# Patient Record
Sex: FEMALE | Race: WHITE | NOT HISPANIC OR LATINO | URBAN - METROPOLITAN AREA
[De-identification: names, ages, dates, MRNs, and addresses within clinical notes are randomized per-mention and may not be internally consistent; named-entity substitution may affect disease eponyms.]

---

## 2017-06-07 ENCOUNTER — OUTPATIENT (OUTPATIENT)
Dept: OUTPATIENT SERVICES | Facility: HOSPITAL | Age: 82
LOS: 1 days | Discharge: HOME | End: 2017-06-07

## 2017-06-07 DIAGNOSIS — S72.009A FRACTURE OF UNSPECIFIED PART OF NECK OF UNSPECIFIED FEMUR, INITIAL ENCOUNTER FOR CLOSED FRACTURE: ICD-10-CM

## 2017-06-07 DIAGNOSIS — L03.90 CELLULITIS, UNSPECIFIED: ICD-10-CM

## 2017-06-07 DIAGNOSIS — E03.9 HYPOTHYROIDISM, UNSPECIFIED: ICD-10-CM

## 2017-06-07 DIAGNOSIS — E27.0 OTHER ADRENOCORTICAL OVERACTIVITY: ICD-10-CM

## 2017-06-07 DIAGNOSIS — M48.00 SPINAL STENOSIS, SITE UNSPECIFIED: ICD-10-CM

## 2017-06-07 DIAGNOSIS — M19.90 UNSPECIFIED OSTEOARTHRITIS, UNSPECIFIED SITE: ICD-10-CM

## 2017-06-28 ENCOUNTER — OUTPATIENT (OUTPATIENT)
Dept: OUTPATIENT SERVICES | Facility: HOSPITAL | Age: 82
LOS: 1 days | Discharge: HOME | End: 2017-06-28

## 2017-06-28 DIAGNOSIS — E53.8 DEFICIENCY OF OTHER SPECIFIED B GROUP VITAMINS: ICD-10-CM

## 2017-06-28 DIAGNOSIS — M19.90 UNSPECIFIED OSTEOARTHRITIS, UNSPECIFIED SITE: ICD-10-CM

## 2017-06-28 DIAGNOSIS — E27.0 OTHER ADRENOCORTICAL OVERACTIVITY: ICD-10-CM

## 2017-06-28 DIAGNOSIS — E03.9 HYPOTHYROIDISM, UNSPECIFIED: ICD-10-CM

## 2017-06-28 DIAGNOSIS — D50.9 IRON DEFICIENCY ANEMIA, UNSPECIFIED: ICD-10-CM

## 2017-06-28 DIAGNOSIS — R79.89 OTHER SPECIFIED ABNORMAL FINDINGS OF BLOOD CHEMISTRY: ICD-10-CM

## 2017-06-28 DIAGNOSIS — N39.0 URINARY TRACT INFECTION, SITE NOT SPECIFIED: ICD-10-CM

## 2017-06-28 DIAGNOSIS — L03.90 CELLULITIS, UNSPECIFIED: ICD-10-CM

## 2017-06-28 DIAGNOSIS — S72.009A FRACTURE OF UNSPECIFIED PART OF NECK OF UNSPECIFIED FEMUR, INITIAL ENCOUNTER FOR CLOSED FRACTURE: ICD-10-CM

## 2017-06-28 DIAGNOSIS — M48.00 SPINAL STENOSIS, SITE UNSPECIFIED: ICD-10-CM

## 2017-06-28 DIAGNOSIS — E55.9 VITAMIN D DEFICIENCY, UNSPECIFIED: ICD-10-CM

## 2017-12-03 ENCOUNTER — EMERGENCY (EMERGENCY)
Facility: HOSPITAL | Age: 82
LOS: 0 days | Discharge: NURSING, REHAB OR LONG-TERM CARE | End: 2017-12-03
Admitting: INTERNAL MEDICINE

## 2017-12-03 DIAGNOSIS — E78.00 PURE HYPERCHOLESTEROLEMIA, UNSPECIFIED: ICD-10-CM

## 2017-12-03 DIAGNOSIS — E27.0 OTHER ADRENOCORTICAL OVERACTIVITY: ICD-10-CM

## 2017-12-03 DIAGNOSIS — T83.038A LEAKAGE OF OTHER URINARY CATHETER, INITIAL ENCOUNTER: ICD-10-CM

## 2017-12-03 DIAGNOSIS — S72.009A FRACTURE OF UNSPECIFIED PART OF NECK OF UNSPECIFIED FEMUR, INITIAL ENCOUNTER FOR CLOSED FRACTURE: ICD-10-CM

## 2017-12-03 DIAGNOSIS — Z79.891 LONG TERM (CURRENT) USE OF OPIATE ANALGESIC: ICD-10-CM

## 2017-12-03 DIAGNOSIS — M48.00 SPINAL STENOSIS, SITE UNSPECIFIED: ICD-10-CM

## 2017-12-03 DIAGNOSIS — L03.90 CELLULITIS, UNSPECIFIED: ICD-10-CM

## 2017-12-03 DIAGNOSIS — M19.90 UNSPECIFIED OSTEOARTHRITIS, UNSPECIFIED SITE: ICD-10-CM

## 2017-12-03 DIAGNOSIS — E03.9 HYPOTHYROIDISM, UNSPECIFIED: ICD-10-CM
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
## 2017-12-08 ENCOUNTER — EMERGENCY (EMERGENCY)
Facility: HOSPITAL | Age: 82
LOS: 0 days | Discharge: HOME | End: 2017-12-08

## 2017-12-08 DIAGNOSIS — E03.9 HYPOTHYROIDISM, UNSPECIFIED: ICD-10-CM
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
## 2017-12-08 DIAGNOSIS — E27.0 OTHER ADRENOCORTICAL OVERACTIVITY: ICD-10-CM

## 2017-12-08 DIAGNOSIS — Z79.899 OTHER LONG TERM (CURRENT) DRUG THERAPY: ICD-10-CM

## 2017-12-08 DIAGNOSIS — T83.511A INFECTION AND INFLAMMATORY REACTION DUE TO INDWELLING URETHRAL CATHETER, INITIAL ENCOUNTER: ICD-10-CM
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
## 2017-12-08 DIAGNOSIS — S72.009A FRACTURE OF UNSPECIFIED PART OF NECK OF UNSPECIFIED FEMUR, INITIAL ENCOUNTER FOR CLOSED FRACTURE: ICD-10-CM

## 2017-12-08 DIAGNOSIS — E78.00 PURE HYPERCHOLESTEROLEMIA, UNSPECIFIED: ICD-10-CM

## 2017-12-08 DIAGNOSIS — N39.0 URINARY TRACT INFECTION, SITE NOT SPECIFIED: ICD-10-CM

## 2017-12-08 DIAGNOSIS — M19.90 UNSPECIFIED OSTEOARTHRITIS, UNSPECIFIED SITE: ICD-10-CM

## 2017-12-08 DIAGNOSIS — L03.90 CELLULITIS, UNSPECIFIED: ICD-10-CM
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
## 2017-12-08 DIAGNOSIS — M48.00 SPINAL STENOSIS, SITE UNSPECIFIED: ICD-10-CM

## 2017-12-22 ENCOUNTER — OUTPATIENT (OUTPATIENT)
Dept: OUTPATIENT SERVICES | Facility: HOSPITAL | Age: 82
LOS: 1 days | Discharge: HOME | End: 2017-12-22

## 2017-12-22 DIAGNOSIS — E27.0 OTHER ADRENOCORTICAL OVERACTIVITY: ICD-10-CM

## 2017-12-22 DIAGNOSIS — N39.0 URINARY TRACT INFECTION, SITE NOT SPECIFIED: ICD-10-CM

## 2017-12-22 DIAGNOSIS — L03.90 CELLULITIS, UNSPECIFIED: ICD-10-CM

## 2017-12-22 DIAGNOSIS — S72.009A FRACTURE OF UNSPECIFIED PART OF NECK OF UNSPECIFIED FEMUR, INITIAL ENCOUNTER FOR CLOSED FRACTURE: ICD-10-CM

## 2017-12-22 DIAGNOSIS — R41.82 ALTERED MENTAL STATUS, UNSPECIFIED: ICD-10-CM

## 2017-12-22 DIAGNOSIS — M19.90 UNSPECIFIED OSTEOARTHRITIS, UNSPECIFIED SITE: ICD-10-CM

## 2017-12-22 DIAGNOSIS — M48.00 SPINAL STENOSIS, SITE UNSPECIFIED: ICD-10-CM

## 2017-12-22 DIAGNOSIS — E03.9 HYPOTHYROIDISM, UNSPECIFIED: ICD-10-CM

## 2018-03-13 ENCOUNTER — OUTPATIENT (OUTPATIENT)
Dept: OUTPATIENT SERVICES | Facility: HOSPITAL | Age: 83
LOS: 1 days | Discharge: HOME | End: 2018-03-13

## 2018-03-13 DIAGNOSIS — R79.89 OTHER SPECIFIED ABNORMAL FINDINGS OF BLOOD CHEMISTRY: ICD-10-CM

## 2018-03-13 DIAGNOSIS — D64.9 ANEMIA, UNSPECIFIED: ICD-10-CM

## 2018-03-20 ENCOUNTER — OUTPATIENT (OUTPATIENT)
Dept: OUTPATIENT SERVICES | Facility: HOSPITAL | Age: 83
LOS: 1 days | Discharge: HOME | End: 2018-03-20

## 2018-03-20 DIAGNOSIS — R79.89 OTHER SPECIFIED ABNORMAL FINDINGS OF BLOOD CHEMISTRY: ICD-10-CM

## 2018-03-20 DIAGNOSIS — D64.9 ANEMIA, UNSPECIFIED: ICD-10-CM

## 2018-03-28 ENCOUNTER — OUTPATIENT (OUTPATIENT)
Dept: OUTPATIENT SERVICES | Facility: HOSPITAL | Age: 83
LOS: 1 days | Discharge: HOME | End: 2018-03-28

## 2018-03-28 DIAGNOSIS — R79.89 OTHER SPECIFIED ABNORMAL FINDINGS OF BLOOD CHEMISTRY: ICD-10-CM

## 2018-05-03 ENCOUNTER — OUTPATIENT (OUTPATIENT)
Dept: OUTPATIENT SERVICES | Facility: HOSPITAL | Age: 83
LOS: 1 days | Discharge: HOME | End: 2018-05-03

## 2018-05-03 DIAGNOSIS — R79.89 OTHER SPECIFIED ABNORMAL FINDINGS OF BLOOD CHEMISTRY: ICD-10-CM

## 2018-06-08 ENCOUNTER — OUTPATIENT (OUTPATIENT)
Dept: OUTPATIENT SERVICES | Facility: HOSPITAL | Age: 83
LOS: 1 days | Discharge: HOME | End: 2018-06-08

## 2018-06-08 DIAGNOSIS — R79.89 OTHER SPECIFIED ABNORMAL FINDINGS OF BLOOD CHEMISTRY: ICD-10-CM

## 2018-07-30 ENCOUNTER — OUTPATIENT (OUTPATIENT)
Dept: OUTPATIENT SERVICES | Facility: HOSPITAL | Age: 83
LOS: 1 days | Discharge: HOME | End: 2018-07-30

## 2018-07-30 DIAGNOSIS — R60.9 EDEMA, UNSPECIFIED: ICD-10-CM

## 2018-07-30 DIAGNOSIS — D64.9 ANEMIA, UNSPECIFIED: ICD-10-CM

## 2018-08-09 ENCOUNTER — OUTPATIENT (OUTPATIENT)
Dept: OUTPATIENT SERVICES | Facility: HOSPITAL | Age: 83
LOS: 1 days | Discharge: HOME | End: 2018-08-09

## 2018-08-09 DIAGNOSIS — Z02.9 ENCOUNTER FOR ADMINISTRATIVE EXAMINATIONS, UNSPECIFIED: ICD-10-CM

## 2018-08-13 ENCOUNTER — OUTPATIENT (OUTPATIENT)
Dept: OUTPATIENT SERVICES | Facility: HOSPITAL | Age: 83
LOS: 1 days | Discharge: HOME | End: 2018-08-13

## 2018-08-13 DIAGNOSIS — D64.9 ANEMIA, UNSPECIFIED: ICD-10-CM

## 2018-08-13 DIAGNOSIS — R60.9 EDEMA, UNSPECIFIED: ICD-10-CM

## 2018-10-02 ENCOUNTER — OUTPATIENT (OUTPATIENT)
Dept: OUTPATIENT SERVICES | Facility: HOSPITAL | Age: 83
LOS: 1 days | Discharge: HOME | End: 2018-10-02

## 2018-10-02 DIAGNOSIS — R79.89 OTHER SPECIFIED ABNORMAL FINDINGS OF BLOOD CHEMISTRY: ICD-10-CM

## 2018-10-02 DIAGNOSIS — D64.9 ANEMIA, UNSPECIFIED: ICD-10-CM

## 2018-11-05 ENCOUNTER — OUTPATIENT (OUTPATIENT)
Dept: OUTPATIENT SERVICES | Facility: HOSPITAL | Age: 83
LOS: 1 days | Discharge: HOME | End: 2018-11-05

## 2018-11-05 DIAGNOSIS — R22.9 LOCALIZED SWELLING, MASS AND LUMP, UNSPECIFIED: ICD-10-CM

## 2018-11-05 DIAGNOSIS — R79.89 OTHER SPECIFIED ABNORMAL FINDINGS OF BLOOD CHEMISTRY: ICD-10-CM

## 2018-11-09 ENCOUNTER — INPATIENT (INPATIENT)
Facility: HOSPITAL | Age: 83
LOS: 11 days | Discharge: NURSING, REHAB OR LONG-TERM CARE | End: 2018-11-21
Attending: INTERNAL MEDICINE | Admitting: INTERNAL MEDICINE
Payer: MEDICARE

## 2018-11-09 VITALS
HEART RATE: 76 BPM | DIASTOLIC BLOOD PRESSURE: 78 MMHG | SYSTOLIC BLOOD PRESSURE: 184 MMHG | OXYGEN SATURATION: 100 % | TEMPERATURE: 99 F | RESPIRATION RATE: 18 BRPM

## 2018-11-09 LAB
ALBUMIN SERPL ELPH-MCNC: 4 G/DL — SIGNIFICANT CHANGE UP (ref 3.5–5.2)
ALP SERPL-CCNC: 100 U/L — SIGNIFICANT CHANGE UP (ref 30–115)
ALT FLD-CCNC: 21 U/L — SIGNIFICANT CHANGE UP (ref 0–41)
ANION GAP SERPL CALC-SCNC: 18 MMOL/L — HIGH (ref 7–14)
APTT BLD: 29.2 SEC — SIGNIFICANT CHANGE UP (ref 27–39.2)
AST SERPL-CCNC: 22 U/L — SIGNIFICANT CHANGE UP (ref 0–41)
BASE EXCESS BLDV CALC-SCNC: 0.2 MMOL/L — SIGNIFICANT CHANGE UP (ref -2–2)
BASOPHILS # BLD AUTO: 0.03 K/UL — SIGNIFICANT CHANGE UP (ref 0–0.2)
BASOPHILS NFR BLD AUTO: 0.2 % — SIGNIFICANT CHANGE UP (ref 0–1)
BILIRUB SERPL-MCNC: 0.5 MG/DL — SIGNIFICANT CHANGE UP (ref 0.2–1.2)
BLD GP AB SCN SERPL QL: SIGNIFICANT CHANGE UP
BUN SERPL-MCNC: 7 MG/DL — LOW (ref 10–20)
CA-I SERPL-SCNC: 1.08 MMOL/L — LOW (ref 1.12–1.3)
CALCIUM SERPL-MCNC: 8.7 MG/DL — SIGNIFICANT CHANGE UP (ref 8.5–10.1)
CHLORIDE SERPL-SCNC: 76 MMOL/L — LOW (ref 98–110)
CO2 SERPL-SCNC: 21 MMOL/L — SIGNIFICANT CHANGE UP (ref 17–32)
CREAT SERPL-MCNC: 0.5 MG/DL — LOW (ref 0.7–1.5)
EOSINOPHIL # BLD AUTO: 0.02 K/UL — SIGNIFICANT CHANGE UP (ref 0–0.7)
EOSINOPHIL NFR BLD AUTO: 0.2 % — SIGNIFICANT CHANGE UP (ref 0–8)
GAS PNL BLDV: 114 MMOL/L — LOW (ref 136–145)
GAS PNL BLDV: SIGNIFICANT CHANGE UP
GLUCOSE SERPL-MCNC: 148 MG/DL — HIGH (ref 70–99)
HCO3 BLDV-SCNC: 25 MMOL/L — SIGNIFICANT CHANGE UP (ref 22–29)
HCT VFR BLD CALC: 31.8 % — LOW (ref 37–47)
HCT VFR BLDA CALC: 37.8 % — SIGNIFICANT CHANGE UP (ref 34–44)
HGB BLD CALC-MCNC: 12.3 G/DL — LOW (ref 14–18)
HGB BLD-MCNC: 11.3 G/DL — LOW (ref 12–16)
IMM GRANULOCYTES NFR BLD AUTO: 0.5 % — HIGH (ref 0.1–0.3)
INR BLD: 1.05 RATIO — SIGNIFICANT CHANGE UP (ref 0.65–1.3)
LACTATE BLDV-MCNC: 3.1 MMOL/L — HIGH (ref 0.5–1.6)
LYMPHOCYTES # BLD AUTO: 1.73 K/UL — SIGNIFICANT CHANGE UP (ref 1.2–3.4)
LYMPHOCYTES # BLD AUTO: 13.3 % — LOW (ref 20.5–51.1)
MCHC RBC-ENTMCNC: 30 PG — SIGNIFICANT CHANGE UP (ref 27–31)
MCHC RBC-ENTMCNC: 35.5 G/DL — SIGNIFICANT CHANGE UP (ref 32–37)
MCV RBC AUTO: 84.4 FL — SIGNIFICANT CHANGE UP (ref 81–99)
MONOCYTES # BLD AUTO: 1.23 K/UL — HIGH (ref 0.1–0.6)
MONOCYTES NFR BLD AUTO: 9.5 % — HIGH (ref 1.7–9.3)
NEUTROPHILS # BLD AUTO: 9.91 K/UL — HIGH (ref 1.4–6.5)
NEUTROPHILS NFR BLD AUTO: 76.3 % — HIGH (ref 42.2–75.2)
NRBC # BLD: 0 /100 WBCS — SIGNIFICANT CHANGE UP (ref 0–0)
PCO2 BLDV: 41 MMHG — SIGNIFICANT CHANGE UP (ref 41–51)
PH BLDV: 7.4 — SIGNIFICANT CHANGE UP (ref 7.26–7.43)
PLATELET # BLD AUTO: 258 K/UL — SIGNIFICANT CHANGE UP (ref 130–400)
PO2 BLDV: 47 MMHG — HIGH (ref 20–40)
POTASSIUM BLDV-SCNC: 3.8 MMOL/L — SIGNIFICANT CHANGE UP (ref 3.3–5.6)
POTASSIUM SERPL-MCNC: 4.2 MMOL/L — SIGNIFICANT CHANGE UP (ref 3.5–5)
POTASSIUM SERPL-SCNC: 4.2 MMOL/L — SIGNIFICANT CHANGE UP (ref 3.5–5)
PROT SERPL-MCNC: 7 G/DL — SIGNIFICANT CHANGE UP (ref 6–8)
PROTHROM AB SERPL-ACNC: 12.1 SEC — SIGNIFICANT CHANGE UP (ref 9.95–12.87)
RBC # BLD: 3.77 M/UL — LOW (ref 4.2–5.4)
RBC # FLD: 13.2 % — SIGNIFICANT CHANGE UP (ref 11.5–14.5)
SAO2 % BLDV: 82 % — SIGNIFICANT CHANGE UP
SODIUM SERPL-SCNC: 115 MMOL/L — CRITICAL LOW (ref 135–146)
TYPE + AB SCN PNL BLD: SIGNIFICANT CHANGE UP
WBC # BLD: 12.98 K/UL — HIGH (ref 4.8–10.8)
WBC # FLD AUTO: 12.98 K/UL — HIGH (ref 4.8–10.8)

## 2018-11-09 RX ORDER — SODIUM CHLORIDE 9 MG/ML
1000 INJECTION INTRAMUSCULAR; INTRAVENOUS; SUBCUTANEOUS ONCE
Qty: 0 | Refills: 0 | Status: COMPLETED | OUTPATIENT
Start: 2018-11-09 | End: 2018-11-09

## 2018-11-09 RX ORDER — ONDANSETRON 8 MG/1
4 TABLET, FILM COATED ORAL ONCE
Qty: 0 | Refills: 0 | Status: COMPLETED | OUTPATIENT
Start: 2018-11-09 | End: 2018-11-09

## 2018-11-09 RX ADMIN — SODIUM CHLORIDE 1000 MILLILITER(S): 9 INJECTION INTRAMUSCULAR; INTRAVENOUS; SUBCUTANEOUS at 23:33

## 2018-11-09 RX ADMIN — ONDANSETRON 4 MILLIGRAM(S): 8 TABLET, FILM COATED ORAL at 21:01

## 2018-11-09 RX ADMIN — SODIUM CHLORIDE 2000 MILLILITER(S): 9 INJECTION INTRAMUSCULAR; INTRAVENOUS; SUBCUTANEOUS at 22:56

## 2018-11-09 NOTE — ED PROVIDER NOTE - OBJECTIVE STATEMENT
89 y/o female with PMH of HLD, hypothyroidism, osteoarthritis, depression s/p right hip replacement who presents to ED for right hip pain s/p fall. Pt states that yesterday she fell while walking with her walker and landed on her buttock. PT states she fell due to her neuropathy. After fall, pt was unable to stand or ambulate. Pts granddaughter called an xray tech to her home who did an xray which found a fracture prompting pt come to ED for evaluation. No head injury. Remembers entire incident. No weakness, chest pain, shortness of breath, abdominal pain.     Orthopedist: Dr. Dixon

## 2018-11-09 NOTE — ED ADULT NURSE NOTE - NSFALLRSKHRMRISKTYPE_ED_ALL_ED
bones(Osteoporosis,prev fx,steroid use,metastatic bone ca)/other/surgery(72hr postop, recent leg amputee, sig. abd/thor surg)

## 2018-11-09 NOTE — CONSULT NOTE ADULT - ASSESSMENT
plan  right periprosthetic distal femur fracture     pain control   dvt proph   non weight bearing knee immobilizer   fu labs   serial cbc   ice to knee   history of swollen right lower ext obtain duplex   compartment checks   nv checks   surgical options discussed with family   will discuss case with attending plan  right periprosthetic distal femur fracture     pain control   dvt proph   non weight bearing knee immobilizer   fu labs   serial cbc   ice to knee   history of swollen right lower ext obtain duplex   compartment checks   nv checks   surgical options discussed with family   will discuss case with attending   pt seen and examined  will likely needed ORIF  when stable  sodium still low

## 2018-11-09 NOTE — ED ADULT NURSE NOTE - OBJECTIVE STATEMENT
pt c/o right hip pain s/p fall. pt recently had right hip replacement, was ambulating with walker and had mechanical fall. has xray out pt and was told she has a fx. Denies head trauma, denies LOC.

## 2018-11-09 NOTE — ED PROVIDER NOTE - ATTENDING CONTRIBUTION TO CARE
89 yo F with h/o HL, hypothyroidism, OA, depression, s/p R hip pinning p/w right hip pain. Pt states that she was walking with her aide yesterday and had two falls. Pt states that the falls were 2/2 to her neuropathy. Pt states that she was able to get up after her fall the first time but after second fall, was unable to get up 2/2 to twisting her right leg. Pt denies any head injury or LOC. Pt is not currently on any anticoagulation. Pt states that she has been unable to ambulate since her second fall. Pt currently c/o pain and swelling to her right thigh and lower leg. a/p: elevated blood pressure, pt appears in mild distress, nontoxic appearing, ncat, perrla, norm cardiac exam, lungs cta b/l, no w/r/r, abd is soft and nt, +shortened and externally rotated right lower leg, +swelling to right knee/upper calf, +soft compartments, +able to faintly palpate right DP, +sensation intact to light touch throughout RLE, able to move toes on right foot, restricted ROM of right knee/hip, will check xr, labs and reassess

## 2018-11-09 NOTE — ED PROVIDER NOTE - NS ED ROS FT
Review of Systems:  •	CONSTITUTIONAL - No fever, No diaphoresis, No weight change  •	SKIN - No rash  •	HEMATOLOGIC - No abnormal bleeding or bruising  •	EYES - No eye pain, No blurred vision  •	ENT - No change in hearing, No sore throat, No neck pain, No rhinorrhea, No ear pain  •	RESPIRATORY - No shortness of breath, No cough  •	CARDIAC -No chest pain, No palpitations  •	GI - No abdominal pain, No nausea, No vomiting, No diarrhea, No constipation, No bright red blood per rectum or melena. No flank pain  •                 - No dysuria, frequency, hematuria.   •	ENDO - No polydypsia, No polyuria, No heat/cold intolerance  •	MUSCULOSKELETAL - No joint paint, No swelling, No back pain. + neuropathy. Ambulate with walker at baseline.  •	NEUROLOGIC - No numbness, No focal weakness, No headache, No dizziness  All other systems negative, unless specified in HPI

## 2018-11-09 NOTE — ED ADULT NURSE NOTE - NSIMPLEMENTINTERV_GEN_ALL_ED
Implemented All Fall with Harm Risk Interventions:  [place] to call system. Call bell, personal items and telephone within reach. Instruct patient to call for assistance. Room bathroom lighting operational. Non-slip footwear when patient is off stretcher. Physically safe environment: no spills, clutter or unnecessary equipment. Stretcher in lowest position, wheels locked, appropriate side rails in place. Provide visual cue, wrist band, yellow gown, etc. Monitor gait and stability. Monitor for mental status changes and reorient to person, place, and time. Review medications for side effects contributing to fall risk. Reinforce activity limits and safety measures with patient and family. Provide visual clues: red socks.

## 2018-11-09 NOTE — CONSULT NOTE ADULT - SUBJECTIVE AND OBJECTIVE BOX
Orthopaedic Surgery Consult Note    For Surgeon:    HPI:  89yFemale  Patient is a 89y old  Female who presents with a chief complaint of twisting injury yesterday and presents with right lower ext pain.    Pt s/p right hip orif 2016 and has long imn.  Pt ambulates with walker.  As per family right lower ext has been swollen       Allergies    No Known Allergies    Intolerances      PAST MEDICAL & SURGICAL HISTORY:  Depression  OA (osteoarthritis)  Hypothyroid  HLD (hyperlipidemia)    MEDICATIONS  (STANDING):    MEDICATIONS  (PRN):      Vital Signs Last 24 Hrs  T(C): 37.1 (09 Nov 2018 19:04), Max: 37.1 (09 Nov 2018 19:04)  T(F): 98.8 (09 Nov 2018 19:04), Max: 98.8 (09 Nov 2018 19:04)  HR: 76 (09 Nov 2018 19:04) (76 - 76)  BP: 184/78 (09 Nov 2018 19:04) (184/78 - 184/78)  BP(mean): --  RR: 18 (09 Nov 2018 19:04) (18 - 18)  SpO2: 100% (09 Nov 2018 19:04) (100% - 100%)                              11.3   12.98 )-----------( 258      ( 09 Nov 2018 20:50 )             31.8     11-09    115<LL>  |  76<L>  |  7<L>  ----------------------------<  148<H>  4.2   |  21  |  0.5<L>    Ca    8.7      09 Nov 2018 20:50    TPro  7.0  /  Alb  4.0  /  TBili  0.5  /  DBili  x   /  AST  22  /  ALT  21  /  AlkPhos  100  11-09    PT/INR - ( 09 Nov 2018 20:50 )   PT: 12.10 sec;   INR: 1.05 ratio         PTT - ( 09 Nov 2018 20:50 )  PTT:29.2 sec  Imaging: right femur: s/p long imn with distal femur fracture     PE: right lower ext: swelling present of entire right lower ext, thigh and calf compressible, not tense, skin intact, df/pf intact sensation intact, pulse not palatable due to swelling, was found on ultrasound by ed staff, cap refill<2 seconds, foot is warm   no pain at ankle moving toes, remainder of pe unremarkable

## 2018-11-09 NOTE — ED PROVIDER NOTE - PROGRESS NOTE DETAILS
Ortho consulted. US at bedside shows +R DP pulsations, though slightly diminished from L DP. Repeat vbg sent 2/2 to hyponatremia noted on labs. bedside sono: + b/l pulses in the le discussed case with ICU, Dr. Boaz Conrad, no need for ICU

## 2018-11-09 NOTE — ED PROVIDER NOTE - MEDICAL DECISION MAKING DETAILS
89 yo F with h/o HL, hypothyroidism, OA, depression, s/p R hip pinning p/w right hip pain. Pt states that she was walking with her aide yesterday and had two falls. Pt diagnosed with distal right femur fracture. Pt seen by ortho. Pt also noted with hyponatremia on labs, not symptomatic at this time. Pt case discussed with ICU, no need for ICU monitoring at this time. Pt stable for admission to medical floor for furthe rmonitoring and ortho intervention. +pulses intact on exam in ED but will need to be monitored

## 2018-11-09 NOTE — ED PROVIDER NOTE - PHYSICAL EXAMINATION
CONSTITUTIONAL: Well-developed; well-nourished; in no acute distress.   SKIN: Warm, dry  HEAD: Normocephalic; atraumatic.  EYES: no conjunctival injection. PERRL.   ENT: No nasal discharge; airway clear.  NECK: Supple; non tender.  CARD: S1, S2 normal; no murmurs, gallops, or rubs. Regular rate and rhythm.   RESP: No wheezes, rales or rhonchi.  ABD: soft ntnd  EXT: Normal ROM.  No clubbing, cyanosis or edema.   : Musa cath in place.   LYMPH: No acute cervical adenopathy.  NEURO: Alert, oriented, grossly unremarkable  PSYCH: Cooperative, appropriate.

## 2018-11-10 DIAGNOSIS — R09.89 OTHER SPECIFIED SYMPTOMS AND SIGNS INVOLVING THE CIRCULATORY AND RESPIRATORY SYSTEMS: ICD-10-CM

## 2018-11-10 DIAGNOSIS — Z96.7 PRESENCE OF OTHER BONE AND TENDON IMPLANTS: Chronic | ICD-10-CM

## 2018-11-10 LAB
ALBUMIN SERPL ELPH-MCNC: 4.1 G/DL — SIGNIFICANT CHANGE UP (ref 3.5–5.2)
ALP SERPL-CCNC: 100 U/L — SIGNIFICANT CHANGE UP (ref 30–115)
ALT FLD-CCNC: 19 U/L — SIGNIFICANT CHANGE UP (ref 0–41)
ANION GAP SERPL CALC-SCNC: 18 MMOL/L — HIGH (ref 7–14)
APPEARANCE UR: ABNORMAL
APTT BLD: 30.3 SEC — SIGNIFICANT CHANGE UP (ref 27–39.2)
AST SERPL-CCNC: 18 U/L — SIGNIFICANT CHANGE UP (ref 0–41)
BASE EXCESS BLDA CALC-SCNC: 1.5 MMOL/L — SIGNIFICANT CHANGE UP (ref -2–2)
BASOPHILS # BLD AUTO: 0.02 K/UL — SIGNIFICANT CHANGE UP (ref 0–0.2)
BASOPHILS NFR BLD AUTO: 0.2 % — SIGNIFICANT CHANGE UP (ref 0–1)
BILIRUB SERPL-MCNC: 0.5 MG/DL — SIGNIFICANT CHANGE UP (ref 0.2–1.2)
BILIRUB UR-MCNC: NEGATIVE — SIGNIFICANT CHANGE UP
BUN SERPL-MCNC: 7 MG/DL — LOW (ref 10–20)
CALCIUM SERPL-MCNC: 8.4 MG/DL — LOW (ref 8.5–10.1)
CHLORIDE SERPL-SCNC: 79 MMOL/L — LOW (ref 98–110)
CHOLEST SERPL-MCNC: 134 MG/DL — SIGNIFICANT CHANGE UP (ref 100–200)
CO2 SERPL-SCNC: 23 MMOL/L — SIGNIFICANT CHANGE UP (ref 17–32)
COLOR SPEC: YELLOW — SIGNIFICANT CHANGE UP
CREAT ?TM UR-MCNC: 26 MG/DL — SIGNIFICANT CHANGE UP
CREAT SERPL-MCNC: 0.5 MG/DL — LOW (ref 0.7–1.5)
DIFF PNL FLD: ABNORMAL
EOSINOPHIL # BLD AUTO: 0.03 K/UL — SIGNIFICANT CHANGE UP (ref 0–0.7)
EOSINOPHIL NFR BLD AUTO: 0.3 % — SIGNIFICANT CHANGE UP (ref 0–8)
ESTIMATED AVERAGE GLUCOSE: 120 MG/DL — HIGH (ref 68–114)
GLUCOSE BLDC GLUCOMTR-MCNC: 149 MG/DL — HIGH (ref 70–99)
GLUCOSE BLDC GLUCOMTR-MCNC: 156 MG/DL — HIGH (ref 70–99)
GLUCOSE SERPL-MCNC: 118 MG/DL — HIGH (ref 70–99)
GLUCOSE UR QL: NEGATIVE MG/DL — SIGNIFICANT CHANGE UP
HBA1C BLD-MCNC: 5.8 % — HIGH (ref 4–5.6)
HCO3 BLDA-SCNC: 26 MMOL/L — SIGNIFICANT CHANGE UP (ref 23–27)
HCT VFR BLD CALC: 30.2 % — LOW (ref 37–47)
HDLC SERPL-MCNC: 51 MG/DL — SIGNIFICANT CHANGE UP
HGB BLD-MCNC: 10.6 G/DL — LOW (ref 12–16)
IMM GRANULOCYTES NFR BLD AUTO: 0.2 % — SIGNIFICANT CHANGE UP (ref 0.1–0.3)
INR BLD: 1.04 RATIO — SIGNIFICANT CHANGE UP (ref 0.65–1.3)
KETONES UR-MCNC: NEGATIVE — SIGNIFICANT CHANGE UP
LACTATE SERPL-SCNC: 1.8 MMOL/L — SIGNIFICANT CHANGE UP (ref 0.5–2.2)
LEUKOCYTE ESTERASE UR-ACNC: ABNORMAL
LIPID PNL WITH DIRECT LDL SERPL: 64 MG/DL — SIGNIFICANT CHANGE UP (ref 4–129)
LYMPHOCYTES # BLD AUTO: 1.55 K/UL — SIGNIFICANT CHANGE UP (ref 1.2–3.4)
LYMPHOCYTES # BLD AUTO: 17.4 % — LOW (ref 20.5–51.1)
MAGNESIUM SERPL-MCNC: 1.8 MG/DL — SIGNIFICANT CHANGE UP (ref 1.8–2.4)
MCHC RBC-ENTMCNC: 29.7 PG — SIGNIFICANT CHANGE UP (ref 27–31)
MCHC RBC-ENTMCNC: 35.1 G/DL — SIGNIFICANT CHANGE UP (ref 32–37)
MCV RBC AUTO: 84.6 FL — SIGNIFICANT CHANGE UP (ref 81–99)
MONOCYTES # BLD AUTO: 1.12 K/UL — HIGH (ref 0.1–0.6)
MONOCYTES NFR BLD AUTO: 12.6 % — HIGH (ref 1.7–9.3)
NEUTROPHILS # BLD AUTO: 6.15 K/UL — SIGNIFICANT CHANGE UP (ref 1.4–6.5)
NEUTROPHILS NFR BLD AUTO: 69.3 % — SIGNIFICANT CHANGE UP (ref 42.2–75.2)
NITRITE UR-MCNC: POSITIVE
OSMOLALITY UR: 276 MOS/KG — SIGNIFICANT CHANGE UP (ref 50–1400)
PCO2 BLDA: 38 MMHG — SIGNIFICANT CHANGE UP (ref 38–42)
PH BLDA: 7.44 — HIGH (ref 7.38–7.42)
PH UR: 6 — SIGNIFICANT CHANGE UP (ref 5–8)
PHOSPHATE SERPL-MCNC: 3.6 MG/DL — SIGNIFICANT CHANGE UP (ref 2.1–4.9)
PLATELET # BLD AUTO: 266 K/UL — SIGNIFICANT CHANGE UP (ref 130–400)
PO2 BLDA: 62 MMHG — LOW (ref 78–95)
POTASSIUM SERPL-MCNC: 4 MMOL/L — SIGNIFICANT CHANGE UP (ref 3.5–5)
POTASSIUM SERPL-SCNC: 4 MMOL/L — SIGNIFICANT CHANGE UP (ref 3.5–5)
PROT SERPL-MCNC: 6.9 G/DL — SIGNIFICANT CHANGE UP (ref 6–8)
PROT UR-MCNC: NEGATIVE MG/DL — SIGNIFICANT CHANGE UP
PROTHROM AB SERPL-ACNC: 12 SEC — SIGNIFICANT CHANGE UP (ref 9.95–12.87)
RBC # BLD: 3.57 M/UL — LOW (ref 4.2–5.4)
RBC # FLD: 13.3 % — SIGNIFICANT CHANGE UP (ref 11.5–14.5)
SAO2 % BLDA: 92 % — LOW (ref 94–98)
SODIUM SERPL-SCNC: 120 MMOL/L — LOW (ref 135–146)
SODIUM UR-SCNC: 63 MMOL/L — SIGNIFICANT CHANGE UP
SP GR SPEC: 1.01 — SIGNIFICANT CHANGE UP (ref 1.01–1.03)
TOTAL CHOLESTEROL/HDL RATIO MEASUREMENT: 2.6 RATIO — LOW (ref 4–5.5)
TRIGL SERPL-MCNC: 160 MG/DL — HIGH (ref 10–149)
TSH SERPL-MCNC: 4.75 UIU/ML — HIGH (ref 0.27–4.2)
TYPE + AB SCN PNL BLD: SIGNIFICANT CHANGE UP
UROBILINOGEN FLD QL: 0.2 MG/DL — SIGNIFICANT CHANGE UP (ref 0.2–0.2)
UUN UR-MCNC: 163 MG/DL — SIGNIFICANT CHANGE UP
WBC # BLD: 8.89 K/UL — SIGNIFICANT CHANGE UP (ref 4.8–10.8)
WBC # FLD AUTO: 8.89 K/UL — SIGNIFICANT CHANGE UP (ref 4.8–10.8)

## 2018-11-10 PROCEDURE — 93970 EXTREMITY STUDY: CPT | Mod: 26

## 2018-11-10 RX ORDER — HEPARIN SODIUM 5000 [USP'U]/ML
5000 INJECTION INTRAVENOUS; SUBCUTANEOUS EVERY 8 HOURS
Qty: 0 | Refills: 0 | Status: DISCONTINUED | OUTPATIENT
Start: 2018-11-10 | End: 2018-11-14

## 2018-11-10 RX ORDER — SIMVASTATIN 20 MG/1
5 TABLET, FILM COATED ORAL AT BEDTIME
Qty: 0 | Refills: 0 | Status: DISCONTINUED | OUTPATIENT
Start: 2018-11-10 | End: 2018-11-14

## 2018-11-10 RX ORDER — ONDANSETRON 8 MG/1
4 TABLET, FILM COATED ORAL ONCE
Qty: 0 | Refills: 0 | Status: COMPLETED | OUTPATIENT
Start: 2018-11-10 | End: 2018-11-10

## 2018-11-10 RX ORDER — LEVOTHYROXINE SODIUM 125 MCG
25 TABLET ORAL DAILY
Qty: 0 | Refills: 0 | Status: DISCONTINUED | OUTPATIENT
Start: 2018-11-10 | End: 2018-11-14

## 2018-11-10 RX ORDER — CEFTRIAXONE 500 MG/1
1 INJECTION, POWDER, FOR SOLUTION INTRAMUSCULAR; INTRAVENOUS EVERY 24 HOURS
Qty: 0 | Refills: 0 | Status: DISCONTINUED | OUTPATIENT
Start: 2018-11-10 | End: 2018-11-14

## 2018-11-10 RX ORDER — SODIUM CHLORIDE 9 MG/ML
1000 INJECTION INTRAMUSCULAR; INTRAVENOUS; SUBCUTANEOUS
Qty: 0 | Refills: 0 | Status: DISCONTINUED | OUTPATIENT
Start: 2018-11-10 | End: 2018-11-10

## 2018-11-10 RX ORDER — CELECOXIB 200 MG/1
200 CAPSULE ORAL AT BEDTIME
Qty: 0 | Refills: 0 | Status: DISCONTINUED | OUTPATIENT
Start: 2018-11-10 | End: 2018-11-14

## 2018-11-10 RX ORDER — MIRTAZAPINE 45 MG/1
15 TABLET, ORALLY DISINTEGRATING ORAL AT BEDTIME
Qty: 0 | Refills: 0 | Status: DISCONTINUED | OUTPATIENT
Start: 2018-11-10 | End: 2018-11-11

## 2018-11-10 RX ORDER — DOCUSATE SODIUM 100 MG
100 CAPSULE ORAL THREE TIMES A DAY
Qty: 0 | Refills: 0 | Status: DISCONTINUED | OUTPATIENT
Start: 2018-11-10 | End: 2018-11-14

## 2018-11-10 RX ORDER — PANTOPRAZOLE SODIUM 20 MG/1
40 TABLET, DELAYED RELEASE ORAL
Qty: 0 | Refills: 0 | Status: DISCONTINUED | OUTPATIENT
Start: 2018-11-10 | End: 2018-11-14

## 2018-11-10 RX ORDER — MORPHINE SULFATE 50 MG/1
2 CAPSULE, EXTENDED RELEASE ORAL EVERY 4 HOURS
Qty: 0 | Refills: 0 | Status: DISCONTINUED | OUTPATIENT
Start: 2018-11-10 | End: 2018-11-14

## 2018-11-10 RX ORDER — SENNA PLUS 8.6 MG/1
2 TABLET ORAL AT BEDTIME
Qty: 0 | Refills: 0 | Status: DISCONTINUED | OUTPATIENT
Start: 2018-11-10 | End: 2018-11-14

## 2018-11-10 RX ORDER — DULOXETINE HYDROCHLORIDE 30 MG/1
20 CAPSULE, DELAYED RELEASE ORAL DAILY
Qty: 0 | Refills: 0 | Status: DISCONTINUED | OUTPATIENT
Start: 2018-11-10 | End: 2018-11-10

## 2018-11-10 RX ORDER — FUROSEMIDE 40 MG
40 TABLET ORAL ONCE
Qty: 0 | Refills: 0 | Status: COMPLETED | OUTPATIENT
Start: 2018-11-10 | End: 2018-11-10

## 2018-11-10 RX ORDER — ONDANSETRON 8 MG/1
4 TABLET, FILM COATED ORAL
Qty: 0 | Refills: 0 | Status: DISCONTINUED | OUTPATIENT
Start: 2018-11-10 | End: 2018-11-14

## 2018-11-10 RX ADMIN — HEPARIN SODIUM 5000 UNIT(S): 5000 INJECTION INTRAVENOUS; SUBCUTANEOUS at 23:29

## 2018-11-10 RX ADMIN — HEPARIN SODIUM 5000 UNIT(S): 5000 INJECTION INTRAVENOUS; SUBCUTANEOUS at 13:29

## 2018-11-10 RX ADMIN — Medication 1 TABLET(S): at 11:52

## 2018-11-10 RX ADMIN — Medication 150 MILLIGRAM(S): at 23:30

## 2018-11-10 RX ADMIN — Medication 100 MILLIGRAM(S): at 13:29

## 2018-11-10 RX ADMIN — DULOXETINE HYDROCHLORIDE 20 MILLIGRAM(S): 30 CAPSULE, DELAYED RELEASE ORAL at 11:52

## 2018-11-10 RX ADMIN — Medication 150 MILLIGRAM(S): at 05:29

## 2018-11-10 RX ADMIN — Medication 150 MILLIGRAM(S): at 13:29

## 2018-11-10 RX ADMIN — MIRTAZAPINE 15 MILLIGRAM(S): 45 TABLET, ORALLY DISINTEGRATING ORAL at 23:30

## 2018-11-10 RX ADMIN — Medication 100 MILLIGRAM(S): at 23:29

## 2018-11-10 RX ADMIN — SIMVASTATIN 5 MILLIGRAM(S): 20 TABLET, FILM COATED ORAL at 23:30

## 2018-11-10 RX ADMIN — Medication 100 MILLIGRAM(S): at 05:28

## 2018-11-10 RX ADMIN — SODIUM CHLORIDE 75 MILLILITER(S): 9 INJECTION INTRAMUSCULAR; INTRAVENOUS; SUBCUTANEOUS at 04:39

## 2018-11-10 RX ADMIN — HEPARIN SODIUM 5000 UNIT(S): 5000 INJECTION INTRAVENOUS; SUBCUTANEOUS at 05:29

## 2018-11-10 RX ADMIN — Medication 25 MICROGRAM(S): at 05:29

## 2018-11-10 RX ADMIN — PANTOPRAZOLE SODIUM 40 MILLIGRAM(S): 20 TABLET, DELAYED RELEASE ORAL at 08:11

## 2018-11-10 RX ADMIN — SODIUM CHLORIDE 75 MILLILITER(S): 9 INJECTION INTRAMUSCULAR; INTRAVENOUS; SUBCUTANEOUS at 07:04

## 2018-11-10 RX ADMIN — CELECOXIB 200 MILLIGRAM(S): 200 CAPSULE ORAL at 23:29

## 2018-11-10 RX ADMIN — Medication 40 MILLIGRAM(S): at 07:03

## 2018-11-10 NOTE — H&P ADULT - HISTORY OF PRESENT ILLNESS
89 year old Female with pertinent history of right hip orif in 2016 presents to the ED after a fall from a standing position while walking with the stroller. She said it resulted in a twisting injury yesterday and when she tried to get up she heard a snap and had a lot of pain in the right lower extremity. She says at baseline she lives home alone, an aide is their to help her with basic needs. A doctor visits her once every 2 months and A nurse visits her every 2 weeks. She currently has no other active complaints and feels fine. She denies any shaking movement while on the floor or loss of bowel or bladder control or tongue bite during the episode of fall.

## 2018-11-10 NOTE — H&P ADULT - NSHPSOCIALHISTORY_GEN_ALL_CORE
non smoker  used to drink wine socially, no longer alcohol abuse  no history of drug abuse non smoker  used to drink wine socially, no longer alcohol   no history of drug abuse

## 2018-11-10 NOTE — CONSULT NOTE ADULT - SUBJECTIVE AND OBJECTIVE BOX
Vascular Surgery Consultation Note    89 year old Female with PMHx of neuropathy, depression, OA, hypothyroid, HLD and pertinent history of right hip orif in 2016 presents to the ED after a fall from a standing position while walking with the stroller. She said it resulted in a twisting injury yesterday and when she tried to get up she heard a snap and had a lot of pain in the right lower extremity. She says at baseline she lives home alone, an aide is their to help her with basic needs. A doctor visits her once every 2 months and A nurse visits her every 2 weeks. She currently has no other active complaints and feels fine. She denies any shaking movement while on the floor or loss of bowel or bladder control or tongue bite during the episode of fall. (10 Nov 2018 00:58)    Ortho is planning for possible surgical intervention when pt's Na is stable. Pt has RLE swelling and ortho recommended venous duplex. Vascular surgery is being consulted for decrease pulses.    Surgery Information  OR time:      EBL:          IV Fluids:       Blood Products:   UOP:        PAST MEDICAL & SURGICAL HISTORY:  Neuropathy  Depression  OA (osteoarthritis)  Hypothyroid  HLD (hyperlipidemia)  S/P ORIF (open reduction internal fixation) fracture    Home Meds: Home Medications:  aspirin 81 mg oral tablet, chewable: 1 tab(s) orally once a day (10 Nov 2018 00:56)  celecoxib 200 mg oral capsule: 1 cap(s) orally once a day (at bedtime) (10 Nov 2018 00:54)  DULoxetine 20 mg oral delayed release capsule: 1 dose(s) orally once a day (at bedtime) (10 Nov 2018 00:51)  levothyroxine 25 mcg (0.025 mg) oral tablet: 1 tab(s) orally once a day (10 Nov 2018 00:53)  Lyrica 150 mg oral capsule: 1 cap(s) orally every 8 hours (10 Nov 2018 00:53)  mirtazapine 15 mg oral tablet: 1 tab(s) orally once a day (at bedtime) (10 Nov 2018 00:55)  omeprazole 20 mg oral delayed release capsule: 1 cap(s) orally once a day (10 Nov 2018 00:52)  simvastatin 5 mg oral tablet: 1 tab(s) orally once a day (at bedtime) (10 Nov 2018 00:52)  Vitamin D3 50,000 intl units oral capsule: 1 cap(s) orally once a week (10 Nov 2018 00:57)    Allergies: Allergies  No Known Allergies    Intolerances  None recorded.    Soc:   Advanced Directives: Presumed Full Code     ROS:    REVIEW OF SYSTEMS    [x] A ten-point review of systems was otherwise negative except as noted.  [ ] Due to altered mental status/intubation, subjective information were not able to be obtained from the patient. History was obtained, to the extent possible, from review of the chart and collateral sources of information.      CURRENT MEDICATIONS:   --------------------------------------------------------------------------------------  Neurologic Medications  celecoxib 200 milliGRAM(s) Oral at bedtime  mirtazapine 15 milliGRAM(s) Oral at bedtime  morphine  - Injectable 2 milliGRAM(s) IV Push every 4 hours PRN Severe Pain (7 - 10)  pregabalin 150 milliGRAM(s) Oral every 8 hours    Gastrointestinal Medications  docusate sodium 100 milliGRAM(s) Oral three times a day  multivitamin 1 Tablet(s) Oral daily  pantoprazole    Tablet 40 milliGRAM(s) Oral before breakfast  senna 2 Tablet(s) Oral at bedtime PRN Constipation    Hematologic/Oncologic Medications  heparin  Injectable 5000 Unit(s) SubCutaneous every 8 hours    Antimicrobial/Immunologic Medications  cefTRIAXone   IVPB 1 Gram(s) IV Intermittent every 24 hours    Endocrine/Metabolic Medications  levothyroxine 25 MICROGram(s) Oral daily  simvastatin 5 milliGRAM(s) Oral at bedtime    VITAL SIGNS, INS/OUTS (last 24 hours):  --------------------------------------------------------------------------------------  ICU Vital Signs Last 24 Hrs  T(C): 35.4 (10 Nov 2018 16:23), Max: 37 (10 Nov 2018 02:13)  T(F): 95.7 (10 Nov 2018 16:23), Max: 98.6 (10 Nov 2018 02:13)  HR: 74 (10 Nov 2018 16:23) (71 - 74)  BP: 133/59 (10 Nov 2018 16:23) (124/60 - 143/67)  RR: 19 (10 Nov 2018 16:23) (18 - 20)  SpO2: 96% (10 Nov 2018 16:23) (96% - 97%)    I&O's Summary    10 Nov 2018 07:01  -  10 Nov 2018 20:38  --------------------------------------------------------  IN: 0 mL / OUT: 1100 mL / NET: -1100 mL    --------------------------------------------------------------------------------------  PHYSICAL EXAM  General: NAD, AAOx3, calm and cooperative  HEENT: NCAT  Cardiac: RRR S1, S2  Respiratory: CTAB, normal respiratory effort  Abdomen: Soft, non-distended, non-tender, +bowel sounds  Musculoskeletal: Swelling to right knee/upper calf w/ restricted ROM of right knee and hip, brace present. Strength 5/5 BL UE, ROM intact, compartments soft  Neuro: grossly intact, No focal deficits  Vascular: Pulses 2+ throughout, extremities well perfused  Skin: Warm/dry, normal color, no jaundice    LABS  --------------------------------------------------------------------------------------  Labs:  CAPILLARY BLOOD GLUCOSE  POCT Blood Glucose.: 156 mg/dL (10 Nov 2018 14:11)               10.6   8.89  )-----------( 266      ( 10 Nov 2018 07:10 )             30.2       Auto Neutrophil %: 69.3 % (11-10-18 @ 07:10)  Auto Immature Granulocyte %: 0.2 % (11-10-18 @ 07:10)  Auto Neutrophil %: 76.3 % (18 @ 20:50)  Auto Immature Granulocyte %: 0.5 % (18 @ 20:50)    11-10    120<L>  |  79<L>  |  7<L>  ----------------------------<  118<H>  4.0   |  23  |  0.5<L>    Calcium, Total Serum: 8.4 mg/dL (11-10-18 @ 07:10)    LFTs:             6.9  | 0.5  | 18       ------------------[100     ( 10 Nov 2018 07:10 )  4.1  | x    | 19          Lactate, Blood: 1.8 mmol/L (11-10-18 @ 07:10)  Blood Gas Arterial, Lactate: 1.4 mmoL/L (11-10-18 @ 06:39)  Blood Gas Venous - Lactate: 3.1 mmoL/L (18 @ 22:37)    ABG - ( 10 Nov 2018 06:39 )  pH: 7.44  /  pCO2: 38    /  pO2: 62    / HCO3: 26    / Base Excess: 1.5   /  SaO2: 92        Coags:     12.00  ----< 1.04    ( 10 Nov 2018 07:10 )     30.3     Urinalysis Basic - ( 10 Nov 2018 06:08 )    Color: Yellow / Appearance: Cloudy / S.010 / pH: x  Gluc: x / Ketone: Negative  / Bili: Negative / Urobili: 0.2 mg/dL   Blood: x / Protein: Negative mg/dL / Nitrite: Positive   Leuk Esterase: Large / RBC: 3-5 /HPF / WBC >50 /HPF   Sq Epi: x / Non Sq Epi: x / Bacteria: Many /HPF      IMAGING RESULTS  < from: Xray Chest 1 View-PORTABLE IMMEDIATE (18 @ 20:40) >  Impression:    No consolidation effusion or pneumothorax.    < from: Xray Pelvis AP only (18 @ 20:41) >  Impression:  No acute displaced fracture or dislocation.    < from: Xray Femur 2 Views, Right (18 @ 20:41) >  Impression:  Comminuted distal diaphyseal femur fracture.    < from: CT Head No Cont (18 @ 23:30) >  Impression:   No evidence of intracranial hemorrhage, territorial infarct, or mass   effect.

## 2018-11-10 NOTE — H&P ADULT - FAMILY HISTORY
No pertinent family history in first degree relatives No pertinent family history in first degree relatives, no family history of heart disease

## 2018-11-10 NOTE — PROVIDER CONTACT NOTE (OTHER) - ACTION/TREATMENT ORDERED:
Md Brown made aware and came to assess patient and review medications. Will continue to monitor, no new orders at this time.

## 2018-11-10 NOTE — PROGRESS NOTE ADULT - ASSESSMENT
89 year old Female with pertinent history of right hip orif in 2016 presents to the ED after a fall from a standing position while walking with the stroller. She said it resulted in a twisting injury yesterday and when she tried to get up she heard a snap and had a lot of pain in the right lower extremity. She says at baseline she lives home alone, an aide is their to help her with basic needs. A doctor visits her once every 2 months and A nurse visits her every 2 weeks. She currently has no other active complaints and feels fine. She denies any shaking movement while on the floor or loss of bowel or bladder control or tongue bite during the episode of fall. (10 Nov 2018 00:58)    #) Fall  Right periprosthetic distal femur fracture as per surgery  2 mg every 4 hours PRN for pain control   DVT prophylaxis with Heparin  Non weight bearing knee immobilizer, apply Ice to knee   Will obtain right lower ext obtain duplex   Regular compartment checks and neurovascular checks   Orthopedic team following    #) Hyponateremia  Low Serum Osmolality around 240  Check Urine Osmolality and Urine Sodium  Will start on Normal Saline at 75 cc/hr  Follow up echo  Currently asymptomatic    Hypothyroidism  Continue levothyroxine    Dyslipidemia  Continue Simvastatin, f/u lipid profile    Depression  Continue home medications    DVT prophylaxis with Heparin and GI prophylaxis with Protonix  Comes from Home  Full Code

## 2018-11-10 NOTE — H&P ADULT - NSHPLABSRESULTS_GEN_ALL_CORE
SUBJECTIVE:    Patient is a 89y old Female who presents with a chief complaint of fall (10 Nov 2018 00:58)    Currently admitted to medicine with the primary diagnosis of Femur fracture     Today is hospital day 1d. This morning she is resting comfortably in bed and reports no new issues or overnight events.     PAST MEDICAL & SURGICAL HISTORY  Depression  OA (osteoarthritis)  Hypothyroid  HLD (hyperlipidemia)  S/P ORIF (open reduction internal fixation) fracture    SOCIAL HISTORY:  Negative for smoking/alcohol/drug use.     ALLERGIES:  No Known Allergies    MEDICATIONS:  STANDING MEDICATIONS  celecoxib 200 milliGRAM(s) Oral at bedtime  DULoxetine 20 milliGRAM(s) Oral daily  levothyroxine 25 MICROGram(s) Oral daily  mirtazapine 15 milliGRAM(s) Oral at bedtime  pantoprazole    Tablet 40 milliGRAM(s) Oral before breakfast  pregabalin 150 milliGRAM(s) Oral every 8 hours  simvastatin 5 milliGRAM(s) Oral at bedtime    PRN MEDICATIONS    VITALS:   T(F): 98.8  HR: 76  BP: 184/78  RR: 18  SpO2: 100%    LABS:                        11.3   12.98 )-----------( 258      ( 09 Nov 2018 20:50 )             31.8     11-09    115<LL>  |  76<L>  |  7<L>  ----------------------------<  148<H>  4.2   |  21  |  0.5<L>    Ca    8.7      09 Nov 2018 20:50    TPro  7.0  /  Alb  4.0  /  TBili  0.5  /  DBili  x   /  AST  22  /  ALT  21  /  AlkPhos  100  11-09    PT/INR - ( 09 Nov 2018 20:50 )   PT: 12.10 sec;   INR: 1.05 ratio         PTT - ( 09 Nov 2018 20:50 )  PTT:29.2 sec              RADIOLOGY:    CT Head No Cont (11.09.18 @ 23:30) >    No evidence of intracranial hemorrhage, territorial infarct, or mass   effect.    Xray Pelvis AP only (11.09.18 @ 20:41) >    No acute displaced fracture or dislocation.

## 2018-11-10 NOTE — PROGRESS NOTE ADULT - SUBJECTIVE AND OBJECTIVE BOX
SUBJECTIVE:    Patient is a 89y old Female who presents with a chief complaint of Fall (10 Nov 2018 20:37)    Currently admitted to medicine with the primary diagnosis of Femur fracture     Today is hospital day 1d. This morning she is resting comfortably in bed and reports no new issues or overnight events.     PAST MEDICAL & SURGICAL HISTORY  Neuropathy  Depression  OA (osteoarthritis)  Hypothyroid  HLD (hyperlipidemia)  S/P ORIF (open reduction internal fixation) fracture    SOCIAL HISTORY:  Negative for smoking/alcohol/drug use.     ALLERGIES:  No Known Allergies    MEDICATIONS:  STANDING MEDICATIONS  cefTRIAXone   IVPB 1 Gram(s) IV Intermittent every 24 hours  celecoxib 200 milliGRAM(s) Oral at bedtime  docusate sodium 100 milliGRAM(s) Oral three times a day  heparin  Injectable 5000 Unit(s) SubCutaneous every 8 hours  levothyroxine 25 MICROGram(s) Oral daily  mirtazapine 15 milliGRAM(s) Oral at bedtime  multivitamin 1 Tablet(s) Oral daily  pantoprazole    Tablet 40 milliGRAM(s) Oral before breakfast  pregabalin 150 milliGRAM(s) Oral every 8 hours  simvastatin 5 milliGRAM(s) Oral at bedtime    PRN MEDICATIONS  morphine  - Injectable 2 milliGRAM(s) IV Push every 4 hours PRN  senna 2 Tablet(s) Oral at bedtime PRN    VITALS:   T(F): 95.7  HR: 74  BP: 133/59  RR: 19  SpO2: 96%    LABS:                        10.6   8.89  )-----------( 266      ( 10 Nov 2018 07:10 )             30.2     11-10    120<L>  |  79<L>  |  7<L>  ----------------------------<  118<H>  4.0   |  23  |  0.5<L>    Ca    8.4<L>      10 Nov 2018 07:10  Phos  3.6     11-10  Mg     1.8     11-10    TPro  6.9  /  Alb  4.1  /  TBili  0.5  /  DBili  x   /  AST  18  /  ALT  19  /  AlkPhos  100  11-10    PT/INR - ( 10 Nov 2018 07:10 )   PT: 12.00 sec;   INR: 1.04 ratio         PTT - ( 10 Nov 2018 07:10 )  PTT:30.3 sec  Urinalysis Basic - ( 10 Nov 2018 06:08 )    Color: Yellow / Appearance: Cloudy / S.010 / pH: x  Gluc: x / Ketone: Negative  / Bili: Negative / Urobili: 0.2 mg/dL   Blood: x / Protein: Negative mg/dL / Nitrite: Positive   Leuk Esterase: Large / RBC: 3-5 /HPF / WBC >50 /HPF   Sq Epi: x / Non Sq Epi: x / Bacteria: Many /HPF      ABG - ( 10 Nov 2018 06:39 )  pH, Arterial: 7.44  pH, Blood: x     /  pCO2: 38    /  pO2: 62    / HCO3: 26    / Base Excess: 1.5   /  SaO2: 92                Lactate, Blood: 1.8 mmol/L (11-10-18 @ 07:10)          RADIOLOGY:    PHYSICAL EXAM:  GEN: No acute distress  LUNGS: Clear to auscultation bilaterally   HEART: S1/S2 present. RRR.   ABD: Soft, non-tender, non-distended. Bowel sounds present  EXT: NC/NC/NE/2+PP/JOE  NEURO: AAOX3

## 2018-11-10 NOTE — CONSULT NOTE ADULT - ASSESSMENT
ASSESSMENT:  89y Female with PMHx of neuropathy, depression, OA, hypothyroid, HLD and pertinent history of right hip ORIF in 2016 presents to the ED after a fall, with above physical exam, labs and imaging studies are consistent with distal femur fracture. Vascular surgery is being consulted for decreased pulses. Ortho is planning for possible surgical intervention when pt's Na is stable. Pt has RLE swelling and ortho recommended venous duplex. Vascular surgery is being consulted for decrease pulses.      PLAN:   -F/U Venous duplex of RLE  -Trend Na  -Pain Control as needed  -Encourage Incentive Spirometer (10x/hour when awake) use  -Continue GI and DVT prophylaxis  -Strict Input and output monitoring  -F/U ortho plan  -Continue care as per primary team    --------------------------------------------------------------------------------------    11-10-18 @ 20:38 ASSESSMENT:  89y Female with PMHx of neuropathy, depression, OA, hypothyroid, HLD and pertinent history of right hip ORIF in 2016 presents to the ED after a fall, with above physical exam, labs and imaging studies are consistent with distal femur fracture. Vascular surgery is being consulted for decreased pulses. Ortho is planning for possible surgical intervention when pt's Na is stable. Pt has RLE swelling and ortho recommended venous duplex. Vascular surgery is being consulted for decrease pulses.      PLAN:     -Pain Control as needed  -Encourage Incentive Spirometer (10x/hour when awake) use  -Continue GI and DVT prophylaxis  -F/U ortho plan  -Continue care as per primary team    --------------------------------------------------------------------------------------    11-10-18 @ 20:38

## 2018-11-10 NOTE — H&P ADULT - ATTENDING COMMENTS
Patient is seen and examined independently at bedside. I agree with the resident's note, history and plan as above. Corrections made where appropriate    All labs, radiologic studies, vitals, orders and medications list reviewed.    Early morning, patient was noted to be hypoxic, Cxray with pleural effusion. She received Lasix 40 with symptomatic improvement.    A: 1. Right femur Fracture       2. Hyponatremia, chronic x many months       3. Hypothyroidism        4. Vitamin D Deficiency        5. Depression         6. +U/A  --As per family at bedside, patient has had an outpatient workup for chronic hyponatremia, which she has had for many months and was unrevealing. Cymbalta can cause hyponatremia. Will hold for now  --f/u TSH  --check LE duplex and f/u vascular evaluation  --case discussed with Ortho, plan for surgery next week if family is agreeable  --c/w diet for now with increased salt, DVT ppx and pain control. NWB to right extremity but may go OOB to chair  --UA positive, will start rocephin, pending Ucx  --c/w Vit D supplementation  --check 2d echo to further risk stratify for OR Patient is seen and examined independently at bedside. I agree with the resident's note, history and plan as above. Corrections made where appropriate    All labs, radiologic studies, vitals, orders and medications list reviewed.    Early morning, patient was noted to be hypoxic, Cxray with pleural effusion. She received Lasix 40 with symptomatic improvement.    A: 1. Right femur Fracture       2. Hyponatremia, chronic x >1 year       3. Hypothyroidism        4. Vitamin D Deficiency        5. Depression         6. +U/A  --Prior records reviewed. Patient has had chronic hyponatremia x more that 1 year and is being followed on an outpatient basis.  Cymbalta can cause hyponatremia. Will hold for now  --f/u TSH  --check LE duplex and f/u vascular evaluation  --case discussed with Ortho, plan for surgery next week if family is agreeable  --c/w diet for now with increased salt, DVT ppx and pain control. NWB to right extremity but may go OOB to chair  --UA positive, will start rocephin, pending Ucx  --c/w Vit D supplementation  --check 2d echo to further risk stratify for OR Patient is seen and examined independently at bedside. I agree with the resident's note, history and plan as above. Corrections made where appropriate    All labs, radiologic studies, vitals, orders and medications list reviewed.    Early morning, patient was noted to be hypoxic, Cxray with pleural effusion. She received Lasix 40 with symptomatic improvement.    A: 1. Right femur Fracture       2. Hyponatremia, chronic x >1 year       3. Hypothyroidism        4. Vitamin D Deficiency        5. Depression         6. +U/A  --Prior records reviewed. Patient has had chronic hyponatremia x more that 1 year and is being followed on an outpatient basis.  Was 114 on 3/10/17. Cymbalta can cause hyponatremia. Will hold for now  --f/u TSH  --check LE duplex and f/u vascular evaluation  --case discussed with Ortho, plan for surgery next week if family is agreeable  --c/w diet for now with increased salt, DVT ppx and pain control. NWB to right extremity but may go OOB to chair  --UA positive, will start rocephin, pending Ucx  --c/w Vit D supplementation  --check 2d echo to further risk stratify for OR

## 2018-11-10 NOTE — PROVIDER CONTACT NOTE (OTHER) - ASSESSMENT
Patient noted with lethargy upon returning from venous duplex testing. vs: 138/59, pulse 70, pox 94% on RA, .

## 2018-11-10 NOTE — H&P ADULT - NSHPPHYSICALEXAM_GEN_ALL_CORE
CONSTITUTIONAL: Well-developed; well-nourished; in no acute distress.   HEAD: Normocephalic; atraumatic.  EYES: no conjunctival injection. PERRL.   ENT: No nasal discharge; airway clear..  CARD: S1, S2 normal RRR  RESP: breath sounds bilateral  ABD: soft, bloated, non tender  EXT: Shortened and slightly externally rotated right lower leg, swelling to right knee/upper calf, soft compartments, sensation intact to light touch throughout RLE, able to move toes on right foot, restricted ROM of right knee and hip, brace present  : Musa cath in place.   NEURO: Alert, oriented, grossly unremarkable  PSYCH: Cooperative, appropriate.

## 2018-11-10 NOTE — H&P ADULT - ASSESSMENT
89 year old female being evaluated s/p fall    Fall  Right periprosthetic distal femur fracture as per surgery  2 mg every 4 hours PRN for pain control   DVT prophylaxis with Heparin  Non weight bearing knee immobilizer, apply Ice to knee   Will obtain right lower ext obtain duplex   Regular compartment checks and neurovascular checks   Orthopedic team following    Hyponateremia  Low Serum Osmolality around 240  Check Urine Osmolality and Urine Sodium  Will start on Normal Saline at 75 cc/hr  Follow up echo  Currently asymptomatic    Dyslipidemia  Continue Simvastatin, f/u lipid profile    Depression  Continue home medications    DVT prophylaxis with Heparin and GI prophylaxis with Protonix  Comes from Home  Full Code 89 year old female being evaluated s/p fall    Fall  Right periprosthetic distal femur fracture as per surgery  2 mg every 4 hours PRN for pain control   DVT prophylaxis with Heparin  Non weight bearing knee immobilizer, apply Ice to knee   Will obtain right lower ext obtain duplex   Regular compartment checks and neurovascular checks   Orthopedic team following    Hyponateremia  Low Serum Osmolality around 240  Check Urine Osmolality and Urine Sodium  Will start on Normal Saline at 75 cc/hr  Follow up echo  Currently asymptomatic    Hypothyroidism  Continue levothyroxine    Dyslipidemia  Continue Simvastatin, f/u lipid profile    Depression  Continue home medications    DVT prophylaxis with Heparin and GI prophylaxis with Protonix  Comes from Home  Full Code

## 2018-11-10 NOTE — CHART NOTE - NSCHARTNOTEFT_GEN_A_CORE
Was informed by the RN patient requiring 2L NC and saturating at 91%. upon admission the pt had no oxygen requirements. I examined the pt at the bedside. Pt resting comfortably in bed in no acute distress and no subjective SOB. Bibasilar crackles were present. CXR from admission shows L sided plural effusion.     -F/U echo  -F/U ABG  -40 mg IV lasix  - F/U repeat CXR Was informed by the RN patient requiring 2L NC and saturating at 91%. upon admission the pt had no oxygen requirements. I examined the pt at the bedside. Pt resting comfortably in bed in no acute distress and no subjective SOB. Bibasilar crackles were present. CXR from admission shows L sided plural effusion.   - DC fluids   -F/U echo  -F/U ABG  -40 mg IV lasix  - F/U repeat CXR

## 2018-11-11 LAB
ANION GAP SERPL CALC-SCNC: 16 MMOL/L — HIGH (ref 7–14)
BASOPHILS # BLD AUTO: 0.07 K/UL — SIGNIFICANT CHANGE UP (ref 0–0.2)
BASOPHILS NFR BLD AUTO: 0.7 % — SIGNIFICANT CHANGE UP (ref 0–1)
BUN SERPL-MCNC: 12 MG/DL — SIGNIFICANT CHANGE UP (ref 10–20)
CALCIUM SERPL-MCNC: 8.7 MG/DL — SIGNIFICANT CHANGE UP (ref 8.5–10.1)
CHLORIDE SERPL-SCNC: 80 MMOL/L — LOW (ref 98–110)
CO2 SERPL-SCNC: 26 MMOL/L — SIGNIFICANT CHANGE UP (ref 17–32)
CREAT SERPL-MCNC: 0.8 MG/DL — SIGNIFICANT CHANGE UP (ref 0.7–1.5)
EOSINOPHIL # BLD AUTO: 0.08 K/UL — SIGNIFICANT CHANGE UP (ref 0–0.7)
EOSINOPHIL NFR BLD AUTO: 0.8 % — SIGNIFICANT CHANGE UP (ref 0–8)
GLUCOSE SERPL-MCNC: 128 MG/DL — HIGH (ref 70–99)
HCT VFR BLD CALC: 28.8 % — LOW (ref 37–47)
HGB BLD-MCNC: 9.8 G/DL — LOW (ref 12–16)
IMM GRANULOCYTES NFR BLD AUTO: 0.3 % — SIGNIFICANT CHANGE UP (ref 0.1–0.3)
LYMPHOCYTES # BLD AUTO: 2.89 K/UL — SIGNIFICANT CHANGE UP (ref 1.2–3.4)
LYMPHOCYTES # BLD AUTO: 29.6 % — SIGNIFICANT CHANGE UP (ref 20.5–51.1)
MAGNESIUM SERPL-MCNC: 1.9 MG/DL — SIGNIFICANT CHANGE UP (ref 1.8–2.4)
MCHC RBC-ENTMCNC: 29.3 PG — SIGNIFICANT CHANGE UP (ref 27–31)
MCHC RBC-ENTMCNC: 34 G/DL — SIGNIFICANT CHANGE UP (ref 32–37)
MCV RBC AUTO: 86 FL — SIGNIFICANT CHANGE UP (ref 81–99)
MONOCYTES # BLD AUTO: 1.81 K/UL — HIGH (ref 0.1–0.6)
MONOCYTES NFR BLD AUTO: 18.6 % — HIGH (ref 1.7–9.3)
NEUTROPHILS # BLD AUTO: 4.87 K/UL — SIGNIFICANT CHANGE UP (ref 1.4–6.5)
NEUTROPHILS NFR BLD AUTO: 50 % — SIGNIFICANT CHANGE UP (ref 42.2–75.2)
NRBC # BLD: 0 /100 WBCS — SIGNIFICANT CHANGE UP (ref 0–0)
PLATELET # BLD AUTO: 255 K/UL — SIGNIFICANT CHANGE UP (ref 130–400)
POTASSIUM SERPL-MCNC: 4 MMOL/L — SIGNIFICANT CHANGE UP (ref 3.5–5)
POTASSIUM SERPL-SCNC: 4 MMOL/L — SIGNIFICANT CHANGE UP (ref 3.5–5)
RBC # BLD: 3.35 M/UL — LOW (ref 4.2–5.4)
RBC # FLD: 13.4 % — SIGNIFICANT CHANGE UP (ref 11.5–14.5)
SODIUM SERPL-SCNC: 122 MMOL/L — LOW (ref 135–146)
WBC # BLD: 9.75 K/UL — SIGNIFICANT CHANGE UP (ref 4.8–10.8)
WBC # FLD AUTO: 9.75 K/UL — SIGNIFICANT CHANGE UP (ref 4.8–10.8)

## 2018-11-11 RX ADMIN — Medication 100 MILLIGRAM(S): at 13:47

## 2018-11-11 RX ADMIN — CEFTRIAXONE 100 GRAM(S): 500 INJECTION, POWDER, FOR SOLUTION INTRAMUSCULAR; INTRAVENOUS at 06:10

## 2018-11-11 RX ADMIN — CELECOXIB 200 MILLIGRAM(S): 200 CAPSULE ORAL at 00:00

## 2018-11-11 RX ADMIN — Medication 100 MILLIGRAM(S): at 21:35

## 2018-11-11 RX ADMIN — PANTOPRAZOLE SODIUM 40 MILLIGRAM(S): 20 TABLET, DELAYED RELEASE ORAL at 06:10

## 2018-11-11 RX ADMIN — HEPARIN SODIUM 5000 UNIT(S): 5000 INJECTION INTRAVENOUS; SUBCUTANEOUS at 21:35

## 2018-11-11 RX ADMIN — Medication 150 MILLIGRAM(S): at 21:35

## 2018-11-11 RX ADMIN — CELECOXIB 200 MILLIGRAM(S): 200 CAPSULE ORAL at 21:37

## 2018-11-11 RX ADMIN — Medication 150 MILLIGRAM(S): at 13:43

## 2018-11-11 RX ADMIN — Medication 1 TABLET(S): at 13:44

## 2018-11-11 RX ADMIN — HEPARIN SODIUM 5000 UNIT(S): 5000 INJECTION INTRAVENOUS; SUBCUTANEOUS at 06:11

## 2018-11-11 RX ADMIN — Medication 100 MILLIGRAM(S): at 06:10

## 2018-11-11 RX ADMIN — HEPARIN SODIUM 5000 UNIT(S): 5000 INJECTION INTRAVENOUS; SUBCUTANEOUS at 13:46

## 2018-11-11 RX ADMIN — SIMVASTATIN 5 MILLIGRAM(S): 20 TABLET, FILM COATED ORAL at 21:35

## 2018-11-11 NOTE — PROGRESS NOTE ADULT - ASSESSMENT
89 year old Female with pertinent history of right hip orif in 2016 presents to the ED after a fall from a standing position while walking with the stroller. She said it resulted in a twisting injury yesterday and when she tried to get up she heard a snap and had a lot of pain in the right lower extremity. She says at baseline she lives home alone, an aide is their to help her with basic needs. A doctor visits her once every 2 months and A nurse visits her every 2 weeks. She currently has no other active complaints and feels fine. She denies any shaking movement while on the floor or loss of bowel or bladder control or tongue bite during the episode of fall.  ·	Right femur Fracture  ·	Hyponatremia, chronic x >1 year  ·	Hypothyroidism  ·	Vitamin D Deficiency  ·	Depression  ·	 +U/A/UTI    --Patient has had chronic hyponatremia x more that 1 year and is being followed on an outpatient basis.  Was 114 on 3/10/17. Cymbalta can cause hyponatremia. Will hold for now  --awaiting today's labs  --f/u TSH  --LE duplex and echo done, pending read  --case discussed with Ortho, plan for surgery next week if family is agreeable  --f/u vascular evaluation  --c/w diet for now with increased salt, DVT ppx and pain control. NWB to right extremity but may go OOB to chair  --c/w Rocephin for UTI, f/u Ucx  --c/w Vit D supplementation  --will consider cardiology evaluation pending echo results 89 year old Female with pertinent history of right hip orif in 2016 presents to the ED after a fall from a standing position while walking with the stroller. She said it resulted in a twisting injury yesterday and when she tried to get up she heard a snap and had a lot of pain in the right lower extremity. She says at baseline she lives home alone, an aide is their to help her with basic needs. A doctor visits her once every 2 months and A nurse visits her every 2 weeks. She currently has no other active complaints and feels fine. She denies any shaking movement while on the floor or loss of bowel or bladder control or tongue bite during the episode of fall.  ·	Right femur Fracture  ·	Hyponatremia, chronic x >1 year  ·	Hypothyroidism  ·	Vitamin D Deficiency  ·	Depression  ·	 +U/A/UTI    --Patient has had chronic hyponatremia x more that 1 year and is being followed on an outpatient basis.  Was 114 on 3/10/17. Cymbalta can cause hyponatremia. Will hold for now  --awaiting today's labs  --f/u TSH  --LE duplex no DVTs b/l LE  --f/u 2d echo  --case discussed with Ortho, plan for surgery next week if family is agreeable  --f/u vascular evaluation  --c/w diet for now with increased salt, DVT ppx and pain control. NWB to right extremity but may go OOB to chair  --c/w Rocephin for UTI, f/u Ucx  --c/w Vit D supplementation  --will consider cardiology evaluation pending echo results

## 2018-11-11 NOTE — PROGRESS NOTE ADULT - SUBJECTIVE AND OBJECTIVE BOX
ASHVIN CUEVAS  89y Female    CHIEF COMPLAINT:    Patient is a 89y old  Female who presents with a chief complaint of fall (10 Nov 2018 21:25)      INTERVAL HPI/OVERNIGHT EVENTS:    Patient seen and examined. Per chart notes, patient was lethargic last night after coming back from vascular. Vital signs were stable and no intervention done. Currently at baseline.     ROS: All other systems are negative.    Vital Signs:    T(F): 97.3 (11-11-18 @ 07:35), Max: 97.3 (11-11-18 @ 07:35)  HR: 70 (11-11-18 @ 07:35) (70 - 74)  BP: 154/67 (11-11-18 @ 07:35) (133/59 - 154/67)  RR: 18 (11-11-18 @ 07:35) (18 - 19)  SpO2: 95% (11-11-18 @ 07:35) (94% - 96%)  I&O's Summary    10 Nov 2018 07:01  -  11 Nov 2018 07:00  --------------------------------------------------------  IN: 0 mL / OUT: 1100 mL / NET: -1100 mL    POCT Blood Glucose.: 149 mg/dL (10 Nov 2018 23:38)  POCT Blood Glucose.: 156 mg/dL (10 Nov 2018 14:11)      PHYSICAL EXAM:    GENERAL:  NAD, resting in bed  SKIN: No rashes or lesions  HEENT: Atraumatic. Normocephalic.   NECK: Supple, No JVD. No lymphadenopathy.  PULMONARY: faint bibasilar crackles. No wheezing. No rales  CVS: Normal S1, S2. Rate and Rhythm are regular. No murmurs.  ABDOMEN/GI: Soft, Nontender, Nondistended; BS present  MSK:  No edema B/L LE. Rkee/leg swollen with decreased ROM  NEUROLOGIC:  No motor or sensory deficit.  PSYCH: Alert & oriented x 2, normal affect    Consultant(s) Notes Reviewed:  [x ] YES  [ ] NO  Care Discussed with Consultants/Other Providers [ x] YES  [ ] NO    LABS:                        10.6   8.89  )-----------( 266      ( 10 Nov 2018 07:10 )             30.2     11-10    120<L>  |  79<L>  |  7<L>  ----------------------------<  118<H>  4.0   |  23  |  0.5<L>    Ca    8.4<L>      10 Nov 2018 07:10  Phos  3.6     11-10  Mg     1.8     11-10    TPro  6.9  /  Alb  4.1  /  TBili  0.5  /  DBili  x   /  AST  18  /  ALT  19  /  AlkPhos  100  11-10    PT/INR - ( 10 Nov 2018 07:10 )   PT: 12.00 sec;   INR: 1.04 ratio      PTT - ( 10 Nov 2018 07:10 )  PTT:30.3 sec    RADIOLOGY & ADDITIONAL TESTS:  < from: Xray Femur 2 Views, Right (11.09.18 @ 20:41) >  Comminuted distal diaphyseal femur fracture.    < end of copied text >      Imaging or report Personally Reviewed:  [x] YES  [ ] NO      Medications:  Standing  cefTRIAXone   IVPB 1 Gram(s) IV Intermittent every 24 hours  celecoxib 200 milliGRAM(s) Oral at bedtime  docusate sodium 100 milliGRAM(s) Oral three times a day  heparin  Injectable 5000 Unit(s) SubCutaneous every 8 hours  levothyroxine 25 MICROGram(s) Oral daily  mirtazapine 15 milliGRAM(s) Oral at bedtime  multivitamin 1 Tablet(s) Oral daily  pantoprazole    Tablet 40 milliGRAM(s) Oral before breakfast  pregabalin 150 milliGRAM(s) Oral every 8 hours  simvastatin 5 milliGRAM(s) Oral at bedtime    PRN Meds  morphine  - Injectable 2 milliGRAM(s) IV Push every 4 hours PRN  ondansetron    Tablet 4 milliGRAM(s) Oral two times a day PRN  senna 2 Tablet(s) Oral at bedtime PRN

## 2018-11-11 NOTE — PATIENT PROFILE ADULT - NSPROCHRONICPAINLOC_GEN_A_NUR
abdomen/residual limb, RUE/ankle/heel/Bilateral:/cervical spine/residual limb, LUE/leg/knee/lower/foot

## 2018-11-11 NOTE — PATIENT PROFILE ADULT - STATED REASON FOR ADMISSION
Pt. states she was using her walker and she felt she was going to fall so she slowly sat down on the floor. She felt and heard a crack in the back of the right leg.

## 2018-11-12 LAB
ANION GAP SERPL CALC-SCNC: 17 MMOL/L — HIGH (ref 7–14)
BASOPHILS # BLD AUTO: 0.09 K/UL — SIGNIFICANT CHANGE UP (ref 0–0.2)
BASOPHILS NFR BLD AUTO: 0.8 % — SIGNIFICANT CHANGE UP (ref 0–1)
BUN SERPL-MCNC: 12 MG/DL — SIGNIFICANT CHANGE UP (ref 10–20)
CALCIUM SERPL-MCNC: 9.2 MG/DL — SIGNIFICANT CHANGE UP (ref 8.5–10.1)
CHLORIDE SERPL-SCNC: 84 MMOL/L — LOW (ref 98–110)
CO2 SERPL-SCNC: 24 MMOL/L — SIGNIFICANT CHANGE UP (ref 17–32)
CREAT SERPL-MCNC: 0.6 MG/DL — LOW (ref 0.7–1.5)
EOSINOPHIL # BLD AUTO: 0.07 K/UL — SIGNIFICANT CHANGE UP (ref 0–0.7)
EOSINOPHIL NFR BLD AUTO: 0.6 % — SIGNIFICANT CHANGE UP (ref 0–8)
GLUCOSE SERPL-MCNC: 116 MG/DL — HIGH (ref 70–99)
HCT VFR BLD CALC: 29.5 % — LOW (ref 37–47)
HGB BLD-MCNC: 10.4 G/DL — LOW (ref 12–16)
IMM GRANULOCYTES NFR BLD AUTO: 0.3 % — SIGNIFICANT CHANGE UP (ref 0.1–0.3)
LYMPHOCYTES # BLD AUTO: 2.57 K/UL — SIGNIFICANT CHANGE UP (ref 1.2–3.4)
LYMPHOCYTES # BLD AUTO: 22.1 % — SIGNIFICANT CHANGE UP (ref 20.5–51.1)
MAGNESIUM SERPL-MCNC: 2 MG/DL — SIGNIFICANT CHANGE UP (ref 1.8–2.4)
MCHC RBC-ENTMCNC: 30 PG — SIGNIFICANT CHANGE UP (ref 27–31)
MCHC RBC-ENTMCNC: 35.3 G/DL — SIGNIFICANT CHANGE UP (ref 32–37)
MCV RBC AUTO: 85 FL — SIGNIFICANT CHANGE UP (ref 81–99)
MONOCYTES # BLD AUTO: 1.54 K/UL — HIGH (ref 0.1–0.6)
MONOCYTES NFR BLD AUTO: 13.2 % — HIGH (ref 1.7–9.3)
NEUTROPHILS # BLD AUTO: 7.34 K/UL — HIGH (ref 1.4–6.5)
NEUTROPHILS NFR BLD AUTO: 63 % — SIGNIFICANT CHANGE UP (ref 42.2–75.2)
NRBC # BLD: 0 /100 WBCS — SIGNIFICANT CHANGE UP (ref 0–0)
OSMOLALITY UR: 148 MOS/KG — SIGNIFICANT CHANGE UP (ref 50–1400)
PLATELET # BLD AUTO: 297 K/UL — SIGNIFICANT CHANGE UP (ref 130–400)
POTASSIUM SERPL-MCNC: 3.9 MMOL/L — SIGNIFICANT CHANGE UP (ref 3.5–5)
POTASSIUM SERPL-SCNC: 3.9 MMOL/L — SIGNIFICANT CHANGE UP (ref 3.5–5)
RBC # BLD: 3.47 M/UL — LOW (ref 4.2–5.4)
RBC # FLD: 13.3 % — SIGNIFICANT CHANGE UP (ref 11.5–14.5)
SODIUM SERPL-SCNC: 125 MMOL/L — LOW (ref 135–146)
SODIUM UR-SCNC: <20 MMOL/L — SIGNIFICANT CHANGE UP
WBC # BLD: 11.65 K/UL — HIGH (ref 4.8–10.8)
WBC # FLD AUTO: 11.65 K/UL — HIGH (ref 4.8–10.8)

## 2018-11-12 PROCEDURE — 93925 LOWER EXTREMITY STUDY: CPT | Mod: 26

## 2018-11-12 PROCEDURE — 99222 1ST HOSP IP/OBS MODERATE 55: CPT

## 2018-11-12 RX ADMIN — HEPARIN SODIUM 5000 UNIT(S): 5000 INJECTION INTRAVENOUS; SUBCUTANEOUS at 13:05

## 2018-11-12 RX ADMIN — PANTOPRAZOLE SODIUM 40 MILLIGRAM(S): 20 TABLET, DELAYED RELEASE ORAL at 05:50

## 2018-11-12 RX ADMIN — Medication 1 TABLET(S): at 11:29

## 2018-11-12 RX ADMIN — Medication 100 MILLIGRAM(S): at 13:05

## 2018-11-12 RX ADMIN — Medication 150 MILLIGRAM(S): at 05:54

## 2018-11-12 RX ADMIN — Medication 150 MILLIGRAM(S): at 13:05

## 2018-11-12 RX ADMIN — Medication 25 MICROGRAM(S): at 05:50

## 2018-11-12 RX ADMIN — CELECOXIB 200 MILLIGRAM(S): 200 CAPSULE ORAL at 21:37

## 2018-11-12 RX ADMIN — Medication 100 MILLIGRAM(S): at 21:37

## 2018-11-12 RX ADMIN — HEPARIN SODIUM 5000 UNIT(S): 5000 INJECTION INTRAVENOUS; SUBCUTANEOUS at 21:37

## 2018-11-12 RX ADMIN — CEFTRIAXONE 100 GRAM(S): 500 INJECTION, POWDER, FOR SOLUTION INTRAMUSCULAR; INTRAVENOUS at 05:49

## 2018-11-12 RX ADMIN — Medication 150 MILLIGRAM(S): at 21:37

## 2018-11-12 RX ADMIN — Medication 100 MILLIGRAM(S): at 05:50

## 2018-11-12 RX ADMIN — SIMVASTATIN 5 MILLIGRAM(S): 20 TABLET, FILM COATED ORAL at 21:37

## 2018-11-12 RX ADMIN — HEPARIN SODIUM 5000 UNIT(S): 5000 INJECTION INTRAVENOUS; SUBCUTANEOUS at 05:51

## 2018-11-12 NOTE — PROGRESS NOTE ADULT - SUBJECTIVE AND OBJECTIVE BOX
Hospital Day:  3d    Subjective:      Past Medical Hx:   Neuropathy  Depression  OA (osteoarthritis)  Hypothyroid  HLD (hyperlipidemia)    Past Sx:  S/P ORIF (open reduction internal fixation) fracture    Allergies:  No Known Allergies    Current Meds:   Standng Meds:  cefTRIAXone   IVPB 1 Gram(s) IV Intermittent every 24 hours  celecoxib 200 milliGRAM(s) Oral at bedtime  docusate sodium 100 milliGRAM(s) Oral three times a day  heparin  Injectable 5000 Unit(s) SubCutaneous every 8 hours  levothyroxine 25 MICROGram(s) Oral daily  multivitamin 1 Tablet(s) Oral daily  pantoprazole    Tablet 40 milliGRAM(s) Oral before breakfast  pregabalin 150 milliGRAM(s) Oral every 8 hours  simvastatin 5 milliGRAM(s) Oral at bedtime    PRN Meds:  morphine  - Injectable 2 milliGRAM(s) IV Push every 4 hours PRN Severe Pain (7 - 10)  ondansetron    Tablet 4 milliGRAM(s) Oral two times a day PRN Nausea and/or Vomiting  senna 2 Tablet(s) Oral at bedtime PRN Constipation    Vital Signs:   T(F): 97.4 (18 @ 08:00), Max: 98.3 (18 @ 11:30)  HR: 75 (18 @ 08:00) (66 - 76)  BP: 101/60 (18 @ 08:00) (101/60 - 140/64)  RR: 18 (18 @ 08:00) (18 - 18)  SpO2: --      18 @ 07:01  -  18 @ 07:00  --------------------------------------------------------  IN: 210 mL / OUT: 2700 mL / NET: -2490 mL        Physical Exam:   GENERAL: NAD, Confused; pleasant and conversive. Appearing younger than stated age   HEENT: NCAT, PERRLA, EOMI   CHEST/LUNG: CTA No wheezing/rhonchi/rales   HEART: Regular rate and rhythm; s1 s2 appreciated, No murmurs, rubs, or gallops  ABDOMEN: Soft, Nontender, Nondistended; Bowel sounds present  EXTREMITIES: No LE edema b/l, Muscle strength 3/5 in RLE, 5/5 in bilateral UE and LLE, Mild swelling in right lower extremity   NERVOUS SYSTEM:  Alert & Oriented X2 (person and place, not time), sensation equal and intact bilaterally       Labs:                         10.4   11.65 )-----------( 297      ( 2018 06:00 )             29.5     Neutophil% 63.0, Lymphocyte% 22.1, Monocyte% 13.2, Bands% 0.3 18 @ 06:00    2018 06:00    125    |  84     |  12     ----------------------------<  116    3.9     |  24     |  0.6      Ca    9.2        2018 06:00  Mg     2.0       2018 06:00      Hemoglobin A1C, Whole Blood: 5.8 % (11-10-18 @ 07:10)    Urinalysis Basic - ( 10 Nov 2018 06:08 )    Color: Yellow / Appearance: Cloudy / S.010 / pH: x  Gluc: x / Ketone: Negative  / Bili: Negative / Urobili: 0.2 mg/dL   Blood: x / Protein: Negative mg/dL / Nitrite: Positive   Leuk Esterase: Large / RBC: 3-5 /HPF / WBC >50 /HPF   Sq Epi: x / Non Sq Epi: x / Bacteria: Many /HPF    Radiology:       Assessment and Plan:   This is a 89 year old female with PMHx of right hip ORIF in 2016, Vit D deficiency, Hypothyroid, hyponatremia who presented to the ED after enduring a twisting injury to her right leg with an audible snap and severe pain. Patient found to have a a right periprosthetic distal femur fracture.     #Right Periprosthetic Distal Femur Fracture  - Seen by surgery: plan for surgery this week if family is agreeable   - Vascular surgery: duplex of RLE negative, Trend NA,   - OOBTC with non-weight bearing to RLE  - Pain regimen: 2mg Morphine q4 PRN     #Hyponatremia:   - Sodium increasing 115 -> 120 -> 122 -> 125   - Continue on increased sodium diet  - Continue on NS at 75cc   - Urine osmolality and urine sodium pending   - Echo normal with grade 1 diastolic dysfunction     #UTI  - Continue on Rocephin  - Urine culture sent; results pending     #Hypothyroidism  - Continue on Levothyroxine	  - TSH ordered and pending    #Depression  - Continue on home medication     Activity: OOBTC with nonweightbearing on RLE  Diet: Mechanical Soft  DVT ppx: Heparin SubQ  GI ppx: Not indicated   Code Status: full code   DISPO: Plan for surgery this week? Hospital Day:  3d    Subjective:  No overnight events. Seen and examined at bedside. Patient was confused and kept stating that she had people behind her and felt [place] was [place]. No fevers, chills, nausea, vomiting, diarrhea, chest pain, shortness of breath. Last reported bowel movement was between  and .     Past Medical Hx:   Neuropathy  Depression  OA (osteoarthritis)  Hypothyroid  HLD (hyperlipidemia)    Past Sx:  S/P ORIF (open reduction internal fixation) fracture    Allergies:  No Known Allergies    Current Meds:   Standng Meds:  cefTRIAXone   IVPB 1 Gram(s) IV Intermittent every 24 hours  celecoxib 200 milliGRAM(s) Oral at bedtime  docusate sodium 100 milliGRAM(s) Oral three times a day  heparin  Injectable 5000 Unit(s) SubCutaneous every 8 hours  levothyroxine 25 MICROGram(s) Oral daily  multivitamin 1 Tablet(s) Oral daily  pantoprazole    Tablet 40 milliGRAM(s) Oral before breakfast  pregabalin 150 milliGRAM(s) Oral every 8 hours  simvastatin 5 milliGRAM(s) Oral at bedtime    PRN Meds:  morphine  - Injectable 2 milliGRAM(s) IV Push every 4 hours PRN Severe Pain (7 - 10)  ondansetron    Tablet 4 milliGRAM(s) Oral two times a day PRN Nausea and/or Vomiting  senna 2 Tablet(s) Oral at bedtime PRN Constipation    Vital Signs:   T(F): 97.4 (18 @ 08:00), Max: 98.3 (18 @ 11:30)  HR: 75 (18 @ 08:00) (66 - 76)  BP: 101/60 (18 @ 08:00) (101/60 - 140/64)  RR: 18 (18 @ 08:00) (18 - 18)  SpO2: --      18 @ 07:01  -  18 @ 07:00  --------------------------------------------------------  IN: 210 mL / OUT: 2700 mL / NET: -2490 mL        Physical Exam:   GENERAL: NAD, Confused; pleasant and conversive. Appearing younger than stated age   HEENT: NCAT, PERRLA, EOMI   CHEST/LUNG: CTA No wheezing/rhonchi/rales   HEART: Regular rate and rhythm; s1 s2 appreciated, No murmurs, rubs, or gallops  ABDOMEN: Soft, Nontender, Nondistended; Bowel sounds present  EXTREMITIES: No LE edema b/l, Muscle strength 3/5 in RLE, 5/5 in bilateral UE and LLE, Mild swelling in right lower extremity   NERVOUS SYSTEM:  Alert & Oriented X2 (person and place, not time), sensation equal and intact bilaterally       Labs:                         10.4   11.65 )-----------( 297      ( 2018 06:00 )             29.5     Neutophil% 63.0, Lymphocyte% 22.1, Monocyte% 13.2, Bands% 0.3 18 @ 06:00    2018 06:00    125    |  84     |  12     ----------------------------<  116    3.9     |  24     |  0.6      Ca    9.2        2018 06:00  Mg     2.0       2018 06:00      Hemoglobin A1C, Whole Blood: 5.8 % (11-10-18 @ 07:10)    Urinalysis Basic - ( 10 Nov 2018 06:08 )    Color: Yellow / Appearance: Cloudy / S.010 / pH: x  Gluc: x / Ketone: Negative  / Bili: Negative / Urobili: 0.2 mg/dL   Blood: x / Protein: Negative mg/dL / Nitrite: Positive   Leuk Esterase: Large / RBC: 3-5 /HPF / WBC >50 /HPF   Sq Epi: x / Non Sq Epi: x / Bacteria: Many /HPF    Radiology:       Assessment and Plan:   This is a 89 year old female with PMHx of right hip ORIF in 2016, Vit D deficiency, Hypothyroid, hyponatremia who presented to the ED after enduring a twisting injury to her right leg with an audible snap and severe pain. Patient found to have a a right periprosthetic distal femur fracture.     #Right Periprosthetic Distal Femur Fracture  - Seen by surgery: plan for surgery this week if family is agreeable   - Vascular surgery: duplex of RLE negative, Trend NA,   - OOBTC with non-weight bearing to RLE  - Pain regimen: 2mg Morphine q4 PRN     #Hyponatremia:   - Sodium increasing 115 -> 120 -> 122 -> 125   - Continue on increased sodium diet  - Urine osmolality and urine sodium today   - Echo normal with grade 1 diastolic dysfunction     #UTI  - Continue on Rocephin  - Urine culture sent; results pending     #Hypothyroidism  - Continue on Levothyroxine	  - TSH ordered and pending    #Depression  - Continue on home medication     Activity: OOBTC with nonweightbearing on RLE  Diet: Mechanical Soft  DVT ppx: Heparin SubQ  GI ppx: Not indicated   Code Status: full code   DISPO: Plan for surgery this week? Spoke with family (daughters and granddaughters) at length regarding the patient's disorientation. Explained the leading causes of delirium with the family and our plan to start on a bowel regimen for constipation, continue treatment for a UTI and constant reorientation with day/night exposure. Family understandable. Spoke with attending regarding the need for medical attending clearance for orthopedic surgery. Hospital Day:  3d    Subjective:  No overnight events. Seen and examined at bedside. Patient was confused and kept stating that she had people behind her and felt that she was in the [place]. No fevers, chills, nausea, vomiting, diarrhea, chest pain, shortness of breath. Last reported bowel movement was between  and .     Past Medical Hx:   Neuropathy  Depression  OA (osteoarthritis)  Hypothyroid  HLD (hyperlipidemia)    Past Sx:  S/P ORIF (open reduction internal fixation) fracture    Allergies:  No Known Allergies    Current Meds:   Standng Meds:  cefTRIAXone   IVPB 1 Gram(s) IV Intermittent every 24 hours  celecoxib 200 milliGRAM(s) Oral at bedtime  docusate sodium 100 milliGRAM(s) Oral three times a day  heparin  Injectable 5000 Unit(s) SubCutaneous every 8 hours  levothyroxine 25 MICROGram(s) Oral daily  multivitamin 1 Tablet(s) Oral daily  pantoprazole    Tablet 40 milliGRAM(s) Oral before breakfast  pregabalin 150 milliGRAM(s) Oral every 8 hours  simvastatin 5 milliGRAM(s) Oral at bedtime    PRN Meds:  morphine  - Injectable 2 milliGRAM(s) IV Push every 4 hours PRN Severe Pain (7 - 10)  ondansetron    Tablet 4 milliGRAM(s) Oral two times a day PRN Nausea and/or Vomiting  senna 2 Tablet(s) Oral at bedtime PRN Constipation    Vital Signs:   T(F): 97.4 (18 @ 08:00), Max: 98.3 (18 @ 11:30)  HR: 75 (18 @ 08:00) (66 - 76)  BP: 101/60 (18 @ 08:00) (101/60 - 140/64)  RR: 18 (18 @ 08:00) (18 - 18)  SpO2: --      18 @ 07:01  -  18 @ 07:00  --------------------------------------------------------  IN: 210 mL / OUT: 2700 mL / NET: -2490 mL        Physical Exam:   GENERAL: NAD, Confused; pleasant and conversive. Appearing younger than stated age   HEENT: NCAT, PERRLA, EOMI   CHEST/LUNG: CTA No wheezing/rhonchi/rales   HEART: Regular rate and rhythm; s1 s2 appreciated, No murmurs, rubs, or gallops  ABDOMEN: Soft, Nontender, Nondistended; Bowel sounds present  EXTREMITIES: No LE edema b/l, Muscle strength 3/5 in RLE, 5/5 in bilateral UE and LLE, Mild swelling in right lower extremity   NERVOUS SYSTEM:  Alert & Oriented X2 (person and place, not time), sensation equal and intact bilaterally       Labs:                         10.4   11.65 )-----------( 297      ( 2018 06:00 )             29.5     Neutophil% 63.0, Lymphocyte% 22.1, Monocyte% 13.2, Bands% 0.3 18 @ 06:00    2018 06:00    125    |  84     |  12     ----------------------------<  116    3.9     |  24     |  0.6      Ca    9.2        2018 06:00  Mg     2.0       2018 06:00      Hemoglobin A1C, Whole Blood: 5.8 % (11-10-18 @ 07:10)    Urinalysis Basic - ( 10 Nov 2018 06:08 )    Color: Yellow / Appearance: Cloudy / S.010 / pH: x  Gluc: x / Ketone: Negative  / Bili: Negative / Urobili: 0.2 mg/dL   Blood: x / Protein: Negative mg/dL / Nitrite: Positive   Leuk Esterase: Large / RBC: 3-5 /HPF / WBC >50 /HPF   Sq Epi: x / Non Sq Epi: x / Bacteria: Many /HPF    Radiology:   None Today     Assessment and Plan:   This is a 89 year old female with PMHx of right hip ORIF in 2016, Vit D deficiency, Hypothyroid, hyponatremia who presented to the ED after enduring a twisting injury to her right leg with an audible snap and severe pain. Patient found to have a a right periprosthetic distal femur fracture.     #Right Periprosthetic Distal Femur Fracture  - Seen by surgery: plan for surgery this week if family is agreeable   - Vascular surgery: duplex of RLE negative, Trend NA,   - OOBTC with non-weight bearing to RLE  - Pain regimen: 2mg Morphine q4 PRN     #Hyponatremia:   - Sodium increasing 115 -> 120 -> 122 -> 125   - Continue on increased sodium diet  - Urine osmolality and urine sodium today   - Echo normal with grade 1 diastolic dysfunction     #UTI  - Continue on Rocephin  - Urine culture sent; results pending     #Hypothyroidism  - Continue on Levothyroxine	  - TSH ordered and pending    #Depression  - Continue on home medication     #Constipation  - Continue on colace and senna  - Tap water enema     Activity: OOBTC with nonweightbearing on RLE  Diet: Mechanical Soft  DVT ppx: Heparin SubQ  GI ppx: Not indicated   Code Status: full code   DISPO: Plan for surgery this week? Spoke with family (daughters and granddaughters) at length regarding the patient's disorientation. Explained the leading causes of delirium with the family and our plan to start on a bowel regimen for constipation, continue treatment for a UTI and constant reorientation with day/night exposure. Family understandable. Spoke with attending regarding the need for medical attending clearance for orthopedic surgery.

## 2018-11-12 NOTE — PROGRESS NOTE ADULT - ATTENDING COMMENTS
Patient seen and examined independently. I agree with the resident's note, physical exam, and plan except as below.  Vital Signs Last 24 Hrs  T(C): 36.5 (12 Nov 2018 15:40), Max: 36.6 (12 Nov 2018 00:18)  T(F): 97.7 (12 Nov 2018 15:40), Max: 97.9 (12 Nov 2018 00:18)  HR: 92 (12 Nov 2018 15:40) (75 - 92)  BP: 119/66 (12 Nov 2018 15:40) (101/60 - 140/64)  BP(mean): --  RR: 18 (12 Nov 2018 15:40) (18 - 18)  SpO2: --  PE:  aaox2 -person, place (not time)  peerla, eomi  s1s2rr  ctabl  softntnd+bs  rLE edema , immobilzer, ext rotated      # fall - Right femur fracture awaiting surgery when clinically improved  #delerium - metabolic enceph - multifactorial - UTI, fracture, hospitalization   -check Ucx - pt has indiwelling chronic alicea   #hyponatremia -improved - 125 - was 115 - renal eval requested by family  cymbalta held  discussed with HS and family at bedside

## 2018-11-13 RX ADMIN — Medication 150 MILLIGRAM(S): at 21:40

## 2018-11-13 RX ADMIN — Medication 100 MILLIGRAM(S): at 05:47

## 2018-11-13 RX ADMIN — Medication 1 TABLET(S): at 13:50

## 2018-11-13 RX ADMIN — HEPARIN SODIUM 5000 UNIT(S): 5000 INJECTION INTRAVENOUS; SUBCUTANEOUS at 05:47

## 2018-11-13 RX ADMIN — CEFTRIAXONE 100 GRAM(S): 500 INJECTION, POWDER, FOR SOLUTION INTRAMUSCULAR; INTRAVENOUS at 05:46

## 2018-11-13 RX ADMIN — Medication 150 MILLIGRAM(S): at 05:46

## 2018-11-13 RX ADMIN — SIMVASTATIN 5 MILLIGRAM(S): 20 TABLET, FILM COATED ORAL at 21:40

## 2018-11-13 RX ADMIN — HEPARIN SODIUM 5000 UNIT(S): 5000 INJECTION INTRAVENOUS; SUBCUTANEOUS at 21:40

## 2018-11-13 RX ADMIN — CELECOXIB 200 MILLIGRAM(S): 200 CAPSULE ORAL at 21:40

## 2018-11-13 RX ADMIN — PANTOPRAZOLE SODIUM 40 MILLIGRAM(S): 20 TABLET, DELAYED RELEASE ORAL at 05:47

## 2018-11-13 RX ADMIN — Medication 150 MILLIGRAM(S): at 13:52

## 2018-11-13 RX ADMIN — HEPARIN SODIUM 5000 UNIT(S): 5000 INJECTION INTRAVENOUS; SUBCUTANEOUS at 13:50

## 2018-11-13 RX ADMIN — Medication 100 MILLIGRAM(S): at 13:50

## 2018-11-13 RX ADMIN — Medication 25 MICROGRAM(S): at 05:47

## 2018-11-13 RX ADMIN — Medication 100 MILLIGRAM(S): at 21:40

## 2018-11-13 NOTE — PROGRESS NOTE ADULT - SUBJECTIVE AND OBJECTIVE BOX
Patient with right periprosthetic femur fracture scheduled for tomorrow afternoon pending medical clearance.  consent obtained from daughter  npo after midnight   type and screen -current

## 2018-11-13 NOTE — PROGRESS NOTE ADULT - SUBJECTIVE AND OBJECTIVE BOX
Hospital Day:  4d    Subjective:      Past Medical Hx:   Neuropathy  Depression  OA (osteoarthritis)  Hypothyroid  HLD (hyperlipidemia)    Past Sx:  S/P ORIF (open reduction internal fixation) fracture    Allergies:  No Known Allergies    Current Meds:   Standng Meds:  cefTRIAXone   IVPB 1 Gram(s) IV Intermittent every 24 hours  celecoxib 200 milliGRAM(s) Oral at bedtime  docusate sodium 100 milliGRAM(s) Oral three times a day  heparin  Injectable 5000 Unit(s) SubCutaneous every 8 hours  levothyroxine 25 MICROGram(s) Oral daily  multivitamin 1 Tablet(s) Oral daily  pantoprazole    Tablet 40 milliGRAM(s) Oral before breakfast  pregabalin 150 milliGRAM(s) Oral every 8 hours  simvastatin 5 milliGRAM(s) Oral at bedtime    PRN Meds:  morphine  - Injectable 2 milliGRAM(s) IV Push every 4 hours PRN Severe Pain (7 - 10)  ondansetron    Tablet 4 milliGRAM(s) Oral two times a day PRN Nausea and/or Vomiting  senna 2 Tablet(s) Oral at bedtime PRN Constipation    Vital Signs:   T(F): 97.7 (18 @ 11:32), Max: 98.5 (18 @ 00:26)  HR: 79 (18 @ 11:32) (79 - 92)  BP: 120/58 (18 @ 11:32) (119/66 - 139/55)  RR: 18 (18 @ 11:32) (18 - 18)  SpO2: --      18 @ 07:01  -  18 @ 07:00  --------------------------------------------------------  IN: 200 mL / OUT: 1700 mL / NET: -1500 mL    18 @ 07:01  -  18 @ 15:11  --------------------------------------------------------  IN: 600 mL / OUT: 500 mL / NET: 100 mL        Physical Exam:   GENERAL: NAD, Confused; pleasant and conversive. Appearing younger than stated age   HEENT: NCAT, PERRLA, EOMI   CHEST/LUNG: CTA No wheezing/rhonchi/rales   HEART: Regular rate and rhythm; s1 s2 appreciated, No murmurs, rubs, or gallops  ABDOMEN: Soft, Nontender, Nondistended; Bowel sounds present  EXTREMITIES: No LE edema b/l, Muscle strength 3/5 in RLE, 5/5 in bilateral UE and LLE, Mild swelling in right lower extremity   NERVOUS SYSTEM:  Alert & Oriented X2 (person and place, not time), sensation equal and intact bilaterally     Labs:                         10.4   11.65 )-----------( 297      ( 2018 06:00 )             29.5       2018 06:00    125    |  84     |  12     ----------------------------<  116    3.9     |  24     |  0.6      Ca    9.2        2018 06:00  Mg     2.0       2018 06:00    Hemoglobin A1C, Whole Blood: 5.8 % (11-10-18 @ 07:10)    Urinalysis Basic - ( 10 Nov 2018 06:08 )    Color: Yellow / Appearance: Cloudy / S.010 / pH: x  Gluc: x / Ketone: Negative  / Bili: Negative / Urobili: 0.2 mg/dL   Blood: x / Protein: Negative mg/dL / Nitrite: Positive   Leuk Esterase: Large / RBC: 3-5 /HPF / WBC >50 /HPF   Sq Epi: x / Non Sq Epi: x / Bacteria: Many /HPF    Radiology:   [facility] Duplex Lower Extrem Arterial, Bilat (18 @ 14:22)  Impression:    Right posterior tibial artery chronic occlusion.    Diffuse mild atherosclerotic disease bilateral lower extremities.    No evidence of significant arterial occlusive disease in bilateral lower   extremities otherwise.      Assessment and Plan:   This is a 89 year old female with PMHx of right hip ORIF in 2016, Vit D deficiency, Hypothyroid, hyponatremia who presented to the ED after enduring a twisting injury to her right leg with an audible snap and severe pain. Patient found to have a a right periprosthetic distal femur fracture.     #Right Periprosthetic Distal Femur Fracture  - Seen by surgery: plan for surgery this week if family is agreeable   - Vascular surgery: duplex of RLE negative, Trend NA,   - OOBTC with non-weight bearing to RLE  - Pain regimen: 2mg Morphine q4 PRN   -     #Delirium   - Most likely multifactorial: hospitalization, UTI, constipation, fracture  - Urine Culture pending.   - Constant reorientation  - Avoid sedating medications.     #Hyponatremia:   - Sodium increasing 115 -> 120 -> 122 -> 125   - Continue on increased sodium diet  - Urine osmolality and urine sodium today   - Echo normal with grade 1 diastolic dysfunction     #UTI  - Continue on Rocephin  - Urine culture sent; results pending     #Hypothyroidism  - Continue on Levothyroxine	  - TSH ordered and pending    #Depression  - Continue on home medication     #Constipation (resolved)  - Continue on colace and senna  - Tap water enema given yesterday and patient had bowel movement     Activity: OOBTC with nonweightbearing on RLE  Diet: Mechanical Soft  DVT ppx: Heparin SubQ  GI ppx: Not indicated   Code Status: full code   DISPO: To OR tomorrow as an add on. Spoke at length with family. Family concerned about activity status and medication. Informed family about how medication can be prepared in bulk up to 1 week in advance. Also explained that it is okay for the patient to get out of bed and will be beneficial for her to be out of bed to chair so long as Hospital Day:  4d    Subjective:  No overnight events. Seen and examined at bedside. Patient family was around in the afternoon. Patient was still confused but not nearly as bad as yesterday. Patient's family expressed concerns about the plan and communication. Explained that the patient was actively receiving the antibiotics every morning between 5:30AM and 6:00AM. Patient family was also concerned about the activity. Expressed that orthopedics was okay with the patient being out of bed to chair so long as they were non weight bearing on the leg. Meeting held with nursing manager in room to ensure entire primary care team is aware.     Past Medical Hx:   Neuropathy  Depression  OA (osteoarthritis)  Hypothyroid  HLD (hyperlipidemia)    Past Sx:  S/P ORIF (open reduction internal fixation) fracture    Allergies:  No Known Allergies    Current Meds:   Standng Meds:  cefTRIAXone   IVPB 1 Gram(s) IV Intermittent every 24 hours  celecoxib 200 milliGRAM(s) Oral at bedtime  docusate sodium 100 milliGRAM(s) Oral three times a day  heparin  Injectable 5000 Unit(s) SubCutaneous every 8 hours  levothyroxine 25 MICROGram(s) Oral daily  multivitamin 1 Tablet(s) Oral daily  pantoprazole    Tablet 40 milliGRAM(s) Oral before breakfast  pregabalin 150 milliGRAM(s) Oral every 8 hours  simvastatin 5 milliGRAM(s) Oral at bedtime    PRN Meds:  morphine  - Injectable 2 milliGRAM(s) IV Push every 4 hours PRN Severe Pain (7 - 10)  ondansetron    Tablet 4 milliGRAM(s) Oral two times a day PRN Nausea and/or Vomiting  senna 2 Tablet(s) Oral at bedtime PRN Constipation    Vital Signs:   T(F): 97.7 (18 @ 11:32), Max: 98.5 (18 @ 00:26)  HR: 79 (18 @ :32) (79 - 92)  BP: 120/58 (18 @ 11:32) (119/66 - 139/55)  RR: 18 (18 @ 11:32) (18 - 18)  SpO2: --      18 @ 07:01  -  18 @ 07:00  --------------------------------------------------------  IN: 200 mL / OUT: 1700 mL / NET: -1500 mL    18 @ 07:01  -  18 @ 15:11  --------------------------------------------------------  IN: 600 mL / OUT: 500 mL / NET: 100 mL        Physical Exam:   GENERAL: NAD, Confused; pleasant and conversive. Appearing stated age   HEENT: NCAT, PERRLA, EOMI   CHEST/LUNG: CTA No wheezing/rhonchi/rales   HEART: Regular rate and rhythm; s1 s2 appreciated, No murmurs, rubs, or gallops  ABDOMEN: Soft, Nontender, Nondistended; Bowel sounds present  EXTREMITIES: No LE edema b/l, Muscle strength 3/5 in RLE, 5/5 in bilateral UE and LLE, Mild swelling in right lower extremity   NERVOUS SYSTEM:  Alert & Oriented X1 (person, not place, not time), sensation equal and intact bilaterally     Labs:                         10.4   11.65 )-----------( 297      ( 2018 06:00 )             29.5       2018 06:00    125    |  84     |  12     ----------------------------<  116    3.9     |  24     |  0.6      Ca    9.2        2018 06:00  Mg     2.0       2018 06:00    Hemoglobin A1C, Whole Blood: 5.8 % (11-10-18 @ 07:10)    Urinalysis Basic - ( 10 Nov 2018 06:08 )    Color: Yellow / Appearance: Cloudy / S.010 / pH: x  Gluc: x / Ketone: Negative  / Bili: Negative / Urobili: 0.2 mg/dL   Blood: x / Protein: Negative mg/dL / Nitrite: Positive   Leuk Esterase: Large / RBC: 3-5 /HPF / WBC >50 /HPF   Sq Epi: x / Non Sq Epi: x / Bacteria: Many /HPF    Radiology:   [facility] Duplex Lower Extrem Arterial, Bilat (18 @ 14:22)  Impression:    Right posterior tibial artery chronic occlusion.    Diffuse mild atherosclerotic disease bilateral lower extremities.    No evidence of significant arterial occlusive disease in bilateral lower   extremities otherwise.      Assessment and Plan:   This is a 89 year old female with PMHx of right hip ORIF in 2016, Vit D deficiency, Hypothyroid, hyponatremia who presented to the ED after enduring a twisting injury to her right leg with an audible snap and severe pain. Patient found to have a a right periprosthetic distal femur fracture.     #Right Periprosthetic Distal Femur Fracture  - Seen by surgery: plan for surgery tomorrow as an add on   - Vascular surgery: duplex of RLE negative, Trend NA,   - OOBTC with non-weight bearing to RLE  - Pain regimen: 2mg Morphine q4 PRN     #Delirium   - Most likely multifactorial: hospitalization, UTI, constipation, fracture  - Urine Culture pending.   - Constant reorientation  - Avoid sedating medications.     #Hyponatremia:   - Sodium increasing 115 -> 120 -> 122 -> 125   - Continue on increased sodium diet  - Urine osmolality and urine sodium done   - Echo normal with grade 1 diastolic dysfunction     #UTI  - Continue on Rocephin  - Urine culture sent; results pending     #Hypothyroidism  - Continue on Levothyroxine	  - TSH ordered and pending    #Depression  - Continue on home medication     #Constipation (resolved)  - Continue on colace and senna  - Tap water enema given yesterday and patient had bowel movement     Activity: OOBTC with nonweightbearing on RLE  Diet: Mechanical Soft  DVT ppx: Heparin SubQ  GI ppx: Not indicated   Code Status: full code   DISPO: To OR tomorrow as an add on. Spoke at length with family. Orthopedics to talk with the family about the surgical approach and also to obtain consent from the daughters who are the proxy. Hospital Day:  4d    Subjective:  No overnight events. Seen and examined at bedside. Patient family was around in the afternoon. Patient was still confused but not nearly as bad as yesterday. Patient's family expressed concerns about the plan and communication. Explained that the patient was actively receiving the antibiotics every morning between 5:30AM and 6:00AM. Patient family was also concerned about the activity. Expressed that orthopedics was okay with the patient being out of bed to chair so long as they were non weight bearing on the leg. Meeting held with nursing manager in room to ensure entire primary care team is aware.     Past Medical Hx:   Neuropathy  Depression  OA (osteoarthritis)  Hypothyroid  HLD (hyperlipidemia)    Past Sx:  S/P ORIF (open reduction internal fixation) fracture    Allergies:  No Known Allergies    Current Meds:   Standng Meds:  cefTRIAXone   IVPB 1 Gram(s) IV Intermittent every 24 hours  celecoxib 200 milliGRAM(s) Oral at bedtime  docusate sodium 100 milliGRAM(s) Oral three times a day  heparin  Injectable 5000 Unit(s) SubCutaneous every 8 hours  levothyroxine 25 MICROGram(s) Oral daily  multivitamin 1 Tablet(s) Oral daily  pantoprazole    Tablet 40 milliGRAM(s) Oral before breakfast  pregabalin 150 milliGRAM(s) Oral every 8 hours  simvastatin 5 milliGRAM(s) Oral at bedtime    PRN Meds:  morphine  - Injectable 2 milliGRAM(s) IV Push every 4 hours PRN Severe Pain (7 - 10)  ondansetron    Tablet 4 milliGRAM(s) Oral two times a day PRN Nausea and/or Vomiting  senna 2 Tablet(s) Oral at bedtime PRN Constipation    Vital Signs:   T(F): 97.7 (18 @ 11:32), Max: 98.5 (18 @ 00:26)  HR: 79 (18 @ :32) (79 - 92)  BP: 120/58 (18 @ 11:32) (119/66 - 139/55)  RR: 18 (18 @ 11:32) (18 - 18)  SpO2: --      18 @ 07:01  -  18 @ 07:00  --------------------------------------------------------  IN: 200 mL / OUT: 1700 mL / NET: -1500 mL    18 @ 07:01  -  18 @ 15:11  --------------------------------------------------------  IN: 600 mL / OUT: 500 mL / NET: 100 mL        Physical Exam:   GENERAL: NAD, Confused; pleasant and conversive. Appearing stated age   HEENT: NCAT, PERRLA, EOMI   CHEST/LUNG: CTA No wheezing/rhonchi/rales   HEART: Regular rate and rhythm; s1 s2 appreciated, No murmurs, rubs, or gallops  ABDOMEN: Soft, Nontender, Nondistended; Bowel sounds present  EXTREMITIES: No LE edema b/l, Muscle strength 3/5 in RLE, 5/5 in bilateral UE and LLE, Mild swelling in right lower extremity   NERVOUS SYSTEM:  Alert & Oriented X1 (person, not place, not time), sensation equal and intact bilaterally     Labs:                         10.4   11.65 )-----------( 297      ( 2018 06:00 )             29.5       2018 06:00    125    |  84     |  12     ----------------------------<  116    3.9     |  24     |  0.6      Ca    9.2        2018 06:00  Mg     2.0       2018 06:00    Hemoglobin A1C, Whole Blood: 5.8 % (11-10-18 @ 07:10)    Urinalysis Basic - ( 10 Nov 2018 06:08 )    Color: Yellow / Appearance: Cloudy / S.010 / pH: x  Gluc: x / Ketone: Negative  / Bili: Negative / Urobili: 0.2 mg/dL   Blood: x / Protein: Negative mg/dL / Nitrite: Positive   Leuk Esterase: Large / RBC: 3-5 /HPF / WBC >50 /HPF   Sq Epi: x / Non Sq Epi: x / Bacteria: Many /HPF    Radiology:   [facility] Duplex Lower Extrem Arterial, Bilat (18 @ 14:22)  Impression:    Right posterior tibial artery chronic occlusion.    Diffuse mild atherosclerotic disease bilateral lower extremities.    No evidence of significant arterial occlusive disease in bilateral lower   extremities otherwise.      Assessment and Plan:   This is a 89 year old female with PMHx of right hip ORIF in 2016, Vit D deficiency, Hypothyroid, hyponatremia who presented to the ED after enduring a twisting injury to her right leg with an audible snap and severe pain. Patient found to have a a right periprosthetic distal femur fracture.     #Right Periprosthetic Distal Femur Fracture  - Seen by surgery: plan for surgery tomorrow as an add on   - Vascular surgery: duplex of RLE negative, Trend NA,   - OOBTC with non-weight bearing to RLE  - Pain regimen: 2mg Morphine q4 PRN   - Will need medical clearance from medicine attending    #Delirium   - Most likely multifactorial: hospitalization, UTI, constipation, fracture  - Urine Culture pending.   - Constant reorientation  - Avoid sedating medications.     #Hyponatremia:   - Sodium increasing 115 -> 120 -> 122 -> 125   - Continue on increased sodium diet  - Urine osmolality and urine sodium done   - Echo normal with grade 1 diastolic dysfunction     #UTI  - Continue on Rocephin  - Urine culture sent; results pending     #Hypothyroidism  - Continue on Levothyroxine	  - TSH ordered and pending    #Depression  - Continue on home medication     #Constipation (resolved)  - Continue on colace and senna  - Tap water enema given yesterday and patient had bowel movement     Activity: OOBTC with nonweightbearing on RLE  Diet: Mechanical Soft  DVT ppx: Heparin SubQ  GI ppx: Not indicated   Code Status: full code   DISPO: To OR tomorrow as an add on. Spoke at length with family. Orthopedics to talk with the family about the surgical approach and also to obtain consent from the daughters who are the proxy.

## 2018-11-13 NOTE — PROGRESS NOTE ADULT - ATTENDING COMMENTS
patient seen and examined , agree with pgy 3 assesment and plan except as indicated above,   GEN Lying in no acute distress  HEENT Pupils equal and reactive to light and accommodationSupple Neck  PULM Clear to auscultation bilaterally  CV s1s2 regular rate and rhythm  GI + bowel sounds nontnender  EXT no cyanosis or edema  PSYCH awake alert and oriented x 3  INTEG No Lesions  NEURO JOE, right knee immobilized  #Chronic diastolic congestive heart failure at baseline  #CKD 2   #Basilar opacities - not requiring oxygen supplementation   #Metabolic encephalopathy secondary to urinary tract infection awaiting culture , cw ceftriaxone   #Suspected vitamin d deficiency in light of fracture and osteopenia - check vitamin d level and supplement if needed   Patient is at chronic stable statee and uti is being treated from wbc decreased from 12.9 and no fever- thus given above risk fators is medium risk for moderate surgery     DW HS and family at length and orthopedics   Time spent 40 minutes patient seen and examined , agree with pgy 3 assesment and plan except as indicated above,   GEN Lying in no acute distress  HEENT Pupils equal and reactive to light and accommodationSupple Neck  PULM Clear to auscultation bilaterally  CV s1s2 regular rate and rhythm  GI + bowel sounds nontnender  EXT no cyanosis or edema  PSYCH awake alert and oriented x 3  INTEG No Lesions  NEURO JOE, right knee immobilized  #Chronic diastolic congestive heart failure at baseline  #CKD 2   #Basilar opacities - not requiring oxygen supplementation   #Metabolic encephalopathy secondary to urinary tract infection awaiting culture , cw ceftriaxone   #Suspected vitamin d deficiency in light of fracture and osteopenia - check vitamin d level and supplement if needed   #Hyponatremia improving     DW HS and family at length and orthopedics   Time spent 40 minutes

## 2018-11-14 LAB
-  AMIKACIN: SIGNIFICANT CHANGE UP
-  AMPICILLIN/SULBACTAM: SIGNIFICANT CHANGE UP
-  AZTREONAM: SIGNIFICANT CHANGE UP
-  CEFAZOLIN: SIGNIFICANT CHANGE UP
-  CEFEPIME: SIGNIFICANT CHANGE UP
-  CEFTAZIDIME: SIGNIFICANT CHANGE UP
-  CIPROFLOXACIN: SIGNIFICANT CHANGE UP
-  DAPTOMYCIN: SIGNIFICANT CHANGE UP
-  GENTAMICIN: SIGNIFICANT CHANGE UP
-  GENTAMICIN: SIGNIFICANT CHANGE UP
-  IMIPENEM: SIGNIFICANT CHANGE UP
-  LEVOFLOXACIN: SIGNIFICANT CHANGE UP
-  LINEZOLID: SIGNIFICANT CHANGE UP
-  MEROPENEM: SIGNIFICANT CHANGE UP
-  OXACILLIN: SIGNIFICANT CHANGE UP
-  PENICILLIN: SIGNIFICANT CHANGE UP
-  PIPERACILLIN/TAZOBACTAM: SIGNIFICANT CHANGE UP
-  RIFAMPIN: SIGNIFICANT CHANGE UP
-  TETRACYCLINE: SIGNIFICANT CHANGE UP
-  TOBRAMYCIN: SIGNIFICANT CHANGE UP
-  TRIMETHOPRIM/SULFAMETHOXAZOLE: SIGNIFICANT CHANGE UP
-  VANCOMYCIN: SIGNIFICANT CHANGE UP
24R-OH-CALCIDIOL SERPL-MCNC: 29 NG/ML — LOW (ref 30–80)
ALBUMIN SERPL ELPH-MCNC: 3.7 G/DL — SIGNIFICANT CHANGE UP (ref 3.5–5.2)
ALP SERPL-CCNC: 87 U/L — SIGNIFICANT CHANGE UP (ref 30–115)
ALT FLD-CCNC: 18 U/L — SIGNIFICANT CHANGE UP (ref 0–41)
ANION GAP SERPL CALC-SCNC: 14 MMOL/L — SIGNIFICANT CHANGE UP (ref 7–14)
ANION GAP SERPL CALC-SCNC: 14 MMOL/L — SIGNIFICANT CHANGE UP (ref 7–14)
APTT BLD: 28.7 SEC — SIGNIFICANT CHANGE UP (ref 27–39.2)
APTT BLD: 30.7 SEC — SIGNIFICANT CHANGE UP (ref 27–39.2)
AST SERPL-CCNC: 20 U/L — SIGNIFICANT CHANGE UP (ref 0–41)
BASOPHILS # BLD AUTO: 0.1 K/UL — SIGNIFICANT CHANGE UP (ref 0–0.2)
BASOPHILS NFR BLD AUTO: 1.1 % — HIGH (ref 0–1)
BILIRUB SERPL-MCNC: 0.6 MG/DL — SIGNIFICANT CHANGE UP (ref 0.2–1.2)
BUN SERPL-MCNC: 16 MG/DL — SIGNIFICANT CHANGE UP (ref 10–20)
BUN SERPL-MCNC: 16 MG/DL — SIGNIFICANT CHANGE UP (ref 10–20)
CALCIUM SERPL-MCNC: 8.1 MG/DL — LOW (ref 8.5–10.1)
CALCIUM SERPL-MCNC: 9.1 MG/DL — SIGNIFICANT CHANGE UP (ref 8.5–10.1)
CHLORIDE SERPL-SCNC: 88 MMOL/L — LOW (ref 98–110)
CHLORIDE SERPL-SCNC: 97 MMOL/L — LOW (ref 98–110)
CO2 SERPL-SCNC: 25 MMOL/L — SIGNIFICANT CHANGE UP (ref 17–32)
CO2 SERPL-SCNC: 27 MMOL/L — SIGNIFICANT CHANGE UP (ref 17–32)
CREAT SERPL-MCNC: 0.7 MG/DL — SIGNIFICANT CHANGE UP (ref 0.7–1.5)
CREAT SERPL-MCNC: 0.7 MG/DL — SIGNIFICANT CHANGE UP (ref 0.7–1.5)
CULTURE RESULTS: SIGNIFICANT CHANGE UP
EOSINOPHIL # BLD AUTO: 0.24 K/UL — SIGNIFICANT CHANGE UP (ref 0–0.7)
EOSINOPHIL NFR BLD AUTO: 2.7 % — SIGNIFICANT CHANGE UP (ref 0–8)
GLUCOSE SERPL-MCNC: 110 MG/DL — HIGH (ref 70–99)
GLUCOSE SERPL-MCNC: 135 MG/DL — HIGH (ref 70–99)
HCT VFR BLD CALC: 29.3 % — LOW (ref 37–47)
HCT VFR BLD CALC: 29.5 % — LOW (ref 37–47)
HGB BLD-MCNC: 10 G/DL — LOW (ref 12–16)
HGB BLD-MCNC: 9.5 G/DL — LOW (ref 12–16)
IMM GRANULOCYTES NFR BLD AUTO: 0.2 % — SIGNIFICANT CHANGE UP (ref 0.1–0.3)
INR BLD: 1.07 RATIO — SIGNIFICANT CHANGE UP (ref 0.65–1.3)
INR BLD: 1.16 RATIO — SIGNIFICANT CHANGE UP (ref 0.65–1.3)
LYMPHOCYTES # BLD AUTO: 3.19 K/UL — SIGNIFICANT CHANGE UP (ref 1.2–3.4)
LYMPHOCYTES # BLD AUTO: 36 % — SIGNIFICANT CHANGE UP (ref 20.5–51.1)
MAGNESIUM SERPL-MCNC: 2.2 MG/DL — SIGNIFICANT CHANGE UP (ref 1.8–2.4)
MCHC RBC-ENTMCNC: 29.4 PG — SIGNIFICANT CHANGE UP (ref 27–31)
MCHC RBC-ENTMCNC: 29.9 PG — SIGNIFICANT CHANGE UP (ref 27–31)
MCHC RBC-ENTMCNC: 32.4 G/DL — SIGNIFICANT CHANGE UP (ref 32–37)
MCHC RBC-ENTMCNC: 33.9 G/DL — SIGNIFICANT CHANGE UP (ref 32–37)
MCV RBC AUTO: 88.1 FL — SIGNIFICANT CHANGE UP (ref 81–99)
MCV RBC AUTO: 90.7 FL — SIGNIFICANT CHANGE UP (ref 81–99)
METHOD TYPE: SIGNIFICANT CHANGE UP
METHOD TYPE: SIGNIFICANT CHANGE UP
MONOCYTES # BLD AUTO: 1.4 K/UL — HIGH (ref 0.1–0.6)
MONOCYTES NFR BLD AUTO: 15.8 % — HIGH (ref 1.7–9.3)
NEUTROPHILS # BLD AUTO: 3.91 K/UL — SIGNIFICANT CHANGE UP (ref 1.4–6.5)
NEUTROPHILS NFR BLD AUTO: 44.2 % — SIGNIFICANT CHANGE UP (ref 42.2–75.2)
NRBC # BLD: 0 /100 WBCS — SIGNIFICANT CHANGE UP (ref 0–0)
NRBC # BLD: 0 /100 WBCS — SIGNIFICANT CHANGE UP (ref 0–0)
ORGANISM # SPEC MICROSCOPIC CNT: SIGNIFICANT CHANGE UP
PLATELET # BLD AUTO: 308 K/UL — SIGNIFICANT CHANGE UP (ref 130–400)
PLATELET # BLD AUTO: 309 K/UL — SIGNIFICANT CHANGE UP (ref 130–400)
POTASSIUM SERPL-MCNC: 3.8 MMOL/L — SIGNIFICANT CHANGE UP (ref 3.5–5)
POTASSIUM SERPL-MCNC: 3.9 MMOL/L — SIGNIFICANT CHANGE UP (ref 3.5–5)
POTASSIUM SERPL-SCNC: 3.8 MMOL/L — SIGNIFICANT CHANGE UP (ref 3.5–5)
POTASSIUM SERPL-SCNC: 3.9 MMOL/L — SIGNIFICANT CHANGE UP (ref 3.5–5)
PROT SERPL-MCNC: 6.3 G/DL — SIGNIFICANT CHANGE UP (ref 6–8)
PROTHROM AB SERPL-ACNC: 12.3 SEC — SIGNIFICANT CHANGE UP (ref 9.95–12.87)
PROTHROM AB SERPL-ACNC: 13.3 SEC — HIGH (ref 9.95–12.87)
RBC # BLD: 3.23 M/UL — LOW (ref 4.2–5.4)
RBC # BLD: 3.35 M/UL — LOW (ref 4.2–5.4)
RBC # FLD: 14.2 % — SIGNIFICANT CHANGE UP (ref 11.5–14.5)
RBC # FLD: 14.5 % — SIGNIFICANT CHANGE UP (ref 11.5–14.5)
SODIUM SERPL-SCNC: 129 MMOL/L — LOW (ref 135–146)
SODIUM SERPL-SCNC: 136 MMOL/L — SIGNIFICANT CHANGE UP (ref 135–146)
SPECIMEN SOURCE: SIGNIFICANT CHANGE UP
WBC # BLD: 13.17 K/UL — HIGH (ref 4.8–10.8)
WBC # BLD: 8.86 K/UL — SIGNIFICANT CHANGE UP (ref 4.8–10.8)
WBC # FLD AUTO: 13.17 K/UL — HIGH (ref 4.8–10.8)
WBC # FLD AUTO: 8.86 K/UL — SIGNIFICANT CHANGE UP (ref 4.8–10.8)

## 2018-11-14 RX ORDER — HYDROMORPHONE HYDROCHLORIDE 2 MG/ML
0.5 INJECTION INTRAMUSCULAR; INTRAVENOUS; SUBCUTANEOUS
Qty: 0 | Refills: 0 | Status: DISCONTINUED | OUTPATIENT
Start: 2018-11-14 | End: 2018-11-14

## 2018-11-14 RX ORDER — HYDROMORPHONE HYDROCHLORIDE 2 MG/ML
1 INJECTION INTRAMUSCULAR; INTRAVENOUS; SUBCUTANEOUS
Qty: 0 | Refills: 0 | Status: DISCONTINUED | OUTPATIENT
Start: 2018-11-14 | End: 2018-11-14

## 2018-11-14 RX ORDER — DULOXETINE HYDROCHLORIDE 30 MG/1
20 CAPSULE, DELAYED RELEASE ORAL DAILY
Qty: 0 | Refills: 0 | Status: DISCONTINUED | OUTPATIENT
Start: 2018-11-14 | End: 2018-11-21

## 2018-11-14 RX ORDER — SODIUM CHLORIDE 9 MG/ML
1000 INJECTION INTRAMUSCULAR; INTRAVENOUS; SUBCUTANEOUS
Qty: 0 | Refills: 0 | Status: DISCONTINUED | OUTPATIENT
Start: 2018-11-14 | End: 2018-11-14

## 2018-11-14 RX ORDER — OXYCODONE HYDROCHLORIDE 5 MG/1
10 TABLET ORAL EVERY 4 HOURS
Qty: 0 | Refills: 0 | Status: DISCONTINUED | OUTPATIENT
Start: 2018-11-14 | End: 2018-11-21

## 2018-11-14 RX ORDER — ONDANSETRON 8 MG/1
4 TABLET, FILM COATED ORAL ONCE
Qty: 0 | Refills: 0 | Status: DISCONTINUED | OUTPATIENT
Start: 2018-11-14 | End: 2018-11-21

## 2018-11-14 RX ORDER — FESOTERODINE FUMARATE 8 MG/1
4 TABLET, FILM COATED, EXTENDED RELEASE ORAL DAILY
Qty: 0 | Refills: 0 | Status: DISCONTINUED | OUTPATIENT
Start: 2018-11-14 | End: 2018-11-21

## 2018-11-14 RX ORDER — HEPARIN SODIUM 5000 [USP'U]/ML
5000 INJECTION INTRAVENOUS; SUBCUTANEOUS EVERY 8 HOURS
Qty: 0 | Refills: 0 | Status: DISCONTINUED | OUTPATIENT
Start: 2018-11-14 | End: 2018-11-21

## 2018-11-14 RX ORDER — SENNA PLUS 8.6 MG/1
2 TABLET ORAL AT BEDTIME
Qty: 0 | Refills: 0 | Status: DISCONTINUED | OUTPATIENT
Start: 2018-11-14 | End: 2018-11-18

## 2018-11-14 RX ORDER — LEVOTHYROXINE SODIUM 125 MCG
25 TABLET ORAL DAILY
Qty: 0 | Refills: 0 | Status: DISCONTINUED | OUTPATIENT
Start: 2018-11-14 | End: 2018-11-21

## 2018-11-14 RX ORDER — CEFTRIAXONE 500 MG/1
1 INJECTION, POWDER, FOR SOLUTION INTRAMUSCULAR; INTRAVENOUS EVERY 24 HOURS
Qty: 0 | Refills: 0 | Status: DISCONTINUED | OUTPATIENT
Start: 2018-11-14 | End: 2018-11-15

## 2018-11-14 RX ORDER — DOCUSATE SODIUM 100 MG
100 CAPSULE ORAL THREE TIMES A DAY
Qty: 0 | Refills: 0 | Status: DISCONTINUED | OUTPATIENT
Start: 2018-11-14 | End: 2018-11-21

## 2018-11-14 RX ORDER — FUROSEMIDE 40 MG
40 TABLET ORAL ONCE
Qty: 0 | Refills: 0 | Status: COMPLETED | OUTPATIENT
Start: 2018-11-14 | End: 2018-11-14

## 2018-11-14 RX ORDER — ACETAMINOPHEN 500 MG
650 TABLET ORAL EVERY 6 HOURS
Qty: 0 | Refills: 0 | Status: DISCONTINUED | OUTPATIENT
Start: 2018-11-14 | End: 2018-11-21

## 2018-11-14 RX ORDER — OXYCODONE HYDROCHLORIDE 5 MG/1
5 TABLET ORAL EVERY 4 HOURS
Qty: 0 | Refills: 0 | Status: DISCONTINUED | OUTPATIENT
Start: 2018-11-14 | End: 2018-11-14

## 2018-11-14 RX ORDER — ONDANSETRON 8 MG/1
4 TABLET, FILM COATED ORAL
Qty: 0 | Refills: 0 | Status: DISCONTINUED | OUTPATIENT
Start: 2018-11-14 | End: 2018-11-21

## 2018-11-14 RX ORDER — MORPHINE SULFATE 50 MG/1
2 CAPSULE, EXTENDED RELEASE ORAL EVERY 4 HOURS
Qty: 0 | Refills: 0 | Status: DISCONTINUED | OUTPATIENT
Start: 2018-11-14 | End: 2018-11-14

## 2018-11-14 RX ORDER — CIPROFLOXACIN LACTATE 400MG/40ML
500 VIAL (ML) INTRAVENOUS EVERY 12 HOURS
Qty: 0 | Refills: 0 | Status: CANCELLED | OUTPATIENT
Start: 2018-11-15 | End: 2018-11-14

## 2018-11-14 RX ORDER — HYDROMORPHONE HYDROCHLORIDE 2 MG/ML
0.25 INJECTION INTRAMUSCULAR; INTRAVENOUS; SUBCUTANEOUS
Qty: 0 | Refills: 0 | Status: DISCONTINUED | OUTPATIENT
Start: 2018-11-14 | End: 2018-11-14

## 2018-11-14 RX ORDER — SIMVASTATIN 20 MG/1
5 TABLET, FILM COATED ORAL AT BEDTIME
Qty: 0 | Refills: 0 | Status: DISCONTINUED | OUTPATIENT
Start: 2018-11-14 | End: 2018-11-21

## 2018-11-14 RX ORDER — PANTOPRAZOLE SODIUM 20 MG/1
40 TABLET, DELAYED RELEASE ORAL
Qty: 0 | Refills: 0 | Status: DISCONTINUED | OUTPATIENT
Start: 2018-11-14 | End: 2018-11-21

## 2018-11-14 RX ADMIN — CEFTRIAXONE 100 GRAM(S): 500 INJECTION, POWDER, FOR SOLUTION INTRAMUSCULAR; INTRAVENOUS at 05:06

## 2018-11-14 RX ADMIN — HEPARIN SODIUM 5000 UNIT(S): 5000 INJECTION INTRAVENOUS; SUBCUTANEOUS at 21:34

## 2018-11-14 RX ADMIN — SODIUM CHLORIDE 60 MILLILITER(S): 9 INJECTION INTRAMUSCULAR; INTRAVENOUS; SUBCUTANEOUS at 09:19

## 2018-11-14 RX ADMIN — HEPARIN SODIUM 5000 UNIT(S): 5000 INJECTION INTRAVENOUS; SUBCUTANEOUS at 05:05

## 2018-11-14 RX ADMIN — Medication 100 MILLIGRAM(S): at 05:05

## 2018-11-14 RX ADMIN — SODIUM CHLORIDE 100 MILLILITER(S): 9 INJECTION INTRAMUSCULAR; INTRAVENOUS; SUBCUTANEOUS at 21:08

## 2018-11-14 RX ADMIN — Medication 25 MICROGRAM(S): at 05:05

## 2018-11-14 RX ADMIN — CEFTRIAXONE 100 GRAM(S): 500 INJECTION, POWDER, FOR SOLUTION INTRAMUSCULAR; INTRAVENOUS at 20:58

## 2018-11-14 RX ADMIN — Medication 150 MILLIGRAM(S): at 22:10

## 2018-11-14 RX ADMIN — Medication 650 MILLIGRAM(S): at 20:46

## 2018-11-14 RX ADMIN — Medication 100 MILLIGRAM(S): at 22:10

## 2018-11-14 RX ADMIN — Medication 150 MILLIGRAM(S): at 05:05

## 2018-11-14 RX ADMIN — Medication 40 MILLIGRAM(S): at 20:46

## 2018-11-14 RX ADMIN — SIMVASTATIN 5 MILLIGRAM(S): 20 TABLET, FILM COATED ORAL at 22:10

## 2018-11-14 NOTE — BRIEF OPERATIVE NOTE - POST-OP DX
Closed displaced supracondylar fracture of distal end of right femur without intracondylar extension, initial encounter  11/14/2018    Active  Faisal Ponce  Presence of retained hardware  11/14/2018    Active  Faisal Ponce

## 2018-11-14 NOTE — BRIEF OPERATIVE NOTE - PROCEDURE
<<-----Click on this checkbox to enter Procedure Removal of hardware  11/14/2018  CPT code 98453  Active  [email or web address]  ORIF fracture of femur  11/14/2018  supracondylar about previous CMN; CPT code 18890-56519 (modified because of nail, making case more difficult, akin to intrarticular extension, CPT [id number])  Active  [email or web address]

## 2018-11-14 NOTE — PROGRESS NOTE ADULT - SUBJECTIVE AND OBJECTIVE BOX
Hospital Day:  4d    Subjective:  No complaints. Family 3 daughters at bedside. Nurse no events. NPO for hip surgery. Pt with memory loss (at baseline per family)    Past Medical Hx:   Neuropathy  Depression  OA (osteoarthritis)  Hypothyroid  HLD (hyperlipidemia)    Past Sx:  S/P ORIF (open reduction internal fixation) fracture    Allergies:  No Known Allergies    Current Meds:   MEDICATIONS  (STANDING):  cefTRIAXone   IVPB 1 Gram(s) IV Intermittent every 24 hours  celecoxib 200 milliGRAM(s) Oral at bedtime  docusate sodium 100 milliGRAM(s) Oral three times a day  heparin  Injectable 5000 Unit(s) SubCutaneous every 8 hours  levothyroxine 25 MICROGram(s) Oral daily  multivitamin 1 Tablet(s) Oral daily  pantoprazole    Tablet 40 milliGRAM(s) Oral before breakfast  pregabalin 150 milliGRAM(s) Oral every 8 hours  simvastatin 5 milliGRAM(s) Oral at bedtime  sodium chloride 0.9%. 1000 milliLiter(s) (60 mL/Hr) IV Continuous <Continuous>    MEDICATIONS  (PRN):  morphine  - Injectable 2 milliGRAM(s) IV Push every 4 hours PRN Severe Pain (7 - 10)  ondansetron    Tablet 4 milliGRAM(s) Oral two times a day PRN Nausea and/or Vomiting  senna 2 Tablet(s) Oral at bedtime PRN Constipation    Vital Signs:   T(C): 36.6 (2018 07:35), Max: 36.7 (2018 16:14)  T(F): 97.9 (2018 07:35), Max: 98 (2018 16:14)  HR: 68 (2018 07:35) (65 - 75)  BP: 123/60 (2018 07:35) (113/54 - 131/58)  RR: 18 (2018 07:35) (16 - 18)      18 @ 07:01  -  18 @ 07:00  --------------------------------------------------------  IN: 200 mL / OUT: 1700 mL / NET: -1500 mL    18 @ 07:18 @ 15:11  --------------------------------------------------------  IN: 600 mL / OUT: 500 mL / NET: 100 mL    Physical Exam:   GENERAL: NAD, Confused; pleasant and conversive. Appearing stated age   HEENT: NCAT, PERRLA, EOMI   CHEST/LUNG: CTA No wheezing/rhonchi/rales   HEART: Regular rate and rhythm; s1 s2 appreciated, No murmurs, rubs, or gallops  ABDOMEN: Soft, Nontender, Nondistended; Bowel sounds present  EXTREMITIES: No LE edema b/l, Muscle strength 3/5 in RLE, 5/5 in bilateral UE and LLE, Mild swelling in right lower extremity   NERVOUS SYSTEM:  Alert & Oriented X1 (person, not place, not time), sensation equal and intact bilaterally     Labs:                         10.0   8.86  )-----------( 308      ( 2018 06:03 )             29.5       -14    129<L>  |  88<L>  |  16  ----------------------------<  110<H>  3.8   |  27  |  0.7    Ca    9.1      2018 06:03  Mg     2.2         TPro  6.3  /  Alb  3.7  /  TBili  0.6  /  DBili  x   /  AST  20  /  ALT  18  /  AlkPhos  87      Hemoglobin A1C, Whole Blood: 5.8 % (11-10-18 @ 07:10)    Urinalysis Basic - ( 10 Nov 2018 06:08 )    Color: Yellow / Appearance: Cloudy / S.010 / pH: x  Gluc: x / Ketone: Negative  / Bili: Negative / Urobili: 0.2 mg/dL   Blood: x / Protein: Negative mg/dL / Nitrite: Positive   Leuk Esterase: Large / RBC: 3-5 /HPF / WBC >50 /HPF   Sq Epi: x / Non Sq Epi: x / Bacteria: Many /HPF    Radiology:   VA Duplex Lower Extrem Arterial, Bilat (18 @ 14:22)  Impression:    Right posterior tibial artery chronic occlusion.    Diffuse mild atherosclerotic disease bilateral lower extremities.    No evidence of significant arterial occlusive disease in bilateral lower   extremities otherwise.      Assessment and Plan:   This is a 89 year old female with PMHx of right hip ORIF in 2016, Vit D deficiency, Hypothyroid, chronic hyponatremia who presented to the ED after enduring a twisting injury to her right leg with an audible snap and severe pain. Patient found to have a right periprosthetic distal femur fracture.     #Right Periprosthetic Distal Femur Fracture  - Pt is Medically optimal for surgery pls proceed as planned  - TTE and EKG reviewed. Grade I HTN findings otherwise unremarkable.   - Tentative surgical repair (pinning) today with Dr. Rodriguez  - RLE Duplex NO dvt  - OOBTC with non-weight bearing to RLE  - Pain regimen: 2mg Morphine q4 PRN       #Delirium   - Most likely multifactorial: hospitalization, UTI, constipation, fracture  - Urine Culture pending.   - Constant reorientation  - Avoid sedating medications.     #Hyponatremia:   - Sodium increasing 115 -> 120 -> 122 -> 125 - 129  - Continue on increased sodium diet    #UTI - Pseudomonads / Staph   - Change to Cipro 500mg po bid  - Pt already had ceftriaxone for the day    #Hypothyroidism  - Continue on Levothyroxine	  - TSH ordered and pending    #Depression  - Continue on home medication     #Constipation (resolved)  - Continue on colace and senna  - Tap water enema prn (s/p BM)     Diet: NPO for now (mechanical soft usual)  DVT ppx: Heparin SubQ  GI ppx: Not indicated     Code Status: full code     DISPO:  OR today. Medically optimal.

## 2018-11-14 NOTE — PRE-ANESTHESIA EVALUATION ADULT - NSANTHOSAYNRD_GEN_A_CORE
No. DEMOND screening performed.  STOP BANG Legend: 0-2 = LOW Risk; 3-4 = INTERMEDIATE Risk; 5-8 = HIGH Risk

## 2018-11-14 NOTE — BRIEF OPERATIVE NOTE - OPERATION/FINDINGS
above; very distal below previously placed nail; Post op - NWB x 12 weeks, Abx and DVT ppx per protocol  Dict:  Implants: Synthes 12 hole locking condylar buttress plate secured with 6 x 5.0 cannulated screws distal and 9 x 5.0 locking solid screws proximally (2 x were bicortical) above; very distal below previously placed nail; Post op - NWB x 12 weeks, Abx and DVT ppx per protocol  Dict:[id number]  Implants: Synthes 12 hole locking condylar buttress plate secured with 6 x 5.0 cannulated screws distal and 9 x 5.0 locking solid screws proximally (2 x were bicortical)

## 2018-11-14 NOTE — CHART NOTE - NSCHARTNOTEFT_GEN_A_CORE
PACU ANESTHESIA ADMISSION NOTE      Procedure: ORIF fracture of femur: supracondylar about previous CMN; CPT code 04118-44090 (modified because of nail, making case more difficult, akin to intrarticular extension, CPT [id number])  Removal of hardware: CPT code 92228    Post op diagnosis:  Presence of retained hardware  Closed displaced supracondylar fracture of distal end of right femur without intracondylar extension, initial encounter      ____  Intubated  TV:______       Rate: ______      FiO2: ______    ___x_  Patent Airway    __x__  Full return of protective reflexes    __x__  Full recovery from anesthesia / back to baseline     Vitals:   T:  98.2         R:   16               BP: 115/42                 Sat:     98              P:82       Mental Status:  __x__ Awake   __x___ Alert   _____ Drowsy   _____ Sedated    Nausea/Vomiting:  __x__ NO  ______Yes,   See Post - Op Orders          Pain Scale (0-10):  _0____    Treatment: ____ None    ____ See Post - Op/PCA Orders    Post - Operative Fluids:   ____ Oral   _x___ See Post - Op Orders    Plan: Discharge:   ____Home       __x___Floor     _____Critical Care    _____  Other:_________________    Comments: No anesthesia complications noted.

## 2018-11-14 NOTE — BRIEF OPERATIVE NOTE - PRE-OP DX
Closed displaced supracondylar fracture of distal end of right femur with intracondylar extension, initial encounter  11/14/2018    Active  Faisal Ponce

## 2018-11-14 NOTE — PROGRESS NOTE ADULT - SUBJECTIVE AND OBJECTIVE BOX
Hospital Day:  5d    Subjective:      Past Medical Hx:   Neuropathy  Depression  OA (osteoarthritis)  Hypothyroid  HLD (hyperlipidemia)    Past Sx:  S/P ORIF (open reduction internal fixation) fracture    Allergies:  No Known Allergies    Current Meds:   Standng Meds:  celecoxib 200 milliGRAM(s) Oral at bedtime  docusate sodium 100 milliGRAM(s) Oral three times a day  heparin  Injectable 5000 Unit(s) SubCutaneous every 8 hours  levothyroxine 25 MICROGram(s) Oral daily  multivitamin 1 Tablet(s) Oral daily  pantoprazole    Tablet 40 milliGRAM(s) Oral before breakfast  pregabalin 150 milliGRAM(s) Oral every 8 hours  simvastatin 5 milliGRAM(s) Oral at bedtime  sodium chloride 0.9%. 1000 milliLiter(s) (60 mL/Hr) IV Continuous <Continuous>    PRN Meds:  morphine  - Injectable 2 milliGRAM(s) IV Push every 4 hours PRN Severe Pain (7 - 10)  ondansetron    Tablet 4 milliGRAM(s) Oral two times a day PRN Nausea and/or Vomiting  senna 2 Tablet(s) Oral at bedtime PRN Constipation    Vital Signs:   T(F): 97.9 (11-14-18 @ 07:35), Max: 98 (11-13-18 @ 16:14)  HR: 68 (11-14-18 @ 07:35) (65 - 75)  BP: 123/60 (11-14-18 @ 07:35) (113/54 - 131/58)  RR: 18 (11-14-18 @ 07:35) (16 - 18)  SpO2: --      11-13-18 @ 07:01  -  11-14-18 @ 07:00  --------------------------------------------------------  IN: 600 mL / OUT: 1775 mL / NET: -1175 mL    11-14-18 @ 07:01  -  11-14-18 @ 14:05  --------------------------------------------------------  IN: 0 mL / OUT: 800 mL / NET: -800 mL        Physical Exam:   GENERAL: NAD, Confused; pleasant and conversive. Appearing stated age   HEENT: NCAT, PERRLA, EOMI   CHEST/LUNG: CTA No wheezing/rhonchi/rales   HEART: Regular rate and rhythm; s1 s2 appreciated, No murmurs, rubs, or gallops  ABDOMEN: Soft, Nontender, Nondistended; Bowel sounds present  EXTREMITIES: No LE edema b/l, Muscle strength 3/5 in RLE, 5/5 in bilateral UE and LLE, Mild swelling in right lower extremity. Brace present on RLE   NERVOUS SYSTEM:  Alert & Oriented X2 (person, place, not time), sensation equal and intact bilaterally       Labs:                         10.0   8.86  )-----------( 308      ( 14 Nov 2018 06:03 )             29.5     Neutophil% 44.2, Lymphocyte% 36.0, Monocyte% 15.8, Bands% 0.2 11-14-18 @ 06:03    14 Nov 2018 06:03    129    |  88     |  16     ----------------------------<  110    3.8     |  27     |  0.7      Ca    9.1        14 Nov 2018 06:03  Mg     2.2       14 Nov 2018 06:03    TPro  6.3    /  Alb  3.7    /  TBili  0.6    /  DBili  x      /  AST  20     /  ALT  18     /  AlkPhos  87     14 Nov 2018 06:03       pTT    30.7             ----< 1.07 INR  (11-14-18 @ 06:03)    12.30        PT      Hemoglobin A1C, Whole Blood: 5.8 % (11-10-18 @ 07:10)    Radiology:   None today     Assessment and Plan:   This is a 89 year old female with PMHx of right hip ORIF in 2016, Vit D deficiency, Hypothyroid, hyponatremia who presented to the ED after enduring a twisting injury to her right leg with an audible snap and severe pain. Patient found to have a a right periprosthetic distal femur fracture.     #Right Periprosthetic Distal Femur Fracture  - Seen by orthopedics: to OR today as an addon   - Vascular surgery: duplex of RLE negative, Trend NA,   - OOBTC with non-weight bearing to RLE  - Pain regimen: 2mg Morphine q4 PRN   - Will need medical clearance from medicine attending    #Delirium   - Most likely multifactorial: hospitalization, UTI, constipation, fracture  - Urine Culture pending.   - Constant reorientation  - Avoid sedating medications.     #Hyponatremia:   - Sodium increasing 115 -> 120 -> 122 -> 125   - Continue on increased sodium diet  - Urine osmolality and urine sodium done   - Echo normal with grade 1 diastolic dysfunction     #UTI  - Continue on Rocephin  - Urine culture sent; results pending     #Hypothyroidism  - Continue on Levothyroxine	  - TSH ordered and pending    #Depression  - Continue on home medication     #Constipation (resolved)  - Continue on colace and senna  - Tap water enema given yesterday and patient had bowel movement     Activity: OOBTC with nonweightbearing on RLE  Diet: Mechanical Soft  DVT ppx: Heparin SubQ  GI ppx: Not indicated   Code Status: full code   DISPO: To OR tomorrow as an add on. Spoke at length with family. Orthopedics to talk with the family about the surgical approach and also to obtain consent from the daughters who are the proxy. Hospital Day:  5d    Subjective:  No overnight events. Seen and examined at bedside. Patient reports that they are wondering when they will have their operation. Patient states that they have no fevers, chills, nausea, vomiting, constipation, diarrhea, weakness, fatigue, chest pain, shortness of breath.     Past Medical Hx:   Neuropathy  Depression  OA (osteoarthritis)  Hypothyroid  HLD (hyperlipidemia)    Past Sx:  S/P ORIF (open reduction internal fixation) fracture    Allergies:  No Known Allergies    Current Meds:   Standng Meds:  celecoxib 200 milliGRAM(s) Oral at bedtime  docusate sodium 100 milliGRAM(s) Oral three times a day  heparin  Injectable 5000 Unit(s) SubCutaneous every 8 hours  levothyroxine 25 MICROGram(s) Oral daily  multivitamin 1 Tablet(s) Oral daily  pantoprazole    Tablet 40 milliGRAM(s) Oral before breakfast  pregabalin 150 milliGRAM(s) Oral every 8 hours  simvastatin 5 milliGRAM(s) Oral at bedtime  sodium chloride 0.9%. 1000 milliLiter(s) (60 mL/Hr) IV Continuous <Continuous>    PRN Meds:  morphine  - Injectable 2 milliGRAM(s) IV Push every 4 hours PRN Severe Pain (7 - 10)  ondansetron    Tablet 4 milliGRAM(s) Oral two times a day PRN Nausea and/or Vomiting  senna 2 Tablet(s) Oral at bedtime PRN Constipation    Vital Signs:   T(F): 97.9 (11-14-18 @ 07:35), Max: 98 (11-13-18 @ 16:14)  HR: 68 (11-14-18 @ 07:35) (65 - 75)  BP: 123/60 (11-14-18 @ 07:35) (113/54 - 131/58)  RR: 18 (11-14-18 @ 07:35) (16 - 18)  SpO2: --      11-13-18 @ 07:01  -  11-14-18 @ 07:00  --------------------------------------------------------  IN: 600 mL / OUT: 1775 mL / NET: -1175 mL    11-14-18 @ 07:01  -  11-14-18 @ 14:05  --------------------------------------------------------  IN: 0 mL / OUT: 800 mL / NET: -800 mL        Physical Exam:   GENERAL: NAD, Confused; pleasant and conversive. Appearing stated age   HEENT: NCAT, PERRLA, EOMI   CHEST/LUNG: CTA No wheezing/rhonchi/rales   HEART: Regular rate and rhythm; s1 s2 appreciated, No murmurs, rubs, or gallops  ABDOMEN: Soft, Nontender, Nondistended; Bowel sounds present  EXTREMITIES: No LE edema b/l, Muscle strength 3/5 in RLE, 5/5 in bilateral UE and LLE, Mild swelling in right lower extremity. Brace present on RLE   NERVOUS SYSTEM:  Alert & Oriented X2 (person, place, not time), sensation equal and intact bilaterally       Labs:                         10.0   8.86  )-----------( 308      ( 14 Nov 2018 06:03 )             29.5     Neutophil% 44.2, Lymphocyte% 36.0, Monocyte% 15.8, Bands% 0.2 11-14-18 @ 06:03    14 Nov 2018 06:03    129    |  88     |  16     ----------------------------<  110    3.8     |  27     |  0.7      Ca    9.1        14 Nov 2018 06:03  Mg     2.2       14 Nov 2018 06:03    TPro  6.3    /  Alb  3.7    /  TBili  0.6    /  DBili  x      /  AST  20     /  ALT  18     /  AlkPhos  87     14 Nov 2018 06:03       pTT    30.7             ----< 1.07 INR  (11-14-18 @ 06:03)    12.30        PT      Hemoglobin A1C, Whole Blood: 5.8 % (11-10-18 @ 07:10)    Radiology:   None today     Assessment and Plan:   This is a 89 year old female with PMHx of right hip ORIF in 2016, Vit D deficiency, Hypothyroid, hyponatremia who presented to the ED after enduring a twisting injury to her right leg with an audible snap and severe pain. Patient found to have a a right periprosthetic distal femur fracture.     #Right Periprosthetic Distal Femur Fracture  - Seen by orthopedics: to OR today for surgical repair    - Vascular surgery: duplex of RLE negative  - OOBTC with non-weight bearing to RLE  - Pain regimen: 2mg Morphine q4 PRN   - Medically optimized as per attending note     #Delirium   - Most likely multifactorial: hospitalization, UTI, constipation, fracture  - Urine Culture pending.   - Constant reorientation  - Avoid sedating medications.     #Hyponatremia:   - Sodium increasing 115 -> 120 -> 122 -> 125 -> 129  - Continue on increased sodium diet  - Urine osmolality and urine sodium done      #UTI  - CTX discontinued. Will start on Cipro 500mg BID tomorrow.   - Urine culture sent; pseudomonas and staph aureus grew. Awaiting sensitivities.     #Hypothyroidism  - Continue on Levothyroxine	  - TSH ordered and pending    #Depression  - Continue on home medication     #Constipation (resolved)  - Continue on colace and senna    Activity: OOBTC with nonweightbearing on RLE  Diet: Mechanical Soft  DVT ppx: Heparin SubQ  GI ppx: Not indicated   Code Status: full code   DISPO: To OR today

## 2018-11-15 LAB
ANION GAP SERPL CALC-SCNC: 15 MMOL/L — HIGH (ref 7–14)
APTT BLD: 28.1 SEC — SIGNIFICANT CHANGE UP (ref 27–39.2)
BUN SERPL-MCNC: 16 MG/DL — SIGNIFICANT CHANGE UP (ref 10–20)
CALCIUM SERPL-MCNC: 8 MG/DL — LOW (ref 8.5–10.1)
CHLORIDE SERPL-SCNC: 97 MMOL/L — LOW (ref 98–110)
CO2 SERPL-SCNC: 24 MMOL/L — SIGNIFICANT CHANGE UP (ref 17–32)
CREAT SERPL-MCNC: 0.6 MG/DL — LOW (ref 0.7–1.5)
GLUCOSE BLDC GLUCOMTR-MCNC: 154 MG/DL — HIGH (ref 70–99)
GLUCOSE SERPL-MCNC: 131 MG/DL — HIGH (ref 70–99)
HCT VFR BLD CALC: 25.5 % — LOW (ref 37–47)
HGB BLD-MCNC: 8.4 G/DL — LOW (ref 12–16)
INR BLD: 1.15 RATIO — SIGNIFICANT CHANGE UP (ref 0.65–1.3)
MCHC RBC-ENTMCNC: 29.6 PG — SIGNIFICANT CHANGE UP (ref 27–31)
MCHC RBC-ENTMCNC: 32.9 G/DL — SIGNIFICANT CHANGE UP (ref 32–37)
MCV RBC AUTO: 89.8 FL — SIGNIFICANT CHANGE UP (ref 81–99)
NRBC # BLD: 0 /100 WBCS — SIGNIFICANT CHANGE UP (ref 0–0)
PLATELET # BLD AUTO: 295 K/UL — SIGNIFICANT CHANGE UP (ref 130–400)
POTASSIUM SERPL-MCNC: 4.1 MMOL/L — SIGNIFICANT CHANGE UP (ref 3.5–5)
POTASSIUM SERPL-SCNC: 4.1 MMOL/L — SIGNIFICANT CHANGE UP (ref 3.5–5)
PROTHROM AB SERPL-ACNC: 13.2 SEC — HIGH (ref 9.95–12.87)
RBC # BLD: 2.84 M/UL — LOW (ref 4.2–5.4)
RBC # FLD: 14.7 % — HIGH (ref 11.5–14.5)
SODIUM SERPL-SCNC: 136 MMOL/L — SIGNIFICANT CHANGE UP (ref 135–146)
WBC # BLD: 10.57 K/UL — SIGNIFICANT CHANGE UP (ref 4.8–10.8)
WBC # FLD AUTO: 10.57 K/UL — SIGNIFICANT CHANGE UP (ref 4.8–10.8)

## 2018-11-15 RX ADMIN — SIMVASTATIN 5 MILLIGRAM(S): 20 TABLET, FILM COATED ORAL at 22:00

## 2018-11-15 RX ADMIN — PANTOPRAZOLE SODIUM 40 MILLIGRAM(S): 20 TABLET, DELAYED RELEASE ORAL at 05:24

## 2018-11-15 RX ADMIN — Medication 25 MICROGRAM(S): at 05:19

## 2018-11-15 RX ADMIN — DULOXETINE HYDROCHLORIDE 20 MILLIGRAM(S): 30 CAPSULE, DELAYED RELEASE ORAL at 11:53

## 2018-11-15 RX ADMIN — Medication 1 TABLET(S): at 11:53

## 2018-11-15 RX ADMIN — Medication 150 MILLIGRAM(S): at 22:04

## 2018-11-15 RX ADMIN — HEPARIN SODIUM 5000 UNIT(S): 5000 INJECTION INTRAVENOUS; SUBCUTANEOUS at 05:19

## 2018-11-15 RX ADMIN — Medication 100 MILLIGRAM(S): at 22:00

## 2018-11-15 RX ADMIN — HEPARIN SODIUM 5000 UNIT(S): 5000 INJECTION INTRAVENOUS; SUBCUTANEOUS at 15:00

## 2018-11-15 RX ADMIN — Medication 150 MILLIGRAM(S): at 05:22

## 2018-11-15 RX ADMIN — Medication 100 MILLIGRAM(S): at 05:19

## 2018-11-15 RX ADMIN — HEPARIN SODIUM 5000 UNIT(S): 5000 INJECTION INTRAVENOUS; SUBCUTANEOUS at 22:00

## 2018-11-15 RX ADMIN — Medication 650 MILLIGRAM(S): at 05:19

## 2018-11-15 RX ADMIN — Medication 650 MILLIGRAM(S): at 11:52

## 2018-11-15 RX ADMIN — Medication 100 MILLIGRAM(S): at 15:00

## 2018-11-15 RX ADMIN — Medication 650 MILLIGRAM(S): at 11:54

## 2018-11-15 RX ADMIN — Medication 150 MILLIGRAM(S): at 15:09

## 2018-11-15 RX ADMIN — Medication 650 MILLIGRAM(S): at 05:23

## 2018-11-15 RX ADMIN — FESOTERODINE FUMARATE 4 MILLIGRAM(S): 8 TABLET, FILM COATED, EXTENDED RELEASE ORAL at 11:53

## 2018-11-15 NOTE — PROGRESS NOTE ADULT - SUBJECTIVE AND OBJECTIVE BOX
Hospital Day:  6d    Subjective:  Yesterday the patient had her ORIF of the right femur. She is POD 1. Reports that she still has pain in her RLE. She was pleasant and conversive this morning. She was fully oriented and aware of her surroundings. No fevers, chills, nausea, vomiting, constipation, diarrhea, chest pain, shortness of breath.    Past Medical Hx:   Neuropathy  Depression  OA (osteoarthritis)  Hypothyroid  HLD (hyperlipidemia)    Past Sx:  S/P ORIF (open reduction internal fixation) fracture    Allergies:  No Known Allergies    Current Meds:   Standng Meds:  acetaminophen   Tablet .. 650 milliGRAM(s) Oral every 6 hours  cefTRIAXone   IVPB 1 Gram(s) IV Intermittent every 24 hours  docusate sodium 100 milliGRAM(s) Oral three times a day  DULoxetine 20 milliGRAM(s) Oral daily  fesoterodine ER Tablet 4 milliGRAM(s) Oral daily  heparin  Injectable 5000 Unit(s) SubCutaneous every 8 hours  levothyroxine 25 MICROGram(s) Oral daily  multivitamin 1 Tablet(s) Oral daily  pantoprazole    Tablet 40 milliGRAM(s) Oral before breakfast  pregabalin 150 milliGRAM(s) Oral every 8 hours  simvastatin 5 milliGRAM(s) Oral at bedtime    PRN Meds:  HYDROmorphone  Injectable 0.5 milliGRAM(s) IV Push every 10 minutes PRN Severe Pain (7 - 10)  HYDROmorphone  Injectable 0.25 milliGRAM(s) IV Push every 10 minutes PRN Moderate Pain (4 - 6)  morphine  - Injectable 2 milliGRAM(s) IV Push every 4 hours PRN Severe Pain (7 - 10)  ondansetron    Tablet 4 milliGRAM(s) Oral two times a day PRN Nausea and/or Vomiting  ondansetron Injectable 4 milliGRAM(s) IV Push once PRN Nausea and/or Vomiting  oxyCODONE    IR 5 milliGRAM(s) Oral every 4 hours PRN Mild Pain (1 - 3)  oxyCODONE    IR 10 milliGRAM(s) Oral every 4 hours PRN Moderate Pain (4 - 6)  senna 2 Tablet(s) Oral at bedtime PRN Constipation    Vital Signs:   T(F): 97.5 (11-15-18 @ 07:00), Max: 98.2 (11-14-18 @ 19:26)  HR: 79 (11-15-18 @ 08:12) (76 - 85)  BP: 107/49 (11-15-18 @ 08:12) (102/38 - 147/41)  RR: 18 (11-15-18 @ 08:12) (12 - 19)  SpO2: 99% (11-14-18 @ 21:59) (97% - 99%)      11-14-18 @ 07:01  -  11-15-18 @ 07:00  --------------------------------------------------------  IN: 0 mL / OUT: 850 mL / NET: -850 mL    11-15-18 @ 07:01  -  11-15-18 @ 11:28  --------------------------------------------------------  IN: 500 mL / OUT: 350 mL / NET: 150 mL        Physical Exam:   GENERAL: NAD, pleasant and conversive. Appearing stated age   HEENT: NCAT, PERRLA, EOMI   CHEST/LUNG: CTA No wheezing/rhonchi/rales   HEART: Regular rate and rhythm; s1 s2 appreciated, No murmurs, rubs, or gallops  ABDOMEN: Soft, Nontender, Nondistended; Bowel sounds present  EXTREMITIES: No LE edema b/l, Muscle strength 3/5 in RLE, 5/5 in bilateral UE and LLE, Mild swelling in right lower extremity. Brace present on RLE   NERVOUS SYSTEM:  Alert & Oriented X3, sensation equal and intact bilaterally, cranial nerves ii-xii grossly intact       Labs:                         8.4    10.57 )-----------( 295      ( 15 Nov 2018 07:04 )             25.5       15 Nov 2018 07:04    136    |  97     |  16     ----------------------------<  131    4.1     |  24     |  0.6      Ca    8.0        15 Nov 2018 07:04  Mg     2.2       14 Nov 2018 06:03    TPro  6.3    /  Alb  3.7    /  TBili  0.6    /  DBili  x      /  AST  20     /  ALT  18     /  AlkPhos  87     14 Nov 2018 06:03       pTT    28.1             ----< 1.15 INR  (11-15-18 @ 07:04)    13.20        PT,    pTT    28.7             ----< 1.16 INR  (11-14-18 @ 20:35)    13.30        PT      Hemoglobin A1C, Whole Blood: 5.8 % (11-10-18 @ 07:10)    Radiology:   None Today     Assessment and Plan:   This is a 89 year old female with PMHx of right hip ORIF in 2016, Vit D deficiency, Hypothyroid, hyponatremia who presented to the ED after enduring a twisting injury to her right leg with an audible snap and severe pain. Patient found to have a a right periprosthetic distal femur fracture.     #Right Periprosthetic Distal Femur Fracture  - POD #1 for ORIF  - Vascular surgery: duplex of RLE negative  - OOBTC with non-weight bearing to RLE fro 12 weeks   - PT/OT and physiatry consulted   - Pain regimen: Morphine 2mg q4 PRN, Oxycodone 5mg Q4 PRN     #Delirium   - Most likely multifactorial: hospitalization, UTI, constipation, fracture  - Urine Culture pending.   - Constant reorientation  - Avoid sedating medications.     #Hyponatremia:   - Sodium increasing 115 -> 120 -> 122 -> 125 -> 129  - Continue on increased sodium diet  - Urine osmolality and urine sodium done      #UTI  - CTX discontinued. Will start on Cipro 500mg BID tomorrow.   - Urine culture sent; pseudomonas and staph aureus grew. Awaiting sensitivities.     #Hypothyroidism  - Continue on Levothyroxine	  - TSH ordered and pending    #Depression  - Continue on home medication     #Constipation (resolved)  - Continue on colace and senna    Activity: OOBTC with nonweightbearing on RLE  Diet: Mechanical Soft  DVT ppx: Heparin SubQ  GI ppx: Not indicated   Code Status: full code   DISPO: To OR today Hospital Day:  6d    Subjective:  Yesterday the patient had her ORIF of the right femur. She is POD 1. Reports that she still has pain in her RLE. She was pleasant and conversive this morning. She was fully oriented and aware of her surroundings. No fevers, chills, nausea, vomiting, constipation, diarrhea, chest pain, shortness of breath.    Past Medical Hx:   Neuropathy  Depression  OA (osteoarthritis)  Hypothyroid  HLD (hyperlipidemia)    Past Sx:  S/P ORIF (open reduction internal fixation) fracture    Allergies:  No Known Allergies    Current Meds:   Standng Meds:  acetaminophen   Tablet .. 650 milliGRAM(s) Oral every 6 hours  cefTRIAXone   IVPB 1 Gram(s) IV Intermittent every 24 hours  docusate sodium 100 milliGRAM(s) Oral three times a day  DULoxetine 20 milliGRAM(s) Oral daily  fesoterodine ER Tablet 4 milliGRAM(s) Oral daily  heparin  Injectable 5000 Unit(s) SubCutaneous every 8 hours  levothyroxine 25 MICROGram(s) Oral daily  multivitamin 1 Tablet(s) Oral daily  pantoprazole    Tablet 40 milliGRAM(s) Oral before breakfast  pregabalin 150 milliGRAM(s) Oral every 8 hours  simvastatin 5 milliGRAM(s) Oral at bedtime    PRN Meds:  HYDROmorphone  Injectable 0.5 milliGRAM(s) IV Push every 10 minutes PRN Severe Pain (7 - 10)  HYDROmorphone  Injectable 0.25 milliGRAM(s) IV Push every 10 minutes PRN Moderate Pain (4 - 6)  morphine  - Injectable 2 milliGRAM(s) IV Push every 4 hours PRN Severe Pain (7 - 10)  ondansetron    Tablet 4 milliGRAM(s) Oral two times a day PRN Nausea and/or Vomiting  ondansetron Injectable 4 milliGRAM(s) IV Push once PRN Nausea and/or Vomiting  oxyCODONE    IR 5 milliGRAM(s) Oral every 4 hours PRN Mild Pain (1 - 3)  oxyCODONE    IR 10 milliGRAM(s) Oral every 4 hours PRN Moderate Pain (4 - 6)  senna 2 Tablet(s) Oral at bedtime PRN Constipation    Vital Signs:   T(F): 97.5 (11-15-18 @ 07:00), Max: 98.2 (11-14-18 @ 19:26)  HR: 79 (11-15-18 @ 08:12) (76 - 85)  BP: 107/49 (11-15-18 @ 08:12) (102/38 - 147/41)  RR: 18 (11-15-18 @ 08:12) (12 - 19)  SpO2: 99% (11-14-18 @ 21:59) (97% - 99%)      11-14-18 @ 07:01  -  11-15-18 @ 07:00  --------------------------------------------------------  IN: 0 mL / OUT: 850 mL / NET: -850 mL    11-15-18 @ 07:01  -  11-15-18 @ 11:28  --------------------------------------------------------  IN: 500 mL / OUT: 350 mL / NET: 150 mL        Physical Exam:   GENERAL: NAD, pleasant and conversive. Appearing stated age   HEENT: NCAT, PERRLA, EOMI   CHEST/LUNG: CTA No wheezing/rhonchi/rales   HEART: Regular rate and rhythm; s1 s2 appreciated, No murmurs, rubs, or gallops  ABDOMEN: Soft, Nontender, Nondistended; Bowel sounds present  EXTREMITIES: No LE edema b/l, Muscle strength 3/5 in RLE, 5/5 in bilateral UE and LLE, Mild swelling in right lower extremity. Brace present on RLE   NERVOUS SYSTEM:  Alert & Oriented X3, sensation equal and intact bilaterally, cranial nerves ii-xii grossly intact       Labs:                         8.4    10.57 )-----------( 295      ( 15 Nov 2018 07:04 )             25.5       15 Nov 2018 07:04    136    |  97     |  16     ----------------------------<  131    4.1     |  24     |  0.6      Ca    8.0        15 Nov 2018 07:04  Mg     2.2       14 Nov 2018 06:03    TPro  6.3    /  Alb  3.7    /  TBili  0.6    /  DBili  x      /  AST  20     /  ALT  18     /  AlkPhos  87     14 Nov 2018 06:03       pTT    28.1             ----< 1.15 INR  (11-15-18 @ 07:04)    13.20        PT,    pTT    28.7             ----< 1.16 INR  (11-14-18 @ 20:35)    13.30        PT    Hemoglobin A1C, Whole Blood: 5.8 % (11-10-18 @ 07:10)    Culture - Urine (11.10.18 @ 17:05)    -  Amikacin: S 16    -  Ampicillin/Sulbactam: R <=8/4    -  Aztreonam: I 16    -  Cefazolin: R >16    -  Cefepime: I 16    -  Ceftazidime: S 4    -  Ciprofloxacin: R >2    -  Daptomycin: S 0.5    -  Gentamicin: R >8    -  Gentamicin: S <=1 Should not be used as monotherapy    -  Imipenem: R 8    -  Levofloxacin: R >4    -  Linezolid: S 2    -  Meropenem: R >8    -  Oxacillin: R >2    -  Penicillin: R >8    -  Piperacillin/Tazobactam: S 16    -  RIF- Rifampin: S <=1 Should not be used as monotherapy    -  Tetra/Doxy: S <=1    -  Tobramycin: R >8    -  Trimethoprim/Sulfamethoxazole: R >2/38    -  Vancomycin: S 1    Specimen Source: .Urine Catheterized    Culture Results:   50,000 - 99,000 CFU/mL Pseudomonas aeruginosa (Carbapenem Resistant)  50,000 - 99,000 CFU/mL Methicillin resistant Staphylococcus aureus    Organism Identification: Pseudomonas aeruginosa (Carbapenem Resistant)  Methicillin resistant Staphylococcus aureus    Organism: Pseudomonas aeruginosa (Carbapenem Resistant)    Organism: Methicillin resistant Staphylococcus aureus    Method Type: HARVEY    Method Type: HARVEY        Radiology:   None Today     Assessment and Plan:   This is a 89 year old female with PMHx of right hip ORIF in 2016, Vit D deficiency, Hypothyroid, hyponatremia who presented to the ED after enduring a twisting injury to her right leg with an audible snap and severe pain. Patient found to have a right periprosthetic distal femur fracture.     #Right Periprosthetic Distal Femur Fracture  - POD #1 for ORIF  - Vascular surgery: duplex of RLE negative  - OOBTC with non-weight bearing to RLE fro 12 weeks   - PT/OT and physiatry consulted   - Pain regimen: Morphine 2mg q4 PRN, Oxycodone 5mg Q4 PRN     #Delirium (improving)   - Most likely multifactorial: hospitalization, UTI, constipation, fracture  - Constant reorientation  - Avoid sedating medications.     #Hyponatremia (resolved)   - Sodium increasing 115 -> 120 -> 122 -> 125 -> 129 -> 136  - Continue on increased sodium diet     #UTI   - ID consulted  - Urine culture sent; pseudomonas and staph aureus grew. Awaiting sensitivities.     #Hypothyroidism  - Continue on Levothyroxine	  - TSH ordered and pending    #Depression  - Continue on home medication     #Constipation (resolved)  - Continue on colace and senna    Activity: OOBTC with nonweightbearing on RLE  Diet: Mechanical Soft  DVT ppx: Heparin SubQ  GI ppx: Not indicated   Code Status: full code   DISPO: To OR today Hospital Day:  6d    Subjective:  Yesterday the patient had her ORIF of the right femur. She is POD 1. Reports that she still has pain in her RLE. She was pleasant and conversive this morning. She was fully oriented and aware of her surroundings. No fevers, chills, nausea, vomiting, constipation, diarrhea, chest pain, shortness of breath.    Past Medical Hx:   Neuropathy  Depression  OA (osteoarthritis)  Hypothyroid  HLD (hyperlipidemia)    Past Sx:  S/P ORIF (open reduction internal fixation) fracture    Allergies:  No Known Allergies    Current Meds:   Standng Meds:  acetaminophen   Tablet .. 650 milliGRAM(s) Oral every 6 hours  cefTRIAXone   IVPB 1 Gram(s) IV Intermittent every 24 hours  docusate sodium 100 milliGRAM(s) Oral three times a day  DULoxetine 20 milliGRAM(s) Oral daily  fesoterodine ER Tablet 4 milliGRAM(s) Oral daily  heparin  Injectable 5000 Unit(s) SubCutaneous every 8 hours  levothyroxine 25 MICROGram(s) Oral daily  multivitamin 1 Tablet(s) Oral daily  pantoprazole    Tablet 40 milliGRAM(s) Oral before breakfast  pregabalin 150 milliGRAM(s) Oral every 8 hours  simvastatin 5 milliGRAM(s) Oral at bedtime    PRN Meds:  HYDROmorphone  Injectable 0.5 milliGRAM(s) IV Push every 10 minutes PRN Severe Pain (7 - 10)  HYDROmorphone  Injectable 0.25 milliGRAM(s) IV Push every 10 minutes PRN Moderate Pain (4 - 6)  morphine  - Injectable 2 milliGRAM(s) IV Push every 4 hours PRN Severe Pain (7 - 10)  ondansetron    Tablet 4 milliGRAM(s) Oral two times a day PRN Nausea and/or Vomiting  ondansetron Injectable 4 milliGRAM(s) IV Push once PRN Nausea and/or Vomiting  oxyCODONE    IR 5 milliGRAM(s) Oral every 4 hours PRN Mild Pain (1 - 3)  oxyCODONE    IR 10 milliGRAM(s) Oral every 4 hours PRN Moderate Pain (4 - 6)  senna 2 Tablet(s) Oral at bedtime PRN Constipation    Vital Signs:   T(F): 97.5 (11-15-18 @ 07:00), Max: 98.2 (11-14-18 @ 19:26)  HR: 79 (11-15-18 @ 08:12) (76 - 85)  BP: 107/49 (11-15-18 @ 08:12) (102/38 - 147/41)  RR: 18 (11-15-18 @ 08:12) (12 - 19)  SpO2: 99% (11-14-18 @ 21:59) (97% - 99%)      11-14-18 @ 07:01  -  11-15-18 @ 07:00  --------------------------------------------------------  IN: 0 mL / OUT: 850 mL / NET: -850 mL    11-15-18 @ 07:01  -  11-15-18 @ 11:28  --------------------------------------------------------  IN: 500 mL / OUT: 350 mL / NET: 150 mL        Physical Exam:   GENERAL: NAD, pleasant and conversive. Appearing stated age   HEENT: NCAT, PERRLA, EOMI   CHEST/LUNG: CTA No wheezing/rhonchi/rales   HEART: Regular rate and rhythm; s1 s2 appreciated, No murmurs, rubs, or gallops  ABDOMEN: Soft, Nontender, Nondistended; Bowel sounds present  EXTREMITIES: No LE edema b/l, Muscle strength 3/5 in RLE, 5/5 in bilateral UE and LLE, Mild swelling in right lower extremity. Brace present on RLE   NERVOUS SYSTEM:  Alert & Oriented X3, sensation equal and intact bilaterally, cranial nerves ii-xii grossly intact       Labs:                         8.4    10.57 )-----------( 295      ( 15 Nov 2018 07:04 )             25.5       15 Nov 2018 07:04    136    |  97     |  16     ----------------------------<  131    4.1     |  24     |  0.6      Ca    8.0        15 Nov 2018 07:04  Mg     2.2       14 Nov 2018 06:03    TPro  6.3    /  Alb  3.7    /  TBili  0.6    /  DBili  x      /  AST  20     /  ALT  18     /  AlkPhos  87     14 Nov 2018 06:03       pTT    28.1             ----< 1.15 INR  (11-15-18 @ 07:04)    13.20        PT,    pTT    28.7             ----< 1.16 INR  (11-14-18 @ 20:35)    13.30        PT    Hemoglobin A1C, Whole Blood: 5.8 % (11-10-18 @ 07:10)    Culture - Urine (11.10.18 @ 17:05)    -  Amikacin: S 16    -  Ampicillin/Sulbactam: R <=8/4    -  Aztreonam: I 16    -  Cefazolin: R >16    -  Cefepime: I 16    -  Ceftazidime: S 4    -  Ciprofloxacin: R >2    -  Daptomycin: S 0.5    -  Gentamicin: R >8    -  Gentamicin: S <=1 Should not be used as monotherapy    -  Imipenem: R 8    -  Levofloxacin: R >4    -  Linezolid: S 2    -  Meropenem: R >8    -  Oxacillin: R >2    -  Penicillin: R >8    -  Piperacillin/Tazobactam: S 16    -  RIF- Rifampin: S <=1 Should not be used as monotherapy    -  Tetra/Doxy: S <=1    -  Tobramycin: R >8    -  Trimethoprim/Sulfamethoxazole: R >2/38    -  Vancomycin: S 1    Specimen Source: .Urine Catheterized    Culture Results:   50,000 - 99,000 CFU/mL Pseudomonas aeruginosa (Carbapenem Resistant)  50,000 - 99,000 CFU/mL Methicillin resistant Staphylococcus aureus    Organism Identification: Pseudomonas aeruginosa (Carbapenem Resistant)  Methicillin resistant Staphylococcus aureus    Organism: Pseudomonas aeruginosa (Carbapenem Resistant)    Organism: Methicillin resistant Staphylococcus aureus    Method Type: HARVEY    Method Type: HARVEY        Radiology:   None Today     Assessment and Plan:   This is a 89 year old female with PMHx of right hip ORIF in 2016, Vit D deficiency, Hypothyroid, hyponatremia who presented to the ED after enduring a twisting injury to her right leg with an audible snap and severe pain. Patient found to have a right periprosthetic distal femur fracture.     #Right Periprosthetic Distal Femur Fracture  - POD #1 for ORIF  - Vascular surgery: duplex of RLE negative  - OOBTC with non-weight bearing to RLE fro 12 weeks   - PT/OT and physiatry consulted   - Pain regimen: Morphine 2mg q4 PRN, Oxycodone 5mg Q4 PRN     #Delirium (improving)   - Most likely multifactorial: hospitalization, UTI, constipation, fracture  - Constant reorientation  - Avoid sedating medications.     #Hyponatremia (resolved)   - Sodium increasing 115 -> 120 -> 122 -> 125 -> 129 -> 136  - Continue on increased sodium diet     #UTI   - ID consulted  - Urine culture sent; pseudomonas and staph aureus grew. Discussed Urine culture results with ID doctor. Will hold antibiotics.     #Hypothyroidism  - Continue on Levothyroxine	  - TSH ordered and pending    #Depression  - Continue on home medication     #Constipation (resolved)  - Continue on colace and senna    Activity: OOBTC with nonweightbearing on RLE  Diet: Mechanical Soft  DVT ppx: Heparin SubQ  GI ppx: Not indicated   Code Status: full code   DISPO: To OR today Hospital Day:  6d    Subjective:  Yesterday the patient had her ORIF of the right femur. She is POD 1. Reports that she still has pain in her RLE. She was pleasant and conversive this morning. She was fully oriented and aware of her surroundings. No fevers, chills, nausea, vomiting, constipation, diarrhea, chest pain, shortness of breath.    Past Medical Hx:   Neuropathy  Depression  OA (osteoarthritis)  Hypothyroid  HLD (hyperlipidemia)    Past Sx:  S/P ORIF (open reduction internal fixation) fracture    Allergies:  No Known Allergies    Current Meds:   Standng Meds:  acetaminophen   Tablet .. 650 milliGRAM(s) Oral every 6 hours  cefTRIAXone   IVPB 1 Gram(s) IV Intermittent every 24 hours  docusate sodium 100 milliGRAM(s) Oral three times a day  DULoxetine 20 milliGRAM(s) Oral daily  fesoterodine ER Tablet 4 milliGRAM(s) Oral daily  heparin  Injectable 5000 Unit(s) SubCutaneous every 8 hours  levothyroxine 25 MICROGram(s) Oral daily  multivitamin 1 Tablet(s) Oral daily  pantoprazole    Tablet 40 milliGRAM(s) Oral before breakfast  pregabalin 150 milliGRAM(s) Oral every 8 hours  simvastatin 5 milliGRAM(s) Oral at bedtime    PRN Meds:  HYDROmorphone  Injectable 0.5 milliGRAM(s) IV Push every 10 minutes PRN Severe Pain (7 - 10)  HYDROmorphone  Injectable 0.25 milliGRAM(s) IV Push every 10 minutes PRN Moderate Pain (4 - 6)  morphine  - Injectable 2 milliGRAM(s) IV Push every 4 hours PRN Severe Pain (7 - 10)  ondansetron    Tablet 4 milliGRAM(s) Oral two times a day PRN Nausea and/or Vomiting  ondansetron Injectable 4 milliGRAM(s) IV Push once PRN Nausea and/or Vomiting  oxyCODONE    IR 5 milliGRAM(s) Oral every 4 hours PRN Mild Pain (1 - 3)  oxyCODONE    IR 10 milliGRAM(s) Oral every 4 hours PRN Moderate Pain (4 - 6)  senna 2 Tablet(s) Oral at bedtime PRN Constipation    Vital Signs:   T(F): 97.5 (11-15-18 @ 07:00), Max: 98.2 (11-14-18 @ 19:26)  HR: 79 (11-15-18 @ 08:12) (76 - 85)  BP: 107/49 (11-15-18 @ 08:12) (102/38 - 147/41)  RR: 18 (11-15-18 @ 08:12) (12 - 19)  SpO2: 99% (11-14-18 @ 21:59) (97% - 99%)      11-14-18 @ 07:01  -  11-15-18 @ 07:00  --------------------------------------------------------  IN: 0 mL / OUT: 850 mL / NET: -850 mL    11-15-18 @ 07:01  -  11-15-18 @ 11:28  --------------------------------------------------------  IN: 500 mL / OUT: 350 mL / NET: 150 mL        Physical Exam:   GENERAL: NAD, pleasant and conversive. Appearing stated age   HEENT: NCAT, PERRLA, EOMI   CHEST/LUNG: CTA No wheezing/rhonchi/rales   HEART: Regular rate and rhythm; s1 s2 appreciated, No murmurs, rubs, or gallops  ABDOMEN: Soft, Nontender, Nondistended; Bowel sounds present  EXTREMITIES: No LE edema b/l, Muscle strength 3/5 in RLE, 5/5 in bilateral UE and LLE, Mild swelling in right lower extremity. Brace present on RLE   NERVOUS SYSTEM:  Alert & Oriented X3, sensation equal and intact bilaterally, cranial nerves ii-xii grossly intact       Labs:                         8.4    10.57 )-----------( 295      ( 15 Nov 2018 07:04 )             25.5       15 Nov 2018 07:04    136    |  97     |  16     ----------------------------<  131    4.1     |  24     |  0.6      Ca    8.0        15 Nov 2018 07:04  Mg     2.2       14 Nov 2018 06:03    TPro  6.3    /  Alb  3.7    /  TBili  0.6    /  DBili  x      /  AST  20     /  ALT  18     /  AlkPhos  87     14 Nov 2018 06:03       pTT    28.1             ----< 1.15 INR  (11-15-18 @ 07:04)    13.20        PT,    pTT    28.7             ----< 1.16 INR  (11-14-18 @ 20:35)    13.30        PT    Hemoglobin A1C, Whole Blood: 5.8 % (11-10-18 @ 07:10)    Culture - Urine (11.10.18 @ 17:05)    -  Amikacin: S 16    -  Ampicillin/Sulbactam: R <=8/4    -  Aztreonam: I 16    -  Cefazolin: R >16    -  Cefepime: I 16    -  Ceftazidime: S 4    -  Ciprofloxacin: R >2    -  Daptomycin: S 0.5    -  Gentamicin: R >8    -  Gentamicin: S <=1 Should not be used as monotherapy    -  Imipenem: R 8    -  Levofloxacin: R >4    -  Linezolid: S 2    -  Meropenem: R >8    -  Oxacillin: R >2    -  Penicillin: R >8    -  Piperacillin/Tazobactam: S 16    -  RIF- Rifampin: S <=1 Should not be used as monotherapy    -  Tetra/Doxy: S <=1    -  Tobramycin: R >8    -  Trimethoprim/Sulfamethoxazole: R >2/38    -  Vancomycin: S 1    Specimen Source: .Urine Catheterized    Culture Results:   50,000 - 99,000 CFU/mL Pseudomonas aeruginosa (Carbapenem Resistant)  50,000 - 99,000 CFU/mL Methicillin resistant Staphylococcus aureus    Organism Identification: Pseudomonas aeruginosa (Carbapenem Resistant)  Methicillin resistant Staphylococcus aureus    Organism: Pseudomonas aeruginosa (Carbapenem Resistant)    Organism: Methicillin resistant Staphylococcus aureus    Method Type: HARVEY    Method Type: HARVEY    Radiology:   None Today     Assessment and Plan:   This is a 89 year old female with PMHx of right hip ORIF in 2016, Vit D deficiency, Hypothyroid, hyponatremia who presented to the ED after enduring a twisting injury to her right leg with an audible snap and severe pain. Patient found to have a right periprosthetic distal femur fracture.     #Right Periprosthetic Distal Femur Fracture  - POD #1 for ORIF  - Vascular surgery: duplex of RLE negative  - OOBTC with non-weight bearing to RLE fro 12 weeks   - PT/OT and physiatry consulted   - Pain regimen: Morphine 2mg q4 PRN, Oxycodone 5mg Q4 PRN     #Delirium (improving)   - Most likely multifactorial: hospitalization, UTI, constipation, fracture  - Constant reorientation  - Avoid sedating medications.     #Hyponatremia (resolved)   - Sodium increasing 115 -> 120 -> 122 -> 125 -> 129 -> 136  - Continue on increased sodium diet     #UTI   - ID consulted  - Urine culture sent; pseudomonas and staph aureus grew. Discussed Urine culture results with ID doctor. Will hold antibiotics.     #Hypothyroidism  - Continue on Levothyroxine	  - TSH ordered and pending    #Depression  - Continue on home medication     #Constipation (resolved)  - Continue on colace and senna    Activity: OOBTC with nonweightbearing on RLE  Diet: Mechanical Soft  DVT ppx: Heparin SubQ  GI ppx: Not indicated   Code Status: full code   DISPO: To OR today    Attending Attestation  Pt has been seen and examined. She is POD #1. She is comfortable. Agree with above documentation as corrected and case d/w at rounds.   Dx: Rt Periprosthetic hip fracture s/p pinning. NWB RLE.  Plan: Physical Rx evaluation, Pain control, NWB RLE, SNF placement, c/w all meds per orders.    DIspo:  SNF, f/u CBC in am

## 2018-11-15 NOTE — OCCUPATIONAL THERAPY INITIAL EVALUATION ADULT - PLANNED THERAPY INTERVENTIONS, OT EVAL
balance training/IADL retraining/ADL retraining/bed mobility training/parent/caregiver training.../strengthening/transfer training

## 2018-11-15 NOTE — PROGRESS NOTE ADULT - SUBJECTIVE AND OBJECTIVE BOX
Patient seen and examined at bedside post-operatively s/p R Femur ORIF.  Patient doing well at this time. Pain well controlled, no complaints at this time.     Physical Exam:  Vitals reviewed   NAD, AOx3  Non-labored breathing   RLE  Dressing in place C/D/I  EHL/FHL/TA/GA Motor intact  SP/DP/T/S/S SILT distally   Toes WWP    89F s/p R Femur ORIF, doing well at this time      -Pain control  -PT/OT  -Continue home meds  -Ancef x3 doses   -DVT ppx  -AM labs  -OOBTC/IS  -Orthopedics to follow

## 2018-11-15 NOTE — CONSULT NOTE ADULT - SUBJECTIVE AND OBJECTIVE BOX
HPI:  89 year old Female with pertinent history of right hip orif in 2016 presents to the ED after a fall from a standing position while walking with the stroller. She said it resulted in a twisting injury yesterday and when she tried to get up she heard a snap and had a lot of pain in the right lower extremity. She says at baseline she lives home alone, an aide is their to help her with basic needs. A doctor visits her once every 2 months and A nurse visits her every 2 weeks. She currently has no other active complaints and feels fine. She denies any shaking movement while on the floor or loss of bowel or bladder control or tongue bite during the episode of fall. (10 Nov 2018 00:58). Trauma w/u + for right periprosthetic distal femur fx, s/p orif on 11/14, NWB as per ortho.      PAST MEDICAL & SURGICAL HISTORY:  Neuropathy  Depression  OA (osteoarthritis)  Hypothyroid  HLD (hyperlipidemia)  S/P ORIF (open reduction internal fixation) fracture      Hospital Course:    TODAY'S SUBJECTIVE & REVIEW OF SYMPTOMS:     Constitutional WNL   Cardio WNL   Resp WNL   GI WNL  Heme WNL  Endo WNL  Skin WNL  MSK WNL  Neuro WNL  Cognitive WNL  Psych WNL      MEDICATIONS  (STANDING):  acetaminophen   Tablet .. 650 milliGRAM(s) Oral every 6 hours  docusate sodium 100 milliGRAM(s) Oral three times a day  DULoxetine 20 milliGRAM(s) Oral daily  fesoterodine ER Tablet 4 milliGRAM(s) Oral daily  heparin  Injectable 5000 Unit(s) SubCutaneous every 8 hours  levothyroxine 25 MICROGram(s) Oral daily  multivitamin 1 Tablet(s) Oral daily  pantoprazole    Tablet 40 milliGRAM(s) Oral before breakfast  pregabalin 150 milliGRAM(s) Oral every 8 hours  simvastatin 5 milliGRAM(s) Oral at bedtime    MEDICATIONS  (PRN):  HYDROmorphone  Injectable 0.5 milliGRAM(s) IV Push every 10 minutes PRN Severe Pain (7 - 10)  HYDROmorphone  Injectable 0.25 milliGRAM(s) IV Push every 10 minutes PRN Moderate Pain (4 - 6)  morphine  - Injectable 2 milliGRAM(s) IV Push every 4 hours PRN Severe Pain (7 - 10)  ondansetron    Tablet 4 milliGRAM(s) Oral two times a day PRN Nausea and/or Vomiting  ondansetron Injectable 4 milliGRAM(s) IV Push once PRN Nausea and/or Vomiting  oxyCODONE    IR 5 milliGRAM(s) Oral every 4 hours PRN Mild Pain (1 - 3)  oxyCODONE    IR 10 milliGRAM(s) Oral every 4 hours PRN Moderate Pain (4 - 6)  senna 2 Tablet(s) Oral at bedtime PRN Constipation      FAMILY HISTORY:  No pertinent family history in first degree relatives: no family history of heart disease      Allergies    No Known Allergies    Intolerances        SOCIAL HISTORY:    [  ] Etoh  [  ] Smoking  [  ] Substance abuse     Home Environment:  [  ] Home Alone  [x  ] Lives with Family  [  ] Home Health Aid    Dwelling:  [  ] Apartment  [x  ] Private House  [  ] Adult Home  [  ] Skilled Nursing Facility      [  ] Short Term  [  ] Long Term  [ x ] Stairs       Elevator [  ]    FUNCTIONAL STATUS PTA: (Check all that apply)  Ambulation: [   ]Independent    [  ] Dependent     [  ] Non-Ambulatory  Assistive Device: [  ] SA Cane  [  ]  Q Cane  [ x ] Walker  [  ]  Wheelchair  ADL : [  ] Independent  [x  ]  Dependent       Vital Signs Last 24 Hrs  T(C): 36.4 (15 Nov 2018 07:00), Max: 36.8 (14 Nov 2018 19:26)  T(F): 97.5 (15 Nov 2018 07:00), Max: 98.2 (14 Nov 2018 19:26)  HR: 79 (15 Nov 2018 08:12) (76 - 85)  BP: 107/49 (15 Nov 2018 08:12) (102/38 - 147/41)  BP(mean): --  RR: 18 (15 Nov 2018 08:12) (12 - 19)  SpO2: 99% (14 Nov 2018 21:59) (97% - 99%)      PHYSICAL EXAM: Alert & Oriented X3  GENERAL: NAD, well-groomed, well-developed  HEAD:  Atraumatic, Normocephalic  CHEST/LUNG: Clear   HEART: S1S2+  ABDOMEN: Soft, Nontender  EXTREMITIES:  no calf tenderness    NERVOUS SYSTEM:  Cranial Nerves 2-12 intact [  ] Abnormal  [  ]  ROM: WFL all extremities [  ]  Abnormal [x  ]limited rle  Motor Strength: WFL all extremities  [  ]  Abnormal [x  ]limited rle  Sensation: intact to light touch [x  ] Abnormal [  ]  Reflexes: Symmetric [  ]  Abnormal [  ]    FUNCTIONAL STATUS:  Bed Mobility: Independent [  ]  Supervision [  ]  Needs Assistance [ x ]  N/A [  ]  Transfers: Independent [  ]  Supervision [  ]  Needs Assistance [x  ]  N/A [  ]   Ambulation: Independent [  ]  Supervision [  ]  Needs Assistance [  ]  N/A [  ]  ADL: Independent [  ] Requires Assistance [  ] N/A [  ]      LABS:                        8.4    10.57 )-----------( 295      ( 15 Nov 2018 07:04 )             25.5     11-15    136  |  97<L>  |  16  ----------------------------<  131<H>  4.1   |  24  |  0.6<L>    Ca    8.0<L>      15 Nov 2018 07:04  Mg     2.2     11-14    TPro  6.3  /  Alb  3.7  /  TBili  0.6  /  DBili  x   /  AST  20  /  ALT  18  /  AlkPhos  87  11-14    PT/INR - ( 15 Nov 2018 07:04 )   PT: 13.20 sec;   INR: 1.15 ratio         PTT - ( 15 Nov 2018 07:04 )  PTT:28.1 sec      RADIOLOGY & ADDITIONAL STUDIES:    Assesment:

## 2018-11-16 LAB
ANION GAP SERPL CALC-SCNC: 15 MMOL/L — HIGH (ref 7–14)
BASOPHILS # BLD AUTO: 0.09 K/UL — SIGNIFICANT CHANGE UP (ref 0–0.2)
BASOPHILS NFR BLD AUTO: 0.6 % — SIGNIFICANT CHANGE UP (ref 0–1)
BUN SERPL-MCNC: 16 MG/DL — SIGNIFICANT CHANGE UP (ref 10–20)
CALCIUM SERPL-MCNC: 8.2 MG/DL — LOW (ref 8.5–10.1)
CHLORIDE SERPL-SCNC: 94 MMOL/L — LOW (ref 98–110)
CO2 SERPL-SCNC: 23 MMOL/L — SIGNIFICANT CHANGE UP (ref 17–32)
CREAT SERPL-MCNC: 0.6 MG/DL — LOW (ref 0.7–1.5)
EOSINOPHIL # BLD AUTO: 0.2 K/UL — SIGNIFICANT CHANGE UP (ref 0–0.7)
EOSINOPHIL NFR BLD AUTO: 1.3 % — SIGNIFICANT CHANGE UP (ref 0–8)
GLUCOSE SERPL-MCNC: 122 MG/DL — HIGH (ref 70–99)
HCT VFR BLD CALC: 22.7 % — LOW (ref 37–47)
HGB BLD-MCNC: 7.6 G/DL — LOW (ref 12–16)
IMM GRANULOCYTES NFR BLD AUTO: 0.9 % — HIGH (ref 0.1–0.3)
LYMPHOCYTES # BLD AUTO: 20 % — LOW (ref 20.5–51.1)
LYMPHOCYTES # BLD AUTO: 3.02 K/UL — SIGNIFICANT CHANGE UP (ref 1.2–3.4)
MCHC RBC-ENTMCNC: 29.8 PG — SIGNIFICANT CHANGE UP (ref 27–31)
MCHC RBC-ENTMCNC: 33.5 G/DL — SIGNIFICANT CHANGE UP (ref 32–37)
MCV RBC AUTO: 89 FL — SIGNIFICANT CHANGE UP (ref 81–99)
MONOCYTES # BLD AUTO: 2.21 K/UL — HIGH (ref 0.1–0.6)
MONOCYTES NFR BLD AUTO: 14.7 % — HIGH (ref 1.7–9.3)
NEUTROPHILS # BLD AUTO: 9.43 K/UL — HIGH (ref 1.4–6.5)
NEUTROPHILS NFR BLD AUTO: 62.5 % — SIGNIFICANT CHANGE UP (ref 42.2–75.2)
NRBC # BLD: 0 /100 WBCS — SIGNIFICANT CHANGE UP (ref 0–0)
PLATELET # BLD AUTO: 295 K/UL — SIGNIFICANT CHANGE UP (ref 130–400)
POTASSIUM SERPL-MCNC: 3.6 MMOL/L — SIGNIFICANT CHANGE UP (ref 3.5–5)
POTASSIUM SERPL-SCNC: 3.6 MMOL/L — SIGNIFICANT CHANGE UP (ref 3.5–5)
RBC # BLD: 2.55 M/UL — LOW (ref 4.2–5.4)
RBC # FLD: 14.6 % — HIGH (ref 11.5–14.5)
SODIUM SERPL-SCNC: 132 MMOL/L — LOW (ref 135–146)
WBC # BLD: 15.08 K/UL — HIGH (ref 4.8–10.8)
WBC # FLD AUTO: 15.08 K/UL — HIGH (ref 4.8–10.8)

## 2018-11-16 RX ORDER — SODIUM CHLORIDE 9 MG/ML
750 INJECTION INTRAMUSCULAR; INTRAVENOUS; SUBCUTANEOUS
Qty: 0 | Refills: 0 | Status: DISCONTINUED | OUTPATIENT
Start: 2018-11-16 | End: 2018-11-19

## 2018-11-16 RX ADMIN — Medication 25 MICROGRAM(S): at 06:39

## 2018-11-16 RX ADMIN — SIMVASTATIN 5 MILLIGRAM(S): 20 TABLET, FILM COATED ORAL at 21:39

## 2018-11-16 RX ADMIN — PANTOPRAZOLE SODIUM 40 MILLIGRAM(S): 20 TABLET, DELAYED RELEASE ORAL at 06:40

## 2018-11-16 RX ADMIN — Medication 150 MILLIGRAM(S): at 14:49

## 2018-11-16 RX ADMIN — Medication 100 MILLIGRAM(S): at 14:49

## 2018-11-16 RX ADMIN — Medication 100 MILLIGRAM(S): at 21:38

## 2018-11-16 RX ADMIN — Medication 650 MILLIGRAM(S): at 11:22

## 2018-11-16 RX ADMIN — Medication 150 MILLIGRAM(S): at 09:39

## 2018-11-16 RX ADMIN — OXYCODONE HYDROCHLORIDE 10 MILLIGRAM(S): 5 TABLET ORAL at 11:21

## 2018-11-16 RX ADMIN — HEPARIN SODIUM 5000 UNIT(S): 5000 INJECTION INTRAVENOUS; SUBCUTANEOUS at 21:38

## 2018-11-16 RX ADMIN — Medication 650 MILLIGRAM(S): at 06:41

## 2018-11-16 RX ADMIN — SODIUM CHLORIDE 75 MILLILITER(S): 9 INJECTION INTRAMUSCULAR; INTRAVENOUS; SUBCUTANEOUS at 14:50

## 2018-11-16 RX ADMIN — HEPARIN SODIUM 5000 UNIT(S): 5000 INJECTION INTRAVENOUS; SUBCUTANEOUS at 06:39

## 2018-11-16 RX ADMIN — Medication 1 TABLET(S): at 11:22

## 2018-11-16 RX ADMIN — FESOTERODINE FUMARATE 4 MILLIGRAM(S): 8 TABLET, FILM COATED, EXTENDED RELEASE ORAL at 11:22

## 2018-11-16 RX ADMIN — Medication 100 MILLIGRAM(S): at 06:39

## 2018-11-16 RX ADMIN — Medication 650 MILLIGRAM(S): at 00:17

## 2018-11-16 RX ADMIN — Medication 150 MILLIGRAM(S): at 21:36

## 2018-11-16 RX ADMIN — Medication 650 MILLIGRAM(S): at 17:53

## 2018-11-16 RX ADMIN — HEPARIN SODIUM 5000 UNIT(S): 5000 INJECTION INTRAVENOUS; SUBCUTANEOUS at 14:49

## 2018-11-16 RX ADMIN — Medication 650 MILLIGRAM(S): at 06:40

## 2018-11-16 RX ADMIN — DULOXETINE HYDROCHLORIDE 20 MILLIGRAM(S): 30 CAPSULE, DELAYED RELEASE ORAL at 11:22

## 2018-11-16 NOTE — DIETITIAN INITIAL EVALUATION ADULT. - DIET TYPE
The diet says MECHANICAL SOFT except no type/stage of mechanical soft it is specified. However, pt says good appetite and observed 100% lunch./dysphagia 3, soft, thin liquids

## 2018-11-16 NOTE — DIETITIAN INITIAL EVALUATION ADULT. - PHYSICAL APPEARANCE
obese/BMI is 33.6 (UBW is stable per pt with possible weight gain recently). However, RD spoken with family before and reported height was 4'11" (not EMR noted 5', while UBW reported from family was 68.2kg, vs EMR 78kg)

## 2018-11-16 NOTE — PROGRESS NOTE ADULT - SUBJECTIVE AND OBJECTIVE BOX
Hospital Day:  6    Subjective:  No complaints. Family 1 granddaughter and  from [place] at bedside. Pt is doing well carrying a normal conversation and comprehends well.     Past Medical Hx:   Neuropathy  Depression  OA (osteoarthritis)  Hypothyroid  HLD (hyperlipidemia)    Past Sx:  S/P ORIF (open reduction internal fixation) fracture    Allergies:  No Known Allergies    Current Meds:   MEDICATIONS  (STANDING):  acetaminophen   Tablet .. 650 milliGRAM(s) Oral every 6 hours  docusate sodium 100 milliGRAM(s) Oral three times a day  DULoxetine 20 milliGRAM(s) Oral daily  fesoterodine ER Tablet 4 milliGRAM(s) Oral daily  heparin  Injectable 5000 Unit(s) SubCutaneous every 8 hours  levothyroxine 25 MICROGram(s) Oral daily  multivitamin 1 Tablet(s) Oral daily  pantoprazole    Tablet 40 milliGRAM(s) Oral before breakfast  pregabalin 150 milliGRAM(s) Oral every 8 hours  simvastatin 5 milliGRAM(s) Oral at bedtime  sodium chloride 0.9%. 750 milliLiter(s) (75 mL/Hr) IV Continuous <Continuous>    MEDICATIONS  (PRN):  HYDROmorphone  Injectable 0.5 milliGRAM(s) IV Push every 10 minutes PRN Severe Pain (7 - 10)  HYDROmorphone  Injectable 0.25 milliGRAM(s) IV Push every 10 minutes PRN Moderate Pain (4 - 6)  morphine  - Injectable 2 milliGRAM(s) IV Push every 4 hours PRN Severe Pain (7 - 10)  ondansetron    Tablet 4 milliGRAM(s) Oral two times a day PRN Nausea and/or Vomiting  ondansetron Injectable 4 milliGRAM(s) IV Push once PRN Nausea and/or Vomiting  oxyCODONE    IR 5 milliGRAM(s) Oral every 4 hours PRN Mild Pain (1 - 3)  oxyCODONE    IR 10 milliGRAM(s) Oral every 4 hours PRN Moderate Pain (4 - 6)  senna 2 Tablet(s) Oral at bedtime PRN Constipation      Vital Signs:   Vital Signs Last 24 Hrs  T(C): 37.4 (16 Nov 2018 07:55), Max: 38.3 (15 Nov 2018 23:07)  T(F): 99.4 (16 Nov 2018 07:55), Max: 101 (15 Nov 2018 23:07)  HR: 80 (16 Nov 2018 07:55) (80 - 90)  BP: 104/48 (16 Nov 2018 07:55) (104/48 - 123/53)  RR: 18 (16 Nov 2018 07:55) (18 - 18)    Physical Exam:   GENERAL: NAD, Confused; pleasant and conversive. Appearing stated age   HEENT: NCAT, PERRLA, EOMI   CHEST/LUNG: CTA No wheezing/rhonchi/rales   HEART: Regular rate and rhythm; s1 s2 appreciated, No murmurs, rubs, or gallops  ABDOMEN: Soft, Nontender, Nondistended; Bowel sounds present  EXTREMITIES: No LE edema b/l, Muscle strength 3/5 in RLE, 5/5 in bilateral UE and LLE, Mild swelling in right lower extremity   NERVOUS SYSTEM:  Alert & Oriented X 2, sensation equal and intact bilaterally     Labs:                           7.6    15.08 )-----------( 295      ( 16 Nov 2018 06:22 )             22.7       11-16    132<L>  |  94<L>  |  16  ----------------------------<  122<H>  3.6   |  23  |  0.6<L>    Ca    8.2<L>      16 Nov 2018 06:22      Hemoglobin A1C, Whole Blood: 5.8 % (11-10-18 @ 07:10)    Urinalysis Basic - ( 10 Nov 2018 06:08 )    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  Diffuse mild atherosclerotic disease bilateral lower extremities.  No evidence of significant arterial occlusive disease in bilateral lower extremities otherwise.    Assessment and Plan:   This is a 89 year old female with PMHx of right hip ORIF in 2016, Vit D deficiency, Hypothyroid, chronic hyponatremia who presented to the ED after enduring a twisting injury to her right leg with an audible snap and severe pain. Patient found to have a right periprosthetic distal femur fracture.     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 Encourage oral hydration / daily CBC   Discussed with family at bedside at length. Time spent at least 30 min for all care and counselling pt / family.

## 2018-11-16 NOTE — CONSULT NOTE ADULT - ASSESSMENT
Cultures + for MRSA and Carbepenam Resistant Pseudomonas  MRSA R -Unasyn, Cefazolin, and Bactrim  Carbepenam Resistant Pseudomonas aeruginosa R- Aztreonam, Cefepime, Fluoroquinolones, Gentamicin, Tobramycin  Wt 78kg      ? IV Daptomycin 6mg/kg q 24+ Ceftazidime 2g q 8  Hold statin during Daptomycin  ***Incomplete note: pending ID attending review****** Cultures + for MRSA and Carbepenam Resistant Pseudomonas  MRSA R -Unasyn, Cefazolin, and Bactrim  Carbepenam Resistant Pseudomonas aeruginosa R- Aztreonam, Cefepime, Fluoroquinolones, Gentamicin, Tobramycin  ? Wt 58.97 or 78kg      ? IV Daptomycin 6mg/kg q 24+ Ceftazidime 2g q 8  Hold statin during Daptomycin  ***Incomplete note: pending ID attending review****** Cultures + for MRSA and Carbepenam Resistant Pseudomonas  MRSA R -Unasyn, Cefazolin, and Bactrim  Carbepenam Resistant Pseudomonas aeruginosa R- Aztreonam, Cefepime, Fluoroquinolones, Gentamicin, Tobramycin  Musa Catheter - urine deep yellow-orange. Right leg bandage in place, with swelling present in lower R extremity.  No CVA tenderness.   Wt 78kg    Plan:   ? IV Daptomycin 6mg/kg q 24+ Ceftazidime 2g q 8  -Rec Hold statin during Daptomycin  ? US KUB  -Blood culture pending.  ***Incomplete note: pending ID attending review****** IMPRESSION:  Isolated fevers, pyuria, leucocytosis  as per family at bedside everytime a UC is done it is positive  polymicrobial corinna in urine   Presently on rocephin which does not cover the pathogens  reluctant to advocate 14 days of iv ABx based on the cultures  as pt is clinically stable will observe pt.    RECOMMENDATIONS:  no ABx for now

## 2018-11-16 NOTE — DIETITIAN INITIAL EVALUATION ADULT. - OTHER INFO
s/p fall while walking w/ stroller. Pt lives at home w/ aide to help with her basic needs. Now day 1 s/p ORIF vascular surgery consulted. Ortho and PT consulted. Delirium improving. Constipation resolved- on BM regimen

## 2018-11-16 NOTE — PROGRESS NOTE ADULT - SUBJECTIVE AND OBJECTIVE BOX
ORTHO PROGRESS NOTE       89yFemale POD #  2    S/P orif right pp distal femur fx     Patient seen and examined at bedside . The patient is awake and alert in NAD. No complaints of chest pain, SOB, N/V.    Vital Signs Last 24 Hrs  T(C): 37.4 (16 Nov 2018 07:55), Max: 38.3 (15 Nov 2018 23:07)  T(F): 99.4 (16 Nov 2018 07:55), Max: 101 (15 Nov 2018 23:07)  HR: 80 (16 Nov 2018 07:55) (80 - 90)  BP: 104/48 (16 Nov 2018 07:55) (104/48 - 123/53)  BP(mean): --  RR: 18 (16 Nov 2018 07:55) (18 - 18)  SpO2: 95% (15 Nov 2018 11:02) (91% - 95%)                          7.6    15.08 )-----------( 295      ( 16 Nov 2018 06:22 )             22.7     11-16    132<L>  |  94<L>  |  16  ----------------------------<  122<H>  3.6   |  23  |  0.6<L>    Ca    8.2<L>      16 Nov 2018 06:22      PT/INR - ( 15 Nov 2018 07:04 )   PT: 13.20 sec;   INR: 1.15 ratio         PTT - ( 15 Nov 2018 07:04 )  PTT:28.1 sec      DVT ppx     MEDICATIONS  (STANDING):  acetaminophen   Tablet .. 650 milliGRAM(s) Oral every 6 hours  docusate sodium 100 milliGRAM(s) Oral three times a day  DULoxetine 20 milliGRAM(s) Oral daily  fesoterodine ER Tablet 4 milliGRAM(s) Oral daily  heparin  Injectable 5000 Unit(s) SubCutaneous every 8 hours  levothyroxine 25 MICROGram(s) Oral daily  multivitamin 1 Tablet(s) Oral daily  pantoprazole    Tablet 40 milliGRAM(s) Oral before breakfast  pregabalin 150 milliGRAM(s) Oral every 8 hours  simvastatin 5 milliGRAM(s) Oral at bedtime    MEDICATIONS  (PRN):  HYDROmorphone  Injectable 0.5 milliGRAM(s) IV Push every 10 minutes PRN Severe Pain (7 - 10)  HYDROmorphone  Injectable 0.25 milliGRAM(s) IV Push every 10 minutes PRN Moderate Pain (4 - 6)  morphine  - Injectable 2 milliGRAM(s) IV Push every 4 hours PRN Severe Pain (7 - 10)  ondansetron    Tablet 4 milliGRAM(s) Oral two times a day PRN Nausea and/or Vomiting  ondansetron Injectable 4 milliGRAM(s) IV Push once PRN Nausea and/or Vomiting  oxyCODONE    IR 5 milliGRAM(s) Oral every 4 hours PRN Mild Pain (1 - 3)  oxyCODONE    IR 10 milliGRAM(s) Oral every 4 hours PRN Moderate Pain (4 - 6)  senna 2 Tablet(s) Oral at bedtime PRN Constipation      PE:   right lower ext dressing C/D/I          Compartments soft         NVI, SILT           A/P: 89yFemale POD # 2   S/P  orif right pp distal femur fx             OOB to Chair            Physical Therapy- nwb right le            Pain control            Incentive Spirometry            DVT Prophylaxis            Discharge planning ORTHO PROGRESS NOTE       89yFemale POD #  2    S/P orif right pp distal femur fx     Patient seen and examined at bedside . The patient is awake and alert in NAD. No complaints of chest pain, SOB, N/V.    Vital Signs Last 24 Hrs  T(C): 37.4 (16 Nov 2018 07:55), Max: 38.3 (15 Nov 2018 23:07)  T(F): 99.4 (16 Nov 2018 07:55), Max: 101 (15 Nov 2018 23:07)  HR: 80 (16 Nov 2018 07:55) (80 - 90)  BP: 104/48 (16 Nov 2018 07:55) (104/48 - 123/53)  BP(mean): --  RR: 18 (16 Nov 2018 07:55) (18 - 18)  SpO2: 95% (15 Nov 2018 11:02) (91% - 95%)                          7.6    15.08 )-----------( 295      ( 16 Nov 2018 06:22 )             22.7     11-16    132<L>  |  94<L>  |  16  ----------------------------<  122<H>  3.6   |  23  |  0.6<L>    Ca    8.2<L>      16 Nov 2018 06:22      PT/INR - ( 15 Nov 2018 07:04 )   PT: 13.20 sec;   INR: 1.15 ratio         PTT - ( 15 Nov 2018 07:04 )  PTT:28.1 sec      DVT ppx     MEDICATIONS  (STANDING):  acetaminophen   Tablet .. 650 milliGRAM(s) Oral every 6 hours  docusate sodium 100 milliGRAM(s) Oral three times a day  DULoxetine 20 milliGRAM(s) Oral daily  fesoterodine ER Tablet 4 milliGRAM(s) Oral daily  heparin  Injectable 5000 Unit(s) SubCutaneous every 8 hours  levothyroxine 25 MICROGram(s) Oral daily  multivitamin 1 Tablet(s) Oral daily  pantoprazole    Tablet 40 milliGRAM(s) Oral before breakfast  pregabalin 150 milliGRAM(s) Oral every 8 hours  simvastatin 5 milliGRAM(s) Oral at bedtime    MEDICATIONS  (PRN):  HYDROmorphone  Injectable 0.5 milliGRAM(s) IV Push every 10 minutes PRN Severe Pain (7 - 10)  HYDROmorphone  Injectable 0.25 milliGRAM(s) IV Push every 10 minutes PRN Moderate Pain (4 - 6)  morphine  - Injectable 2 milliGRAM(s) IV Push every 4 hours PRN Severe Pain (7 - 10)  ondansetron    Tablet 4 milliGRAM(s) Oral two times a day PRN Nausea and/or Vomiting  ondansetron Injectable 4 milliGRAM(s) IV Push once PRN Nausea and/or Vomiting  oxyCODONE    IR 5 milliGRAM(s) Oral every 4 hours PRN Mild Pain (1 - 3)  oxyCODONE    IR 10 milliGRAM(s) Oral every 4 hours PRN Moderate Pain (4 - 6)  senna 2 Tablet(s) Oral at bedtime PRN Constipation      PE:   right lower ext dressing C/D/I          Compartments soft         NVI, SILT           A/P: 89yFemale POD # 2   S/P  orif right pp distal femur fx             OOB to Chair            Physical Therapy- nwb right le            Pain control            Incentive Spirometry            DVT Prophylaxis            Discharge planning   pt seen and examined  agree with plan   DC planning

## 2018-11-16 NOTE — CONSULT NOTE ADULT - ATTENDING COMMENTS
Right femur fracture with warm foot and strong DP doppler signal in the foot. No evidence of arterial insufficiency clinically. no DVT. Recommend, arterial duplex right leg. No vascular intervention planned.
I have personally examined the patient and reviewed the documentation above.  Corrections and edits were made wherever needed.

## 2018-11-16 NOTE — DIETITIAN INITIAL EVALUATION ADULT. - ORAL INTAKE PTA
good/PTA eats very well at home because daughter cooks most meals, with aide visiting daily. Unsure about vitamin/supp use. NKFA

## 2018-11-16 NOTE — DIETITIAN INITIAL EVALUATION ADULT. - PT NOT SOURCE
other (specify)/LOS- no edema. Ecchymosis. LBM 3 days ago per pt. LIP already noted pt on BM regimen for constipation. No oral issue per pt.

## 2018-11-16 NOTE — DIETITIAN INITIAL EVALUATION ADULT. - SOURCE
Grand-daughter and her  was at bedside this morning but no longer. Spoken with only patient. She appears to be somewhat a poor historian./patient/family/significant other

## 2018-11-16 NOTE — PROGRESS NOTE ADULT - SUBJECTIVE AND OBJECTIVE BOX
Hospital Day:  7d    Subjective:  Seen and examined while in a chair with granddaughter and granddaughter's  present. Patient reported that she was having some pain while seated in the chair. Informed her that it is normal to have some pain after the surgery and she can take some ain medication. She took an Oxycodone IR and reported later on that there was interval improvement in her pain. Patient states that she has no fevers, chills, nausea, vomiting, constipation, diarrhea, fatigue, shortness of breath.      Past Medical Hx:   Neuropathy  Depression  OA (osteoarthritis)  Hypothyroid  HLD (hyperlipidemia)    Past Sx:  S/P ORIF (open reduction internal fixation) fracture    Allergies:  No Known Allergies    Current Meds:   Standng Meds:  acetaminophen   Tablet .. 650 milliGRAM(s) Oral every 6 hours  docusate sodium 100 milliGRAM(s) Oral three times a day  DULoxetine 20 milliGRAM(s) Oral daily  fesoterodine ER Tablet 4 milliGRAM(s) Oral daily  heparin  Injectable 5000 Unit(s) SubCutaneous every 8 hours  levothyroxine 25 MICROGram(s) Oral daily  multivitamin 1 Tablet(s) Oral daily  pantoprazole    Tablet 40 milliGRAM(s) Oral before breakfast  pregabalin 150 milliGRAM(s) Oral every 8 hours  simvastatin 5 milliGRAM(s) Oral at bedtime  sodium chloride 0.9%. 750 milliLiter(s) (75 mL/Hr) IV Continuous <Continuous>    PRN Meds:  HYDROmorphone  Injectable 0.5 milliGRAM(s) IV Push every 10 minutes PRN Severe Pain (7 - 10)  HYDROmorphone  Injectable 0.25 milliGRAM(s) IV Push every 10 minutes PRN Moderate Pain (4 - 6)  morphine  - Injectable 2 milliGRAM(s) IV Push every 4 hours PRN Severe Pain (7 - 10)  ondansetron    Tablet 4 milliGRAM(s) Oral two times a day PRN Nausea and/or Vomiting  ondansetron Injectable 4 milliGRAM(s) IV Push once PRN Nausea and/or Vomiting  oxyCODONE    IR 5 milliGRAM(s) Oral every 4 hours PRN Mild Pain (1 - 3)  oxyCODONE    IR 10 milliGRAM(s) Oral every 4 hours PRN Moderate Pain (4 - 6)  senna 2 Tablet(s) Oral at bedtime PRN Constipation    Vital Signs:   T(F): 99.4 (11-16-18 @ 07:55), Max: 101 (11-15-18 @ 23:07)  HR: 80 (11-16-18 @ 07:55) (80 - 90)  BP: 104/48 (11-16-18 @ 07:55) (104/48 - 123/53)  RR: 18 (11-16-18 @ 07:55) (18 - 18)  SpO2: --      11-15-18 @ 07:01  -  11-16-18 @ 07:00  --------------------------------------------------------  IN: 800 mL / OUT: 1050 mL / NET: -250 mL    11-16-18 @ 07:01  -  11-16-18 @ 13:51  --------------------------------------------------------  IN: 240 mL / OUT: 0 mL / NET: 240 mL        Physical Exam:   GENERAL: NAD, pleasant and conversive sitting in a chair. Appearing stated age   HEENT: NCAT, PERRLA, EOMI   CHEST/LUNG: CTA No wheezing/rhonchi/rales   HEART: Regular rate and rhythm; s1 s2 appreciated, No murmurs, rubs, or gallops  ABDOMEN: Soft, Nontender, Nondistended; Bowel sounds present  EXTREMITIES: No LE edema b/l, Muscle strength 2/5 in RLE, 5/5 in bilateral UE and LLE, RLE bandaged with dressing clean dry and intact  NERVOUS SYSTEM:  Alert & Oriented X3, sensation equal and intact bilaterally, cranial nerves ii-xii grossly intact       Labs:                         7.6    15.08 )-----------( 295      ( 16 Nov 2018 06:22 )             22.7     Neutophil% 62.5, Lymphocyte% 20.0, Monocyte% 14.7, Bands% 0.9 11-16-18 @ 06:22    16 Nov 2018 06:22    132    |  94     |  16     ----------------------------<  122    3.6     |  23     |  0.6      Ca    8.2        16 Nov 2018 06:22    Hemoglobin A1C, Whole Blood: 5.8 % (11-10-18 @ 07:10)    Radiology:   None Today     Assessment and Plan:   This is a 89 year old female with PMHx of right hip ORIF in 2016, Vit D deficiency, Hypothyroid, hyponatremia who presented to the ED after enduring a twisting injury to her right leg with an audible snap and severe pain. Patient found to have a right periprosthetic distal femur fracture.     #Right Periprosthetic Distal Femur Fracture  - POD #2 for ORIF  - Vascular surgery: duplex of RLE negative  - OOBTC with non-weight bearing to RLE fro 12 weeks   - PT/OT and physiatry consulted; SNF  - Pain regimen: Morphine 2mg q4 PRN, Oxycodone 5mg Q4 PRN     #Acute on chronic Anemia  - Baseline hemoglobin approximately 11.  - Currently 7.6; most likely secondary to surgery  - Continue to monitor CBC.  - Transfuse if under 7    #Delirium (improving)   - Most likely multifactorial: hospitalization, UTI, constipation, fracture  - Constant reorientation  - Avoid sedating medications.     #Hyponatremia (resolved)   - Sodium 132 today. Initially presented at 115  - Continue on increased sodium diet     #UTI   - ID consulted  - Urine culture sent; pseudomonas and staph aureus grew. Discussed Urine culture results with ID doctor. Will hold antibiotics.     #Hypothyroidism  - Continue on Levothyroxine	  - TSH ordered and pending    #Depression  - Continue on home medication     #Constipation (resolved)  - Continue on colace and senna    Activity: OOBTC with nonweightbearing on RLE  Diet: Mechanical Soft  DVT ppx: Heparin SubQ  GI ppx: Not indicated   Code Status: full code   DISPO: To OR today    Attending Attestation  Pt has been seen and examined. She is POD #1. She is comfortable. Agree with above documentation as corrected and case d/w at rounds.   Dx: Rt Periprosthetic hip fracture s/p pinning. NWB RLE.  Plan: Physical Rx evaluation, Pain control, NWB RLE, SNF placement, c/w all meds per orders.    DIspo:  SNF after seen by PT for [facility]. Continue to monitor CBC Hospital Day:  7d    Subjective:  Seen and examined while in a chair with granddaughter and granddaughter's  present. Patient reported that she was having some pain while seated in the chair. Informed her that it is normal to have some pain after the surgery and she can take some ain medication. She took an Oxycodone IR and reported later on that there was interval improvement in her pain. Patient states that she has no fevers, chills, nausea, vomiting, constipation, diarrhea, fatigue, shortness of breath.      Past Medical Hx:   Neuropathy  Depression  OA (osteoarthritis)  Hypothyroid  HLD (hyperlipidemia)    Past Sx:  S/P ORIF (open reduction internal fixation) fracture    Allergies:  No Known Allergies    Current Meds:   Standng Meds:  acetaminophen   Tablet .. 650 milliGRAM(s) Oral every 6 hours  docusate sodium 100 milliGRAM(s) Oral three times a day  DULoxetine 20 milliGRAM(s) Oral daily  fesoterodine ER Tablet 4 milliGRAM(s) Oral daily  heparin  Injectable 5000 Unit(s) SubCutaneous every 8 hours  levothyroxine 25 MICROGram(s) Oral daily  multivitamin 1 Tablet(s) Oral daily  pantoprazole    Tablet 40 milliGRAM(s) Oral before breakfast  pregabalin 150 milliGRAM(s) Oral every 8 hours  simvastatin 5 milliGRAM(s) Oral at bedtime  sodium chloride 0.9%. 750 milliLiter(s) (75 mL/Hr) IV Continuous <Continuous>    PRN Meds:  HYDROmorphone  Injectable 0.5 milliGRAM(s) IV Push every 10 minutes PRN Severe Pain (7 - 10)  HYDROmorphone  Injectable 0.25 milliGRAM(s) IV Push every 10 minutes PRN Moderate Pain (4 - 6)  morphine  - Injectable 2 milliGRAM(s) IV Push every 4 hours PRN Severe Pain (7 - 10)  ondansetron    Tablet 4 milliGRAM(s) Oral two times a day PRN Nausea and/or Vomiting  ondansetron Injectable 4 milliGRAM(s) IV Push once PRN Nausea and/or Vomiting  oxyCODONE    IR 5 milliGRAM(s) Oral every 4 hours PRN Mild Pain (1 - 3)  oxyCODONE    IR 10 milliGRAM(s) Oral every 4 hours PRN Moderate Pain (4 - 6)  senna 2 Tablet(s) Oral at bedtime PRN Constipation    Vital Signs:   T(F): 99.4 (11-16-18 @ 07:55), Max: 101 (11-15-18 @ 23:07)  HR: 80 (11-16-18 @ 07:55) (80 - 90)  BP: 104/48 (11-16-18 @ 07:55) (104/48 - 123/53)  RR: 18 (11-16-18 @ 07:55) (18 - 18)  SpO2: --      11-15-18 @ 07:01  -  11-16-18 @ 07:00  --------------------------------------------------------  IN: 800 mL / OUT: 1050 mL / NET: -250 mL    11-16-18 @ 07:01  -  11-16-18 @ 13:51  --------------------------------------------------------  IN: 240 mL / OUT: 0 mL / NET: 240 mL        Physical Exam:   GENERAL: NAD, pleasant and conversive sitting in a chair. Appearing stated age   HEENT: NCAT, PERRLA, EOMI   CHEST/LUNG: CTA No wheezing/rhonchi/rales   HEART: Regular rate and rhythm; s1 s2 appreciated, No murmurs, rubs, or gallops  ABDOMEN: Soft, Nontender, Nondistended; Bowel sounds present  EXTREMITIES: No LE edema b/l, Muscle strength 2/5 in RLE, 5/5 in bilateral UE and LLE, RLE bandaged with dressing clean dry and intact  NERVOUS SYSTEM:  Alert & Oriented X3, sensation equal and intact bilaterally, cranial nerves ii-xii grossly intact       Labs:                         7.6    15.08 )-----------( 295      ( 16 Nov 2018 06:22 )             22.7     Neutophil% 62.5, Lymphocyte% 20.0, Monocyte% 14.7, Bands% 0.9 11-16-18 @ 06:22    16 Nov 2018 06:22    132    |  94     |  16     ----------------------------<  122    3.6     |  23     |  0.6      Ca    8.2        16 Nov 2018 06:22    Hemoglobin A1C, Whole Blood: 5.8 % (11-10-18 @ 07:10)    Radiology:   None Today     Assessment and Plan:   This is a 89 year old female with PMHx of right hip ORIF in 2016, Vit D deficiency, Hypothyroid, hyponatremia who presented to the ED after enduring a twisting injury to her right leg with an audible snap and severe pain. Patient found to have a right periprosthetic distal femur fracture.     #Right Periprosthetic Distal Femur Fracture  - POD #2 for ORIF  - Vascular surgery: duplex of RLE negative  - OOBTC with non-weight bearing to RLE fro 12 weeks   - PT/OT and physiatry consulted; SNF  - Pain regimen: Morphine 2mg q4 PRN, Oxycodone 5mg Q4 PRN     #Acute on chronic Anemia  - Baseline hemoglobin approximately 11.  - Currently 7.6; most likely secondary to surgery  - Continue to monitor CBC.  - Transfuse if under 7    #Delirium (improving)   - Most likely multifactorial: hospitalization, UTI, constipation, fracture  - Constant reorientation  - Avoid sedating medications.     #Hyponatremia (resolved)   - Sodium 132 today. Initially presented at 115  - Continue on increased sodium diet     #UTI   - ID consulted  - Urine culture sent; pseudomonas and staph aureus grew. Discussed Urine culture results with ID doctor. Will hold antibiotics.     #Hypothyroidism  - Continue on Levothyroxine	  - TSH ordered and pending    #Depression  - Continue on home medication     #Constipation (resolved)  - Continue on colace and senna    Activity: OOBTC with nonweightbearing on RLE  Diet: Mechanical Soft  DVT ppx: Heparin SubQ  GI ppx: Not indicated   Code Status: full code   DISPO: Continue to monitor CBC, Pending PT

## 2018-11-16 NOTE — CONSULT NOTE ADULT - SUBJECTIVE AND OBJECTIVE BOX
ASHVIN CUEVAS  89y, Female  Allergy: No Known Allergies      HPI:  89 year old Female with pertinent history of right hip orif in 2016 presents to the ED after a fall from a standing position while walking with the stroller. She said it resulted in a twisting injury yesterday and when she tried to get up she heard a snap and had a lot of pain in the right lower extremity. She says at baseline she lives home alone, an aide is their to help her with basic needs. A doctor visits her once every 2 months and A nurse visits her every 2 weeks. She currently has no other active complaints and feels fine. She denies any shaking movement while on the floor or loss of bowel or bladder control or tongue bite during the episode of fall. (10 Nov 2018 00:58)    ORIF fracture of femur  11/14/2018     FAMILY HISTORY:  No pertinent family history in first degree relatives: no family history of heart disease    PAST MEDICAL & SURGICAL HISTORY:  Neuropathy  Depression  OA (osteoarthritis)  Hypothyroid  HLD (hyperlipidemia)  S/P ORIF (open reduction internal fixation) fracture    VITALS:  T(F): 99.4, Max: 101 (11-15-18 @ 23:07)  HR: 80  BP: 104/48  RR: 18  Vital Signs Last 24 Hrs  T(C): 37.4 (16 Nov 2018 07:55), Max: 38.3 (15 Nov 2018 23:07)  T(F): 99.4 (16 Nov 2018 07:55), Max: 101 (15 Nov 2018 23:07)  HR: 80 (16 Nov 2018 07:55) (80 - 90)  BP: 104/48 (16 Nov 2018 07:55) (104/48 - 123/53)  BP(mean): --  RR: 18 (16 Nov 2018 07:55) (18 - 18)  SpO2: 95% (15 Nov 2018 11:02) (91% - 95%)    TESTS & MEASUREMENTS:                        7.6    15.08 )-----------( 295      ( 16 Nov 2018 06:22 )             22.7     11-16    132<L>  |  94<L>  |  16  ----------------------------<  122<H>  3.6   |  23  |  0.6<L>    Ca    8.2<L>      16 Nov 2018 06:22          Culture - Urine (collected 11-10-18 @ 17:05)  Source: .Urine Catheterized  Final Report (11-14-18 @ 19:26):    50,000 - 99,000 CFU/mL Pseudomonas aeruginosa (Carbapenem Resistant)    50,000 - 99,000 CFU/mL Methicillin resistant Staphylococcus aureus  Organism: Pseudomonas aeruginosa (Carbapenem Resistant)  Methicillin resistant Staphylococcus aureus (11-14-18 @ 19:26)  Organism: Methicillin resistant Staphylococcus aureus (11-14-18 @ 19:26)      -  Ampicillin/Sulbactam: R <=8/4      -  Cefazolin: R >16      -  Daptomycin: S 0.5      -  Gentamicin: S <=1 Should not be used as monotherapy      -  Linezolid: S 2      -  Oxacillin: R >2      -  Penicillin: R >8      -  RIF- Rifampin: S <=1 Should not be used as monotherapy      -  Tetra/Doxy: S <=1      -  Trimethoprim/Sulfamethoxazole: R >2/38      -  Vancomycin: S 1      Method Type: HARVEY  Organism: Pseudomonas aeruginosa (Carbapenem Resistant) (11-14-18 @ 19:26)      -  Amikacin: S 16      -  Aztreonam: I 16      -  Cefepime: I 16      -  Ceftazidime: S 4      -  Ciprofloxacin: R >2      -  Gentamicin: R >8      -  Imipenem: R 8      -  Levofloxacin: R >4      -  Meropenem: R >8      -  Piperacillin/Tazobactam: S 16      -  Tobramycin: R >8      Method Type: HARVEY      RADIOLOGY & ADDITIONAL TESTS:  < from: Xray Chest 1 View- PORTABLE-Routine (11.11.18 @ 05:57) >  INTERPRETATION:  Clinical History / Reason for exam: Shortness of breath.    Comparison : Chest radiograph 11/10/2018.    Technique/Positioning: Satisfactory.    Findings:    Support devices: None.    Cardiac/mediastinum/hilum: Unchanged.    Lung parenchyma/Pleura: Unchanged small left greater than right basilar   opacities. No pneumothorax.    Skeleton/soft tissues: Unchanged.    Impression:      No significant changes. Left greater than right basilar opacities.    < end of copied text >      ANTIBIOTICS:  Antibiotics on hold.  Received IV Ceftriaxone 1g 11/11-11/14 ASHVIN CUEVAS  89y, Female  Allergy: No Known Allergies      HPI:  89 y F PMH: R hip orif (2016), neuropathy, OA, hypothyroidism, HLD, depression, presented to ED after fall from a standing position while walking with the stroller. She said it resulted in a twisting injury the day prior and when she tried to get up she heard a snap and had pain in the right lower extremity. A doctor visits her once every 2 months and A nurse visits her every 2 weeks. She had an aide that comes during the day. She denies any shaking movement while on the floor or loss of bowel or bladder control or tongue bite during the episode of fall. (10 Nov 2018 00:58)    Urine cultures - 11/10, results listed below. Pt received IV ceftriaxone from 11/11-11/15.  ORIF fracture of femur  11/14/2018   Denies SOB at present, but states she did require supplemental oxygen on 11/10 and last night 11/15.  Denies pain other than R leg d/t ORIF. Denies chills, dysuria. Fevers started Thursday 11/15 (101), and low-grade fever persists this morning 11/16 (99.4).  Pt states she had surgery in past to "lift bladder" but it didn't help with urinary symptoms. She also stated she had to straight cath herself "many years ago".    Previous history of UTIs  Urine Cultures had grown:   e.coli (12/8/17) R- unasyn, bactrim  PA-MDR/CRE + e.coli (3/2/17)  R/I- Cefepime, floroquinolones, Gent, ruben, tobramycin  Proteus Mirabilis (12/3/16) R-nitro  E. faecalic + E. Coli (5/20/16) R- Tetra/doxycycline    FAMILY HISTORY:  No pertinent family history in first degree relatives: no family history of heart disease    PAST MEDICAL & SURGICAL HISTORY:  Neuropathy  Depression  OA (osteoarthritis)  Hypothyroid  HLD (hyperlipidemia)  S/P ORIF (open reduction internal fixation) fracture    VITALS:  T(F): 99.4, Max: 101 (11-15-18 @ 23:07)  HR: 80  BP: 104/48  RR: 18  Vital Signs Last 24 Hrs  T(C): 37.4 (16 Nov 2018 07:55), Max: 38.3 (15 Nov 2018 23:07)  T(F): 99.4 (16 Nov 2018 07:55), Max: 101 (15 Nov 2018 23:07)  HR: 80 (16 Nov 2018 07:55) (80 - 90)  BP: 104/48 (16 Nov 2018 07:55) (104/48 - 123/53)  BP(mean): --  RR: 18 (16 Nov 2018 07:55) (18 - 18)  SpO2: 95% (15 Nov 2018 11:02) (91% - 95%)    TESTS & MEASUREMENTS:  WBC trend: 15.08 (11/16) < 10.57 (11/15) < 13.17 (11/14) <11.65 (11/12) < 9.75 (11/11)< 8.89 (11/10) < 12.98 (11/9)                      7.6    15.08 )-----------( 295      ( 16 Nov 2018 06:22 )             22.7     11-16    132<L>  |  94<L>  |  16  ----------------------------<  122<H>  3.6   |  23  |  0.6<L>    Ca    8.2<L>      16 Nov 2018 06:22      Culture - Urine (collected 11-10-18 @ 17:05)  Source: .Urine Catheterized  Final Report (11-14-18 @ 19:26):    50,000 - 99,000 CFU/mL Pseudomonas aeruginosa (Carbapenem Resistant)    50,000 - 99,000 CFU/mL Methicillin resistant Staphylococcus aureus  Organism: Pseudomonas aeruginosa (Carbapenem Resistant)  Methicillin resistant Staphylococcus aureus (11-14-18 @ 19:26)  Organism: Methicillin resistant Staphylococcus aureus (11-14-18 @ 19:26)      -  Ampicillin/Sulbactam: R <=8/4      -  Cefazolin: R >16      -  Daptomycin: S 0.5      -  Gentamicin: S <=1 Should not be used as monotherapy      -  Linezolid: S 2      -  Oxacillin: R >2      -  Penicillin: R >8      -  RIF- Rifampin: S <=1 Should not be used as monotherapy      -  Tetra/Doxy: S <=1      -  Trimethoprim/Sulfamethoxazole: R >2/38      -  Vancomycin: S 1      Method Type: HARVEY  Organism: Pseudomonas aeruginosa (Carbapenem Resistant) (11-14-18 @ 19:26)      -  Amikacin: S 16      -  Aztreonam: I 16      -  Cefepime: I 16      -  Ceftazidime: S 4      -  Ciprofloxacin: R >2      -  Gentamicin: R >8      -  Imipenem: R 8      -  Levofloxacin: R >4      -  Meropenem: R >8      -  Piperacillin/Tazobactam: S 16      -  Tobramycin: R >8      Method Type: HARVEY      RADIOLOGY & ADDITIONAL TESTS:  < from: Xray Chest 1 View- PORTABLE-Routine (11.11.18 @ 05:57) >  INTERPRETATION:  Clinical History / Reason for exam: Shortness of breath.    Comparison : Chest radiograph 11/10/2018.    Technique/Positioning: Satisfactory.    Findings:    Support devices: None.    Cardiac/mediastinum/hilum: Unchanged.    Lung parenchyma/Pleura: Unchanged small left greater than right basilar   opacities. No pneumothorax.    Skeleton/soft tissues: Unchanged.    Impression:      No significant changes. Left greater than right basilar opacities.    < end of copied text >      ANTIBIOTICS:  Antibiotics on hold.  Received IV Ceftriaxone 1g 11/11-11/14

## 2018-11-16 NOTE — CONSULT NOTE ADULT - CONSULT REASON
Urine culture growing Pseudomonas and MRSA with poor sensitivities Urine culture growing Pseudomonas and MRSA

## 2018-11-17 LAB
ANION GAP SERPL CALC-SCNC: 15 MMOL/L — HIGH (ref 7–14)
BASOPHILS # BLD AUTO: 0.06 K/UL — SIGNIFICANT CHANGE UP (ref 0–0.2)
BASOPHILS # BLD AUTO: 0.08 K/UL — SIGNIFICANT CHANGE UP (ref 0–0.2)
BASOPHILS NFR BLD AUTO: 0.6 % — SIGNIFICANT CHANGE UP (ref 0–1)
BASOPHILS NFR BLD AUTO: 0.8 % — SIGNIFICANT CHANGE UP (ref 0–1)
BUN SERPL-MCNC: 11 MG/DL — SIGNIFICANT CHANGE UP (ref 10–20)
CALCIUM SERPL-MCNC: 7.9 MG/DL — LOW (ref 8.5–10.1)
CHLORIDE SERPL-SCNC: 91 MMOL/L — LOW (ref 98–110)
CO2 SERPL-SCNC: 22 MMOL/L — SIGNIFICANT CHANGE UP (ref 17–32)
CREAT SERPL-MCNC: 0.5 MG/DL — LOW (ref 0.7–1.5)
EOSINOPHIL # BLD AUTO: 0.29 K/UL — SIGNIFICANT CHANGE UP (ref 0–0.7)
EOSINOPHIL # BLD AUTO: 0.3 K/UL — SIGNIFICANT CHANGE UP (ref 0–0.7)
EOSINOPHIL NFR BLD AUTO: 3 % — SIGNIFICANT CHANGE UP (ref 0–8)
EOSINOPHIL NFR BLD AUTO: 3.1 % — SIGNIFICANT CHANGE UP (ref 0–8)
GLUCOSE SERPL-MCNC: 120 MG/DL — HIGH (ref 70–99)
HCT VFR BLD CALC: 20.3 % — LOW (ref 37–47)
HCT VFR BLD CALC: 24.3 % — LOW (ref 37–47)
HGB BLD-MCNC: 6.8 G/DL — CRITICAL LOW (ref 12–16)
HGB BLD-MCNC: 8 G/DL — LOW (ref 12–16)
IMM GRANULOCYTES NFR BLD AUTO: 0.4 % — HIGH (ref 0.1–0.3)
IMM GRANULOCYTES NFR BLD AUTO: 0.7 % — HIGH (ref 0.1–0.3)
LYMPHOCYTES # BLD AUTO: 2.46 K/UL — SIGNIFICANT CHANGE UP (ref 1.2–3.4)
LYMPHOCYTES # BLD AUTO: 2.71 K/UL — SIGNIFICANT CHANGE UP (ref 1.2–3.4)
LYMPHOCYTES # BLD AUTO: 25.3 % — SIGNIFICANT CHANGE UP (ref 20.5–51.1)
LYMPHOCYTES # BLD AUTO: 28.3 % — SIGNIFICANT CHANGE UP (ref 20.5–51.1)
MCHC RBC-ENTMCNC: 29.1 PG — SIGNIFICANT CHANGE UP (ref 27–31)
MCHC RBC-ENTMCNC: 29.6 PG — SIGNIFICANT CHANGE UP (ref 27–31)
MCHC RBC-ENTMCNC: 32.9 G/DL — SIGNIFICANT CHANGE UP (ref 32–37)
MCHC RBC-ENTMCNC: 33.5 G/DL — SIGNIFICANT CHANGE UP (ref 32–37)
MCV RBC AUTO: 88.3 FL — SIGNIFICANT CHANGE UP (ref 81–99)
MCV RBC AUTO: 88.4 FL — SIGNIFICANT CHANGE UP (ref 81–99)
MONOCYTES # BLD AUTO: 1.26 K/UL — HIGH (ref 0.1–0.6)
MONOCYTES # BLD AUTO: 1.48 K/UL — HIGH (ref 0.1–0.6)
MONOCYTES NFR BLD AUTO: 12.9 % — HIGH (ref 1.7–9.3)
MONOCYTES NFR BLD AUTO: 15.5 % — HIGH (ref 1.7–9.3)
NEUTROPHILS # BLD AUTO: 4.96 K/UL — SIGNIFICANT CHANGE UP (ref 1.4–6.5)
NEUTROPHILS # BLD AUTO: 5.6 K/UL — SIGNIFICANT CHANGE UP (ref 1.4–6.5)
NEUTROPHILS NFR BLD AUTO: 51.9 % — SIGNIFICANT CHANGE UP (ref 42.2–75.2)
NEUTROPHILS NFR BLD AUTO: 57.5 % — SIGNIFICANT CHANGE UP (ref 42.2–75.2)
NRBC # BLD: 0 /100 WBCS — SIGNIFICANT CHANGE UP (ref 0–0)
NRBC # BLD: 0 /100 WBCS — SIGNIFICANT CHANGE UP (ref 0–0)
PLATELET # BLD AUTO: 277 K/UL — SIGNIFICANT CHANGE UP (ref 130–400)
PLATELET # BLD AUTO: 313 K/UL — SIGNIFICANT CHANGE UP (ref 130–400)
POTASSIUM SERPL-MCNC: 3.7 MMOL/L — SIGNIFICANT CHANGE UP (ref 3.5–5)
POTASSIUM SERPL-SCNC: 3.7 MMOL/L — SIGNIFICANT CHANGE UP (ref 3.5–5)
RBC # BLD: 2.3 M/UL — LOW (ref 4.2–5.4)
RBC # BLD: 2.75 M/UL — LOW (ref 4.2–5.4)
RBC # FLD: 14.3 % — SIGNIFICANT CHANGE UP (ref 11.5–14.5)
RBC # FLD: 14.6 % — HIGH (ref 11.5–14.5)
SODIUM SERPL-SCNC: 128 MMOL/L — LOW (ref 135–146)
WBC # BLD: 9.57 K/UL — SIGNIFICANT CHANGE UP (ref 4.8–10.8)
WBC # BLD: 9.74 K/UL — SIGNIFICANT CHANGE UP (ref 4.8–10.8)
WBC # FLD AUTO: 9.57 K/UL — SIGNIFICANT CHANGE UP (ref 4.8–10.8)
WBC # FLD AUTO: 9.74 K/UL — SIGNIFICANT CHANGE UP (ref 4.8–10.8)

## 2018-11-17 RX ADMIN — Medication 25 MICROGRAM(S): at 06:03

## 2018-11-17 RX ADMIN — Medication 650 MILLIGRAM(S): at 17:42

## 2018-11-17 RX ADMIN — OXYCODONE HYDROCHLORIDE 10 MILLIGRAM(S): 5 TABLET ORAL at 13:05

## 2018-11-17 RX ADMIN — HEPARIN SODIUM 5000 UNIT(S): 5000 INJECTION INTRAVENOUS; SUBCUTANEOUS at 06:03

## 2018-11-17 RX ADMIN — Medication 100 MILLIGRAM(S): at 22:33

## 2018-11-17 RX ADMIN — Medication 1 TABLET(S): at 13:04

## 2018-11-17 RX ADMIN — SIMVASTATIN 5 MILLIGRAM(S): 20 TABLET, FILM COATED ORAL at 22:34

## 2018-11-17 RX ADMIN — Medication 650 MILLIGRAM(S): at 07:39

## 2018-11-17 RX ADMIN — Medication 650 MILLIGRAM(S): at 06:04

## 2018-11-17 RX ADMIN — PANTOPRAZOLE SODIUM 40 MILLIGRAM(S): 20 TABLET, DELAYED RELEASE ORAL at 06:03

## 2018-11-17 RX ADMIN — Medication 650 MILLIGRAM(S): at 23:55

## 2018-11-17 RX ADMIN — FESOTERODINE FUMARATE 4 MILLIGRAM(S): 8 TABLET, FILM COATED, EXTENDED RELEASE ORAL at 13:04

## 2018-11-17 RX ADMIN — Medication 150 MILLIGRAM(S): at 22:34

## 2018-11-17 RX ADMIN — OXYCODONE HYDROCHLORIDE 10 MILLIGRAM(S): 5 TABLET ORAL at 13:35

## 2018-11-17 RX ADMIN — HEPARIN SODIUM 5000 UNIT(S): 5000 INJECTION INTRAVENOUS; SUBCUTANEOUS at 22:34

## 2018-11-17 RX ADMIN — Medication 150 MILLIGRAM(S): at 06:03

## 2018-11-17 RX ADMIN — Medication 100 MILLIGRAM(S): at 13:04

## 2018-11-17 RX ADMIN — Medication 100 MILLIGRAM(S): at 06:03

## 2018-11-17 RX ADMIN — Medication 150 MILLIGRAM(S): at 13:04

## 2018-11-17 RX ADMIN — DULOXETINE HYDROCHLORIDE 20 MILLIGRAM(S): 30 CAPSULE, DELAYED RELEASE ORAL at 13:04

## 2018-11-17 RX ADMIN — Medication 650 MILLIGRAM(S): at 13:07

## 2018-11-17 RX ADMIN — HEPARIN SODIUM 5000 UNIT(S): 5000 INJECTION INTRAVENOUS; SUBCUTANEOUS at 13:09

## 2018-11-17 NOTE — PROGRESS NOTE ADULT - ATTENDING COMMENTS
pt seen and examined agree with resident exam and plan.  NWB right lower extremity  pain control  oob, PT, IS, DVT proph, f/u H&H

## 2018-11-17 NOTE — PROGRESS NOTE ADULT - SUBJECTIVE AND OBJECTIVE BOX
Hospital Day:  7    Subjective:  No complaints.  Pt is doing well carrying a normal conversation and comprehends well.     Past Medical Hx:   Neuropathy  Depression  OA (osteoarthritis)  Hypothyroid  HLD (hyperlipidemia)    Past Sx:  S/P ORIF (open reduction internal fixation) fracture    Allergies:  No Known Allergies    Current Meds:   MEDICATIONS  (STANDING):  acetaminophen   Tablet .. 650 milliGRAM(s) Oral every 6 hours  docusate sodium 100 milliGRAM(s) Oral three times a day  DULoxetine 20 milliGRAM(s) Oral daily  fesoterodine ER Tablet 4 milliGRAM(s) Oral daily  heparin  Injectable 5000 Unit(s) SubCutaneous every 8 hours  levothyroxine 25 MICROGram(s) Oral daily  multivitamin 1 Tablet(s) Oral daily  pantoprazole    Tablet 40 milliGRAM(s) Oral before breakfast  pregabalin 150 milliGRAM(s) Oral every 8 hours  simvastatin 5 milliGRAM(s) Oral at bedtime  sodium chloride 0.9%. 750 milliLiter(s) (75 mL/Hr) IV Continuous <Continuous>    MEDICATIONS  (PRN):  HYDROmorphone  Injectable 0.5 milliGRAM(s) IV Push every 10 minutes PRN Severe Pain (7 - 10)  HYDROmorphone  Injectable 0.25 milliGRAM(s) IV Push every 10 minutes PRN Moderate Pain (4 - 6)  morphine  - Injectable 2 milliGRAM(s) IV Push every 4 hours PRN Severe Pain (7 - 10)  ondansetron    Tablet 4 milliGRAM(s) Oral two times a day PRN Nausea and/or Vomiting  ondansetron Injectable 4 milliGRAM(s) IV Push once PRN Nausea and/or Vomiting  oxyCODONE    IR 5 milliGRAM(s) Oral every 4 hours PRN Mild Pain (1 - 3)  oxyCODONE    IR 10 milliGRAM(s) Oral every 4 hours PRN Moderate Pain (4 - 6)  senna 2 Tablet(s) Oral at bedtime PRN Constipation      Vital Signs:   Vital Signs Last 24 Hrs  T(C): 37.3 (17 Nov 2018 11:00), Max: 37.3 (17 Nov 2018 08:27)  T(F): 99.2 (17 Nov 2018 11:00), Max: 99.2 (17 Nov 2018 11:00)  HR: 80 (17 Nov 2018 11:00) (80 - 97)  BP: 135/63 (17 Nov 2018 11:00) (101/55 - 135/63)  RR: 20 (17 Nov 2018 11:00) (18 - 20)    Physical Exam:   GENERAL: NAD, Confused; pleasant and conversive. Appearing stated age   HEENT: NCAT, PERRLA, EOMI   CHEST/LUNG: CTA No wheezing/rhonchi/rales   HEART: Regular rate and rhythm; s1 s2 appreciated, No murmurs, rubs, or gallops  ABDOMEN: Soft, Nontender, Nondistended; Bowel sounds present  EXTREMITIES: No LE edema b/l, Muscle strength 3/5 in RLE, 5/5 in bilateral UE and LLE, Mild swelling in right lower extremity   NERVOUS SYSTEM:  Alert & Oriented X 2, sensation equal and intact bilaterally     Labs:                             6.8    9.74  )-----------( 277      ( 17 Nov 2018 06:30 )             20.3                           7.6    15.08 )-----------( 295      ( 16 Nov 2018 06:22 )             22.7       11-17    128<L>  |  91<L>  |  11  ----------------------------<  120<H>  3.7   |  22  |  0.5<L>    Ca    7.9<L>      17 Nov 2018 06:30      11-16    132<L>  |  94<L>  |  16  ----------------------------<  122<H>  3.6   |  23  |  0.6<L>    Ca    8.2<L>      16 Nov 2018 06:22      Hemoglobin A1C, Whole Blood: 5.8 % (11-10-18 @ 07:10)    Urinalysis Basic - ( 10 Nov 2018 06:08 )    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  Diffuse mild atherosclerotic disease bilateral lower extremities.  No evidence of significant arterial occlusive disease in bilateral lower extremities otherwise.    Assessment and Plan:   This is a 89 year old female with PMHx of right hip ORIF in 2016, Vit D deficiency, Hypothyroid, chronic hyponatremia who presented to the ED after enduring a twisting injury to her right leg with an audible snap and severe pain. Patient found to have a right periprosthetic distal femur fracture.     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

## 2018-11-17 NOTE — PROGRESS NOTE ADULT - ASSESSMENT
IMPRESSION:  Isolated fevers, pyuria, leucocytosis  as per family at bedside everytime a UC is done it is positive  polymicrobial corinna in urine   Presently on rocephin which does not cover the pathogens  reluctant to advocate 14 days of iv ABx based on the cultures  as pt is clinically stable will observe pt.  WBC 9.7    RECOMMENDATIONS:  no ABx for now  recall prn please.

## 2018-11-17 NOTE — PROGRESS NOTE ADULT - SUBJECTIVE AND OBJECTIVE BOX
Hospital Day:  8d    Subjective:  Overnight hemoglobin dropped to 6.8; patient was transfused 1 unit of PRBCs. Seen and examined at bedside. Reported some leg pain but otherwise had no acute complaints .Stated no fevers, chills, nausea, vomiting constipation, diarrhea, weakness, fatigue.     Past Medical Hx:   Neuropathy  Depression  OA (osteoarthritis)  Hypothyroid  HLD (hyperlipidemia)    Past Sx:  S/P ORIF (open reduction internal fixation) fracture    Allergies:  No Known Allergies    Current Meds:   Standng Meds:  acetaminophen   Tablet .. 650 milliGRAM(s) Oral every 6 hours  docusate sodium 100 milliGRAM(s) Oral three times a day  DULoxetine 20 milliGRAM(s) Oral daily  fesoterodine ER Tablet 4 milliGRAM(s) Oral daily  heparin  Injectable 5000 Unit(s) SubCutaneous every 8 hours  levothyroxine 25 MICROGram(s) Oral daily  multivitamin 1 Tablet(s) Oral daily  pantoprazole    Tablet 40 milliGRAM(s) Oral before breakfast  pregabalin 150 milliGRAM(s) Oral every 8 hours  simvastatin 5 milliGRAM(s) Oral at bedtime  sodium chloride 0.9%. 750 milliLiter(s) (75 mL/Hr) IV Continuous <Continuous>    PRN Meds:  HYDROmorphone  Injectable 0.5 milliGRAM(s) IV Push every 10 minutes PRN Severe Pain (7 - 10)  HYDROmorphone  Injectable 0.25 milliGRAM(s) IV Push every 10 minutes PRN Moderate Pain (4 - 6)  morphine  - Injectable 2 milliGRAM(s) IV Push every 4 hours PRN Severe Pain (7 - 10)  ondansetron    Tablet 4 milliGRAM(s) Oral two times a day PRN Nausea and/or Vomiting  ondansetron Injectable 4 milliGRAM(s) IV Push once PRN Nausea and/or Vomiting  oxyCODONE    IR 5 milliGRAM(s) Oral every 4 hours PRN Mild Pain (1 - 3)  oxyCODONE    IR 10 milliGRAM(s) Oral every 4 hours PRN Moderate Pain (4 - 6)  senna 2 Tablet(s) Oral at bedtime PRN Constipation    Vital Signs:   T(F): 99.2 (11-17-18 @ 11:00), Max: 99.2 (11-17-18 @ 11:00)  HR: 80 (11-17-18 @ 11:00) (80 - 97)  BP: 135/63 (11-17-18 @ 11:00) (101/55 - 135/63)  RR: 20 (11-17-18 @ 11:00) (18 - 20)  SpO2: --      11-16-18 @ 07:01  -  11-17-18 @ 07:00  --------------------------------------------------------  IN: 440 mL / OUT: 2000 mL / NET: -1560 mL    11-17-18 @ 07:01  -  11-17-18 @ 16:00  --------------------------------------------------------  IN: 1230 mL / OUT: 1000 mL / NET: 230 mL        Physical Exam:   GENERAL: NAD, pleasant and conversive sitting in a chair. Appearing stated age   HEENT: NCAT, PERRLA, EOMI   CHEST/LUNG: CTA No wheezing/rhonchi/rales   HEART: Regular rate and rhythm; s1 s2 appreciated, No murmurs, rubs, or gallops  ABDOMEN: Soft, Nontender, Nondistended; Bowel sounds present  EXTREMITIES: No LE edema b/l, Muscle strength 2/5 in RLE, 5/5 in bilateral UE and LLE, RLE bandaged with dressing clean dry and intact  NERVOUS SYSTEM:  Alert & Oriented X3, sensation equal and intact bilaterally, cranial nerves ii-xii grossly intact     Labs:                         6.8    9.74  )-----------( 277      ( 17 Nov 2018 06:30 )             20.3     Neutophil% 57.5, Lymphocyte% 25.3, Monocyte% 12.9, Bands% 0.7 11-17-18 @ 06:30    17 Nov 2018 06:30    128    |  91     |  11     ----------------------------<  120    3.7     |  22     |  0.5      Ca    7.9        17 Nov 2018 06:30    Hemoglobin A1C, Whole Blood: 5.8 % (11-10-18 @ 07:10)    Radiology:   None Today     Assessment and Plan:   This is a 89 year old female with PMHx of right hip ORIF in 2016, Vit D deficiency, Hypothyroid, hyponatremia who presented to the ED after enduring a twisting injury to her right leg with an audible snap and severe pain. Patient found to have a right periprosthetic distal femur fracture.     #Right Periprosthetic Distal Femur Fracture  - POD #3 for ORIF  - Vascular surgery: duplex of RLE negative  - OOBTC with non-weight bearing to RLE fro 12 weeks   - PT/OT and physiatry consulted; SNF  - Pain regimen: Morphine 2mg q4 PRN, Oxycodone 5mg Q4 PRN     #Acute on chronic Anemia  - Baseline hemoglobin approximately 11.  - Currently 6.8; most likely secondary to surgery. Spoke with ortho surgery today. Expect hemoglobin to drop for 3-4 days after surgery.   - 1 unit of PRBCs today  - Continue to monitor CBC.  - Transfuse if under 7    #Delirium (improving)   - Most likely multifactorial: hospitalization, UTI, constipation, fracture  - Constant reorientation  - Avoid sedating medications.     #Hyponatremia (resolved)   - Sodium 132 today. Initially presented at 115  - Continue on increased sodium diet     #UTI   - ID consulted  - Urine culture sent; pseudomonas and staph aureus grew.  - No antibiotics as per ID     #Hypothyroidism  - Continue on Levothyroxine	  - TSH ordered and pending    #Depression  - Continue on home medication     #Constipation (resolved)  - Continue on colace and senna    Activity: OOBTC with nonweightbearing on RLE  Diet: Mechanical Soft  DVT ppx: Heparin SubQ  GI ppx: Not indicated   Code Status: full code   DISPO: Continue to monitor CBC, Pending PT Hospital Day:  8d    Subjective:  Overnight hemoglobin dropped to 6.8; patient was transfused 1 unit of PRBCs. Seen and examined at bedside. Reported some leg pain but otherwise had no acute complaints .Stated no fevers, chills, nausea, vomiting constipation, diarrhea, weakness, fatigue.     Past Medical Hx:   Neuropathy  Depression  OA (osteoarthritis)  Hypothyroid  HLD (hyperlipidemia)    Past Sx:  S/P ORIF (open reduction internal fixation) fracture    Allergies:  No Known Allergies    Current Meds:   Standng Meds:  acetaminophen   Tablet .. 650 milliGRAM(s) Oral every 6 hours  docusate sodium 100 milliGRAM(s) Oral three times a day  DULoxetine 20 milliGRAM(s) Oral daily  fesoterodine ER Tablet 4 milliGRAM(s) Oral daily  heparin  Injectable 5000 Unit(s) SubCutaneous every 8 hours  levothyroxine 25 MICROGram(s) Oral daily  multivitamin 1 Tablet(s) Oral daily  pantoprazole    Tablet 40 milliGRAM(s) Oral before breakfast  pregabalin 150 milliGRAM(s) Oral every 8 hours  simvastatin 5 milliGRAM(s) Oral at bedtime  sodium chloride 0.9%. 750 milliLiter(s) (75 mL/Hr) IV Continuous <Continuous>    PRN Meds:  HYDROmorphone  Injectable 0.5 milliGRAM(s) IV Push every 10 minutes PRN Severe Pain (7 - 10)  HYDROmorphone  Injectable 0.25 milliGRAM(s) IV Push every 10 minutes PRN Moderate Pain (4 - 6)  morphine  - Injectable 2 milliGRAM(s) IV Push every 4 hours PRN Severe Pain (7 - 10)  ondansetron    Tablet 4 milliGRAM(s) Oral two times a day PRN Nausea and/or Vomiting  ondansetron Injectable 4 milliGRAM(s) IV Push once PRN Nausea and/or Vomiting  oxyCODONE    IR 5 milliGRAM(s) Oral every 4 hours PRN Mild Pain (1 - 3)  oxyCODONE    IR 10 milliGRAM(s) Oral every 4 hours PRN Moderate Pain (4 - 6)  senna 2 Tablet(s) Oral at bedtime PRN Constipation    Vital Signs:   T(F): 99.2 (11-17-18 @ 11:00), Max: 99.2 (11-17-18 @ 11:00)  HR: 80 (11-17-18 @ 11:00) (80 - 97)  BP: 135/63 (11-17-18 @ 11:00) (101/55 - 135/63)  RR: 20 (11-17-18 @ 11:00) (18 - 20)  SpO2: --      11-16-18 @ 07:01  -  11-17-18 @ 07:00  --------------------------------------------------------  IN: 440 mL / OUT: 2000 mL / NET: -1560 mL    11-17-18 @ 07:01  -  11-17-18 @ 16:00  --------------------------------------------------------  IN: 1230 mL / OUT: 1000 mL / NET: 230 mL        Physical Exam:   GENERAL: NAD, pleasant and conversive sitting in a chair. Appearing stated age   HEENT: NCAT, PERRLA, EOMI   CHEST/LUNG: CTA No wheezing/rhonchi/rales   HEART: Regular rate and rhythm; s1 s2 appreciated, No murmurs, rubs, or gallops  ABDOMEN: Soft, Nontender, Nondistended; Bowel sounds present  EXTREMITIES: No LE edema b/l, Muscle strength 2/5 in RLE, 5/5 in bilateral UE and LLE, RLE with staples. Clean dry and intact  NERVOUS SYSTEM:  Alert & Oriented X3, sensation equal and intact bilaterally, cranial nerves ii-xii grossly intact     Labs:                         6.8    9.74  )-----------( 277      ( 17 Nov 2018 06:30 )             20.3     Neutophil% 57.5, Lymphocyte% 25.3, Monocyte% 12.9, Bands% 0.7 11-17-18 @ 06:30    17 Nov 2018 06:30    128    |  91     |  11     ----------------------------<  120    3.7     |  22     |  0.5      Ca    7.9        17 Nov 2018 06:30    Hemoglobin A1C, Whole Blood: 5.8 % (11-10-18 @ 07:10)    Radiology:   None Today     Assessment and Plan:   This is a 89 year old female with PMHx of right hip ORIF in 2016, Vit D deficiency, Hypothyroid, hyponatremia who presented to the ED after enduring a twisting injury to her right leg with an audible snap and severe pain. Patient found to have a right periprosthetic distal femur fracture.     #Right Periprosthetic Distal Femur Fracture  - POD #3 for ORIF  - Vascular surgery: duplex of RLE negative  - OOBTC with non-weight bearing to RLE fro 12 weeks   - PT/OT and physiatry consulted; SNF  - Pain regimen: Morphine 2mg q4 PRN, Oxycodone 5mg Q4 PRN     #Acute on chronic Anemia  - Baseline hemoglobin approximately 11.  - Currently 6.8; most likely secondary to surgery. Spoke with ortho surgery today. Expect hemoglobin to drop for 3-4 days after surgery.   - 1 unit of PRBCs today  - Continue to monitor CBC.  - Transfuse if under 7    #Delirium (improving)   - Most likely multifactorial: hospitalization, UTI, constipation, fracture  - Constant reorientation  - Avoid sedating medications.     #Hyponatremia (resolved)   - Sodium 132 today. Initially presented at 115  - Continue on increased sodium diet     #UTI   - ID consulted  - Urine culture sent; pseudomonas and staph aureus grew.  - No antibiotics as per ID     #Hypothyroidism  - Continue on Levothyroxine	  - TSH ordered and pending    #Depression  - Continue on home medication     #Constipation (resolved)  - Continue on colace and senna    Activity: OOBTC with nonweightbearing on RLE  Diet: Mechanical Soft  DVT ppx: Heparin SubQ  GI ppx: Not indicated   Code Status: full code   DISPO: Continue to monitor CBC, Pending PT

## 2018-11-17 NOTE — PROGRESS NOTE ADULT - SUBJECTIVE AND OBJECTIVE BOX
MASONBERNIEHELEN  89y, Female      OVERNIGHT EVENTS:    no complaints    VITALS:  T(F): 99.1, Max: 99.1 (11-17-18 @ 08:27)  HR: 86  BP: 101/55  RR: 20Vital Signs Last 24 Hrs  T(C): 37.3 (17 Nov 2018 08:27), Max: 37.3 (17 Nov 2018 08:27)  T(F): 99.1 (17 Nov 2018 08:27), Max: 99.1 (17 Nov 2018 08:27)  HR: 86 (17 Nov 2018 08:27) (80 - 97)  BP: 101/55 (17 Nov 2018 08:27) (101/55 - 130/60)  BP(mean): --  RR: 20 (17 Nov 2018 08:27) (18 - 20)  SpO2: --    TESTS & MEASUREMENTS:                        6.8    9.74  )-----------( 277      ( 17 Nov 2018 06:30 )             20.3     11-17    128<L>  |  91<L>  |  11  ----------------------------<  120<H>  3.7   |  22  |  0.5<L>    Ca    7.9<L>      17 Nov 2018 06:30          Culture - Urine (collected 11-10-18 @ 17:05)  Source: .Urine Catheterized  Final Report (11-14-18 @ 19:26):    50,000 - 99,000 CFU/mL Pseudomonas aeruginosa (Carbapenem Resistant)    50,000 - 99,000 CFU/mL Methicillin resistant Staphylococcus aureus  Organism: Pseudomonas aeruginosa (Carbapenem Resistant)  Methicillin resistant Staphylococcus aureus (11-14-18 @ 19:26)  Organism: Methicillin resistant Staphylococcus aureus (11-14-18 @ 19:26)      -  Ampicillin/Sulbactam: R <=8/4      -  Cefazolin: R >16      -  Daptomycin: S 0.5      -  Gentamicin: S <=1 Should not be used as monotherapy      -  Linezolid: S 2      -  Oxacillin: R >2      -  Penicillin: R >8      -  RIF- Rifampin: S <=1 Should not be used as monotherapy      -  Tetra/Doxy: S <=1      -  Trimethoprim/Sulfamethoxazole: R >2/38      -  Vancomycin: S 1      Method Type: HARVEY  Organism: Pseudomonas aeruginosa (Carbapenem Resistant) (11-14-18 @ 19:26)      -  Amikacin: S 16      -  Aztreonam: I 16      -  Cefepime: I 16      -  Ceftazidime: S 4      -  Ciprofloxacin: R >2      -  Gentamicin: R >8      -  Imipenem: R 8      -  Levofloxacin: R >4      -  Meropenem: R >8      -  Piperacillin/Tazobactam: S 16      -  Tobramycin: R >8      Method Type: HARVEY            RADIOLOGY & ADDITIONAL TESTS:    ANTIBIOTICS:

## 2018-11-17 NOTE — PROGRESS NOTE ADULT - SUBJECTIVE AND OBJECTIVE BOX
89yFemale POD #  3    S/P orif right pp distal femur fx     Patient seen and examined at bedside . The patient is awake and alert in NAD. No complaints of chest pain, SOB, N/V.                          7.6    15.08 )-----------( 295      ( 16 Nov 2018 06:22 )             22.7       DVT ppx     MEDICATIONS  (STANDING):  acetaminophen   Tablet .. 650 milliGRAM(s) Oral every 6 hours  docusate sodium 100 milliGRAM(s) Oral three times a day  DULoxetine 20 milliGRAM(s) Oral daily  fesoterodine ER Tablet 4 milliGRAM(s) Oral daily  heparin  Injectable 5000 Unit(s) SubCutaneous every 8 hours  levothyroxine 25 MICROGram(s) Oral daily  multivitamin 1 Tablet(s) Oral daily  pantoprazole    Tablet 40 milliGRAM(s) Oral before breakfast  pregabalin 150 milliGRAM(s) Oral every 8 hours  simvastatin 5 milliGRAM(s) Oral at bedtime    MEDICATIONS  (PRN):  HYDROmorphone  Injectable 0.5 milliGRAM(s) IV Push every 10 minutes PRN Severe Pain (7 - 10)  HYDROmorphone  Injectable 0.25 milliGRAM(s) IV Push every 10 minutes PRN Moderate Pain (4 - 6)  morphine  - Injectable 2 milliGRAM(s) IV Push every 4 hours PRN Severe Pain (7 - 10)  ondansetron    Tablet 4 milliGRAM(s) Oral two times a day PRN Nausea and/or Vomiting  ondansetron Injectable 4 milliGRAM(s) IV Push once PRN Nausea and/or Vomiting  oxyCODONE    IR 5 milliGRAM(s) Oral every 4 hours PRN Mild Pain (1 - 3)  oxyCODONE    IR 10 milliGRAM(s) Oral every 4 hours PRN Moderate Pain (4 - 6)  senna 2 Tablet(s) Oral at bedtime PRN Constipation      PE:   right lower ext dressing C/D/I          Compartments soft         NVI, SILT           A/P: 89yFemale POD # 3   S/P  orif right pp distal femur fx             OOB to Chair            Physical Therapy- nwb right le            Pain control            Incentive Spirometry            DVT Prophylaxis            Discharge planning

## 2018-11-18 LAB
ANION GAP SERPL CALC-SCNC: 14 MMOL/L — SIGNIFICANT CHANGE UP (ref 7–14)
BASOPHILS # BLD AUTO: 0.08 K/UL — SIGNIFICANT CHANGE UP (ref 0–0.2)
BASOPHILS NFR BLD AUTO: 1 % — SIGNIFICANT CHANGE UP (ref 0–1)
BLD GP AB SCN SERPL QL: SIGNIFICANT CHANGE UP
BUN SERPL-MCNC: 10 MG/DL — SIGNIFICANT CHANGE UP (ref 10–20)
CALCIUM SERPL-MCNC: 8 MG/DL — LOW (ref 8.5–10.1)
CHLORIDE SERPL-SCNC: 94 MMOL/L — LOW (ref 98–110)
CO2 SERPL-SCNC: 22 MMOL/L — SIGNIFICANT CHANGE UP (ref 17–32)
CREAT SERPL-MCNC: 0.5 MG/DL — LOW (ref 0.7–1.5)
EOSINOPHIL # BLD AUTO: 0.32 K/UL — SIGNIFICANT CHANGE UP (ref 0–0.7)
EOSINOPHIL NFR BLD AUTO: 3.9 % — SIGNIFICANT CHANGE UP (ref 0–8)
GLUCOSE SERPL-MCNC: 115 MG/DL — HIGH (ref 70–99)
HCT VFR BLD CALC: 26.7 % — LOW (ref 37–47)
HGB BLD-MCNC: 8.8 G/DL — LOW (ref 12–16)
IMM GRANULOCYTES NFR BLD AUTO: 0.4 % — HIGH (ref 0.1–0.3)
LYMPHOCYTES # BLD AUTO: 1.75 K/UL — SIGNIFICANT CHANGE UP (ref 1.2–3.4)
LYMPHOCYTES # BLD AUTO: 21.5 % — SIGNIFICANT CHANGE UP (ref 20.5–51.1)
MCHC RBC-ENTMCNC: 29.6 PG — SIGNIFICANT CHANGE UP (ref 27–31)
MCHC RBC-ENTMCNC: 33 G/DL — SIGNIFICANT CHANGE UP (ref 32–37)
MCV RBC AUTO: 89.9 FL — SIGNIFICANT CHANGE UP (ref 81–99)
MONOCYTES # BLD AUTO: 1.09 K/UL — HIGH (ref 0.1–0.6)
MONOCYTES NFR BLD AUTO: 13.4 % — HIGH (ref 1.7–9.3)
NEUTROPHILS # BLD AUTO: 4.86 K/UL — SIGNIFICANT CHANGE UP (ref 1.4–6.5)
NEUTROPHILS NFR BLD AUTO: 59.8 % — SIGNIFICANT CHANGE UP (ref 42.2–75.2)
NRBC # BLD: 0 /100 WBCS — SIGNIFICANT CHANGE UP (ref 0–0)
PLATELET # BLD AUTO: 287 K/UL — SIGNIFICANT CHANGE UP (ref 130–400)
POTASSIUM SERPL-MCNC: 3.9 MMOL/L — SIGNIFICANT CHANGE UP (ref 3.5–5)
POTASSIUM SERPL-SCNC: 3.9 MMOL/L — SIGNIFICANT CHANGE UP (ref 3.5–5)
RBC # BLD: 2.97 M/UL — LOW (ref 4.2–5.4)
RBC # FLD: 14.6 % — HIGH (ref 11.5–14.5)
SODIUM SERPL-SCNC: 130 MMOL/L — LOW (ref 135–146)
TYPE + AB SCN PNL BLD: SIGNIFICANT CHANGE UP
WBC # BLD: 8.13 K/UL — SIGNIFICANT CHANGE UP (ref 4.8–10.8)
WBC # FLD AUTO: 8.13 K/UL — SIGNIFICANT CHANGE UP (ref 4.8–10.8)

## 2018-11-18 RX ORDER — LACTULOSE 10 G/15ML
20 SOLUTION ORAL DAILY
Qty: 0 | Refills: 0 | Status: COMPLETED | OUTPATIENT
Start: 2018-11-18 | End: 2018-11-21

## 2018-11-18 RX ORDER — SENNA PLUS 8.6 MG/1
2 TABLET ORAL AT BEDTIME
Qty: 0 | Refills: 0 | Status: DISCONTINUED | OUTPATIENT
Start: 2018-11-18 | End: 2018-11-21

## 2018-11-18 RX ADMIN — HEPARIN SODIUM 5000 UNIT(S): 5000 INJECTION INTRAVENOUS; SUBCUTANEOUS at 06:07

## 2018-11-18 RX ADMIN — Medication 650 MILLIGRAM(S): at 11:50

## 2018-11-18 RX ADMIN — SIMVASTATIN 5 MILLIGRAM(S): 20 TABLET, FILM COATED ORAL at 21:31

## 2018-11-18 RX ADMIN — Medication 100 MILLIGRAM(S): at 21:31

## 2018-11-18 RX ADMIN — Medication 25 MICROGRAM(S): at 06:07

## 2018-11-18 RX ADMIN — Medication 650 MILLIGRAM(S): at 11:06

## 2018-11-18 RX ADMIN — SENNA PLUS 2 TABLET(S): 8.6 TABLET ORAL at 17:29

## 2018-11-18 RX ADMIN — Medication 100 MILLIGRAM(S): at 06:07

## 2018-11-18 RX ADMIN — Medication 150 MILLIGRAM(S): at 06:07

## 2018-11-18 RX ADMIN — DULOXETINE HYDROCHLORIDE 20 MILLIGRAM(S): 30 CAPSULE, DELAYED RELEASE ORAL at 11:06

## 2018-11-18 RX ADMIN — FESOTERODINE FUMARATE 4 MILLIGRAM(S): 8 TABLET, FILM COATED, EXTENDED RELEASE ORAL at 11:05

## 2018-11-18 RX ADMIN — Medication 650 MILLIGRAM(S): at 06:07

## 2018-11-18 RX ADMIN — Medication 650 MILLIGRAM(S): at 23:51

## 2018-11-18 RX ADMIN — LACTULOSE 20 GRAM(S): 10 SOLUTION ORAL at 17:29

## 2018-11-18 RX ADMIN — Medication 150 MILLIGRAM(S): at 14:11

## 2018-11-18 RX ADMIN — Medication 150 MILLIGRAM(S): at 21:31

## 2018-11-18 RX ADMIN — PANTOPRAZOLE SODIUM 40 MILLIGRAM(S): 20 TABLET, DELAYED RELEASE ORAL at 06:07

## 2018-11-18 RX ADMIN — HEPARIN SODIUM 5000 UNIT(S): 5000 INJECTION INTRAVENOUS; SUBCUTANEOUS at 14:11

## 2018-11-18 RX ADMIN — HEPARIN SODIUM 5000 UNIT(S): 5000 INJECTION INTRAVENOUS; SUBCUTANEOUS at 21:31

## 2018-11-18 RX ADMIN — Medication 650 MILLIGRAM(S): at 19:19

## 2018-11-18 RX ADMIN — SODIUM CHLORIDE 75 MILLILITER(S): 9 INJECTION INTRAMUSCULAR; INTRAVENOUS; SUBCUTANEOUS at 19:19

## 2018-11-18 RX ADMIN — Medication 1 TABLET(S): at 11:06

## 2018-11-18 RX ADMIN — Medication 100 MILLIGRAM(S): at 14:11

## 2018-11-18 NOTE — PHYSICAL THERAPY INITIAL EVALUATION ADULT - PLANNED THERAPY INTERVENTIONS, PT EVAL
strengthening/gait training/balance training/bed mobility training/joint mobilization/manual therapy techniques/neuromuscular re-education/ROM/stretching/transfer training

## 2018-11-18 NOTE — PROGRESS NOTE ADULT - SUBJECTIVE AND OBJECTIVE BOX
Hospital Day:  8    Subjective:  No complaints.  Pt is doing well carrying a normal conversation and comprehends well.     Past Medical Hx:   Neuropathy  Depression  OA (osteoarthritis)  Hypothyroid  HLD (hyperlipidemia)    Past Sx:  S/P ORIF (open reduction internal fixation) fracture    Allergies:  No Known Allergies    Current Meds:   MEDICATIONS  (STANDING):  acetaminophen   Tablet .. 650 milliGRAM(s) Oral every 6 hours  docusate sodium 100 milliGRAM(s) Oral three times a day  DULoxetine 20 milliGRAM(s) Oral daily  fesoterodine ER Tablet 4 milliGRAM(s) Oral daily  heparin  Injectable 5000 Unit(s) SubCutaneous every 8 hours  lactulose Syrup 20 Gram(s) Oral daily  levothyroxine 25 MICROGram(s) Oral daily  multivitamin 1 Tablet(s) Oral daily  pantoprazole    Tablet 40 milliGRAM(s) Oral before breakfast  pregabalin 150 milliGRAM(s) Oral every 8 hours  senna 2 Tablet(s) Oral at bedtime  simvastatin 5 milliGRAM(s) Oral at bedtime  sodium chloride 0.9%. 750 milliLiter(s) (75 mL/Hr) IV Continuous <Continuous>    MEDICATIONS  (PRN):  HYDROmorphone  Injectable 0.5 milliGRAM(s) IV Push every 10 minutes PRN Severe Pain (7 - 10)  HYDROmorphone  Injectable 0.25 milliGRAM(s) IV Push every 10 minutes PRN Moderate Pain (4 - 6)  morphine  - Injectable 2 milliGRAM(s) IV Push every 4 hours PRN Severe Pain (7 - 10)  ondansetron    Tablet 4 milliGRAM(s) Oral two times a day PRN Nausea and/or Vomiting  ondansetron Injectable 4 milliGRAM(s) IV Push once PRN Nausea and/or Vomiting  oxyCODONE    IR 5 milliGRAM(s) Oral every 4 hours PRN Mild Pain (1 - 3)  oxyCODONE    IR 10 milliGRAM(s) Oral every 4 hours PRN Moderate Pain (4 - 6)      Vital Signs:   T(C): 35.7 (18 Nov 2018 08:00), Max: 37.9 (17 Nov 2018 17:12)  T(F): 96.2 (18 Nov 2018 08:00), Max: 100.2 (17 Nov 2018 17:12)  HR: 73 (18 Nov 2018 08:00) (73 - 77)  BP: 125/56 (18 Nov 2018 08:00) (113/53 - 125/56)  RR: 18 (18 Nov 2018 08:00) (18 - 18)    Physical Exam:   GENERAL: NAD, Confused; pleasant and conversive. Appearing stated age   HEENT: NCAT, PERRLA, EOMI   CHEST/LUNG: CTA No wheezing/rhonchi/rales   HEART: Regular rate and rhythm; s1 s2 appreciated, No murmurs, rubs, or gallops  ABDOMEN: Soft, Nontender, Nondistended; Bowel sounds present  EXTREMITIES: No LE edema b/l, Muscle strength 3/5 in RLE, 5/5 in bilateral UE and LLE, Mild swelling in right lower extremity   NERVOUS SYSTEM:  Alert & Oriented X 2, sensation equal and intact bilaterally     Labs:                           8.8    8.13  )-----------( 287      ( 18 Nov 2018 07:32 )             26.7                           6.8    9.74  )-----------( 277      ( 17 Nov 2018 06:30 )             20.3                           7.6    15.08 )-----------( 295      ( 16 Nov 2018 06:22 )             22.7         11-18    130<L>  |  94<L>  |  10  ----------------------------<  115<H>  3.9   |  22  |  0.5<L>    Ca    8.0<L>      18 Nov 2018 07:32      11-17    128<L>  |  91<L>  |  11  ----------------------------<  120<H>  3.7   |  22  |  0.5<L>    Ca    7.9<L>      17 Nov 2018 06:30      11-16    132<L>  |  94<L>  |  16  ----------------------------<  122<H>  3.6   |  23  |  0.6<L>    Ca    8.2<L>      16 Nov 2018 06:22      Hemoglobin A1C, Whole Blood: 5.8 % (11-10-18 @ 07:10)    Urinalysis Basic - ( 10 Nov 2018 06:08 )    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  Diffuse mild atherosclerotic disease bilateral lower extremities.  No evidence of significant arterial occlusive disease in bilateral lower extremities otherwise.    Assessment and Plan:   This is a 89 year old female with PMHx of right hip ORIF in 2016, Vit D deficiency, Hypothyroid, chronic hyponatremia who presented to the ED after enduring a twisting injury to her right leg with an audible snap and severe pain. Patient found to have a right periprosthetic distal femur fracture.     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 RLE - verify with Ortho / labs am / SNF Placement

## 2018-11-18 NOTE — PROGRESS NOTE ADULT - ATTENDING COMMENTS
pt seen and examined.  agree with resident exam and plan.   nwb RLE, oob, PT, IS, DVT proph, pain control.

## 2018-11-18 NOTE — PHYSICAL THERAPY INITIAL EVALUATION ADULT - TRANSFER SAFETY CONCERNS NOTED: SIT/STAND, REHAB EVAL
decreased safety awareness/losing balance/inability to maintain weight-bearing restrictions w/o assist/decreased weight-shifting ability

## 2018-11-18 NOTE — PROGRESS NOTE ADULT - SUBJECTIVE AND OBJECTIVE BOX
Patient seen and examined at bedside in the am. No acute events overnight. Patient resting comfortably in bed, pain well controlled.     Physical Exam  Vital Signs Last 24 Hrs  T(C): 35.7 (18 Nov 2018 08:00), Max: 37.9 (17 Nov 2018 17:12)  T(F): 96.2 (18 Nov 2018 08:00), Max: 100.2 (17 Nov 2018 17:12)  HR: 73 (18 Nov 2018 08:00) (73 - 80)  BP: 125/56 (18 Nov 2018 08:00) (113/53 - 135/63)  BP(mean): --  RR: 18 (18 Nov 2018 08:00) (18 - 20)  SpO2: --      NAD, AOx3  Non-labored breathing  RLE  Dressings C/D/I  Thigh soft, compressible  Compartments soft  EHL/FHL/TA/GA Motor intact   SP/DP/T/S/S SILT distally  Toes WWP    Labs                        8.8    8.13  )-----------( 287      ( 18 Nov 2018 07:32 )             26.7   11-18    130<L>  |  94<L>  |  10  ----------------------------<  115<H>  3.9   |  22  |  0.5<L>    Ca    8.0<L>      18 Nov 2018 07:32          A/P  89F R periprosthetic femur fracture POD#4 s/p ORIF     NWB  Pain control  DVT ppx  Continue med mgmt per primary team  Bowel regiment   OOBTC/IS

## 2018-11-19 ENCOUNTER — TRANSCRIPTION ENCOUNTER (OUTPATIENT)
Age: 83
End: 2018-11-19

## 2018-11-19 ENCOUNTER — OUTPATIENT (OUTPATIENT)
Dept: OUTPATIENT SERVICES | Facility: HOSPITAL | Age: 83
LOS: 1 days | Discharge: HOME | End: 2018-11-19

## 2018-11-19 DIAGNOSIS — Z02.9 ENCOUNTER FOR ADMINISTRATIVE EXAMINATIONS, UNSPECIFIED: ICD-10-CM

## 2018-11-19 DIAGNOSIS — Z96.7 PRESENCE OF OTHER BONE AND TENDON IMPLANTS: Chronic | ICD-10-CM

## 2018-11-19 PROBLEM — E78.5 HYPERLIPIDEMIA, UNSPECIFIED: Chronic | Status: ACTIVE | Noted: 2018-11-09

## 2018-11-19 PROBLEM — M19.90 UNSPECIFIED OSTEOARTHRITIS, UNSPECIFIED SITE: Chronic | Status: ACTIVE | Noted: 2018-11-09

## 2018-11-19 PROBLEM — G62.9 POLYNEUROPATHY, UNSPECIFIED: Chronic | Status: ACTIVE | Noted: 2018-11-10

## 2018-11-19 PROBLEM — F32.9 MAJOR DEPRESSIVE DISORDER, SINGLE EPISODE, UNSPECIFIED: Chronic | Status: ACTIVE | Noted: 2018-11-09

## 2018-11-19 PROBLEM — E03.9 HYPOTHYROIDISM, UNSPECIFIED: Chronic | Status: ACTIVE | Noted: 2018-11-09

## 2018-11-19 LAB
ANION GAP SERPL CALC-SCNC: 15 MMOL/L — HIGH (ref 7–14)
ANISOCYTOSIS BLD QL: SLIGHT — SIGNIFICANT CHANGE UP
BASOPHILS # BLD AUTO: 0 K/UL — SIGNIFICANT CHANGE UP (ref 0–0.2)
BASOPHILS NFR BLD AUTO: 0 % — SIGNIFICANT CHANGE UP (ref 0–1)
BUN SERPL-MCNC: 8 MG/DL — LOW (ref 10–20)
BURR CELLS BLD QL SMEAR: PRESENT — SIGNIFICANT CHANGE UP
CALCIUM SERPL-MCNC: 8.2 MG/DL — LOW (ref 8.5–10.1)
CHLORIDE SERPL-SCNC: 98 MMOL/L — SIGNIFICANT CHANGE UP (ref 98–110)
CO2 SERPL-SCNC: 22 MMOL/L — SIGNIFICANT CHANGE UP (ref 17–32)
CREAT SERPL-MCNC: 0.5 MG/DL — LOW (ref 0.7–1.5)
EOSINOPHIL # BLD AUTO: 0.14 K/UL — SIGNIFICANT CHANGE UP (ref 0–0.7)
EOSINOPHIL NFR BLD AUTO: 1.8 % — SIGNIFICANT CHANGE UP (ref 0–8)
GIANT PLATELETS BLD QL SMEAR: PRESENT — SIGNIFICANT CHANGE UP
GLUCOSE SERPL-MCNC: 107 MG/DL — HIGH (ref 70–99)
HCT VFR BLD CALC: 29.6 % — LOW (ref 37–47)
HGB BLD-MCNC: 9.6 G/DL — LOW (ref 12–16)
LYMPHOCYTES # BLD AUTO: 1.61 K/UL — SIGNIFICANT CHANGE UP (ref 1.2–3.4)
LYMPHOCYTES # BLD AUTO: 20.3 % — LOW (ref 20.5–51.1)
MCHC RBC-ENTMCNC: 30 PG — SIGNIFICANT CHANGE UP (ref 27–31)
MCHC RBC-ENTMCNC: 32.4 G/DL — SIGNIFICANT CHANGE UP (ref 32–37)
MCV RBC AUTO: 92.5 FL — SIGNIFICANT CHANGE UP (ref 81–99)
MONOCYTES # BLD AUTO: 0.7 K/UL — HIGH (ref 0.1–0.6)
MONOCYTES NFR BLD AUTO: 8.8 % — SIGNIFICANT CHANGE UP (ref 1.7–9.3)
NEUTROPHILS # BLD AUTO: 5.28 K/UL — SIGNIFICANT CHANGE UP (ref 1.4–6.5)
NEUTROPHILS NFR BLD AUTO: 66.4 % — SIGNIFICANT CHANGE UP (ref 42.2–75.2)
NRBC # BLD: 0 /100 WBCS — SIGNIFICANT CHANGE UP (ref 0–0)
PLAT MORPH BLD: NORMAL — SIGNIFICANT CHANGE UP
PLATELET # BLD AUTO: 141 K/UL — SIGNIFICANT CHANGE UP (ref 130–400)
PLATELET COUNT - ESTIMATE: NORMAL — SIGNIFICANT CHANGE UP
POLYCHROMASIA BLD QL SMEAR: SLIGHT — SIGNIFICANT CHANGE UP
POTASSIUM SERPL-MCNC: 4.2 MMOL/L — SIGNIFICANT CHANGE UP (ref 3.5–5)
POTASSIUM SERPL-SCNC: 4.2 MMOL/L — SIGNIFICANT CHANGE UP (ref 3.5–5)
RBC # BLD: 3.2 M/UL — LOW (ref 4.2–5.4)
RBC # FLD: 14.9 % — HIGH (ref 11.5–14.5)
RBC BLD AUTO: ABNORMAL
SMUDGE CELLS # BLD: PRESENT — SIGNIFICANT CHANGE UP
SODIUM SERPL-SCNC: 135 MMOL/L — SIGNIFICANT CHANGE UP (ref 135–146)
VARIANT LYMPHS # BLD: 2.7 % — SIGNIFICANT CHANGE UP (ref 0–5)
WBC # BLD: 7.95 K/UL — SIGNIFICANT CHANGE UP (ref 4.8–10.8)
WBC # FLD AUTO: 7.95 K/UL — SIGNIFICANT CHANGE UP (ref 4.8–10.8)

## 2018-11-19 RX ORDER — SENNA PLUS 8.6 MG/1
2 TABLET ORAL
Qty: 0 | Refills: 0 | DISCHARGE
Start: 2018-11-19

## 2018-11-19 RX ORDER — FUROSEMIDE 40 MG
40 TABLET ORAL EVERY 12 HOURS
Qty: 0 | Refills: 0 | Status: DISCONTINUED | OUTPATIENT
Start: 2018-11-19 | End: 2018-11-21

## 2018-11-19 RX ORDER — DOCUSATE SODIUM 100 MG
1 CAPSULE ORAL
Qty: 0 | Refills: 0 | DISCHARGE
Start: 2018-11-19

## 2018-11-19 RX ORDER — FESOTERODINE FUMARATE 8 MG/1
1 TABLET, FILM COATED, EXTENDED RELEASE ORAL
Qty: 0 | Refills: 0 | DISCHARGE
Start: 2018-11-19

## 2018-11-19 RX ORDER — HEPARIN SODIUM 5000 [USP'U]/ML
5000 INJECTION INTRAVENOUS; SUBCUTANEOUS
Qty: 0 | Refills: 0 | DISCHARGE
Start: 2018-11-19 | End: 2018-12-18

## 2018-11-19 RX ADMIN — Medication 150 MILLIGRAM(S): at 06:58

## 2018-11-19 RX ADMIN — Medication 40 MILLIGRAM(S): at 17:04

## 2018-11-19 RX ADMIN — Medication 150 MILLIGRAM(S): at 14:23

## 2018-11-19 RX ADMIN — SIMVASTATIN 5 MILLIGRAM(S): 20 TABLET, FILM COATED ORAL at 21:33

## 2018-11-19 RX ADMIN — DULOXETINE HYDROCHLORIDE 20 MILLIGRAM(S): 30 CAPSULE, DELAYED RELEASE ORAL at 14:20

## 2018-11-19 RX ADMIN — HEPARIN SODIUM 5000 UNIT(S): 5000 INJECTION INTRAVENOUS; SUBCUTANEOUS at 06:58

## 2018-11-19 RX ADMIN — SENNA PLUS 2 TABLET(S): 8.6 TABLET ORAL at 21:33

## 2018-11-19 RX ADMIN — Medication 650 MILLIGRAM(S): at 14:18

## 2018-11-19 RX ADMIN — HEPARIN SODIUM 5000 UNIT(S): 5000 INJECTION INTRAVENOUS; SUBCUTANEOUS at 21:33

## 2018-11-19 RX ADMIN — LACTULOSE 20 GRAM(S): 10 SOLUTION ORAL at 14:20

## 2018-11-19 RX ADMIN — Medication 150 MILLIGRAM(S): at 21:33

## 2018-11-19 RX ADMIN — FESOTERODINE FUMARATE 4 MILLIGRAM(S): 8 TABLET, FILM COATED, EXTENDED RELEASE ORAL at 14:26

## 2018-11-19 RX ADMIN — Medication 100 MILLIGRAM(S): at 06:58

## 2018-11-19 RX ADMIN — HEPARIN SODIUM 5000 UNIT(S): 5000 INJECTION INTRAVENOUS; SUBCUTANEOUS at 14:20

## 2018-11-19 RX ADMIN — PANTOPRAZOLE SODIUM 40 MILLIGRAM(S): 20 TABLET, DELAYED RELEASE ORAL at 06:58

## 2018-11-19 RX ADMIN — Medication 100 MILLIGRAM(S): at 14:21

## 2018-11-19 RX ADMIN — Medication 1 TABLET(S): at 14:19

## 2018-11-19 RX ADMIN — Medication 100 MILLIGRAM(S): at 21:33

## 2018-11-19 RX ADMIN — Medication 650 MILLIGRAM(S): at 06:57

## 2018-11-19 RX ADMIN — Medication 25 MICROGRAM(S): at 06:58

## 2018-11-19 RX ADMIN — Medication 650 MILLIGRAM(S): at 17:04

## 2018-11-19 NOTE — DISCHARGE NOTE ADULT - CARE PLAN
Principal Discharge DX:	Femur fracture  Goal:	optimize condition  Assessment and plan of treatment:	1- f/u with Dr. Sheldon in a week  Secondary Diagnosis:	Hypothyroid  Goal:	optimize condition  Assessment and plan of treatment:	continue current medication and f/u with Dr. Sheldon in a week  Secondary Diagnosis:	Depression  Goal:	optimize condition  Assessment and plan of treatment:	continue current medication and  f/u with Dr. Sheldon in a week Principal Discharge DX:	Femur fracture  Goal:	optimize condition  Assessment and plan of treatment:	1- f/u with Dr. Sindhu Mustafa ( [phone]) in 10 days for staples removal  2- take Heparin 5000 units SC injection q 8 hrs for 30 days  Secondary Diagnosis:	Hypothyroid  Goal:	optimize condition  Assessment and plan of treatment:	continue current medication and f/u with Dr. Sheldon in a week  Secondary Diagnosis:	Depression  Goal:	optimize condition  Assessment and plan of treatment:	continue current medication and  f/u with Dr. Sheldon in a week

## 2018-11-19 NOTE — PROGRESS NOTE ADULT - ASSESSMENT
This is a 89 year old female with PMHx of right hip ORIF in 2016, Vit D deficiency, Hypothyroid, hyponatremia who presented to the ED after enduring a twisting injury to her right leg with an audible snap and severe pain. Patient found to have a right periprosthetic distal femur fracture.     worsening CXR , will give lasix 40 mg q 12 hrs , will monitor for 24 hrs and d/c in AM if stable     # Right Periprosthetic Distal Femur Fracture  - s/p ORIF  - OOBTC with non-weight bearing to RLE fro 12 weeks   - Pain regimen: Morphine 2mg q4 PRN, Oxycodone 5mg Q4 PRN     # Acute on chronic Anemia:   - Baseline hemoglobin approximately 11.  - Currently 9.6  - 1s/p 1 unit pRBC    # Delirium (improving):  - Most likely multifactorial: hospitalization, UTI, constipation, fracture  - Constant reorientation  - Avoid sedating medications.     # Hyponatremia (resolved):  - Sodium 135 today. Initially presented at 115  - Continue on increased sodium diet     # UTI:  - ID consulted  - Urine culture sent; pseudomonas and staph aureus grew.  - No antibiotics as per ID     # Hypothyroidism:   - Continue on Levothyroxine    # Depression:   - Continue on home medication       Activity: OOBTC with nonweightbearing on RLE  Diet: Mechanical Soft  DVT ppx: Heparin SubQ  GI ppx: Not indicated   Code Status: full code   DISPO: SNF in 24 hrs

## 2018-11-19 NOTE — DISCHARGE NOTE ADULT - PATIENT PORTAL LINK FT
You can access the [facility], offered by [facility], by registering with the following website: [email or web address]

## 2018-11-19 NOTE — DISCHARGE NOTE ADULT - PLAN OF CARE
optimize condition 1- f/u with Dr. Sheldon in a week continue current medication and f/u with Dr. Sheldon in a week continue current medication and  f/u with Dr. Sheldon in a week 1- f/u with Dr. Sindhu Mustafa ( [phone]) in 10 days for staples removal  2- take Heparin 5000 units SC injection q 8 hrs for 30 days

## 2018-11-19 NOTE — PROGRESS NOTE ADULT - SUBJECTIVE AND OBJECTIVE BOX
SUBJECTIVE:    Patient is a 89y old Female who presents with a chief complaint of fall (19 Nov 2018 14:37)  Currently admitted to medicine with the primary diagnosis of Femur fracture  Today is hospital day 10d. This morning she is resting comfortably in bed and reports no new issues or overnight events.     PAST MEDICAL & SURGICAL HISTORY  Neuropathy  Depression  OA (osteoarthritis)  Hypothyroid  HLD (hyperlipidemia)  S/P ORIF (open reduction internal fixation) fracture    SOCIAL HISTORY:  Negative for smoking/alcohol/drug use.     ALLERGIES:  No Known Allergies    MEDICATIONS:  STANDING MEDICATIONS  acetaminophen   Tablet .. 650 milliGRAM(s) Oral every 6 hours  docusate sodium 100 milliGRAM(s) Oral three times a day  DULoxetine 20 milliGRAM(s) Oral daily  fesoterodine ER Tablet 4 milliGRAM(s) Oral daily  furosemide   Injectable 40 milliGRAM(s) IV Push every 12 hours  heparin  Injectable 5000 Unit(s) SubCutaneous every 8 hours  lactulose Syrup 20 Gram(s) Oral daily  levothyroxine 25 MICROGram(s) Oral daily  multivitamin 1 Tablet(s) Oral daily  pantoprazole    Tablet 40 milliGRAM(s) Oral before breakfast  pregabalin 150 milliGRAM(s) Oral every 8 hours  senna 2 Tablet(s) Oral at bedtime  simvastatin 5 milliGRAM(s) Oral at bedtime    PRN MEDICATIONS  HYDROmorphone  Injectable 0.5 milliGRAM(s) IV Push every 10 minutes PRN  HYDROmorphone  Injectable 0.25 milliGRAM(s) IV Push every 10 minutes PRN  morphine  - Injectable 2 milliGRAM(s) IV Push every 4 hours PRN  ondansetron    Tablet 4 milliGRAM(s) Oral two times a day PRN  ondansetron Injectable 4 milliGRAM(s) IV Push once PRN  oxyCODONE    IR 5 milliGRAM(s) Oral every 4 hours PRN  oxyCODONE    IR 10 milliGRAM(s) Oral every 4 hours PRN    VITALS:   T(F): 98.1  HR: 78  BP: 174/76  RR: 18  SpO2: 94%    LABS:                        9.6    7.95  )-----------( 141      ( 19 Nov 2018 06:23 )             29.6     11-19    135  |  98  |  8<L>  ----------------------------<  107<H>  4.2   |  22  |  0.5<L>    Ca    8.2<L>      19 Nov 2018 06:23                Culture - Blood (collected 16 Nov 2018 18:45)  Source: .Blood None  Preliminary Report (18 Nov 2018 03:01):    No growth to date.          RADIOLOGY:    < from: Xray Chest 1 View- PORTABLE-Urgent (11.19.18 @ 14:14) >  Impression:      Pulmonary vascular congestion and bilateral pleural effusions.        < end of copied text >      PHYSICAL EXAM:  GEN: No acute distress , confused   LUNGS: Clear to auscultation bilaterally   HEART: S1/S2 present.    ABD: Soft, non-tender, non-distended. Bowel sounds present  EXT: NC/NC/NE/2+PP/JOE  NEURO: non cooperative

## 2018-11-19 NOTE — CHART NOTE - NSCHARTNOTEFT_GEN_A_CORE
<<<RESIDENT DISCHARGE NOTE>>>     ASHVIN CUEVAS  [id number]    VITAL SIGNS:  T(F): 98.1 (11-19-18 @ 08:00), Max: 99.6 (11-19-18 @ 00:00)  HR: 78 (11-19-18 @ 08:00)  BP: 174/76 (11-19-18 @ 08:00)  SpO2: 94% (11-19-18 @ 00:00)      PHYSICAL EXAMINATION:  GENERAL: NAD  HEENT: NCAT, PERRLA, EOMI   CHEST/LUNG: CTA No wheezing/rhonchi/rales   HEART: Regular rate and rhythm; s1 s2 appreciated, No murmurs, rubs, or gallops  ABDOMEN: Soft, Nontender, Nondistended; Bowel sounds present  EXTREMITIES: No LE edema b/l, Muscle strength 2/5 in RLE, 5/5 in bilateral UE and LLE, RLE with staples. Clean dry and intact  NERVOUS SYSTEM:  Alert & Oriented X3, sensation equal and intact bilaterally, cranial nerves ii-xii grossly intact       TEST RESULTS:                        9.6    7.95  )-----------( 141      ( 19 Nov 2018 06:23 )             29.6       11-19    135  |  98  |  8<L>  ----------------------------<  107<H>  4.2   |  22  |  0.5<L>    Ca    8.2<L>      19 Nov 2018 06:23        FINAL DISCHARGE INTERVIEW:  Resident(s) Present: ( Name: Dr. Sandrita Cox), RN Present: (Name: Elodia)    DISCHARGE MEDICATION RECONCILIATION  reviewed with Attending ( Name: Dr. Woo )    DISPOSITION: SNF

## 2018-11-19 NOTE — PROGRESS NOTE ADULT - SUBJECTIVE AND OBJECTIVE BOX
Hospital Day:  9    Subjective:  No complaints.  Pt is doing well carrying a normal conversation and comprehends well.     Past Medical Hx:   Neuropathy  Depression  OA (osteoarthritis)  Hypothyroid  HLD (hyperlipidemia)    Past Sx:  S/P ORIF (open reduction internal fixation) fracture    Allergies:  No Known Allergies    Current Meds:   MEDICATIONS  (STANDING):  acetaminophen   Tablet .. 650 milliGRAM(s) Oral every 6 hours  docusate sodium 100 milliGRAM(s) Oral three times a day  DULoxetine 20 milliGRAM(s) Oral daily  fesoterodine ER Tablet 4 milliGRAM(s) Oral daily  heparin  Injectable 5000 Unit(s) SubCutaneous every 8 hours  lactulose Syrup 20 Gram(s) Oral daily  levothyroxine 25 MICROGram(s) Oral daily  multivitamin 1 Tablet(s) Oral daily  pantoprazole    Tablet 40 milliGRAM(s) Oral before breakfast  pregabalin 150 milliGRAM(s) Oral every 8 hours  senna 2 Tablet(s) Oral at bedtime  simvastatin 5 milliGRAM(s) Oral at bedtime  sodium chloride 0.9%. 750 milliLiter(s) (75 mL/Hr) IV Continuous <Continuous>    MEDICATIONS  (PRN):  HYDROmorphone  Injectable 0.5 milliGRAM(s) IV Push every 10 minutes PRN Severe Pain (7 - 10)  HYDROmorphone  Injectable 0.25 milliGRAM(s) IV Push every 10 minutes PRN Moderate Pain (4 - 6)  morphine  - Injectable 2 milliGRAM(s) IV Push every 4 hours PRN Severe Pain (7 - 10)  ondansetron    Tablet 4 milliGRAM(s) Oral two times a day PRN Nausea and/or Vomiting  ondansetron Injectable 4 milliGRAM(s) IV Push once PRN Nausea and/or Vomiting  oxyCODONE    IR 5 milliGRAM(s) Oral every 4 hours PRN Mild Pain (1 - 3)  oxyCODONE    IR 10 milliGRAM(s) Oral every 4 hours PRN Moderate Pain (4 - 6)      Vital Signs:   T(C): 36.7 (19 Nov 2018 08:00), Max: 37.6 (19 Nov 2018 00:00)  T(F): 98.1 (19 Nov 2018 08:00), Max: 99.6 (19 Nov 2018 00:00)  HR: 78 (19 Nov 2018 08:00) (77 - 80)  BP: 174/76 (19 Nov 2018 08:00) (150/66 - 174/76)  RR: 18 (19 Nov 2018 08:00) (18 - 20)  SpO2: 94% (19 Nov 2018 00:00) (94% - 94%)    Physical Exam:   GENERAL: NAD, Confused; pleasant and conversive. Appearing stated age   HEENT: NCAT, PERRLA, EOMI   CHEST/LUNG: CTA No wheezing/rhonchi/rales   HEART: Regular rate and rhythm; s1 s2 appreciated, No murmurs, rubs, or gallops  ABDOMEN: Soft, Nontender, Nondistended; Bowel sounds present  EXTREMITIES: No LE edema b/l, Muscle strength 3/5 in RLE, 5/5 in bilateral UE and LLE, Mild swelling in right lower extremity   NERVOUS SYSTEM:  Alert & Oriented X 2, sensation equal and intact bilaterally     Labs:                           9.6    7.95  )-----------( 141      ( 19 Nov 2018 06:23 )             29.6       11-19    135  |  98  |  8<L>  ----------------------------<  107<H>  4.2   |  22  |  0.5<L>    Ca    8.2<L>      19 Nov 2018 06:23      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  Diffuse mild atherosclerotic disease bilateral lower extremities.  No evidence of significant arterial occlusive disease in bilateral lower extremities otherwise.    Assessment and Plan:   This is a 89 year old female with PMHx of right hip ORIF in 2016, Vit D deficiency, Hypothyroid, chronic hyponatremia who presented to the ED after enduring a twisting injury to her right leg with an audible snap and severe pain. Patient found to have a right periprosthetic distal femur fracture.     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   - NO ABX per ID   - watch the pt for any fever or worsening leukocytosis   - CBC daily ( wbc trending up but could be due to stress of surgery - watch pls)    #Hypothyroidism  - Continue on Levothyroxine	  - TSH ordered and pending    #Depression  - Continue on home medication     #Constipation - Active issue  - Continue on colace and senna  - Tap water enema prn (s/p BM)   - above added standing     Diet: mechanical soft usual diet  DVT ppx: Heparin SubQ    Code Status: full code     DISPO:  SNF if CXR is normal / Check Stat portable film / Anticipate for tomorrow / Pls call me at [phone] post CXR / d/w intern

## 2018-11-19 NOTE — DISCHARGE NOTE ADULT - MEDICATION SUMMARY - MEDICATIONS TO STOP TAKING
I will STOP taking the medications listed below when I get home from the hospital:    celecoxib 200 mg oral capsule  -- 1 cap(s) by mouth once a day (at bedtime)

## 2018-11-19 NOTE — DISCHARGE NOTE ADULT - CARE PROVIDER_API CALL
Yadira Sheldon), Geriatric Medicine; Internal Medicine  [address] [place] [address]  Phone: [phone]  Fax: [phone]

## 2018-11-19 NOTE — DISCHARGE NOTE ADULT - HOSPITAL COURSE
89 year old Female with pertinent history of right hip ORIF in 2016 presents to the ED after a fall from a standing position while walking with the stroller. She said it resulted in a twisting injury and when she tried to get up she heard a snap and had a lot of pain in the right lower extremity. She says at baseline she lives home alone, an aide is their to help her with basic needs. A doctor visits her once every 2 months and A nurse visits her every 2 weeks.   imaging study showed R periprosthetic femur fracture. Ortho consulted and pt had ORIF, seen by PT and Rehab who recommended SNF placement.  pt is medically stable and cleared to be discharged to f/u with PMD in a wee.

## 2018-11-19 NOTE — DISCHARGE NOTE ADULT - MEDICATION SUMMARY - MEDICATIONS TO TAKE
I will START or STAY ON the medications listed below when I get home from the hospital:    aspirin 81 mg oral tablet, chewable  -- 1 tab(s) by mouth once a day  -- Indication: For CHF    lisinopril 5 mg oral tablet  -- 1 tab(s) by mouth once a day  -- Indication: For HTN    heparin  -- 5000 unit(s) subcutaneous 3 times a day  -- Indication: For DVT ppx    Lyrica 150 mg oral capsule  -- 1 cap(s) by mouth every 8 hours  -- Indication: For Neuropathy    mirtazapine 15 mg oral tablet  -- 1 tab(s) by mouth once a day (at bedtime)  -- Indication: For Depression    DULoxetine 20 mg oral delayed release capsule  -- 1 dose(s) by mouth once a day (at bedtime)  -- Indication: For Depression    simvastatin 5 mg oral tablet  -- 1 tab(s) by mouth once a day (at bedtime)  -- Indication: For Dyslipidemia    NIFEdipine 30 mg oral tablet, extended release  -- 1 tab(s) by mouth once a day  -- Indication: For Hypertension    Lasix 40 mg oral tablet  -- 1 tab(s) by mouth once a day   -- Avoid prolonged or excessive exposure to direct and/or artificial sunlight while taking this medication.  It is very important that you take or use this exactly as directed.  Do not skip doses or discontinue unless directed by your doctor.  It may be advisable to drink a full glass orange juice or eat a banana daily while taking this medication.    -- Indication: For Hypertension    docusate sodium 100 mg oral capsule  -- 1 cap(s) by mouth 3 times a day  -- Indication: For constipation    senna oral tablet  -- 2 tab(s) by mouth once a day (at bedtime)  -- Indication: For constipation    omeprazole 20 mg oral delayed release capsule  -- 1 cap(s) by mouth once a day  -- Indication: For gi ppx    levothyroxine 25 mcg (0.025 mg) oral tablet  -- 1 tab(s) by mouth once a day  -- Indication: For Hypothyroid    fesoterodine 4 mg oral tablet, extended release  -- 1 tab(s) by mouth once a day  -- Indication: For urinary retension    Multiple Vitamins oral tablet  -- 1 tab(s) by mouth once a day  -- Indication: For Supplement    Vitamin D3 50,000 intl units oral capsule  -- 1 cap(s) by mouth once a week  -- Indication: For Supplement

## 2018-11-20 LAB
ANION GAP SERPL CALC-SCNC: 15 MMOL/L — HIGH (ref 7–14)
BASOPHILS # BLD AUTO: 0.08 K/UL — SIGNIFICANT CHANGE UP (ref 0–0.2)
BASOPHILS NFR BLD AUTO: 1 % — SIGNIFICANT CHANGE UP (ref 0–1)
BUN SERPL-MCNC: 10 MG/DL — SIGNIFICANT CHANGE UP (ref 10–20)
CALCIUM SERPL-MCNC: 8.8 MG/DL — SIGNIFICANT CHANGE UP (ref 8.5–10.1)
CHLORIDE SERPL-SCNC: 94 MMOL/L — LOW (ref 98–110)
CO2 SERPL-SCNC: 27 MMOL/L — SIGNIFICANT CHANGE UP (ref 17–32)
CREAT SERPL-MCNC: 0.6 MG/DL — LOW (ref 0.7–1.5)
EOSINOPHIL # BLD AUTO: 0.23 K/UL — SIGNIFICANT CHANGE UP (ref 0–0.7)
EOSINOPHIL NFR BLD AUTO: 3 % — SIGNIFICANT CHANGE UP (ref 0–8)
GLUCOSE SERPL-MCNC: 105 MG/DL — HIGH (ref 70–99)
HCT VFR BLD CALC: 28.4 % — LOW (ref 37–47)
HGB BLD-MCNC: 9.5 G/DL — LOW (ref 12–16)
IMM GRANULOCYTES NFR BLD AUTO: 0.8 % — HIGH (ref 0.1–0.3)
LYMPHOCYTES # BLD AUTO: 2.44 K/UL — SIGNIFICANT CHANGE UP (ref 1.2–3.4)
LYMPHOCYTES # BLD AUTO: 31.4 % — SIGNIFICANT CHANGE UP (ref 20.5–51.1)
MAGNESIUM SERPL-MCNC: 1.7 MG/DL — LOW (ref 1.8–2.4)
MCHC RBC-ENTMCNC: 29.5 PG — SIGNIFICANT CHANGE UP (ref 27–31)
MCHC RBC-ENTMCNC: 33.5 G/DL — SIGNIFICANT CHANGE UP (ref 32–37)
MCV RBC AUTO: 88.2 FL — SIGNIFICANT CHANGE UP (ref 81–99)
MONOCYTES # BLD AUTO: 0.98 K/UL — HIGH (ref 0.1–0.6)
MONOCYTES NFR BLD AUTO: 12.6 % — HIGH (ref 1.7–9.3)
NEUTROPHILS # BLD AUTO: 3.98 K/UL — SIGNIFICANT CHANGE UP (ref 1.4–6.5)
NEUTROPHILS NFR BLD AUTO: 51.2 % — SIGNIFICANT CHANGE UP (ref 42.2–75.2)
NRBC # BLD: 0 /100 WBCS — SIGNIFICANT CHANGE UP (ref 0–0)
PLATELET # BLD AUTO: 390 K/UL — SIGNIFICANT CHANGE UP (ref 130–400)
POTASSIUM SERPL-MCNC: 3.3 MMOL/L — LOW (ref 3.5–5)
POTASSIUM SERPL-SCNC: 3.3 MMOL/L — LOW (ref 3.5–5)
RBC # BLD: 3.22 M/UL — LOW (ref 4.2–5.4)
RBC # FLD: 14.8 % — HIGH (ref 11.5–14.5)
SODIUM SERPL-SCNC: 136 MMOL/L — SIGNIFICANT CHANGE UP (ref 135–146)
WBC # BLD: 7.77 K/UL — SIGNIFICANT CHANGE UP (ref 4.8–10.8)
WBC # FLD AUTO: 7.77 K/UL — SIGNIFICANT CHANGE UP (ref 4.8–10.8)

## 2018-11-20 RX ORDER — NIFEDIPINE 30 MG
30 TABLET, EXTENDED RELEASE 24 HR ORAL DAILY
Qty: 0 | Refills: 0 | Status: DISCONTINUED | OUTPATIENT
Start: 2018-11-20 | End: 2018-11-21

## 2018-11-20 RX ORDER — MAGNESIUM SULFATE 500 MG/ML
2 VIAL (ML) INJECTION ONCE
Qty: 0 | Refills: 0 | Status: COMPLETED | OUTPATIENT
Start: 2018-11-20 | End: 2018-11-20

## 2018-11-20 RX ORDER — POTASSIUM CHLORIDE 20 MEQ
40 PACKET (EA) ORAL ONCE
Qty: 0 | Refills: 0 | Status: COMPLETED | OUTPATIENT
Start: 2018-11-20 | End: 2018-11-20

## 2018-11-20 RX ORDER — POTASSIUM CHLORIDE 20 MEQ
40 PACKET (EA) ORAL ONCE
Qty: 0 | Refills: 0 | Status: DISCONTINUED | OUTPATIENT
Start: 2018-11-20 | End: 2018-11-20

## 2018-11-20 RX ADMIN — HEPARIN SODIUM 5000 UNIT(S): 5000 INJECTION INTRAVENOUS; SUBCUTANEOUS at 06:06

## 2018-11-20 RX ADMIN — Medication 650 MILLIGRAM(S): at 11:49

## 2018-11-20 RX ADMIN — Medication 16.67 GRAM(S): at 10:31

## 2018-11-20 RX ADMIN — Medication 40 MILLIGRAM(S): at 18:26

## 2018-11-20 RX ADMIN — Medication 650 MILLIGRAM(S): at 06:06

## 2018-11-20 RX ADMIN — Medication 650 MILLIGRAM(S): at 00:25

## 2018-11-20 RX ADMIN — Medication 100 MILLIGRAM(S): at 21:06

## 2018-11-20 RX ADMIN — Medication 100 MILLIGRAM(S): at 06:06

## 2018-11-20 RX ADMIN — Medication 40 MILLIGRAM(S): at 06:07

## 2018-11-20 RX ADMIN — HEPARIN SODIUM 5000 UNIT(S): 5000 INJECTION INTRAVENOUS; SUBCUTANEOUS at 21:07

## 2018-11-20 RX ADMIN — Medication 1 TABLET(S): at 11:49

## 2018-11-20 RX ADMIN — HEPARIN SODIUM 5000 UNIT(S): 5000 INJECTION INTRAVENOUS; SUBCUTANEOUS at 14:25

## 2018-11-20 RX ADMIN — FESOTERODINE FUMARATE 4 MILLIGRAM(S): 8 TABLET, FILM COATED, EXTENDED RELEASE ORAL at 11:49

## 2018-11-20 RX ADMIN — Medication 40 MILLIEQUIVALENT(S): at 10:31

## 2018-11-20 RX ADMIN — PANTOPRAZOLE SODIUM 40 MILLIGRAM(S): 20 TABLET, DELAYED RELEASE ORAL at 06:08

## 2018-11-20 RX ADMIN — Medication 150 MILLIGRAM(S): at 14:24

## 2018-11-20 RX ADMIN — DULOXETINE HYDROCHLORIDE 20 MILLIGRAM(S): 30 CAPSULE, DELAYED RELEASE ORAL at 11:50

## 2018-11-20 RX ADMIN — Medication 100 MILLIGRAM(S): at 14:24

## 2018-11-20 RX ADMIN — Medication 25 MICROGRAM(S): at 06:06

## 2018-11-20 RX ADMIN — LACTULOSE 20 GRAM(S): 10 SOLUTION ORAL at 11:49

## 2018-11-20 RX ADMIN — SENNA PLUS 2 TABLET(S): 8.6 TABLET ORAL at 21:06

## 2018-11-20 RX ADMIN — Medication 150 MILLIGRAM(S): at 21:06

## 2018-11-20 RX ADMIN — SIMVASTATIN 5 MILLIGRAM(S): 20 TABLET, FILM COATED ORAL at 21:06

## 2018-11-20 RX ADMIN — Medication 150 MILLIGRAM(S): at 06:06

## 2018-11-20 RX ADMIN — Medication 650 MILLIGRAM(S): at 17:58

## 2018-11-20 NOTE — PROGRESS NOTE ADULT - ASSESSMENT
This is a 89 year old female with PMHx of right hip ORIF in 2016, Vit D deficiency, Hypothyroid, hyponatremia who presented to the ED after enduring a twisting injury to her right leg with an audible snap and severe pain. Patient found to have a right periprosthetic distal femur fracture.     # Diastolic CHF:  - start Nifedipine 30 mg ER PO daily  - c/w lasix 40 mg IV q 12 hrs    # Right Periprosthetic Distal Femur Fracture  - s/p ORIF  - OOBTC with non-weight bearing to RLE fro 12 weeks   - Pain regimen: Morphine 2mg q4 PRN, Oxycodone 5mg Q4 PRN     # Acute on chronic Anemia:   - Baseline hemoglobin approximately 11.  - Currently 9.5  - 1s/p 1 unit pRBC    # Delirium (improving):  - Most likely multifactorial: hospitalization, UTI, constipation, fracture  - Constant reorientation  - Avoid sedating medications.     # Hyponatremia (resolved):  - Sodium 136 today. Initially presented at 115  - Continue on increased sodium diet     # UTI:  - ID consulted  - Urine culture sent; pseudomonas and staph aureus grew.  - No antibiotics as per ID     # Hypothyroidism:   - Continue on Levothyroxine    # Depression:   - Continue on home medication       Activity: OOBTC with nonweightbearing on RLE  Diet: Mechanical Soft  DVT ppx: Heparin SubQ  GI ppx: Not indicated   Code Status: full code   DISPO: SNF in 24 hrs This is a 89 year old female with PMHx of right hip ORIF in 2016, Vit D deficiency, Hypothyroid, hyponatremia who presented to the ED after enduring a twisting injury to her right leg with an audible snap and severe pain. Patient found to have a right periprosthetic distal femur fracture.     # Diastolic CHF:  - start Nifedipine 30 mg ER PO daily  - c/w lasix 40 mg IV q 12 hrs    # Right Periprosthetic Distal Femur Fracture  - s/p ORIF  - OOBTC with non-weight bearing to RLE fro 12 weeks   - Pain regimen: Morphine 2mg q4 PRN, Oxycodone 5mg Q4 PRN     # Acute on chronic Anemia:   - Baseline hemoglobin approximately 11.  - Currently 9.5  - 1s/p 1 unit pRBC    # Delirium (improving):  - Most likely multifactorial: hospitalization, UTI, constipation, fracture  - Constant reorientation  - Avoid sedating medications.     # Hyponatremia (resolved):  - Sodium 136 today. Initially presented at 115  - Continue on increased sodium diet     # UTI:  - ID consulted  - Urine culture sent; pseudomonas and staph aureus grew.  - No antibiotics as per ID     # Hypothyroidism:   - Continue on Levothyroxine    # Depression:   - Continue on home medication       Activity: OOBTC with nonweightbearing on RLE  Diet: Mechanical Soft  DVT ppx: Heparin SubQ  GI ppx: Not indicated   Code Status: full code   DISPO: SNF in 24 hrs    Attending Attestation  Pt seen and examined. Case and Plan discussed with intern and pt. Agree with intern note as corrected.  New HFpEF - Lasix 40mg IV q12h / Start Lisinopril 5mg po qd (to titrate up if BP allows) and Hydralazine 30mg po qd.  f/u BMP / MG / Give 40mg KCL today.   Per Ortho [facility]B to Surgical Limb.   Provide CV - Dr. Lopez follow up in the outpt    Dispo: d/c planning for tomorrow. Will need Lasix 40mg po BID and Lisinopril / Nifedipine / NO BB on this admission due to acute CHF. d/w Intern at length.

## 2018-11-20 NOTE — PROGRESS NOTE ADULT - SUBJECTIVE AND OBJECTIVE BOX
SUBJECTIVE:    Patient is a 89y old Female who presents with a chief complaint of fall (20 Nov 2018 13:14)    Currently admitted to medicine with the primary diagnosis of Femur fracture     Today is hospital day 11d. This morning she is resting comfortably in bed and reports no new issues or overnight events.     PAST MEDICAL & SURGICAL HISTORY  Neuropathy  Depression  OA (osteoarthritis)  Hypothyroid  HLD (hyperlipidemia)  S/P ORIF (open reduction internal fixation) fracture    SOCIAL HISTORY:  Negative for smoking/alcohol/drug use.     ALLERGIES:  No Known Allergies    MEDICATIONS:  STANDING MEDICATIONS  acetaminophen   Tablet .. 650 milliGRAM(s) Oral every 6 hours  docusate sodium 100 milliGRAM(s) Oral three times a day  DULoxetine 20 milliGRAM(s) Oral daily  fesoterodine ER Tablet 4 milliGRAM(s) Oral daily  furosemide   Injectable 40 milliGRAM(s) IV Push every 12 hours  heparin  Injectable 5000 Unit(s) SubCutaneous every 8 hours  lactulose Syrup 20 Gram(s) Oral daily  levothyroxine 25 MICROGram(s) Oral daily  multivitamin 1 Tablet(s) Oral daily  NIFEdipine XL 30 milliGRAM(s) Oral daily  pantoprazole    Tablet 40 milliGRAM(s) Oral before breakfast  potassium chloride    Tablet ER 40 milliEquivalent(s) Oral once  pregabalin 150 milliGRAM(s) Oral every 8 hours  senna 2 Tablet(s) Oral at bedtime  simvastatin 5 milliGRAM(s) Oral at bedtime    PRN MEDICATIONS  HYDROmorphone  Injectable 0.5 milliGRAM(s) IV Push every 10 minutes PRN  HYDROmorphone  Injectable 0.25 milliGRAM(s) IV Push every 10 minutes PRN  morphine  - Injectable 2 milliGRAM(s) IV Push every 4 hours PRN  ondansetron    Tablet 4 milliGRAM(s) Oral two times a day PRN  ondansetron Injectable 4 milliGRAM(s) IV Push once PRN  oxyCODONE    IR 5 milliGRAM(s) Oral every 4 hours PRN  oxyCODONE    IR 10 milliGRAM(s) Oral every 4 hours PRN    VITALS:   T(F): 99.1  HR: 80  BP: 145/72  RR: 18  SpO2: --    LABS:                        9.5    7.77  )-----------( 390      ( 20 Nov 2018 06:24 )             28.4     11-20    136  |  94<L>  |  10  ----------------------------<  105<H>  3.3<L>   |  27  |  0.6<L>    Ca    8.8      20 Nov 2018 06:24  Mg     1.7     11-20        RADIOLOGY:    < from: Xray Chest 1 View- PORTABLE-Routine (11.20.18 @ 06:50) >  Impression:      Bilateral opacifications and effusions. Support devices as described.    Follow-up as needed.    < end of copied text >      PHYSICAL EXAM:  GEN: No acute distress  LUNGS: Clear to auscultation bilaterally , no wheezes or ronchi  HEART: S1/S2 present.   ABD: Soft, non-tender, non-distended. Bowel sounds present  EXT: NC/NC/NE/2+PP/JOE  NEURO: AAOX3 SUBJECTIVE:    Patient is a 89y old Female who presents with a chief complaint of fall (20 Nov 2018 13:14)    Currently admitted to medicine with the primary diagnosis of Femur fracture     Today is hospital day 11d. This morning she is resting comfortably in bed and reports no new issues or overnight events.     PAST MEDICAL & SURGICAL HISTORY  Neuropathy  Depression  OA (osteoarthritis)  Hypothyroid  HLD (hyperlipidemia)  S/P ORIF (open reduction internal fixation) fracture    SOCIAL HISTORY:  Negative for smoking/alcohol/drug use.     ALLERGIES:  No Known Allergies    MEDICATIONS:  STANDING MEDICATIONS  acetaminophen   Tablet .. 650 milliGRAM(s) Oral every 6 hours  docusate sodium 100 milliGRAM(s) Oral three times a day  DULoxetine 20 milliGRAM(s) Oral daily  fesoterodine ER Tablet 4 milliGRAM(s) Oral daily  furosemide   Injectable 40 milliGRAM(s) IV Push every 12 hours  heparin  Injectable 5000 Unit(s) SubCutaneous every 8 hours  lactulose Syrup 20 Gram(s) Oral daily  levothyroxine 25 MICROGram(s) Oral daily  multivitamin 1 Tablet(s) Oral daily  NIFEdipine XL 30 milliGRAM(s) Oral daily  pantoprazole    Tablet 40 milliGRAM(s) Oral before breakfast  potassium chloride    Tablet ER 40 milliEquivalent(s) Oral once  pregabalin 150 milliGRAM(s) Oral every 8 hours  senna 2 Tablet(s) Oral at bedtime  simvastatin 5 milliGRAM(s) Oral at bedtime    PRN MEDICATIONS  HYDROmorphone  Injectable 0.5 milliGRAM(s) IV Push every 10 minutes PRN  HYDROmorphone  Injectable 0.25 milliGRAM(s) IV Push every 10 minutes PRN  morphine  - Injectable 2 milliGRAM(s) IV Push every 4 hours PRN  ondansetron    Tablet 4 milliGRAM(s) Oral two times a day PRN  ondansetron Injectable 4 milliGRAM(s) IV Push once PRN  oxyCODONE    IR 5 milliGRAM(s) Oral every 4 hours PRN  oxyCODONE    IR 10 milliGRAM(s) Oral every 4 hours PRN    VITALS:   T(F): 99.1  HR: 80  BP: 145/72  RR: 18  SpO2: --    LABS:                        9.5    7.77  )-----------( 390      ( 20 Nov 2018 06:24 )             28.4     11-20    136  |  94<L>  |  10  ----------------------------<  105<H>  3.3<L>   |  27  |  0.6<L>    Ca    8.8      20 Nov 2018 06:24  Mg     1.7     11-20        RADIOLOGY:    < from: Xray Chest 1 View- PORTABLE-Routine (11.20.18 @ 06:50) >  Impression:      Bilateral opacifications and effusions. Support devices as described.    Follow-up as needed.    < end of copied text >      PHYSICAL EXAM:  GEN: No acute distress  LUNGS: Clear to auscultation bilaterally , no wheezes or ronchi  HEART: S1/S2 present.   ABD: Soft, non-tender, non-distended. Bowel sounds present  EXT: NC/NC/NE/2+PP/JOE  NEURO: AAOX3      Attending Attestation  Pt seen and examined. Case and Plan discussed with intern and pt. Agree with intern note as corrected.  New HFpEF - Lasix 40mg IV q12h / Start Lisinopril 5mg po qd (to titrate up if BP allows) and Hydralazine 30mg po qd.  f/u BMP / MG / Give 40mg KCL today.   Per Ortho [facility] to Surgical Limb.   Provide CV - Dr. Lopez follow up in the outpt    Dispo: d/c planning for tomorrow. Will need Lasix 40mg po BID and Lisinopril / Nifedipine / NO BB on this admission due to acute CHF. d/w Intern at length.

## 2018-11-20 NOTE — PROGRESS NOTE ADULT - SUBJECTIVE AND OBJECTIVE BOX
Patient seen and examined at bedside in the am. No acute events overnight. Patient resting comfortably in bed, pain well controlled.     Physical Exam  Vital Signs Last 24 Hrs  T(C): 37.3 (20 Nov 2018 08:00), Max: 37.6 (20 Nov 2018 03:22)  T(F): 99.1 (20 Nov 2018 08:00), Max: 99.6 (20 Nov 2018 03:22)  HR: 80 (20 Nov 2018 08:00) (74 - 89)  BP: 145/72 (20 Nov 2018 08:00) (136/63 - 178/72)  BP(mean): --  RR: 18 (20 Nov 2018 08:00) (18 - 18)  SpO2: --    NAD, AOx3  Non-labored breathing  RLE  Kristian in place, no drainage appreicated   Thigh soft, compressible  Compartments soft  EHL/FHL/TA/GA Motor intact   SP/DP/T/S/S SILT distally  Toes WWP    Labs                        9.5    7.77  )-----------( 390      ( 20 Nov 2018 06:24 )             28.4     11-20    136  |  94<L>  |  10  ----------------------------<  105<H>  3.3<L>   |  27  |  0.6<L>    Ca    8.8      20 Nov 2018 06:24  Mg     1.7     11-20          A/P  89F R periprosthetic femur fracture POD#6 s/p ORIF     Worsening pulmonary congestion on CXR, Lasix per medical team  Continue management of electrolyte abnormalities, Na now 136, K 3.3  NWB on RLE  Pain control  DVT ppx; SQH  Continue med mgmt per primary team  Bowel regiment   OOBTC/IS

## 2018-11-21 VITALS
SYSTOLIC BLOOD PRESSURE: 128 MMHG | TEMPERATURE: 98 F | RESPIRATION RATE: 18 BRPM | HEART RATE: 82 BPM | DIASTOLIC BLOOD PRESSURE: 85 MMHG

## 2018-11-21 LAB
ANION GAP SERPL CALC-SCNC: 15 MMOL/L — HIGH (ref 7–14)
BASOPHILS # BLD AUTO: 0.06 K/UL — SIGNIFICANT CHANGE UP (ref 0–0.2)
BASOPHILS NFR BLD AUTO: 0.7 % — SIGNIFICANT CHANGE UP (ref 0–1)
BUN SERPL-MCNC: 13 MG/DL — SIGNIFICANT CHANGE UP (ref 10–20)
CALCIUM SERPL-MCNC: 8.7 MG/DL — SIGNIFICANT CHANGE UP (ref 8.5–10.1)
CHLORIDE SERPL-SCNC: 93 MMOL/L — LOW (ref 98–110)
CO2 SERPL-SCNC: 29 MMOL/L — SIGNIFICANT CHANGE UP (ref 17–32)
CREAT SERPL-MCNC: 0.6 MG/DL — LOW (ref 0.7–1.5)
EOSINOPHIL # BLD AUTO: 0.31 K/UL — SIGNIFICANT CHANGE UP (ref 0–0.7)
EOSINOPHIL NFR BLD AUTO: 3.8 % — SIGNIFICANT CHANGE UP (ref 0–8)
GLUCOSE SERPL-MCNC: 115 MG/DL — HIGH (ref 70–99)
HCT VFR BLD CALC: 29.3 % — LOW (ref 37–47)
HGB BLD-MCNC: 9.9 G/DL — LOW (ref 12–16)
IMM GRANULOCYTES NFR BLD AUTO: 0.5 % — HIGH (ref 0.1–0.3)
LYMPHOCYTES # BLD AUTO: 2.34 K/UL — SIGNIFICANT CHANGE UP (ref 1.2–3.4)
LYMPHOCYTES # BLD AUTO: 28.5 % — SIGNIFICANT CHANGE UP (ref 20.5–51.1)
MCHC RBC-ENTMCNC: 30 PG — SIGNIFICANT CHANGE UP (ref 27–31)
MCHC RBC-ENTMCNC: 33.8 G/DL — SIGNIFICANT CHANGE UP (ref 32–37)
MCV RBC AUTO: 88.8 FL — SIGNIFICANT CHANGE UP (ref 81–99)
MONOCYTES # BLD AUTO: 0.85 K/UL — HIGH (ref 0.1–0.6)
MONOCYTES NFR BLD AUTO: 10.4 % — HIGH (ref 1.7–9.3)
NEUTROPHILS # BLD AUTO: 4.61 K/UL — SIGNIFICANT CHANGE UP (ref 1.4–6.5)
NEUTROPHILS NFR BLD AUTO: 56.1 % — SIGNIFICANT CHANGE UP (ref 42.2–75.2)
NRBC # BLD: 0 /100 WBCS — SIGNIFICANT CHANGE UP (ref 0–0)
PLATELET # BLD AUTO: 441 K/UL — HIGH (ref 130–400)
POTASSIUM SERPL-MCNC: 3.5 MMOL/L — SIGNIFICANT CHANGE UP (ref 3.5–5)
POTASSIUM SERPL-SCNC: 3.5 MMOL/L — SIGNIFICANT CHANGE UP (ref 3.5–5)
RBC # BLD: 3.3 M/UL — LOW (ref 4.2–5.4)
RBC # FLD: 15.1 % — HIGH (ref 11.5–14.5)
SODIUM SERPL-SCNC: 137 MMOL/L — SIGNIFICANT CHANGE UP (ref 135–146)
WBC # BLD: 8.21 K/UL — SIGNIFICANT CHANGE UP (ref 4.8–10.8)
WBC # FLD AUTO: 8.21 K/UL — SIGNIFICANT CHANGE UP (ref 4.8–10.8)

## 2018-11-21 RX ORDER — OXYCODONE AND ACETAMINOPHEN 5; 325 MG/1; MG/1
1 TABLET ORAL ONCE
Qty: 0 | Refills: 0 | Status: DISCONTINUED | OUTPATIENT
Start: 2018-11-21 | End: 2018-11-21

## 2018-11-21 RX ORDER — NIFEDIPINE 30 MG
1 TABLET, EXTENDED RELEASE 24 HR ORAL
Qty: 0 | Refills: 0 | DISCHARGE
Start: 2018-11-21

## 2018-11-21 RX ORDER — CELECOXIB 200 MG/1
1 CAPSULE ORAL
Qty: 0 | Refills: 0 | COMMUNITY

## 2018-11-21 RX ORDER — NIFEDIPINE 30 MG
30 TABLET, EXTENDED RELEASE 24 HR ORAL DAILY
Qty: 0 | Refills: 0 | Status: DISCONTINUED | OUTPATIENT
Start: 2018-11-21 | End: 2018-11-21

## 2018-11-21 RX ORDER — LISINOPRIL 2.5 MG/1
1 TABLET ORAL
Qty: 0 | Refills: 0 | DISCHARGE
Start: 2018-11-21

## 2018-11-21 RX ORDER — FUROSEMIDE 40 MG
1 TABLET ORAL
Qty: 30 | Refills: 0
Start: 2018-11-21 | End: 2018-12-20

## 2018-11-21 RX ORDER — LISINOPRIL 2.5 MG/1
5 TABLET ORAL DAILY
Qty: 0 | Refills: 0 | Status: DISCONTINUED | OUTPATIENT
Start: 2018-11-21 | End: 2018-11-21

## 2018-11-21 RX ADMIN — OXYCODONE HYDROCHLORIDE 10 MILLIGRAM(S): 5 TABLET ORAL at 09:32

## 2018-11-21 RX ADMIN — Medication 100 MILLIGRAM(S): at 14:57

## 2018-11-21 RX ADMIN — Medication 30 MILLIGRAM(S): at 05:15

## 2018-11-21 RX ADMIN — Medication 150 MILLIGRAM(S): at 14:57

## 2018-11-21 RX ADMIN — Medication 650 MILLIGRAM(S): at 05:15

## 2018-11-21 RX ADMIN — Medication 650 MILLIGRAM(S): at 12:18

## 2018-11-21 RX ADMIN — PANTOPRAZOLE SODIUM 40 MILLIGRAM(S): 20 TABLET, DELAYED RELEASE ORAL at 05:15

## 2018-11-21 RX ADMIN — Medication 150 MILLIGRAM(S): at 05:15

## 2018-11-21 RX ADMIN — Medication 1 TABLET(S): at 12:18

## 2018-11-21 RX ADMIN — HEPARIN SODIUM 5000 UNIT(S): 5000 INJECTION INTRAVENOUS; SUBCUTANEOUS at 05:22

## 2018-11-21 RX ADMIN — HEPARIN SODIUM 5000 UNIT(S): 5000 INJECTION INTRAVENOUS; SUBCUTANEOUS at 14:58

## 2018-11-21 RX ADMIN — Medication 100 MILLIGRAM(S): at 05:15

## 2018-11-21 RX ADMIN — Medication 650 MILLIGRAM(S): at 00:28

## 2018-11-21 RX ADMIN — DULOXETINE HYDROCHLORIDE 20 MILLIGRAM(S): 30 CAPSULE, DELAYED RELEASE ORAL at 12:18

## 2018-11-21 RX ADMIN — LACTULOSE 20 GRAM(S): 10 SOLUTION ORAL at 12:27

## 2018-11-21 RX ADMIN — Medication 40 MILLIGRAM(S): at 05:15

## 2018-11-21 RX ADMIN — Medication 25 MICROGRAM(S): at 05:15

## 2018-11-21 RX ADMIN — FESOTERODINE FUMARATE 4 MILLIGRAM(S): 8 TABLET, FILM COATED, EXTENDED RELEASE ORAL at 12:18

## 2018-11-21 NOTE — PROGRESS NOTE ADULT - SUBJECTIVE AND OBJECTIVE BOX
ASHVIN CUEVAS  89y  [id number]      Patient is a 89y old  Female who presents with a chief complaint of fall (20 Nov 2018 13:44)      INTERVAL HPI/OVERNIGHT EVENTS:  no acute events overnight      REVIEW OF SYSTEMS:  ROS negative  FAMILY HISTORY:  No pertinent family history in first degree relatives: no family history of heart disease    T(C): 36.2 (11-21-18 @ 07:56), Max: 36.2 (11-21-18 @ 07:56)  HR: 72 (11-21-18 @ 07:56) (72 - 77)  BP: 131/62 (11-21-18 @ 07:56) (121/58 - 145/64)  RR: 18 (11-21-18 @ 07:56) (18 - 18)  SpO2: --  Wt(kg): --Vital Signs Last 24 Hrs  T(C): 36.2 (21 Nov 2018 07:56), Max: 36.2 (21 Nov 2018 07:56)  T(F): 97.2 (21 Nov 2018 07:56), Max: 97.2 (21 Nov 2018 07:56)  HR: 72 (21 Nov 2018 07:56) (72 - 77)  BP: 131/62 (21 Nov 2018 07:56) (121/58 - 145/64)  BP(mean): --  RR: 18 (21 Nov 2018 07:56) (18 - 18)  SpO2: --    PHYSICAL EXAM:  GEN Lying in no acute distress  HEENT Pupils equal and reactive to light and accommodationSupple Neck  PULM Clear to auscultation bilaterally  CV s1s2 regular rate and rhythm  GI + bowel sounds nontnender  EXT no cyanosis or edema  PSYCH awake alert and oriented x 3  INTEG No Lesions  NEURO JOE,     Consultant(s) Notes Reviewed:  [x ] YES  [ ] NO  Care Discussed with Consultants/Other Providers [ x] YES  [ ] NO    LABS:                            9.9    8.21  )-----------( 441      ( 21 Nov 2018 06:40 )             29.3   11-21    137  |  93<L>  |  13  ----------------------------<  115<H>  3.5   |  29  |  0.6<L>    Ca    8.7      21 Nov 2018 06:40  Mg     1.7     11-20              acetaminophen   Tablet .. 650 milliGRAM(s) Oral every 6 hours  docusate sodium 100 milliGRAM(s) Oral three times a day  DULoxetine 20 milliGRAM(s) Oral daily  fesoterodine ER Tablet 4 milliGRAM(s) Oral daily  furosemide   Injectable 40 milliGRAM(s) IV Push every 12 hours  heparin  Injectable 5000 Unit(s) SubCutaneous every 8 hours  HYDROmorphone  Injectable 0.5 milliGRAM(s) IV Push every 10 minutes PRN  HYDROmorphone  Injectable 0.25 milliGRAM(s) IV Push every 10 minutes PRN  levothyroxine 25 MICROGram(s) Oral daily  lisinopril 5 milliGRAM(s) Oral daily  morphine  - Injectable 2 milliGRAM(s) IV Push every 4 hours PRN  multivitamin 1 Tablet(s) Oral daily  NIFEdipine XL 30 milliGRAM(s) Oral daily  ondansetron    Tablet 4 milliGRAM(s) Oral two times a day PRN  ondansetron Injectable 4 milliGRAM(s) IV Push once PRN  oxyCODONE    IR 5 milliGRAM(s) Oral every 4 hours PRN  oxyCODONE    IR 10 milliGRAM(s) Oral every 4 hours PRN  pantoprazole    Tablet 40 milliGRAM(s) Oral before breakfast  pregabalin 150 milliGRAM(s) Oral every 8 hours  senna 2 Tablet(s) Oral at bedtime  simvastatin 5 milliGRAM(s) Oral at bedtime      HEALTH ISSUES - PROBLEM Dx:  Suspected deep vein thrombosis

## 2018-11-21 NOTE — PROGRESS NOTE ADULT - PROVIDER SPECIALTY LIST ADULT
Hospitalist
Infectious Disease
Internal Medicine
Orthopedics
Internal Medicine
Internal Medicine

## 2018-11-21 NOTE — PROGRESS NOTE ADULT - REASON FOR ADMISSION
fall
fall RLE periprosthetic hip fracture s/p ORIF POD 4
fall complicated by right hip fracture needing repair
fall with periprosthetic femoral fracture s/p repair POD # 3
fall

## 2018-11-21 NOTE — PROGRESS NOTE ADULT - ASSESSMENT
# Diastolic CHF: at chronic stable state  - start Nifedipine 30 mg ER PO daily  - change to po lasix 40 mg q12h    # Right Periprosthetic Distal Femur Fracture  - s/p ORIF  - OOBTC with non-weight bearing to RLE fro 12 weeks   - Pain regimen: Morphine 2mg q4 PRN, Oxycodone 5mg Q4 PRN     # Acute on chronic Anemia:   - Baseline hemoglobin approximately 11.  - Currently 9.5  - 1s/p 1 unit pRBC    # Delirium (improving):  - Most likely multifactorial: hospitalization, UTI, constipation, fracture  - Constant reorientation  - Avoid sedating medications.     # Hyponatremia (resolved):  regular dietSodium, Serum: 137 mmol/L (11.21.18 @ 06:40)        # Assymptomatic bacteruria - no abx    # Hypothyroidism:   - Continue on Levothyroxine    # Depression:   - Continue on home medication     #CKD 2 at baseline    #Stable for dc   Time spent on dc 40 minutes

## 2018-11-21 NOTE — CHART NOTE - NSCHARTNOTEFT_GEN_A_CORE
<<<RESIDENT DISCHARGE NOTE>>>     ASHVIN CUEVAS  [id number]    VITAL SIGNS:  T(F): 97.2 (11-21-18 @ 07:56), Max: 97.2 (11-21-18 @ 07:56)  HR: 72 (11-21-18 @ 07:56)  BP: 131/62 (11-21-18 @ 07:56)  SpO2: --      PHYSICAL EXAMINATION:  GEN: No acute distress  LUNGS: Clear to auscultation bilaterally , no wheezes or ronchi  HEART: S1/S2 present.   ABD: Soft, non-tender, non-distended. Bowel sounds present  EXT: NC/NC/NE/2+PP/JOE  NEURO: AAOX3    TEST RESULTS:                        9.9    8.21  )-----------( 441      ( 21 Nov 2018 06:40 )             29.3       11-21    137  |  93<L>  |  13  ----------------------------<  115<H>  3.5   |  29  |  0.6<L>    Ca    8.7      21 Nov 2018 06:40  Mg     1.7     11-20        FINAL DISCHARGE INTERVIEW:  Resident(s) Present: (Name: Dr. Sandrita Cox), RN Present: (Name: Salena)    DISCHARGE MEDICATION RECONCILIATION  reviewed with Attending (Name: Dr. Yashira Dewitt)    DISPOSITION:   Home

## 2018-11-22 LAB
CULTURE RESULTS: SIGNIFICANT CHANGE UP
SPECIMEN SOURCE: SIGNIFICANT CHANGE UP

## 2018-11-27 DIAGNOSIS — K59.00 CONSTIPATION, UNSPECIFIED: ICD-10-CM

## 2018-11-27 DIAGNOSIS — D62 ACUTE POSTHEMORRHAGIC ANEMIA: ICD-10-CM

## 2018-11-27 DIAGNOSIS — M97.01XA PERIPROSTHETIC FRACTURE AROUND INTERNAL PROSTHETIC RIGHT HIP JOINT, INITIAL ENCOUNTER: ICD-10-CM

## 2018-11-27 DIAGNOSIS — M19.90 UNSPECIFIED OSTEOARTHRITIS, UNSPECIFIED SITE: ICD-10-CM

## 2018-11-27 DIAGNOSIS — E78.5 HYPERLIPIDEMIA, UNSPECIFIED: ICD-10-CM
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
## 2018-11-27 DIAGNOSIS — Z28.21 IMMUNIZATION NOT CARRIED OUT BECAUSE OF PATIENT REFUSAL: ICD-10-CM

## 2018-11-27 DIAGNOSIS — S72.491A OTHER FRACTURE OF LOWER END OF RIGHT FEMUR, INITIAL ENCOUNTER FOR CLOSED FRACTURE: ICD-10-CM

## 2018-11-27 DIAGNOSIS — G62.9 POLYNEUROPATHY, UNSPECIFIED: ICD-10-CM

## 2018-11-27 DIAGNOSIS — G93.41 METABOLIC ENCEPHALOPATHY: ICD-10-CM

## 2018-11-27 DIAGNOSIS — Z96.641 PRESENCE OF RIGHT ARTIFICIAL HIP JOINT: ICD-10-CM

## 2018-11-27 DIAGNOSIS — M85.80 OTHER SPECIFIED DISORDERS OF BONE DENSITY AND STRUCTURE, UNSPECIFIED SITE: ICD-10-CM

## 2018-11-27 DIAGNOSIS — F32.9 MAJOR DEPRESSIVE DISORDER, SINGLE EPISODE, UNSPECIFIED: ICD-10-CM

## 2018-11-27 DIAGNOSIS — R82.71 BACTERIURIA: ICD-10-CM

## 2018-11-27 DIAGNOSIS — E55.9 VITAMIN D DEFICIENCY, UNSPECIFIED: ICD-10-CM

## 2018-11-27 DIAGNOSIS — N18.2 CHRONIC KIDNEY DISEASE, STAGE 2 (MILD): ICD-10-CM

## 2018-11-27 DIAGNOSIS — I13.0 HYPERTENSIVE HEART AND CHRONIC KIDNEY DISEASE WITH HEART FAILURE AND STAGE 1 THROUGH STAGE 4 CHRONIC KIDNEY DISEASE, OR UNSPECIFIED CHRONIC KIDNEY DISEASE: ICD-10-CM

## 2018-11-27 DIAGNOSIS — E87.1 HYPO-OSMOLALITY AND HYPONATREMIA: ICD-10-CM
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
## 2018-11-27 DIAGNOSIS — I50.32 CHRONIC DIASTOLIC (CONGESTIVE) HEART FAILURE: ICD-10-CM

## 2018-11-27 DIAGNOSIS — E03.9 HYPOTHYROIDISM, UNSPECIFIED: ICD-10-CM
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
## 2019-01-01 ENCOUNTER — OUTPATIENT (OUTPATIENT)
Dept: OUTPATIENT SERVICES | Facility: HOSPITAL | Age: 84
LOS: 1 days | Discharge: HOME | End: 2019-01-01

## 2019-01-01 DIAGNOSIS — R33.9 RETENTION OF URINE, UNSPECIFIED: ICD-10-CM

## 2019-01-01 DIAGNOSIS — Z96.7 PRESENCE OF OTHER BONE AND TENDON IMPLANTS: Chronic | ICD-10-CM

## 2019-01-30 ENCOUNTER — OUTPATIENT (OUTPATIENT)
Dept: OUTPATIENT SERVICES | Facility: HOSPITAL | Age: 84
LOS: 1 days | Discharge: HOME | End: 2019-01-30

## 2019-01-30 DIAGNOSIS — Z96.7 PRESENCE OF OTHER BONE AND TENDON IMPLANTS: Chronic | ICD-10-CM

## 2019-01-30 DIAGNOSIS — R50.9 FEVER, UNSPECIFIED: ICD-10-CM

## 2019-01-31 ENCOUNTER — OUTPATIENT (OUTPATIENT)
Dept: OUTPATIENT SERVICES | Facility: HOSPITAL | Age: 84
LOS: 1 days | Discharge: HOME | End: 2019-01-31

## 2019-01-31 DIAGNOSIS — J31.0 CHRONIC RHINITIS: ICD-10-CM

## 2019-01-31 DIAGNOSIS — Z96.7 PRESENCE OF OTHER BONE AND TENDON IMPLANTS: Chronic | ICD-10-CM

## 2019-02-01 ENCOUNTER — OUTPATIENT (OUTPATIENT)
Dept: OUTPATIENT SERVICES | Facility: HOSPITAL | Age: 84
LOS: 1 days | Discharge: HOME | End: 2019-02-01

## 2019-02-01 DIAGNOSIS — R50.9 FEVER, UNSPECIFIED: ICD-10-CM

## 2019-02-01 DIAGNOSIS — N39.0 URINARY TRACT INFECTION, SITE NOT SPECIFIED: ICD-10-CM

## 2019-02-01 DIAGNOSIS — Z96.7 PRESENCE OF OTHER BONE AND TENDON IMPLANTS: Chronic | ICD-10-CM

## 2019-02-13 ENCOUNTER — OUTPATIENT (OUTPATIENT)
Dept: OUTPATIENT SERVICES | Facility: HOSPITAL | Age: 84
LOS: 1 days | Discharge: HOME | End: 2019-02-13

## 2019-02-13 DIAGNOSIS — Z96.7 PRESENCE OF OTHER BONE AND TENDON IMPLANTS: Chronic | ICD-10-CM

## 2019-02-13 DIAGNOSIS — N32.81 OVERACTIVE BLADDER: ICD-10-CM

## 2019-03-05 ENCOUNTER — OUTPATIENT (OUTPATIENT)
Dept: OUTPATIENT SERVICES | Facility: HOSPITAL | Age: 84
LOS: 1 days | Discharge: HOME | End: 2019-03-05

## 2019-03-05 DIAGNOSIS — Z96.7 PRESENCE OF OTHER BONE AND TENDON IMPLANTS: Chronic | ICD-10-CM

## 2019-03-05 DIAGNOSIS — Z02.9 ENCOUNTER FOR ADMINISTRATIVE EXAMINATIONS, UNSPECIFIED: ICD-10-CM

## 2019-03-31 ENCOUNTER — OUTPATIENT (OUTPATIENT)
Dept: OUTPATIENT SERVICES | Facility: HOSPITAL | Age: 84
LOS: 1 days | Discharge: HOME | End: 2019-03-31

## 2019-03-31 DIAGNOSIS — Z96.7 PRESENCE OF OTHER BONE AND TENDON IMPLANTS: Chronic | ICD-10-CM

## 2019-04-01 DIAGNOSIS — J02.9 ACUTE PHARYNGITIS, UNSPECIFIED: ICD-10-CM

## 2019-04-29 PROBLEM — Z00.00 ENCOUNTER FOR PREVENTIVE HEALTH EXAMINATION: Status: ACTIVE | Noted: 2019-04-29

## 2019-04-30 ENCOUNTER — APPOINTMENT (OUTPATIENT)
Dept: GERIATRICS | Facility: HOME HEALTH | Age: 84
End: 2019-04-30

## 2019-04-30 DIAGNOSIS — E55.9 VITAMIN D DEFICIENCY, UNSPECIFIED: ICD-10-CM

## 2019-04-30 DIAGNOSIS — G62.9 POLYNEUROPATHY, UNSPECIFIED: ICD-10-CM

## 2019-04-30 DIAGNOSIS — C85.90 NON-HODGKIN LYMPHOMA, UNSPECIFIED, UNSPECIFIED SITE: ICD-10-CM

## 2019-04-30 DIAGNOSIS — M19.90 UNSPECIFIED OSTEOARTHRITIS, UNSPECIFIED SITE: ICD-10-CM

## 2019-04-30 DIAGNOSIS — K21.9 GASTRO-ESOPHAGEAL REFLUX DISEASE W/OUT ESOPHAGITIS: ICD-10-CM

## 2019-04-30 DIAGNOSIS — R60.9 EDEMA, UNSPECIFIED: ICD-10-CM

## 2019-04-30 DIAGNOSIS — Z96.0 PRESENCE OF UROGENITAL IMPLANTS: ICD-10-CM

## 2019-04-30 DIAGNOSIS — E03.9 HYPOTHYROIDISM, UNSPECIFIED: ICD-10-CM

## 2019-04-30 DIAGNOSIS — E78.5 HYPERLIPIDEMIA, UNSPECIFIED: ICD-10-CM

## 2019-04-30 DIAGNOSIS — R33.9 RETENTION OF URINE, UNSPECIFIED: ICD-10-CM

## 2019-04-30 DIAGNOSIS — K59.09 OTHER CONSTIPATION: ICD-10-CM

## 2019-04-30 DIAGNOSIS — J30.2 OTHER SEASONAL ALLERGIC RHINITIS: ICD-10-CM

## 2019-05-01 ENCOUNTER — OUTPATIENT (OUTPATIENT)
Dept: OUTPATIENT SERVICES | Facility: HOSPITAL | Age: 84
LOS: 1 days | End: 2019-05-01

## 2019-05-01 VITALS
TEMPERATURE: 98.2 F | OXYGEN SATURATION: 98 % | RESPIRATION RATE: 16 BRPM | HEART RATE: 84 BPM | SYSTOLIC BLOOD PRESSURE: 108 MMHG | DIASTOLIC BLOOD PRESSURE: 62 MMHG

## 2019-05-01 DIAGNOSIS — Z96.7 PRESENCE OF OTHER BONE AND TENDON IMPLANTS: Chronic | ICD-10-CM

## 2019-05-01 PROBLEM — G62.9 PERIPHERAL NEUROPATHY: Status: ACTIVE | Noted: 2019-05-01

## 2019-05-01 PROBLEM — E03.9 HYPOTHYROIDISM, ADULT: Status: ACTIVE | Noted: 2019-05-01

## 2019-05-01 PROBLEM — J30.2 SEASONAL ALLERGIC RHINITIS: Status: ACTIVE | Noted: 2019-05-01

## 2019-05-01 PROBLEM — E55.9 VITAMIN D DEFICIENCY: Status: ACTIVE | Noted: 2019-05-01

## 2019-05-01 PROBLEM — R33.9 URINARY RETENTION: Status: ACTIVE | Noted: 2019-05-01

## 2019-05-01 PROBLEM — M19.90 OSTEOARTHRITIS: Status: ACTIVE | Noted: 2019-05-01

## 2019-05-01 PROBLEM — E78.5 HYPERLIPIDEMIA, UNSPECIFIED HYPERLIPIDEMIA TYPE: Status: ACTIVE | Noted: 2019-05-01

## 2019-05-01 PROBLEM — K59.09 CONSTIPATION, CHRONIC: Status: ACTIVE | Noted: 2019-05-01

## 2019-05-01 PROBLEM — Z96.0 FOLEY CATHETER IN PLACE: Status: RESOLVED | Noted: 2019-05-01 | Resolved: 2019-05-01

## 2019-05-01 PROBLEM — K21.9 CHRONIC GERD: Status: ACTIVE | Noted: 2019-05-01

## 2019-05-01 PROBLEM — R60.9 EDEMA, UNSPECIFIED TYPE: Status: ACTIVE | Noted: 2019-05-01

## 2019-05-01 PROBLEM — C85.90 LYMPHOMA: Status: ACTIVE | Noted: 2019-05-01

## 2019-05-01 RX ORDER — PREGABALIN 150 MG/1
150 CAPSULE ORAL 3 TIMES DAILY
Refills: 0 | Status: ACTIVE | COMMUNITY

## 2019-05-01 RX ORDER — ASPIRIN 81 MG/1
81 TABLET, CHEWABLE ORAL
Refills: 0 | Status: ACTIVE | COMMUNITY

## 2019-05-01 RX ORDER — LEVOTHYROXINE SODIUM 0.03 MG/1
25 TABLET ORAL
Refills: 0 | Status: ACTIVE | COMMUNITY

## 2019-05-01 NOTE — HISTORY OF PRESENT ILLNESS
[FreeTextEntry1] : Patient seen and examined at home, HHA and Dtr present during visit.  Patient recently discharged from SNF s/p fall and ORIF.  Was in SNF for 6 months.  Patient developed a PU to the sacral region during her time there, currently following with [facility] wound care.  Since being discharged patient at baseline.  No acute complaints today.  No Cp/SOB.  No abdominal complaints with good BM's.  No urianry complaints (patient with chronic indwelling alicea).  Patient has HHA present 12h/day 7d/week.  Lives in side apartment of Dtr's house.  Has adequate services.  Getting PT/OT at this time.  Patient uses a WC mostly for ambulation as she is still week and uses a Loki lift as well.  She is in the process of getting a hosital bed.

## 2019-05-01 NOTE — PHYSICAL EXAM
[General Appearance - Alert] : alert [General Appearance - In No Acute Distress] : in no acute distress [General Appearance - Well Developed] : well developed [General Appearance - Well Nourished] : well nourished [Sclera] : the sclera and conjunctiva were normal [Normal Oral Mucosa] : normal oral mucosa [Outer Ear] : the ears and nose were normal in appearance [No Oral Cyanosis] : no oral cyanosis [No Oral Pallor] : no oral pallor [Hearing Threshold Finger Rub Not [place]] : hearing was normal [Examination Of The Oral Cavity] : the lips and gums were normal [Jugular Venous Distention Increased] : there was no jugular-venous distention [Neck Cervical Mass (___cm)] : no neck mass was observed [] : no respiratory distress [Exaggerated Use Of Accessory Muscles For Inspiration] : no accessory muscle use [Respiration, Rhythm And Depth] : normal respiratory rhythm and effort [Auscultation Breath Sounds / Voice Sounds] : lungs were clear to auscultation bilaterally [Edema] : there was no peripheral edema [Heart Rate And Rhythm] : heart rate was normal and rhythm regular [Heart Sounds] : normal S1 and S2 [Abdomen Soft] : soft [Abdomen Tenderness] : non-tender [Involuntary Movements] : no involuntary movements were seen [Nail Clubbing] : no clubbing  or cyanosis of the fingernails [Oriented To Time, Place, And Person] : oriented to person, place, and time [Impaired Insight] : insight and judgment were intact [No Focal Deficits] : no focal deficits [Affect] : the affect was normal [Mood] : the mood was normal [FreeTextEntry1] : + stage 2 PU to sacral region.  No erythema/discharge

## 2019-05-01 NOTE — ASSESSMENT
[FreeTextEntry1] : s/p Fall w/ORIF RLE\par - currently receiving PT/OT\par - DME for ambulation\par - safe handling per HHA\par \par Stage 2 PU to sacral region\par - discussed pressure off-loading at length\par - [facility] currently following for wound care\par - no s/s of infection, advised to monitor and call if situation changes\par \par Hypothyroid\par - c/w Levothyroxine\par - check TSH\par \par OA/PN\par - c/w current medications\par - pain at baseline\par \par Cosntipation\par - on multiple medications\par - monitor for BM's, abdominal distension\par \par UR w/chronic alicea\par - c/w chronic alicea, change as per Urology\par - outpatient FU with urology\par \par Lymphoma s/p radiation\par - in remission\par - not currently following with Oncology\par \par HTN/HLD\par - at baseline\par - c/w current medications\par - check routine labs\par \par DENISE\par - at baseline\par - c/w current medications

## 2019-05-07 ENCOUNTER — LABORATORY RESULT (OUTPATIENT)
Age: 84
End: 2019-05-07

## 2019-05-07 ENCOUNTER — OUTPATIENT (OUTPATIENT)
Dept: OUTPATIENT SERVICES | Facility: HOSPITAL | Age: 84
LOS: 1 days | Discharge: HOME | End: 2019-05-07

## 2019-05-07 ENCOUNTER — MOBILE ON CALL (OUTPATIENT)
Age: 84
End: 2019-05-07

## 2019-05-07 DIAGNOSIS — Z96.7 PRESENCE OF OTHER BONE AND TENDON IMPLANTS: Chronic | ICD-10-CM

## 2019-05-07 DIAGNOSIS — R79.89 OTHER SPECIFIED ABNORMAL FINDINGS OF BLOOD CHEMISTRY: ICD-10-CM

## 2019-05-07 DIAGNOSIS — D64.9 ANEMIA, UNSPECIFIED: ICD-10-CM

## 2019-05-07 DIAGNOSIS — J06.9 ACUTE UPPER RESPIRATORY INFECTION, UNSPECIFIED: ICD-10-CM

## 2019-05-07 DIAGNOSIS — E11.9 TYPE 2 DIABETES MELLITUS WITHOUT COMPLICATIONS: ICD-10-CM

## 2019-05-07 DIAGNOSIS — E55.9 VITAMIN D DEFICIENCY, UNSPECIFIED: ICD-10-CM

## 2019-05-07 DIAGNOSIS — E03.9 HYPOTHYROIDISM, UNSPECIFIED: ICD-10-CM

## 2019-05-07 DIAGNOSIS — E78.5 HYPERLIPIDEMIA, UNSPECIFIED: ICD-10-CM

## 2019-05-07 DIAGNOSIS — E53.8 DEFICIENCY OF OTHER SPECIFIED B GROUP VITAMINS: ICD-10-CM

## 2019-05-23 ENCOUNTER — MOBILE ON CALL (OUTPATIENT)
Age: 84
End: 2019-05-23

## 2019-05-23 ENCOUNTER — INPATIENT (INPATIENT)
Facility: HOSPITAL | Age: 84
LOS: 11 days | Discharge: HOME HEALTH | End: 2019-06-04
Attending: INTERNAL MEDICINE | Admitting: INTERNAL MEDICINE
Payer: MEDICARE

## 2019-05-23 VITALS
RESPIRATION RATE: 18 BRPM | TEMPERATURE: 99 F | HEIGHT: 60 IN | WEIGHT: 119.93 LBS | HEART RATE: 73 BPM | OXYGEN SATURATION: 95 % | DIASTOLIC BLOOD PRESSURE: 60 MMHG | SYSTOLIC BLOOD PRESSURE: 107 MMHG

## 2019-05-23 DIAGNOSIS — Z96.7 PRESENCE OF OTHER BONE AND TENDON IMPLANTS: Chronic | ICD-10-CM

## 2019-05-23 DIAGNOSIS — Z71.89 OTHER SPECIFIED COUNSELING: ICD-10-CM

## 2019-05-23 LAB
ALBUMIN SERPL ELPH-MCNC: 3.5 G/DL — SIGNIFICANT CHANGE UP (ref 3.5–5.2)
ALP SERPL-CCNC: 122 U/L — HIGH (ref 30–115)
ALT FLD-CCNC: 13 U/L — SIGNIFICANT CHANGE UP (ref 0–41)
ANION GAP SERPL CALC-SCNC: 10 MMOL/L — SIGNIFICANT CHANGE UP (ref 7–14)
APPEARANCE UR: CLEAR — SIGNIFICANT CHANGE UP
AST SERPL-CCNC: 25 U/L — SIGNIFICANT CHANGE UP (ref 0–41)
BACTERIA # UR AUTO: ABNORMAL
BASOPHILS # BLD AUTO: 0.08 K/UL — SIGNIFICANT CHANGE UP (ref 0–0.2)
BASOPHILS NFR BLD AUTO: 0.5 % — SIGNIFICANT CHANGE UP (ref 0–1)
BILIRUB SERPL-MCNC: 0.6 MG/DL — SIGNIFICANT CHANGE UP (ref 0.2–1.2)
BILIRUB UR-MCNC: NEGATIVE — SIGNIFICANT CHANGE UP
BUN SERPL-MCNC: 14 MG/DL — SIGNIFICANT CHANGE UP (ref 10–20)
CALCIUM SERPL-MCNC: 8.3 MG/DL — LOW (ref 8.5–10.1)
CHLORIDE SERPL-SCNC: 88 MMOL/L — LOW (ref 98–110)
CO2 SERPL-SCNC: 28 MMOL/L — SIGNIFICANT CHANGE UP (ref 17–32)
COLOR SPEC: YELLOW — SIGNIFICANT CHANGE UP
CREAT SERPL-MCNC: 1 MG/DL — SIGNIFICANT CHANGE UP (ref 0.7–1.5)
DIFF PNL FLD: ABNORMAL
EOSINOPHIL # BLD AUTO: 0.02 K/UL — SIGNIFICANT CHANGE UP (ref 0–0.7)
EOSINOPHIL NFR BLD AUTO: 0.1 % — SIGNIFICANT CHANGE UP (ref 0–8)
EPI CELLS # UR: ABNORMAL /HPF
GLUCOSE SERPL-MCNC: 129 MG/DL — HIGH (ref 70–99)
GLUCOSE UR QL: NEGATIVE MG/DL — SIGNIFICANT CHANGE UP
HCT VFR BLD CALC: 32.1 % — LOW (ref 37–47)
HGB BLD-MCNC: 10.2 G/DL — LOW (ref 12–16)
IMM GRANULOCYTES NFR BLD AUTO: 0.6 % — HIGH (ref 0.1–0.3)
KETONES UR-MCNC: NEGATIVE — SIGNIFICANT CHANGE UP
LACTATE SERPL-SCNC: 2 MMOL/L — SIGNIFICANT CHANGE UP (ref 0.5–2.2)
LEUKOCYTE ESTERASE UR-ACNC: SIGNIFICANT CHANGE UP
LIDOCAIN IGE QN: 15 U/L — SIGNIFICANT CHANGE UP (ref 7–60)
LYMPHOCYTES # BLD AUTO: 22.3 % — SIGNIFICANT CHANGE UP (ref 20.5–51.1)
LYMPHOCYTES # BLD AUTO: 3.31 K/UL — SIGNIFICANT CHANGE UP (ref 1.2–3.4)
MCHC RBC-ENTMCNC: 28.7 PG — SIGNIFICANT CHANGE UP (ref 27–31)
MCHC RBC-ENTMCNC: 31.8 G/DL — LOW (ref 32–37)
MCV RBC AUTO: 90.4 FL — SIGNIFICANT CHANGE UP (ref 81–99)
MONOCYTES # BLD AUTO: 2.11 K/UL — HIGH (ref 0.1–0.6)
MONOCYTES NFR BLD AUTO: 14.2 % — HIGH (ref 1.7–9.3)
NEUTROPHILS # BLD AUTO: 9.2 K/UL — HIGH (ref 1.4–6.5)
NEUTROPHILS NFR BLD AUTO: 62.3 % — SIGNIFICANT CHANGE UP (ref 42.2–75.2)
NITRITE UR-MCNC: NEGATIVE — SIGNIFICANT CHANGE UP
NRBC # BLD: 0 /100 WBCS — SIGNIFICANT CHANGE UP (ref 0–0)
PH UR: 6.5 — SIGNIFICANT CHANGE UP (ref 5–8)
PLATELET # BLD AUTO: 344 K/UL — SIGNIFICANT CHANGE UP (ref 130–400)
POTASSIUM SERPL-MCNC: 3.6 MMOL/L — SIGNIFICANT CHANGE UP (ref 3.5–5)
POTASSIUM SERPL-SCNC: 3.6 MMOL/L — SIGNIFICANT CHANGE UP (ref 3.5–5)
PROT SERPL-MCNC: 6.9 G/DL — SIGNIFICANT CHANGE UP (ref 6–8)
PROT UR-MCNC: NEGATIVE MG/DL — SIGNIFICANT CHANGE UP
RBC # BLD: 3.55 M/UL — LOW (ref 4.2–5.4)
RBC # FLD: 15.5 % — HIGH (ref 11.5–14.5)
RBC CASTS # UR COMP ASSIST: ABNORMAL /HPF
SODIUM SERPL-SCNC: 126 MMOL/L — LOW (ref 135–146)
SP GR SPEC: 1.01 — SIGNIFICANT CHANGE UP (ref 1.01–1.03)
TROPONIN T SERPL-MCNC: <0.01 NG/ML — SIGNIFICANT CHANGE UP
UROBILINOGEN FLD QL: 0.2 MG/DL — SIGNIFICANT CHANGE UP (ref 0.2–0.2)
WBC # BLD: 14.81 K/UL — HIGH (ref 4.8–10.8)
WBC # FLD AUTO: 14.81 K/UL — HIGH (ref 4.8–10.8)
WBC UR QL: >50 /HPF

## 2019-05-23 PROCEDURE — 74177 CT ABD & PELVIS W/CONTRAST: CPT | Mod: 26

## 2019-05-23 PROCEDURE — 70450 CT HEAD/BRAIN W/O DYE: CPT | Mod: 26

## 2019-05-23 PROCEDURE — 99285 EMERGENCY DEPT VISIT HI MDM: CPT

## 2019-05-23 PROCEDURE — 71045 X-RAY EXAM CHEST 1 VIEW: CPT | Mod: 26

## 2019-05-23 RX ORDER — DOCUSATE SODIUM 100 MG
100 CAPSULE ORAL THREE TIMES A DAY
Refills: 0 | Status: DISCONTINUED | OUTPATIENT
Start: 2019-05-23 | End: 2019-06-04

## 2019-05-23 RX ORDER — SENNA PLUS 8.6 MG/1
2 TABLET ORAL AT BEDTIME
Refills: 0 | Status: DISCONTINUED | OUTPATIENT
Start: 2019-05-23 | End: 2019-06-04

## 2019-05-23 RX ORDER — CEFTRIAXONE 500 MG/1
1 INJECTION, POWDER, FOR SOLUTION INTRAMUSCULAR; INTRAVENOUS EVERY 24 HOURS
Refills: 0 | Status: DISCONTINUED | OUTPATIENT
Start: 2019-05-23 | End: 2019-05-30

## 2019-05-23 RX ORDER — CEFTRIAXONE 500 MG/1
1 INJECTION, POWDER, FOR SOLUTION INTRAMUSCULAR; INTRAVENOUS ONCE
Refills: 0 | Status: COMPLETED | OUTPATIENT
Start: 2019-05-23 | End: 2019-05-23

## 2019-05-23 RX ORDER — FUROSEMIDE 40 MG
40 TABLET ORAL DAILY
Refills: 0 | Status: DISCONTINUED | OUTPATIENT
Start: 2019-05-23 | End: 2019-05-26

## 2019-05-23 RX ORDER — MIRTAZAPINE 45 MG/1
15 TABLET, ORALLY DISINTEGRATING ORAL AT BEDTIME
Refills: 0 | Status: DISCONTINUED | OUTPATIENT
Start: 2019-05-23 | End: 2019-05-24

## 2019-05-23 RX ORDER — HEPARIN SODIUM 5000 [USP'U]/ML
5000 INJECTION INTRAVENOUS; SUBCUTANEOUS EVERY 12 HOURS
Refills: 0 | Status: DISCONTINUED | OUTPATIENT
Start: 2019-05-23 | End: 2019-06-04

## 2019-05-23 RX ORDER — LACTULOSE 10 G/15ML
20 SOLUTION ORAL
Refills: 0 | Status: DISCONTINUED | OUTPATIENT
Start: 2019-05-23 | End: 2019-06-04

## 2019-05-23 RX ORDER — ASPIRIN/CALCIUM CARB/MAGNESIUM 324 MG
162 TABLET ORAL DAILY
Refills: 0 | Status: DISCONTINUED | OUTPATIENT
Start: 2019-05-23 | End: 2019-05-26

## 2019-05-23 RX ORDER — ASPIRIN/CALCIUM CARB/MAGNESIUM 324 MG
325 TABLET ORAL ONCE
Refills: 0 | Status: COMPLETED | OUTPATIENT
Start: 2019-05-23 | End: 2019-05-23

## 2019-05-23 RX ORDER — SIMVASTATIN 20 MG/1
5 TABLET, FILM COATED ORAL AT BEDTIME
Refills: 0 | Status: DISCONTINUED | OUTPATIENT
Start: 2019-05-23 | End: 2019-05-26

## 2019-05-23 RX ORDER — SODIUM CHLORIDE 9 MG/ML
1000 INJECTION INTRAMUSCULAR; INTRAVENOUS; SUBCUTANEOUS
Refills: 0 | Status: DISCONTINUED | OUTPATIENT
Start: 2019-05-23 | End: 2019-05-23

## 2019-05-23 RX ORDER — LEVOTHYROXINE SODIUM 125 MCG
25 TABLET ORAL DAILY
Refills: 0 | Status: DISCONTINUED | OUTPATIENT
Start: 2019-05-23 | End: 2019-06-04

## 2019-05-23 RX ORDER — PANTOPRAZOLE SODIUM 20 MG/1
40 TABLET, DELAYED RELEASE ORAL
Refills: 0 | Status: DISCONTINUED | OUTPATIENT
Start: 2019-05-23 | End: 2019-06-04

## 2019-05-23 RX ORDER — CHLORHEXIDINE GLUCONATE 213 G/1000ML
1 SOLUTION TOPICAL
Refills: 0 | Status: DISCONTINUED | OUTPATIENT
Start: 2019-05-23 | End: 2019-06-04

## 2019-05-23 RX ADMIN — MIRTAZAPINE 15 MILLIGRAM(S): 45 TABLET, ORALLY DISINTEGRATING ORAL at 23:41

## 2019-05-23 RX ADMIN — SENNA PLUS 2 TABLET(S): 8.6 TABLET ORAL at 23:30

## 2019-05-23 RX ADMIN — Medication 325 MILLIGRAM(S): at 17:05

## 2019-05-23 RX ADMIN — HEPARIN SODIUM 5000 UNIT(S): 5000 INJECTION INTRAVENOUS; SUBCUTANEOUS at 20:05

## 2019-05-23 RX ADMIN — CEFTRIAXONE 100 GRAM(S): 500 INJECTION, POWDER, FOR SOLUTION INTRAMUSCULAR; INTRAVENOUS at 17:05

## 2019-05-23 RX ADMIN — SIMVASTATIN 5 MILLIGRAM(S): 20 TABLET, FILM COATED ORAL at 23:43

## 2019-05-23 RX ADMIN — Medication 100 MILLIGRAM(S): at 23:30

## 2019-05-23 RX ADMIN — Medication 150 MILLIGRAM(S): at 23:30

## 2019-05-23 RX ADMIN — SODIUM CHLORIDE 125 MILLILITER(S): 9 INJECTION INTRAMUSCULAR; INTRAVENOUS; SUBCUTANEOUS at 15:38

## 2019-05-23 NOTE — ED ADULT TRIAGE NOTE - CHIEF COMPLAINT QUOTE
pt biba from home for confusion and r/o uti, pt has alicea catheter, unknown when it was last changed. pt biba from home for confusion and r/o uti, pt has alicea catheter, unknown when it was last changed. pt is oriented to person and place.

## 2019-05-23 NOTE — ED PROVIDER NOTE - CARE PLAN
Principal Discharge DX:	Altered mental status  Secondary Diagnosis:	Stroke  Secondary Diagnosis:	Hyponatremia  Secondary Diagnosis:	UTI (urinary tract infection)

## 2019-05-23 NOTE — ED PROVIDER NOTE - OBJECTIVE STATEMENT
hx from daughters/patient  89 yo F here for confusion x 2 days and back pain with nausea since yesterday. Family is concerned that patient has UTI. No CP, SOB, abdominal pains. No f/c/n/v/c/d.

## 2019-05-23 NOTE — ED PROVIDER NOTE - NS ED ROS FT
Review of Systems    Constitutional: (-) fever, (-) chills  Eyes/ENT: (-) blurry vision, (-) epistaxis, (-) sore throat  Cardiovascular: (-) chest pain, (-) syncope  Respiratory: (-) cough, (-) shortness of breath  Gastrointestinal: (-) pain, (+) nausea, (-) vomiting, (-) diarrhea  Musculoskeletal: (-) neck pain, (+) back pain, (-) joint pain  Integumentary: (-) rash, (-) edema  Neurological: (-) headache, (+) altered mental status  Psychiatric: (-) hallucinations  Allergic/Immunologic: (-) pruritus

## 2019-05-23 NOTE — H&P ADULT - NSICDXPASTMEDICALHX_GEN_ALL_CORE_FT
PAST MEDICAL HISTORY:  Depression     Diastolic heart failure     HLD (hyperlipidemia)     Hypothyroid     Neuropathy     OA (osteoarthritis)

## 2019-05-23 NOTE — ED PROVIDER NOTE - ATTENDING CONTRIBUTION TO CARE
89 yo F PMHx noted presents from home accompanied by daughters who note that patient with confusion over last 2 days.  + nausea, with decreased appetite and c/o back pain.  no fevers.  Pt with h/o UTI in the past and family concerned.  no CP, no SOB, no h/o fall.  On exam pt in NAD AAO x 2, no signs of head trauma, PERRL, OP clear, MMM, follows commands. + equal strength, although RLE weaker, secondary to femur fracture, + right sided drift, pt does not ambulate at baseline.

## 2019-05-23 NOTE — H&P ADULT - HISTORY OF PRESENT ILLNESS
This is a 89 year old female with PMHx of right hip ORIF in 2016, Femur fx s/p ORIF 11/2018, diastolic CHF, diastolic CHF, Vit D deficiency, Hypothyroid, hyponatremia, dementia,  chronic alicea brought in by family for worsening lethargy, back pain and nausea x2 days. No fevers, chills, CP, SOB, palpitations, abdominal pain, change in bowel or urinary habits.     Of note, patient has been off her lisinopril and Nifedipine x few months due to controlled BP  Found to have acute/subacute CVA and b/l perinephric stranding

## 2019-05-23 NOTE — H&P ADULT - ASSESSMENT
This is a 89 year old female with PMHx of right hip ORIF in 2016, Femur fx s/p ORIF 11/2018, diastolic CHF, diastolic CHF, Vit D deficiency, Hypothyroid, hyponatremia, dementia,  chronic alicea brought in by family for worsening lethargy, back pain and nausea x2 days. Found to have acute/subacute CVA and b/l perinephric stranding    #Pyelonephritis, in the setting of chronic alicea. Sepsis not POA  + leukocytosis, no fevers, no urinary symptoms  start rocephin, f/u urine culture  monitor Fever curve and WBC count    #Acute/Subacute CVA  patient with a right UE drift, otherwise no other neuro deficits  at baseline, pt bedbound and forgetful  2d echo 11/2018 Echo normal with grade 1 diastolic dysfunction   Per ED, neuro reccs ASA, MRI/MRA  c/w telemetry monitoring   neuro check per unit routine  S&S eval    #Chronic hyponatremia  as per family, chronic issue >1 year  improved during last hospitalization with salt diet and holding cymbalta  c/w same - start high salt diet once seen by speech/swallow  would avoid IVF given diastolic CHF    #Hypothyroidism  - Continue on Levothyroxine    #Dementia/Depression  patient tearful at times because she does not want to be in the hospital  Geriatrics eval with Dr Sheldon (PMD)    #suspected Vitamin D deficiency  c/w Vit D supplementation weekly    GOC/Code status discussed with family members at bedside. Unsure at this time. WIll discuss amongst family members

## 2019-05-23 NOTE — ED PROVIDER NOTE - PHYSICAL EXAMINATION
Gen: Alert, NAD, well appearing  Head: NC, AT, PERRL, EOMI, normal lids/conjunctiva  ENT: normal hearing, patent oropharynx   Neck: +supple, no tenderness/meningismus,  Pulm: Bilateral BS, normal resp effort, no wheeze/stridor/retractions  CV: RRR, no murmer  Abd/: soft, NT/ND. +indwelling alicea  Mskel: no edema/erythema/cyanosis  Skin: no rash, warm/dry  Neuro: AAOx2,  No facial droop. Follows commands. Right drift. Right leg weaker (hx of femur fx)

## 2019-05-23 NOTE — ED ADULT NURSE NOTE - CHIEF COMPLAINT QUOTE
pt biba from home for confusion and r/o uti, pt has alicea catheter, unknown when it was last changed. pt is oriented to person and place.

## 2019-05-23 NOTE — H&P ADULT - NSHPLABSRESULTS_GEN_ALL_CORE
10.2   14.81 )-----------( 344      ( 23 May 2019 14:15 )             32.1     126<L>  |  88<L>  |  14  ----------------------------<  129<H>  3.6   |  28  |  1.0    Ca    8.3<L>      23 May 2019 14:15    TPro  6.9  /  Alb  3.5  /  TBili  0.6  /  DBili  x   /  AST  25  /  ALT  13  /  AlkPhos  122<H>          Urinalysis Basic - ( 23 May 2019 14:06 )    Color: Yellow / Appearance: Clear / S.010 / pH: x  Gluc: x / Ketone: Negative  / Bili: Negative / Urobili: 0.2 mg/dL   Blood: x / Protein: Negative mg/dL / Nitrite: Negative   Leuk Esterase: Large / RBC: 6-10 /HPF / WBC >50 /HPF   Sq Epi: x / Non Sq Epi: Few /HPF / Bacteria: Few    Lactate Trend   @ 14:15 Lactate:2.0     CARDIAC MARKERS ( 23 May 2019 14:15 )  x     / <0.01 ng/mL / x     / x     / x        < from: CT Abdomen and Pelvis w/ IV Cont (19 @ 16:15) >    Mildly increased left greater than right perinephric fat stranding. Mild   distal left hydroureter. Normal renal enhancement bilaterally and no   calculi present. Findings are of uncertain significance. Clinical   correlation is necessary.    Chronic incidental findings are above.    < end of copied text >    < from: CT Head No Cont (19 @ 14:40) >    Impression:      Apparent new focal area of decreased attenuation in the left portion of   the martin likely representing an acute or subacute infarction. Recommend   further evaluation with MRI of the brain.    < end of copied text >

## 2019-05-23 NOTE — PATIENT PROFILE ADULT - NSPROCHRONICPAINLOC_GEN_A_NUR
Bilateral:/lower/residual limb, LUE/residual limb, RUE/knee/ankle/heel/leg/foot/cervical spine/abdomen

## 2019-05-23 NOTE — ED PROVIDER NOTE - PROGRESS NOTE DETAILS
Case d/w Dr Juárez.  Rec admit to 3S tele, Rec further work up to include MRI/MRA, aspirin Spoke with Dr. macedo, accepts admission.

## 2019-05-23 NOTE — H&P ADULT - NSHPPHYSICALEXAM_GEN_ALL_CORE
ICU Vital Signs Last 24 Hrs  T(C): 37.1 (23 May 2019 13:04), Max: 37.1 (23 May 2019 13:04)  T(F): 98.8 (23 May 2019 13:04), Max: 98.8 (23 May 2019 13:04)  HR: 73 (23 May 2019 13:04) (73 - 73)  BP: 107/60 (23 May 2019 13:04) (107/60 - 107/60)  RR: 18 (23 May 2019 13:04) (18 - 18)  SpO2: 95% (23 May 2019 13:04) (95% - 95%)    PHYSICAL EXAM:  GENERAL: NAD, speaks in full sentences,tearful when asking the year  HEAD:  Atraumatic, Normocephalic  EYES: PERRL, conjunctiva and sclera clear  NECK: Supple, No JVD  CHEST/LUNG: Clear to auscultation bilaterally; No wheeze; No crackles; No accessory muscles used  HEART: Regular rate and rhythm; No murmurs;   ABDOMEN: Soft, Nontender, Nondistended; Bowel sounds present; No guarding  EXTREMITIES: No cyanosis or edema  PSYCH: AAOx2  NEUROLOGY: sensation intact, RUE drift, strength equal bl UE, unable to assess LE   SKIN: No rashes or lesions

## 2019-05-23 NOTE — H&P ADULT - NSICDXFAMILYHX_GEN_ALL_CORE_FT
FAMILY HISTORY:  No pertinent family history in first degree relatives, no family history of heart disease

## 2019-05-23 NOTE — ED PROVIDER NOTE - CLINICAL SUMMARY MEDICAL DECISION MAKING FREE TEXT BOX
Suture/Staple Removal Results reviewed and d/w pt and daughters.  Copies given to them.  Case d/w Neuro, plant to admit for further eval/work up

## 2019-05-23 NOTE — ED ADULT NURSE NOTE - NSIMPLEMENTINTERV_GEN_ALL_ED
Implemented All Fall with Harm Risk Interventions:  [place] to call system. Call bell, personal items and telephone within reach. Instruct patient to call for assistance. Room bathroom lighting operational. Non-slip footwear when patient is off stretcher. Physically safe environment: no spills, clutter or unnecessary equipment. Stretcher in lowest position, wheels locked, appropriate side rails in place. Provide visual cue, wrist band, yellow gown, etc. Monitor gait and stability. Monitor for mental status changes and reorient to person, place, and time. Review medications for side effects contributing to fall risk. Reinforce activity limits and safety measures with patient and family. Provide visual clues: red socks.

## 2019-05-24 LAB
ANION GAP SERPL CALC-SCNC: 11 MMOL/L — SIGNIFICANT CHANGE UP (ref 7–14)
BASOPHILS # BLD AUTO: 0.06 K/UL — SIGNIFICANT CHANGE UP (ref 0–0.2)
BASOPHILS NFR BLD AUTO: 0.8 % — SIGNIFICANT CHANGE UP (ref 0–1)
BUN SERPL-MCNC: 9 MG/DL — LOW (ref 10–20)
CALCIUM SERPL-MCNC: 8.9 MG/DL — SIGNIFICANT CHANGE UP (ref 8.5–10.1)
CHLORIDE SERPL-SCNC: 101 MMOL/L — SIGNIFICANT CHANGE UP (ref 98–110)
CO2 SERPL-SCNC: 29 MMOL/L — SIGNIFICANT CHANGE UP (ref 17–32)
CREAT SERPL-MCNC: 0.7 MG/DL — SIGNIFICANT CHANGE UP (ref 0.7–1.5)
EOSINOPHIL # BLD AUTO: 0.06 K/UL — SIGNIFICANT CHANGE UP (ref 0–0.7)
EOSINOPHIL NFR BLD AUTO: 0.8 % — SIGNIFICANT CHANGE UP (ref 0–8)
GLUCOSE SERPL-MCNC: 117 MG/DL — HIGH (ref 70–99)
HCT VFR BLD CALC: 33.1 % — LOW (ref 37–47)
HGB BLD-MCNC: 10.6 G/DL — LOW (ref 12–16)
IMM GRANULOCYTES NFR BLD AUTO: 0.3 % — SIGNIFICANT CHANGE UP (ref 0.1–0.3)
LYMPHOCYTES # BLD AUTO: 1.92 K/UL — SIGNIFICANT CHANGE UP (ref 1.2–3.4)
LYMPHOCYTES # BLD AUTO: 25.8 % — SIGNIFICANT CHANGE UP (ref 20.5–51.1)
MAGNESIUM SERPL-MCNC: 1.9 MG/DL — SIGNIFICANT CHANGE UP (ref 1.8–2.4)
MCHC RBC-ENTMCNC: 28.9 PG — SIGNIFICANT CHANGE UP (ref 27–31)
MCHC RBC-ENTMCNC: 32 G/DL — SIGNIFICANT CHANGE UP (ref 32–37)
MCV RBC AUTO: 90.2 FL — SIGNIFICANT CHANGE UP (ref 81–99)
MONOCYTES # BLD AUTO: 1.14 K/UL — HIGH (ref 0.1–0.6)
MONOCYTES NFR BLD AUTO: 15.3 % — HIGH (ref 1.7–9.3)
NEUTROPHILS # BLD AUTO: 4.25 K/UL — SIGNIFICANT CHANGE UP (ref 1.4–6.5)
NEUTROPHILS NFR BLD AUTO: 57 % — SIGNIFICANT CHANGE UP (ref 42.2–75.2)
NRBC # BLD: 0 /100 WBCS — SIGNIFICANT CHANGE UP (ref 0–0)
PLATELET # BLD AUTO: 306 K/UL — SIGNIFICANT CHANGE UP (ref 130–400)
POTASSIUM SERPL-MCNC: 3.2 MMOL/L — LOW (ref 3.5–5)
POTASSIUM SERPL-SCNC: 3.2 MMOL/L — LOW (ref 3.5–5)
RBC # BLD: 3.67 M/UL — LOW (ref 4.2–5.4)
RBC # FLD: 15.6 % — HIGH (ref 11.5–14.5)
SODIUM SERPL-SCNC: 141 MMOL/L — SIGNIFICANT CHANGE UP (ref 135–146)
WBC # BLD: 7.45 K/UL — SIGNIFICANT CHANGE UP (ref 4.8–10.8)
WBC # FLD AUTO: 7.45 K/UL — SIGNIFICANT CHANGE UP (ref 4.8–10.8)

## 2019-05-24 PROCEDURE — 70551 MRI BRAIN STEM W/O DYE: CPT | Mod: 26

## 2019-05-24 PROCEDURE — 70544 MR ANGIOGRAPHY HEAD W/O DYE: CPT | Mod: 26,59

## 2019-05-24 PROCEDURE — 99222 1ST HOSP IP/OBS MODERATE 55: CPT

## 2019-05-24 PROCEDURE — 70548 MR ANGIOGRAPHY NECK W/DYE: CPT | Mod: 26

## 2019-05-24 RX ORDER — POTASSIUM CHLORIDE 20 MEQ
40 PACKET (EA) ORAL ONCE
Refills: 0 | Status: COMPLETED | OUTPATIENT
Start: 2019-05-24 | End: 2019-05-24

## 2019-05-24 RX ADMIN — Medication 40 MILLIEQUIVALENT(S): at 18:22

## 2019-05-24 RX ADMIN — CEFTRIAXONE 100 GRAM(S): 500 INJECTION, POWDER, FOR SOLUTION INTRAMUSCULAR; INTRAVENOUS at 18:21

## 2019-05-24 RX ADMIN — SENNA PLUS 2 TABLET(S): 8.6 TABLET ORAL at 23:03

## 2019-05-24 RX ADMIN — HEPARIN SODIUM 5000 UNIT(S): 5000 INJECTION INTRAVENOUS; SUBCUTANEOUS at 18:21

## 2019-05-24 RX ADMIN — SIMVASTATIN 5 MILLIGRAM(S): 20 TABLET, FILM COATED ORAL at 23:04

## 2019-05-24 RX ADMIN — Medication 150 MILLIGRAM(S): at 06:24

## 2019-05-24 RX ADMIN — PANTOPRAZOLE SODIUM 40 MILLIGRAM(S): 20 TABLET, DELAYED RELEASE ORAL at 06:24

## 2019-05-24 RX ADMIN — Medication 25 MICROGRAM(S): at 06:25

## 2019-05-24 RX ADMIN — Medication 100 MILLIGRAM(S): at 23:03

## 2019-05-24 RX ADMIN — Medication 100 MILLIGRAM(S): at 06:25

## 2019-05-24 RX ADMIN — Medication 40 MILLIGRAM(S): at 06:24

## 2019-05-24 RX ADMIN — HEPARIN SODIUM 5000 UNIT(S): 5000 INJECTION INTRAVENOUS; SUBCUTANEOUS at 06:24

## 2019-05-24 RX ADMIN — Medication 150 MILLIGRAM(S): at 23:13

## 2019-05-24 NOTE — PROGRESS NOTE ADULT - SUBJECTIVE AND OBJECTIVE BOX
BERNIE CUEVASHELEN  89y Female    CHIEF COMPLAINT:    Patient is a 89y old  Female who presents with a chief complaint of Lethargy, nausea and back pain x2 days (23 May 2019 17:25)      INTERVAL HPI/OVERNIGHT EVENTS:    Patient seen and examined. No acute events overnight. Pleasant and cooperative today, denies any complaints    ROS: All other systems are negative.    Vital Signs:    T(F): 99.3 (05-24-19 @ 05:47), Max: 99.3 (05-24-19 @ 05:47)  HR: 75 (05-24-19 @ 05:47) (68 - 75)  BP: 120/50 (05-24-19 @ 05:47) (107/60 - 126/53)  RR: 16 (05-24-19 @ 05:47) (16 - 18)  SpO2: 94% (05-23-19 @ 22:45) (89% - 96%)    23 May 2019 07:01  -  24 May 2019 07:00  --------------------------------------------------------  IN: 0 mL / OUT: 2250 mL / NET: -2250 mL    PHYSICAL EXAM:    GENERAL:  NAD  SKIN: No rashes or lesions  HEENT: Atraumatic. Normocephalic.  NECK: Supple, No JVD. No lymphadenopathy.  PULMONARY: CTA B/L. No wheezing. No rales  CVS: Normal S1, S2. Rate and Rhythm are regular. No murmurs.  ABDOMEN/GI: Soft, Nontender, Nondistended; BS present  MSK:  No edema B/L LE. No clubbing or cyanosis  NEUROLOGIC:  RUE drift  PSYCH: Alert & oriented x 2, normal affect    Consultant(s) Notes Reviewed:  [x ] YES  [ ] NO  Care Discussed with Consultants/Other Providers [ x] YES  [ ] NO    LABS:                        10.6   7.45  )-----------( 306      ( 24 May 2019 06:57 )             33.1     05-24    141  |  101  |  9<L>  ----------------------------<  117<H>  3.2<L>   |  29  |  0.7    Ca    8.9      24 May 2019 06:57  Mg     1.9     05-24    TPro  6.9  /  Alb  3.5  /  TBili  0.6  /  DBili  x   /  AST  25  /  ALT  13  /  AlkPhos  122<H>  05-23  Trop <0.01, CKMB --, CK --, 05-23-19 @ 14:15        RADIOLOGY & ADDITIONAL TESTS:      Imaging or report Personally Reviewed:  [x] YES  [ ] NO  Telemetry events reviewed: [x] YES  [ ] NO    Medications:  Standing  aspirin  chewable 162 milliGRAM(s) Oral daily  cefTRIAXone   IVPB 1 Gram(s) IV Intermittent every 24 hours  chlorhexidine 4% Liquid 1 Application(s) Topical <User Schedule>  docusate sodium 100 milliGRAM(s) Oral three times a day  furosemide    Tablet 40 milliGRAM(s) Oral daily  heparin  Injectable 5000 Unit(s) SubCutaneous every 12 hours  levothyroxine 25 MICROGram(s) Oral daily  mirtazapine 15 milliGRAM(s) Oral at bedtime  multivitamin 1 Tablet(s) Oral daily  pantoprazole    Tablet 40 milliGRAM(s) Oral before breakfast  potassium chloride    Tablet ER 40 milliEquivalent(s) Oral once  pregabalin 150 milliGRAM(s) Oral every 8 hours  senna 2 Tablet(s) Oral at bedtime  simvastatin 5 milliGRAM(s) Oral at bedtime    PRN Meds  lactulose Syrup 20 Gram(s) Oral two times a day PRN      Sydney Perez MD  s. [id number]

## 2019-05-24 NOTE — CONSULT NOTE ADULT - SUBJECTIVE AND OBJECTIVE BOX
Neurology Consultation note    Name  ASHVIN CUEVAS    HPI:  This is a 89 year old female with PMHx of right hip ORIF in 2016, Femur fx s/p ORIF 11/2018, diastolic CHF, diastolic CHF, Vit D deficiency, Hypothyroid, hyponatremia, dementia,  chronic alicea brought in by family for worsening lethargy, back pain and nausea x2 days. No fevers, chills, CP, SOB, palpitations, abdominal pain, change in bowel or urinary habits.     Of note, patient has been off her lisinopril and Nifedipine x few months due to controlled BP  Found to have acute/subacute CVA and b/l perinephric stranding (23 May 2019 17:25)      NEURO:  PATIENT IS A 89 YO FEMALE WITH HX AS ABOVE ADMITTED WITH DX OF PYELONEPHRITIS. NEURO CALLED AS PATIENT WAS NOTED TO HAVE RUE WEAKNESS ON EXAM AND CTH REVEALED A L PONTINE HYPODENSITY  PATIENT IS DISORIENTED AND NOT ABLE TO GIVE MORE HX  FAMILY CANNOT STATE THE EXACT TIME OF ONSET THE RUE WEAKNESS      Vital Signs Last 24 Hrs  T(C): 37.4 (24 May 2019 05:47), Max: 37.4 (24 May 2019 05:47)  T(F): 99.3 (24 May 2019 05:47), Max: 99.3 (24 May 2019 05:47)  HR: 75 (24 May 2019 05:47) (68 - 75)  BP: 120/50 (24 May 2019 05:47) (107/60 - 126/53)  BP(mean): --  RR: 16 (24 May 2019 05:47) (16 - 18)  SpO2: 94% (23 May 2019 22:45) (89% - 96%)    Neurological Exam:   Mental status: LETHARGIC, CAN GIVE A FEW ONE WORD ANSWERS, FOLLOWS SIMPLE COMMANDS  Cranial nerves: Pupils equally round and reactive to light, visual fields full, no nystagmus, extraocular muscles intact, V1 through V3 intact bilaterally and symmetric, face symmetric, hearing intact to finger rub, palate elevation symmetric, tongue was midline.  Motor:  MRC grading 5/5 b/l UE/LE. EXCEPT RUE 4/5. B LE SOME EFFORT LIMITATION  Sensation: Intact to light touch,   Coordination: No dysmetria on finger-to-nose  Reflexes: 2+ in bilateral UE/LE, downgoing toes bilaterally. (-) Garcia.      Medications  aspirin  chewable 162 milliGRAM(s) Oral daily  cefTRIAXone   IVPB 1 Gram(s) IV Intermittent every 24 hours  chlorhexidine 4% Liquid 1 Application(s) Topical <User Schedule>  docusate sodium 100 milliGRAM(s) Oral three times a day  furosemide    Tablet 40 milliGRAM(s) Oral daily  heparin  Injectable 5000 Unit(s) SubCutaneous every 12 hours  lactulose Syrup 20 Gram(s) Oral two times a day PRN  levothyroxine 25 MICROGram(s) Oral daily  multivitamin 1 Tablet(s) Oral daily  pantoprazole    Tablet 40 milliGRAM(s) Oral before breakfast  potassium chloride    Tablet ER 40 milliEquivalent(s) Oral once  pregabalin 150 milliGRAM(s) Oral every 8 hours  senna 2 Tablet(s) Oral at bedtime  simvastatin 5 milliGRAM(s) Oral at bedtime      Lab  05-24    141  |  101  |  9<L>  ----------------------------<  117<H>  3.2<L>   |  29  |  0.7    Ca    8.9      24 May 2019 06:57  Mg     1.9     05-24    TPro  6.9  /  Alb  3.5  /  TBili  0.6  /  DBili  x   /  AST  25  /  ALT  13  /  AlkPhos  122<H>  05-23                          10.6   7.45  )-----------( 306      ( 24 May 2019 06:57 )             33.1     LIVER FUNCTIONS - ( 23 May 2019 14:15 )  Alb: 3.5 g/dL / Pro: 6.9 g/dL / ALK PHOS: 122 U/L / ALT: 13 U/L / AST: 25 U/L / GGT: x             1.9        Radiology  CT Head No Cont:   EXAM:  CT BRAIN            PROCEDURE DATE:  05/23/2019            INTERPRETATION:  Clinical History / Reason for exam:  altered mental   status    Technique:  Multiple contiguous axial CT images of the brain were   obtained from base to vertex without administration of intravenous   contrast.      Comparison:  Head CT 11/9/2018.    Findings:      The cortical sulci and ventricular system are symmetrically prominent   consistent with age related cerebral volume loss.    An apparent small focal area of decreased attenuation noted in the left   martin (series 2 image 8). This was not present on the prior exam. There   are scattered subcortical and periventricular hypodensities without mass   effect most consistent with chronic microvascular ischemic changes.    There is no mass effect, midline shift or intracranial hemorrhage.      The bones are intact without depressed skull fracture.    The visualized paranasal sinuses are unremarkable.  The mastoid air cells   are well aerated.      Impression:      Apparent new focal area of decreased attenuation in the left portion of   the martin likely representing an acute or subacute infarction. Recommend   further evaluation with MRI of the brain.              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

## 2019-05-24 NOTE — PHYSICAL THERAPY INITIAL EVALUATION ADULT - SPECIFY REASON(S)
As confirmed with Nursing, patient currently at [facility] for MRI. Will follow up and complete eval when indicated.

## 2019-05-24 NOTE — SWALLOW BEDSIDE ASSESSMENT ADULT - COMMENTS
patient was able to participate in assessment oriented to person, place, event, and grossly to date + toleration observed without overt symptoms of penetration/aspiration

## 2019-05-24 NOTE — PROGRESS NOTE ADULT - ASSESSMENT
his is a 89 year old female with PMHx of right hip ORIF in 2016, Femur fx s/p ORIF 11/2018, diastolic CHF, diastolic CHF, Vit D deficiency, Hypothyroid, hyponatremia, dementia,  chronic alicea brought in by family for worsening lethargy, back pain and nausea x2 days. Found to have acute/subacute CVA and b/l perinephric stranding    #Pyelonephritis, in the setting of chronic alicea. Sepsis not POA  resolved leukocytosis, no fevers, no urinary symptoms  c/w rocephin, f/u urine culture  monitor Fever curve and WBC count    #Acute/Subacute CVA  patient with a right UE drift, otherwise no other neuro deficits  at baseline, pt bedbound and forgetful  2d echo 11/2018 Echo normal with grade 1 diastolic dysfunction   for MRI/MRA today  c/w telemetry monitoring   neuro check per unit routine  S&S eval pending  dc remeron as it makes pt more lethargic    #Chronic hyponatremia - resolved  as per family, chronic issue >1 year  improved during last hospitalization with salt diet and holding cymbalta  c/w same - start high salt diet once seen by speech/swallow  would avoid IVF given diastolic CHF    #Hypothyroidism  - Continue on Levothyroxine    #Dementia/Depression  patient tearful at times because she does not want to be in the hospital  Geriatrics eval with Dr Sheldon (PMD) pending    #suspected Vitamin D deficiency  c/w Vit D supplementation weekly    #Progress Note Handoff  Pending (specify):  Consults - neurology and geriatrics, Urine culture  Family discussion: plan of care discussed with patient and daughter, agreeable   Disposition: Home likely his is a 89 year old female with PMHx of right hip ORIF in 2016, Femur fx s/p ORIF 11/2018, diastolic CHF, diastolic CHF, Vit D deficiency, Hypothyroid, hyponatremia, dementia,  chronic alicea brought in by family for worsening lethargy, back pain and nausea x2 days. Found to have acute/subacute CVA and b/l perinephric stranding    #Pyelonephritis, in the setting of chronic alicea. Sepsis not POA  resolved leukocytosis, no fevers, no urinary symptoms  c/w rocephin, f/u urine culture  monitor Fever curve and WBC count    #Hypokalemia  repleting, check AM levels     #Acute/Subacute CVA  patient with a right UE drift, otherwise no other neuro deficits  at baseline, pt bedbound and forgetful  2d echo 11/2018 Echo normal with grade 1 diastolic dysfunction   for MRI/MRA today  c/w telemetry monitoring   neuro check per unit routine  S&S eval pending  dc remeron as it makes pt more lethargic    #Chronic hyponatremia - resolved  as per family, chronic issue >1 year  improved during last hospitalization with salt diet and holding cymbalta  c/w same - start high salt diet once seen by speech/swallow  would avoid IVF given diastolic CHF    #Hypothyroidism  - Continue on Levothyroxine    #Hypertension  Holding lisinopril and Nifedipine due to controlled BP    #Dementia/Depression  patient tearful at times because she does not want to be in the hospital  Geriatrics eval with Dr Sheldon (PMD) pending    #suspected Vitamin D deficiency  c/w Vit D supplementation weekly    #Progress Note Handoff  Pending (specify):  Consults - neurology and geriatrics, Urine culture  Family discussion: plan of care discussed with patient and daughter, agreeable   Disposition: Home likely

## 2019-05-25 LAB
-  AMIKACIN: SIGNIFICANT CHANGE UP
-  AMPICILLIN/SULBACTAM: SIGNIFICANT CHANGE UP
-  AMPICILLIN: SIGNIFICANT CHANGE UP
-  AZTREONAM: SIGNIFICANT CHANGE UP
-  CEFEPIME: SIGNIFICANT CHANGE UP
-  CEFOXITIN: SIGNIFICANT CHANGE UP
-  CEFTRIAXONE: SIGNIFICANT CHANGE UP
-  CIPROFLOXACIN: SIGNIFICANT CHANGE UP
-  ERTAPENEM: SIGNIFICANT CHANGE UP
-  GENTAMICIN: SIGNIFICANT CHANGE UP
-  IMIPENEM: SIGNIFICANT CHANGE UP
-  LEVOFLOXACIN: SIGNIFICANT CHANGE UP
-  MEROPENEM: SIGNIFICANT CHANGE UP
-  NITROFURANTOIN: SIGNIFICANT CHANGE UP
-  PIPERACILLIN/TAZOBACTAM: SIGNIFICANT CHANGE UP
-  TIGECYCLINE: SIGNIFICANT CHANGE UP
-  TOBRAMYCIN: SIGNIFICANT CHANGE UP
-  TRIMETHOPRIM/SULFAMETHOXAZOLE: SIGNIFICANT CHANGE UP
ANION GAP SERPL CALC-SCNC: 15 MMOL/L — HIGH (ref 7–14)
BASOPHILS # BLD AUTO: 0.08 K/UL — SIGNIFICANT CHANGE UP (ref 0–0.2)
BASOPHILS NFR BLD AUTO: 1 % — SIGNIFICANT CHANGE UP (ref 0–1)
BUN SERPL-MCNC: 9 MG/DL — LOW (ref 10–20)
CALCIUM SERPL-MCNC: 9.4 MG/DL — SIGNIFICANT CHANGE UP (ref 8.5–10.1)
CHLORIDE SERPL-SCNC: 98 MMOL/L — SIGNIFICANT CHANGE UP (ref 98–110)
CO2 SERPL-SCNC: 25 MMOL/L — SIGNIFICANT CHANGE UP (ref 17–32)
CREAT SERPL-MCNC: 0.7 MG/DL — SIGNIFICANT CHANGE UP (ref 0.7–1.5)
CULTURE RESULTS: SIGNIFICANT CHANGE UP
EOSINOPHIL # BLD AUTO: 0.11 K/UL — SIGNIFICANT CHANGE UP (ref 0–0.7)
EOSINOPHIL NFR BLD AUTO: 1.4 % — SIGNIFICANT CHANGE UP (ref 0–8)
GLUCOSE SERPL-MCNC: 89 MG/DL — SIGNIFICANT CHANGE UP (ref 70–99)
HCT VFR BLD CALC: 39.3 % — SIGNIFICANT CHANGE UP (ref 37–47)
HGB BLD-MCNC: 12.4 G/DL — SIGNIFICANT CHANGE UP (ref 12–16)
IMM GRANULOCYTES NFR BLD AUTO: 0.1 % — SIGNIFICANT CHANGE UP (ref 0.1–0.3)
LYMPHOCYTES # BLD AUTO: 3.21 K/UL — SIGNIFICANT CHANGE UP (ref 1.2–3.4)
LYMPHOCYTES # BLD AUTO: 41.5 % — SIGNIFICANT CHANGE UP (ref 20.5–51.1)
MAGNESIUM SERPL-MCNC: 2.1 MG/DL — SIGNIFICANT CHANGE UP (ref 1.8–2.4)
MCHC RBC-ENTMCNC: 28.6 PG — SIGNIFICANT CHANGE UP (ref 27–31)
MCHC RBC-ENTMCNC: 31.6 G/DL — LOW (ref 32–37)
MCV RBC AUTO: 90.8 FL — SIGNIFICANT CHANGE UP (ref 81–99)
METHOD TYPE: SIGNIFICANT CHANGE UP
MONOCYTES # BLD AUTO: 1.17 K/UL — HIGH (ref 0.1–0.6)
MONOCYTES NFR BLD AUTO: 15.1 % — HIGH (ref 1.7–9.3)
NEUTROPHILS # BLD AUTO: 3.16 K/UL — SIGNIFICANT CHANGE UP (ref 1.4–6.5)
NEUTROPHILS NFR BLD AUTO: 40.9 % — LOW (ref 42.2–75.2)
NRBC # BLD: 0 /100 WBCS — SIGNIFICANT CHANGE UP (ref 0–0)
ORGANISM # SPEC MICROSCOPIC CNT: SIGNIFICANT CHANGE UP
ORGANISM # SPEC MICROSCOPIC CNT: SIGNIFICANT CHANGE UP
PLATELET # BLD AUTO: 306 K/UL — SIGNIFICANT CHANGE UP (ref 130–400)
POTASSIUM SERPL-MCNC: 4.7 MMOL/L — SIGNIFICANT CHANGE UP (ref 3.5–5)
POTASSIUM SERPL-SCNC: 4.7 MMOL/L — SIGNIFICANT CHANGE UP (ref 3.5–5)
RBC # BLD: 4.33 M/UL — SIGNIFICANT CHANGE UP (ref 4.2–5.4)
RBC # FLD: 15.5 % — HIGH (ref 11.5–14.5)
SODIUM SERPL-SCNC: 138 MMOL/L — SIGNIFICANT CHANGE UP (ref 135–146)
SPECIMEN SOURCE: SIGNIFICANT CHANGE UP
WBC # BLD: 7.74 K/UL — SIGNIFICANT CHANGE UP (ref 4.8–10.8)
WBC # FLD AUTO: 7.74 K/UL — SIGNIFICANT CHANGE UP (ref 4.8–10.8)

## 2019-05-25 RX ORDER — ACETAMINOPHEN 500 MG
650 TABLET ORAL EVERY 8 HOURS
Refills: 0 | Status: DISCONTINUED | OUTPATIENT
Start: 2019-05-25 | End: 2019-06-04

## 2019-05-25 RX ADMIN — HEPARIN SODIUM 5000 UNIT(S): 5000 INJECTION INTRAVENOUS; SUBCUTANEOUS at 17:44

## 2019-05-25 RX ADMIN — Medication 150 MILLIGRAM(S): at 05:50

## 2019-05-25 RX ADMIN — Medication 100 MILLIGRAM(S): at 21:57

## 2019-05-25 RX ADMIN — CEFTRIAXONE 100 GRAM(S): 500 INJECTION, POWDER, FOR SOLUTION INTRAMUSCULAR; INTRAVENOUS at 17:42

## 2019-05-25 RX ADMIN — SENNA PLUS 2 TABLET(S): 8.6 TABLET ORAL at 21:57

## 2019-05-25 RX ADMIN — Medication 100 MILLIGRAM(S): at 14:29

## 2019-05-25 RX ADMIN — PANTOPRAZOLE SODIUM 40 MILLIGRAM(S): 20 TABLET, DELAYED RELEASE ORAL at 05:50

## 2019-05-25 RX ADMIN — Medication 40 MILLIGRAM(S): at 05:50

## 2019-05-25 RX ADMIN — HEPARIN SODIUM 5000 UNIT(S): 5000 INJECTION INTRAVENOUS; SUBCUTANEOUS at 05:50

## 2019-05-25 RX ADMIN — Medication 1 TABLET(S): at 11:03

## 2019-05-25 RX ADMIN — Medication 150 MILLIGRAM(S): at 22:01

## 2019-05-25 RX ADMIN — SIMVASTATIN 5 MILLIGRAM(S): 20 TABLET, FILM COATED ORAL at 21:59

## 2019-05-25 RX ADMIN — Medication 650 MILLIGRAM(S): at 17:45

## 2019-05-25 RX ADMIN — Medication 162 MILLIGRAM(S): at 11:03

## 2019-05-25 RX ADMIN — Medication 650 MILLIGRAM(S): at 18:44

## 2019-05-25 RX ADMIN — CHLORHEXIDINE GLUCONATE 1 APPLICATION(S): 213 SOLUTION TOPICAL at 11:02

## 2019-05-25 RX ADMIN — Medication 150 MILLIGRAM(S): at 14:29

## 2019-05-25 RX ADMIN — Medication 25 MICROGRAM(S): at 05:50

## 2019-05-25 RX ADMIN — Medication 100 MILLIGRAM(S): at 05:50

## 2019-05-25 NOTE — PROGRESS NOTE ADULT - ASSESSMENT
his is a 89 year old female with PMHx of right hip ORIF in 2016, Femur fx s/p ORIF 11/2018, diastolic CHF, diastolic CHF, Vit D deficiency, Hypothyroid, hyponatremia, dementia,  chronic alicea brought in by family for worsening lethargy, back pain and nausea x2 days. Found to have acute/subacute CVA and b/l perinephric stranding    #Pyelonephritis, in the setting of chronic alicea. Sepsis not POA  resolved leukocytosis, no fevers, no urinary symptoms  Urine cs with >100K gm neg rods, awaiting sensitivities  c/w rocephin for now  monitor Fever curve and WBC count    #Hypokalemia  repleted, awaiting AM labs    #Acute/Subacute CVA  patient with a right UE drift, otherwise no other neuro deficits  at baseline, pt bedbound and forgetful  2d echo 11/2018 Echo normal with grade 1 diastolic dysfunction   MRI/MRA results noted  passed s&s  neurology f/u re: MRI findings    #Chronic hyponatremia - resolved  as per family, chronic issue >1 year  improved during last hospitalization with salt diet and holding cymbalta  c/w same - c/w high salt diet  would avoid IVF given diastolic CHF    #Hypothyroidism  - Continue on Levothyroxine    #Hypertension  Holding lisinopril and Nifedipine due to controlled BP    #Dementia/Depression  patient tearful at times because she does not want to be in the hospital  Geriatrics eval with Dr Sheldon (PMD) pending    #suspected Vitamin D deficiency  c/w Vit D supplementation weekly    #Progress Note Handoff  Pending (specify):  Consults - geriatrics, urine cx  Family discussion: plan of care discussed with patient and daughter, agreeable   Disposition: Home likely once medically stable

## 2019-05-25 NOTE — PROGRESS NOTE ADULT - SUBJECTIVE AND OBJECTIVE BOX
ASHVIN CUEVAS  89y Female    CHIEF COMPLAINT:    Patient is a 89y old  Female who presents with a chief complaint of Lethargy, nausea and back pain x2 days (24 May 2019 12:16)      INTERVAL HPI/OVERNIGHT EVENTS:    Patient seen and examined. No acute events overnight. resting comfortably in bed    ROS: All other systems are negative.    Vital Signs:    T(F): 97.6 (05-25-19 @ 05:33), Max: 98.7 (05-24-19 @ 21:36)  HR: 68 (05-25-19 @ 05:33) (66 - 68)  BP: 143/58 (05-25-19 @ 05:33) (143/58 - 147/65)  RR: 16 (05-25-19 @ 05:33) (16 - 17)    24 May 2019 07:01  -  25 May 2019 07:00  --------------------------------------------------------  IN: 0 mL / OUT: 1050 mL / NET: -1050 mL    PHYSICAL EXAM:    GENERAL:  NAD  SKIN: No rashes or lesions  HEENT: Atraumatic. Normocephalic.   NECK: Supple, No JVD.    PULMONARY: Fine bibasilar crackles. No wheezing. No rales  CVS: Normal S1, S2. Rate and Rhythm are regular. No murmurs.  ABDOMEN/GI: Soft, Nontender, Nondistended; BS present  MSK:  No edema B/L LE. No clubbing or cyanosis  NEUROLOGIC:  No motor or sensory deficit.  PSYCH: Alert & oriented x2, pleasant and cooperative    LABS:                        12.4   7.74  )-----------( 306      ( 25 May 2019 08:55 )             39.3     05-24    141  |  101  |  9<L>  ----------------------------<  117<H>  3.2<L>   |  29  |  0.7    Ca    8.9      24 May 2019 06:57  Mg     1.9     05-24  TPro  6.9  /  Alb  3.5  /  TBili  0.6  /  DBili  x   /  AST  25  /  ALT  13  /  AlkPhos  122<H>  05-23    Trop <0.01, CKMB --, CK --, 05-23-19 @ 14:15    Culture - Urine (collected 23 May 2019 14:06)  Source: .Urine Catheterized  Preliminary Report (24 May 2019 16:22):    >100,000 CFU/ml Gram Negative Rods  RADIOLOGY & ADDITIONAL TESTS:  < from: MR Angio Head No Cont (05.24.19 @ 14:28) >  IMPRESSION:      1.  Left vertebral artery signal abnormality compatible with a proximal   stenosis or occlusion.     2.  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    < end of copied text >  < from: MR Angio Neck w/ IV Cont (05.24.19 @ 14:28) >  IMPRESSION:    Patchy signal within the left vertebral artery suggesting multifocal   severe stenoses and possible occlusion distally.    < end of copied text >    < from: MR Head No Cont (05.24.19 @ 14:27) >  IMPRESSION:     1.  Multiple punctate acute infarcts within the white matter of the left   frontal and parietal lobes. Additional punctate acute infarcts in the   right parietal white matter and left aspect of the midbrain.    2.  Mild/moderate chronic microvascular changes.    3.  Chronic lacunar infarct in the left aspect of the martin.    4.  Right mastoid effusion, correlate clinically for mastoiditis.    < end of copied text >    Imaging or report Personally Reviewed:  [x] YES  [ ] NO      Medications:  Standing  aspirin  chewable 162 milliGRAM(s) Oral daily  cefTRIAXone   IVPB 1 Gram(s) IV Intermittent every 24 hours  chlorhexidine 4% Liquid 1 Application(s) Topical <User Schedule>  docusate sodium 100 milliGRAM(s) Oral three times a day  furosemide    Tablet 40 milliGRAM(s) Oral daily  heparin  Injectable 5000 Unit(s) SubCutaneous every 12 hours  levothyroxine 25 MICROGram(s) Oral daily  multivitamin 1 Tablet(s) Oral daily  pantoprazole    Tablet 40 milliGRAM(s) Oral before breakfast  pregabalin 150 milliGRAM(s) Oral every 8 hours  senna 2 Tablet(s) Oral at bedtime  simvastatin 5 milliGRAM(s) Oral at bedtime    PRN Meds  lactulose Syrup 20 Gram(s) Oral two times a day PRN      Sydney Perez MD  s. [id number]

## 2019-05-25 NOTE — PHYSICAL THERAPY INITIAL EVALUATION ADULT - LEVEL OF INDEPENDENCE: GAIT, REHAB EVAL
unable to perform/secondary to patient unable to maintain balance to weightshift to safely take steps at this time

## 2019-05-25 NOTE — PHYSICAL THERAPY INITIAL EVALUATION ADULT - GENERAL OBSERVATIONS, REHAB EVAL
10:21-10:41 Chart reviewed. Pt encountered semireclined in bed,  may be seen by Physical Therapist as confirmed with Nurse. Patient denied pain; +O2 via NC; +alicea

## 2019-05-26 LAB
ANION GAP SERPL CALC-SCNC: 15 MMOL/L — HIGH (ref 7–14)
BUN SERPL-MCNC: 16 MG/DL — SIGNIFICANT CHANGE UP (ref 10–20)
CALCIUM SERPL-MCNC: 9.4 MG/DL — SIGNIFICANT CHANGE UP (ref 8.5–10.1)
CHLORIDE SERPL-SCNC: 96 MMOL/L — LOW (ref 98–110)
CO2 SERPL-SCNC: 27 MMOL/L — SIGNIFICANT CHANGE UP (ref 17–32)
CREAT SERPL-MCNC: 1 MG/DL — SIGNIFICANT CHANGE UP (ref 0.7–1.5)
GLUCOSE SERPL-MCNC: 106 MG/DL — HIGH (ref 70–99)
POTASSIUM SERPL-MCNC: 4.4 MMOL/L — SIGNIFICANT CHANGE UP (ref 3.5–5)
POTASSIUM SERPL-SCNC: 4.4 MMOL/L — SIGNIFICANT CHANGE UP (ref 3.5–5)
SODIUM SERPL-SCNC: 138 MMOL/L — SIGNIFICANT CHANGE UP (ref 135–146)

## 2019-05-26 PROCEDURE — 70450 CT HEAD/BRAIN W/O DYE: CPT | Mod: 26

## 2019-05-26 PROCEDURE — 99232 SBSQ HOSP IP/OBS MODERATE 35: CPT

## 2019-05-26 RX ORDER — SODIUM CHLORIDE 9 MG/ML
1000 INJECTION INTRAMUSCULAR; INTRAVENOUS; SUBCUTANEOUS
Refills: 0 | Status: DISCONTINUED | OUTPATIENT
Start: 2019-05-26 | End: 2019-05-28

## 2019-05-26 RX ORDER — SIMVASTATIN 20 MG/1
20 TABLET, FILM COATED ORAL AT BEDTIME
Refills: 0 | Status: DISCONTINUED | OUTPATIENT
Start: 2019-05-26 | End: 2019-06-04

## 2019-05-26 RX ORDER — CLOPIDOGREL BISULFATE 75 MG/1
75 TABLET, FILM COATED ORAL DAILY
Refills: 0 | Status: DISCONTINUED | OUTPATIENT
Start: 2019-05-26 | End: 2019-06-04

## 2019-05-26 RX ORDER — SODIUM CHLORIDE 9 MG/ML
100 INJECTION INTRAMUSCULAR; INTRAVENOUS; SUBCUTANEOUS ONCE
Refills: 0 | Status: COMPLETED | OUTPATIENT
Start: 2019-05-26 | End: 2019-05-26

## 2019-05-26 RX ADMIN — Medication 650 MILLIGRAM(S): at 11:58

## 2019-05-26 RX ADMIN — Medication 650 MILLIGRAM(S): at 12:50

## 2019-05-26 RX ADMIN — Medication 150 MILLIGRAM(S): at 14:28

## 2019-05-26 RX ADMIN — PANTOPRAZOLE SODIUM 40 MILLIGRAM(S): 20 TABLET, DELAYED RELEASE ORAL at 06:09

## 2019-05-26 RX ADMIN — CEFTRIAXONE 100 GRAM(S): 500 INJECTION, POWDER, FOR SOLUTION INTRAMUSCULAR; INTRAVENOUS at 17:42

## 2019-05-26 RX ADMIN — Medication 150 MILLIGRAM(S): at 06:11

## 2019-05-26 RX ADMIN — Medication 100 MILLIGRAM(S): at 22:50

## 2019-05-26 RX ADMIN — SODIUM CHLORIDE 100 MILLILITER(S): 9 INJECTION INTRAMUSCULAR; INTRAVENOUS; SUBCUTANEOUS at 06:08

## 2019-05-26 RX ADMIN — Medication 25 MICROGRAM(S): at 06:09

## 2019-05-26 RX ADMIN — Medication 100 MILLIGRAM(S): at 14:28

## 2019-05-26 RX ADMIN — SIMVASTATIN 20 MILLIGRAM(S): 20 TABLET, FILM COATED ORAL at 22:50

## 2019-05-26 RX ADMIN — Medication 40 MILLIGRAM(S): at 06:09

## 2019-05-26 RX ADMIN — Medication 162 MILLIGRAM(S): at 11:59

## 2019-05-26 RX ADMIN — CLOPIDOGREL BISULFATE 75 MILLIGRAM(S): 75 TABLET, FILM COATED ORAL at 14:28

## 2019-05-26 RX ADMIN — SENNA PLUS 2 TABLET(S): 8.6 TABLET ORAL at 22:50

## 2019-05-26 RX ADMIN — HEPARIN SODIUM 5000 UNIT(S): 5000 INJECTION INTRAVENOUS; SUBCUTANEOUS at 06:10

## 2019-05-26 RX ADMIN — Medication 1 TABLET(S): at 11:59

## 2019-05-26 RX ADMIN — Medication 150 MILLIGRAM(S): at 22:51

## 2019-05-26 RX ADMIN — Medication 100 MILLIGRAM(S): at 06:09

## 2019-05-26 RX ADMIN — HEPARIN SODIUM 5000 UNIT(S): 5000 INJECTION INTRAVENOUS; SUBCUTANEOUS at 17:42

## 2019-05-26 RX ADMIN — CHLORHEXIDINE GLUCONATE 1 APPLICATION(S): 213 SOLUTION TOPICAL at 11:59

## 2019-05-26 RX ADMIN — SODIUM CHLORIDE 75 MILLILITER(S): 9 INJECTION INTRAMUSCULAR; INTRAVENOUS; SUBCUTANEOUS at 20:08

## 2019-05-26 NOTE — PROGRESS NOTE ADULT - SUBJECTIVE AND OBJECTIVE BOX
ASHVIN CUEVAS  89y Female    CHIEF COMPLAINT:    Patient is a 89y old  Female who presents with a chief complaint of Lethargy, nausea and back pain x2 days (25 May 2019 09:29)      INTERVAL HPI/OVERNIGHT EVENTS:    Patient seen and examined. Poor urine output overnight    ROS: All other systems are negative.    Vital Signs:    T(F): 96.7 (05-26-19 @ 05:15), Max: 98.4 (05-25-19 @ 21:18)  HR: 56 (05-26-19 @ 05:15) (56 - 77)  BP: 125/58 (05-26-19 @ 05:15) (107/50 - 127/56)  RR: 16 (05-26-19 @ 05:15) (16 - 17)  SpO2: 94% (05-25-19 @ 10:55) (94% - 94%)    PHYSICAL EXAM:    GENERAL:  NAD  SKIN: No rashes or lesions  HEENT: Atraumatic. Normocephalic.   NECK: Supple, No JVD.   PULMONARY: CTA B/L. No wheezing. No rales  CVS: Normal S1, S2. Rate and Rhythm are regular. No murmurs.  ABDOMEN/GI: Soft, Nontender, Nondistended, no suprapubic tenderness; BS present  MSK:  No edema B/L LE. No clubbing or cyanosis  NEUROLOGIC:  No motor or sensory deficit.  PSYCH: Alert & oriented x2, normal affect    Consultant(s) Notes Reviewed:  [x ] YES  [ ] NO  Care Discussed with Consultants/Other Providers [ x] YES  [ ] NO    LABS:                        12.4   7.74  )-----------( 306      ( 25 May 2019 08:55 )             39.3     05-26    138  |  96<L>  |  16  ----------------------------<  106<H>  4.4   |  27  |  1.0    Ca    9.4      26 May 2019 08:33  Mg     2.1     05-25  Trop <0.01, CKMB --, CK --, 05-23-19 @ 14:15  Culture - Urine (collected 23 May 2019 14:06)  Source: .Urine Catheterized  Final Report (25 May 2019 14:15):    >100,000 CFU/ml Escherichia coli  Organism: Escherichia coli (25 May 2019 14:15)  Organism: Escherichia coli (25 May 2019 14:15)    RADIOLOGY & ADDITIONAL TESTS:    Imaging or report Personally Reviewed:  [x] YES  [ ] NO  EKG reviewed: [x] YES  [ ] NO    Medications:  Standing  aspirin  chewable 162 milliGRAM(s) Oral daily  cefTRIAXone   IVPB 1 Gram(s) IV Intermittent every 24 hours  chlorhexidine 4% Liquid 1 Application(s) Topical <User Schedule>  docusate sodium 100 milliGRAM(s) Oral three times a day  furosemide    Tablet 40 milliGRAM(s) Oral daily  heparin  Injectable 5000 Unit(s) SubCutaneous every 12 hours  levothyroxine 25 MICROGram(s) Oral daily  multivitamin 1 Tablet(s) Oral daily  pantoprazole    Tablet 40 milliGRAM(s) Oral before breakfast  pregabalin 150 milliGRAM(s) Oral every 8 hours  senna 2 Tablet(s) Oral at bedtime  simvastatin 5 milliGRAM(s) Oral at bedtime    PRN Meds  acetaminophen   Tablet .. 650 milliGRAM(s) Oral every 8 hours PRN  lactulose Syrup 20 Gram(s) Oral two times a day PRN      Sydney Perez MD  s. [id number]

## 2019-05-26 NOTE — PROGRESS NOTE ADULT - ASSESSMENT
his is a 89 year old female with PMHx of right hip ORIF in 2016, Femur fx s/p ORIF 11/2018, diastolic CHF, diastolic CHF, Vit D deficiency, Hypothyroid, hyponatremia, dementia,  chronic alicea brought in by family for worsening lethargy, back pain and nausea x2 days. Found to have acute/subacute CVA and b/l perinephric stranding    #Pyelonephritis, in the setting of chronic alicea. Sepsis not POA  resolved leukocytosis, no fevers, no urinary symptoms  overnight, decreasing urine output, BP controlled  alicea flushed, bladder scan with 17cc urine s/p 100cc bolus of IVF  encourage PO intake, monitor urine output and check daily BMP  Urine cs with >100K gm Ecoli, pansensitive  c/w rocephin, can switch to PO upon discharge    #Hypokalemia  resolved    #Acute/Subacute CVA  patient with a right UE drift, otherwise no other neuro deficits  at baseline, pt bedbound and forgetful  2d echo 11/2018 Echo normal with grade 1 diastolic dysfunction   MRI/MRA results noted  passed s&s  neurology f/u re: MRI findings    #Chronic hyponatremia - resolved  as per family, chronic issue >1 year  improved during last hospitalization with salt diet and holding cymbalta  c/w same - c/w high salt diet  would avoid IVF given diastolic CHF    #Hypothyroidism  - Continue on Levothyroxine    #Hypertension  Holding lisinopril and Nifedipine due to controlled BP    #Dementia/Depression  patient tearful at times because she does not want to be in the hospital  Geriatrics eval with Dr Sheldon (PMD) pending    #suspected Vitamin D deficiency  c/w Vit D supplementation weekly    #Progress Note Handoff  Pending (specify):  Consults - geriatrics and neurology follow up  Family discussion: plan of care discussed with patient and daughter, agreeable   Disposition: Home likely once medically stable

## 2019-05-26 NOTE — CHART NOTE - NSCHARTNOTEFT_GEN_A_CORE
Patient noted to have worsening neurological status as compared to this morning - cannot grasp my hand, cannot lift her RUE, as well inability to move her RLE. Patient also had mumbled speech and inability to find words.  Case disussed with neurology on call, recommends CTH stat, very gentle hydration to try to maintain a higher BP as well as unit monitoring for neurochecks  discussed with Dr Lopez and Lacey, daughter

## 2019-05-26 NOTE — PROGRESS NOTE ADULT - SUBJECTIVE AND OBJECTIVE BOX
Neurology Follow up note    Name  ASHVIN CUEVAS    HPI:  This is a 89 year old female with PMHx of right hip ORIF in 2016, Femur fx s/p ORIF 11/2018, diastolic CHF, diastolic CHF, Vit D deficiency, Hypothyroid, hyponatremia, dementia,  chronic alicea brought in by family for worsening lethargy, back pain and nausea x2 days. No fevers, chills, CP, SOB, palpitations, abdominal pain, change in bowel or urinary habits.     Of note, patient has been off her lisinopril and Nifedipine x few months due to controlled BP  Found to have acute/subacute CVA and b/l perinephric stranding (23 May 2019 17:25)      Interval History - Patient seen and examined and reviewed the results and possible causes of the stroke with the daughters.  Patient has no new complaints and is tolerating oral diet well.  Patient was on ASA 81mg and simvastatin at home prior to this admission.          Vital Signs Last 24 Hrs  T(C): 35.9 (26 May 2019 05:15), Max: 36.9 (25 May 2019 21:18)  T(F): 96.7 (26 May 2019 05:15), Max: 98.4 (25 May 2019 21:18)  HR: 56 (26 May 2019 05:15) (56 - 77)  BP: 125/58 (26 May 2019 05:15) (107/50 - 127/56)  BP(mean): --  RR: 16 (26 May 2019 05:15) (16 - 17)  SpO2: 94% (26 May 2019 12:01) (94% - 94%)  ICU Vital Signs Last 24 Hrs  T(C): 35.9 (26 May 2019 05:15), Max: 36.9 (25 May 2019 21:18)  T(F): 96.7 (26 May 2019 05:15), Max: 98.4 (25 May 2019 21:18)  HR: 56 (26 May 2019 05:15) (56 - 77)  BP: 125/58 (26 May 2019 05:15) (107/50 - 127/56)  BP(mean): --  ABP: --  ABP(mean): --  RR: 16 (26 May 2019 05:15) (16 - 17)  SpO2: 94% (26 May 2019 12:01) (94% - 94%)          Neurological Exam:   Alert oriented x2 knows the president able to name and repeat  Right facial and RUE 4-/4 and RLE 2/5  Sensory decreased on right  FTN NL on left    NIHSS 9  mrankin 4    Medications  acetaminophen   Tablet .. 650 milliGRAM(s) Oral every 8 hours PRN  aspirin  chewable 162 milliGRAM(s) Oral daily  cefTRIAXone   IVPB 1 Gram(s) IV Intermittent every 24 hours  chlorhexidine 4% Liquid 1 Application(s) Topical <User Schedule>  docusate sodium 100 milliGRAM(s) Oral three times a day  furosemide    Tablet 40 milliGRAM(s) Oral daily  heparin  Injectable 5000 Unit(s) SubCutaneous every 12 hours  lactulose Syrup 20 Gram(s) Oral two times a day PRN  levothyroxine 25 MICROGram(s) Oral daily  multivitamin 1 Tablet(s) Oral daily  pantoprazole    Tablet 40 milliGRAM(s) Oral before breakfast  pregabalin 150 milliGRAM(s) Oral every 8 hours  senna 2 Tablet(s) Oral at bedtime  simvastatin 5 milliGRAM(s) Oral at bedtime      Lab                        12.4   7.74  )-----------( 306      ( 25 May 2019 08:55 )             39.3     05-26    138  |  96<L>  |  16  ----------------------------<  106<H>  4.4   |  27  |  1.0    Ca    9.4      26 May 2019 08:33  Mg     2.1     05-25      CAPILLARY BLOOD GLUCOSE              Radiology  < from: MR Head No Cont (05.24.19 @ 14:27) >  IMPRESSION:     1.  Multiple punctate acute infarcts within the white matter of the left   frontal and parietal lobes. Additional punctate acute infarcts in the   right parietal white matter and left aspect of the midbrain.    2.  Mild/moderate chronic microvascular changes.    3.  Chronic lacunar infarct in the left aspect of the martin.    4.  Right mastoid effusion, correlate clinically for mastoiditis.    < end of copied text >    < from: MR Angio Head No Cont (05.24.19 @ 14:28) >  IMPRESSION:      1.  Left vertebral artery signal abnormality compatible with a proximal   stenosis or occlusion.     2.  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    < end of copied text >            Assessment- Patient found to have b/l L>R acute infarcts suggestive of embolic etiology.  She has severe posterior circulation disease however given territories of stroke more likely embolic    Plan  1. Place back on telemetry to evaluate for paroxysmal afib  2. Switch asa to plavix 75mg QD  3. Simvastatin 20mg QD  4. PT/OT/Rehab

## 2019-05-26 NOTE — CHART NOTE - NSCHARTNOTEFT_GEN_A_CORE
Patient now with poor urine output, no change when Musa was irrigated. RN will do a bladder scan and if limited urine present, will give small bolus. Actually BUN/CR are normal as of yesterday, will repeat in am.

## 2019-05-27 LAB
ANION GAP SERPL CALC-SCNC: 12 MMOL/L — SIGNIFICANT CHANGE UP (ref 7–14)
BUN SERPL-MCNC: 16 MG/DL — SIGNIFICANT CHANGE UP (ref 10–20)
CALCIUM SERPL-MCNC: 9.3 MG/DL — SIGNIFICANT CHANGE UP (ref 8.5–10.1)
CHLORIDE SERPL-SCNC: 99 MMOL/L — SIGNIFICANT CHANGE UP (ref 98–110)
CO2 SERPL-SCNC: 29 MMOL/L — SIGNIFICANT CHANGE UP (ref 17–32)
CREAT SERPL-MCNC: 0.8 MG/DL — SIGNIFICANT CHANGE UP (ref 0.7–1.5)
GLUCOSE SERPL-MCNC: 113 MG/DL — HIGH (ref 70–99)
POTASSIUM SERPL-MCNC: 4.1 MMOL/L — SIGNIFICANT CHANGE UP (ref 3.5–5)
POTASSIUM SERPL-SCNC: 4.1 MMOL/L — SIGNIFICANT CHANGE UP (ref 3.5–5)
SODIUM SERPL-SCNC: 140 MMOL/L — SIGNIFICANT CHANGE UP (ref 135–146)

## 2019-05-27 PROCEDURE — 99232 SBSQ HOSP IP/OBS MODERATE 35: CPT

## 2019-05-27 RX ADMIN — CLOPIDOGREL BISULFATE 75 MILLIGRAM(S): 75 TABLET, FILM COATED ORAL at 12:08

## 2019-05-27 RX ADMIN — CEFTRIAXONE 100 GRAM(S): 500 INJECTION, POWDER, FOR SOLUTION INTRAMUSCULAR; INTRAVENOUS at 17:29

## 2019-05-27 RX ADMIN — Medication 100 MILLIGRAM(S): at 06:17

## 2019-05-27 RX ADMIN — Medication 100 MILLIGRAM(S): at 14:48

## 2019-05-27 RX ADMIN — Medication 100 MILLIGRAM(S): at 21:34

## 2019-05-27 RX ADMIN — Medication 150 MILLIGRAM(S): at 07:25

## 2019-05-27 RX ADMIN — Medication 1 TABLET(S): at 12:08

## 2019-05-27 RX ADMIN — Medication 150 MILLIGRAM(S): at 21:34

## 2019-05-27 RX ADMIN — CHLORHEXIDINE GLUCONATE 1 APPLICATION(S): 213 SOLUTION TOPICAL at 06:17

## 2019-05-27 RX ADMIN — HEPARIN SODIUM 5000 UNIT(S): 5000 INJECTION INTRAVENOUS; SUBCUTANEOUS at 06:17

## 2019-05-27 RX ADMIN — Medication 25 MICROGRAM(S): at 06:17

## 2019-05-27 RX ADMIN — SIMVASTATIN 20 MILLIGRAM(S): 20 TABLET, FILM COATED ORAL at 21:34

## 2019-05-27 RX ADMIN — SENNA PLUS 2 TABLET(S): 8.6 TABLET ORAL at 21:34

## 2019-05-27 RX ADMIN — Medication 150 MILLIGRAM(S): at 14:48

## 2019-05-27 RX ADMIN — PANTOPRAZOLE SODIUM 40 MILLIGRAM(S): 20 TABLET, DELAYED RELEASE ORAL at 06:17

## 2019-05-27 RX ADMIN — HEPARIN SODIUM 5000 UNIT(S): 5000 INJECTION INTRAVENOUS; SUBCUTANEOUS at 17:30

## 2019-05-27 NOTE — PROGRESS NOTE ADULT - SUBJECTIVE AND OBJECTIVE BOX
Neurology Follow up note    Name  ASHVIN CUEVAS    HPI:  This is a 89 year old female with PMHx of right hip ORIF in 2016, Femur fx s/p ORIF 11/2018, diastolic CHF, diastolic CHF, Vit D deficiency, Hypothyroid, hyponatremia, dementia,  chronic alicea brought in by family for worsening lethargy, back pain and nausea x2 days. No fevers, chills, CP, SOB, palpitations, abdominal pain, change in bowel or urinary habits.     Of note, patient has been off her lisinopril and Nifedipine x few months due to controlled BP  Found to have acute/subacute CVA and b/l perinephric stranding (23 May 2019 17:25)      Interval History - Patient had some slight worsening in weakness on right side overnight and upgraded to ICU for telemetry and neurochecks.          Vital Signs Last 24 Hrs  T(C): 37 (27 May 2019 07:01), Max: 37.3 (26 May 2019 18:51)  T(F): 98.6 (27 May 2019 07:01), Max: 99.2 (26 May 2019 18:51)  HR: 81 (27 May 2019 07:02) (76 - 91)  BP: 123/55 (27 May 2019 07:02) (115/54 - 154/70)  BP(mean): 79 (27 May 2019 07:02) (72 - 100)  RR: 22 (27 May 2019 07:02) (18 - 28)  SpO2: 99% (27 May 2019 07:02) (94% - 99%)  ICU Vital Signs Last 24 Hrs  T(C): 37 (27 May 2019 07:01), Max: 37.3 (26 May 2019 18:51)  T(F): 98.6 (27 May 2019 07:01), Max: 99.2 (26 May 2019 18:51)  HR: 81 (27 May 2019 07:02) (76 - 91)  BP: 123/55 (27 May 2019 07:02) (115/54 - 154/70)  BP(mean): 79 (27 May 2019 07:02) (72 - 100)  ABP: --  ABP(mean): --  RR: 22 (27 May 2019 07:02) (18 - 28)  SpO2: 99% (27 May 2019 07:02) (94% - 99%)          Neurological Exam:   A+Ox2 follows commands has 4-/5 power in RUE (requires aggressive prompting) and 1/5 in RLE  Left extremities normal  Sensation symmetric    NIHSS 9    Medications  acetaminophen   Tablet .. 650 milliGRAM(s) Oral every 8 hours PRN  cefTRIAXone   IVPB 1 Gram(s) IV Intermittent every 24 hours  chlorhexidine 4% Liquid 1 Application(s) Topical <User Schedule>  clopidogrel Tablet 75 milliGRAM(s) Oral daily  docusate sodium 100 milliGRAM(s) Oral three times a day  heparin  Injectable 5000 Unit(s) SubCutaneous every 12 hours  lactulose Syrup 20 Gram(s) Oral two times a day PRN  levothyroxine 25 MICROGram(s) Oral daily  multivitamin 1 Tablet(s) Oral daily  pantoprazole    Tablet 40 milliGRAM(s) Oral before breakfast  pregabalin 150 milliGRAM(s) Oral every 8 hours  senna 2 Tablet(s) Oral at bedtime  simvastatin 20 milliGRAM(s) Oral at bedtime  sodium chloride 0.9%. 1000 milliLiter(s) IV Continuous <Continuous>      Lab    05-27    140  |  99  |  16  ----------------------------<  113<H>  4.1   |  29  |  0.8    Ca    9.3      27 May 2019 05:50      CAPILLARY BLOOD GLUCOSE              Radiology  < from: CT Head No Cont (05.26.19 @ 18:40) >    IMPRESSION:    In comparison with the prior noncontrast CT scan of the brain dated May   23, 2019:    Interval development of small foci of of diminished attenuation in the   left periventricular white matter and left basal ganglia, consistent with   new acute ischemic change.    Spoke with GINA RUFFIN on 5/27/2019 10:35 AM with readback.    < end of copied text >    < from: MR Head No Cont (05.24.19 @ 14:27) >  IMPRESSION:     1.  Multiple punctate acute infarcts within the white matter of the left   frontal and parietal lobes. Additional punctate acute infarcts in the   right parietal white matter and left aspect of the midbrain.    2.  Mild/moderate chronic microvascular changes.    3.  Chronic lacunar infarct in the left aspect of the martin.    4.  Right mastoid effusion, correlate clinically for mastoiditis.    < end of copied text >    < from: MR Angio Head No Cont (05.24.19 @ 14:28) >  IMPRESSION:      1.  Left vertebral artery signal abnormality compatible with a proximal   stenosis or occlusion.     2.  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    < end of copied text >        Assessment- Patient with multiple b/l and brainstem infarcts with severe posterior circulation disease.  She has had some progression in weakness from yesterday to today.    Plan  1. Continue telemetry  2. Neurochecks q4 hours  3. Continue plavix and statin  4. Repeat MRI brain tomorrow to evaluate for progression of strokes or new infarcts  5. PT/OT/Speech  6. Keep -160

## 2019-05-27 NOTE — DIETITIAN INITIAL EVALUATION ADULT. - FACTORS AFF FOOD INTAKE
Her daughter reports that she ate well for lunch. EMR reports a varied PO intake between 0-50%. Family would like Ensure Enlive be sent to her room. Coupons were provided for discharge.

## 2019-05-27 NOTE — PROGRESS NOTE ADULT - SUBJECTIVE AND OBJECTIVE BOX
Patient is a 89y old  Female who presents with a chief complaint of Lethargy, nausea and back pain x2 days (26 May 2019 13:29)      INTERVAL HPI/OVERNIGHT EVENTS:  No acute events overnight  resting comfortable in bed this morning    ICU Vital Signs Last 24 Hrs  T(C): 37 (27 May 2019 07:01), Max: 37.3 (26 May 2019 18:51)  T(F): 98.6 (27 May 2019 07:01), Max: 99.2 (26 May 2019 18:51)  HR: 81 (27 May 2019 07:02) (76 - 91)  BP: 123/55 (27 May 2019 07:02) (115/54 - 154/70)  BP(mean): 79 (27 May 2019 07:02) (72 - 100)  RR: 22 (27 May 2019 07:02) (18 - 28)  SpO2: 99% (27 May 2019 07:02) (94% - 99%)    I&O's Summary    26 May 2019 07:01  -  27 May 2019 07:00  --------------------------------------------------------  IN: 945 mL / OUT: 850 mL / NET: 95 mL      LABS:                        12.4   7.74  )-----------( 306      ( 25 May 2019 08:55 )             39.3     05-27    140  |  99  |  16  ----------------------------<  113<H>  4.1   |  29  |  0.8    Ca    9.3      27 May 2019 05:50  Mg     2.1     05-25      Consultant(s) Notes Reviewed:  [x ] YES  [ ] NO      MEDICATIONS  (STANDING):  cefTRIAXone   IVPB 1 Gram(s) IV Intermittent every 24 hours  chlorhexidine 4% Liquid 1 Application(s) Topical <User Schedule>  clopidogrel Tablet 75 milliGRAM(s) Oral daily  docusate sodium 100 milliGRAM(s) Oral three times a day  heparin  Injectable 5000 Unit(s) SubCutaneous every 12 hours  levothyroxine 25 MICROGram(s) Oral daily  multivitamin 1 Tablet(s) Oral daily  pantoprazole    Tablet 40 milliGRAM(s) Oral before breakfast  pregabalin 150 milliGRAM(s) Oral every 8 hours  senna 2 Tablet(s) Oral at bedtime  simvastatin 20 milliGRAM(s) Oral at bedtime  sodium chloride 0.9%. 1000 milliLiter(s) (75 mL/Hr) IV Continuous <Continuous>    MEDICATIONS  (PRN):  acetaminophen   Tablet .. 650 milliGRAM(s) Oral every 8 hours PRN Mild Pain (1 - 3)  lactulose Syrup 20 Gram(s) Oral two times a day PRN constipation      PHYSICAL EXAM:  GENERAL: NAD  NERVOUS SYSTEM:  AAO to self, no slurred speech, cranial nerves intact, RUE 2/5, RLE 1/5, Left side 3-4/5  CHEST/LUNG: CTAB  HEART: regular   ABDOMEN: Soft, Nontender  EXTREMITIES:  no edema    RADIOLOGY & ADDITIONAL TESTS:  CT Head No Cont (05.26.19 @ 18:40)  -Stable subacute to chronic infarct within the left martin.   -Patchy and confluent hypodensities throughout the hemispheric white   matter which are nonspecific and without mass effect, compatible with   chronic microvascular ischemic changes.  -No acute intracranial hemorrhage.    MR Head No Cont (05.24.19 @ 14:27)  1.  Multiple punctate acute infarcts within the white matter of the left   frontal and parietal lobes. Additional punctate acute infarcts in the   right parietal white matter and left aspect of the midbrain.  2.  Mild/moderate chronic microvascular changes.  3.  Chronic lacunar infarct in the left aspect of the martin.  4.  Right mastoid effusion, correlate clinically for mastoiditis.            Care Discussed with Consultants/Other Providers [ x] YES  [ ] NO

## 2019-05-27 NOTE — DIETITIAN INITIAL EVALUATION ADULT. - ENERGY NEEDS
Calories: 1214-1316kcals/day (MSJ A.F 1.2-1.3)  Protein: 67-80g/day (1-1.2g/kg)  Fluid: fluid as per CCU team

## 2019-05-27 NOTE — PROGRESS NOTE ADULT - ASSESSMENT
89 yo F with HFpEF, Vit D deficiency, Hypothyroidism, dementia and chronic alicea initially admitted for pyelonephritis/ subacute stroke, upgraded to CCU yesterday after being slurred and unable to move her R side     - Pyelonephritis  Urine cs with >100K gm Ecoli, pansensitive  c/w rocephine     - Acute/Subacute CVA  repeat CTH negative  MRI: acute L frontal/ parietal infarcts   pt has R sided weakness. No slurred speech  neuro checks q4h  PT on board. pt mostly bedbound   c/w dysphagia diet. DC IVFs if eating well   c/w plavix and zocor  fu neurology recs     - Hypothyroidism  c/w synthroid     - Hypertension  BP controlled  resume nifedipine/ lisinopril if needed     - Dementia/Depression  Swetha eval pending  Cymbalta on hold given hyponatremia     - Vitamin D deficiency  c/w Vit D supplementation weekly     - Dispo:  discuss code status with family  ? disposition

## 2019-05-27 NOTE — DIETITIAN INITIAL EVALUATION ADULT. - OTHER INFO
Reason for assessment: Poor/varied PO. PMH: diastolic heart failure, HLD, hypothyroid, OA, neuropathy, vit D deficiency. Pt presented to the ED for worsening lethargy, back pain, and nausea for 2 days. Pt is admitted for pyelonephritis/subacute stroke. Pt was seen by SLP on 5/23 and they recommended a dysphagia lll/thin liquid. Daughters deny significant wt loss. Unsure of last BM. Fecal incontinent. Abdomen noted as soft/nontender. NKFA.

## 2019-05-28 LAB
ALBUMIN SERPL ELPH-MCNC: 3.5 G/DL — SIGNIFICANT CHANGE UP (ref 3.5–5.2)
ALP SERPL-CCNC: 106 U/L — SIGNIFICANT CHANGE UP (ref 30–115)
ALT FLD-CCNC: 13 U/L — SIGNIFICANT CHANGE UP (ref 0–41)
ANION GAP SERPL CALC-SCNC: 12 MMOL/L — SIGNIFICANT CHANGE UP (ref 7–14)
AST SERPL-CCNC: 16 U/L — SIGNIFICANT CHANGE UP (ref 0–41)
BASOPHILS # BLD AUTO: 0.09 K/UL — SIGNIFICANT CHANGE UP (ref 0–0.2)
BASOPHILS NFR BLD AUTO: 1.1 % — HIGH (ref 0–1)
BILIRUB SERPL-MCNC: 0.2 MG/DL — SIGNIFICANT CHANGE UP (ref 0.2–1.2)
BUN SERPL-MCNC: 13 MG/DL — SIGNIFICANT CHANGE UP (ref 10–20)
CALCIUM SERPL-MCNC: 9.9 MG/DL — SIGNIFICANT CHANGE UP (ref 8.5–10.1)
CHLORIDE SERPL-SCNC: 100 MMOL/L — SIGNIFICANT CHANGE UP (ref 98–110)
CO2 SERPL-SCNC: 29 MMOL/L — SIGNIFICANT CHANGE UP (ref 17–32)
CREAT SERPL-MCNC: 0.6 MG/DL — LOW (ref 0.7–1.5)
EOSINOPHIL # BLD AUTO: 0.2 K/UL — SIGNIFICANT CHANGE UP (ref 0–0.7)
EOSINOPHIL NFR BLD AUTO: 2.5 % — SIGNIFICANT CHANGE UP (ref 0–8)
GLUCOSE SERPL-MCNC: 110 MG/DL — HIGH (ref 70–99)
HCT VFR BLD CALC: 37.6 % — SIGNIFICANT CHANGE UP (ref 37–47)
HGB BLD-MCNC: 12.1 G/DL — SIGNIFICANT CHANGE UP (ref 12–16)
IMM GRANULOCYTES NFR BLD AUTO: 0.4 % — HIGH (ref 0.1–0.3)
LYMPHOCYTES # BLD AUTO: 3.42 K/UL — HIGH (ref 1.2–3.4)
LYMPHOCYTES # BLD AUTO: 42.8 % — SIGNIFICANT CHANGE UP (ref 20.5–51.1)
MAGNESIUM SERPL-MCNC: 1.9 MG/DL — SIGNIFICANT CHANGE UP (ref 1.8–2.4)
MCHC RBC-ENTMCNC: 28.7 PG — SIGNIFICANT CHANGE UP (ref 27–31)
MCHC RBC-ENTMCNC: 32.2 G/DL — SIGNIFICANT CHANGE UP (ref 32–37)
MCV RBC AUTO: 89.3 FL — SIGNIFICANT CHANGE UP (ref 81–99)
MONOCYTES # BLD AUTO: 0.95 K/UL — HIGH (ref 0.1–0.6)
MONOCYTES NFR BLD AUTO: 11.9 % — HIGH (ref 1.7–9.3)
NEUTROPHILS # BLD AUTO: 3.31 K/UL — SIGNIFICANT CHANGE UP (ref 1.4–6.5)
NEUTROPHILS NFR BLD AUTO: 41.3 % — LOW (ref 42.2–75.2)
NRBC # BLD: 0 /100 WBCS — SIGNIFICANT CHANGE UP (ref 0–0)
PLATELET # BLD AUTO: 436 K/UL — HIGH (ref 130–400)
POTASSIUM SERPL-MCNC: 4.2 MMOL/L — SIGNIFICANT CHANGE UP (ref 3.5–5)
POTASSIUM SERPL-SCNC: 4.2 MMOL/L — SIGNIFICANT CHANGE UP (ref 3.5–5)
PROT SERPL-MCNC: 7.4 G/DL — SIGNIFICANT CHANGE UP (ref 6–8)
RBC # BLD: 4.21 M/UL — SIGNIFICANT CHANGE UP (ref 4.2–5.4)
RBC # FLD: 15.1 % — HIGH (ref 11.5–14.5)
SODIUM SERPL-SCNC: 141 MMOL/L — SIGNIFICANT CHANGE UP (ref 135–146)
WBC # BLD: 8 K/UL — SIGNIFICANT CHANGE UP (ref 4.8–10.8)
WBC # FLD AUTO: 8 K/UL — SIGNIFICANT CHANGE UP (ref 4.8–10.8)

## 2019-05-28 PROCEDURE — 99232 SBSQ HOSP IP/OBS MODERATE 35: CPT

## 2019-05-28 RX ORDER — SODIUM CHLORIDE 9 MG/ML
1000 INJECTION, SOLUTION INTRAVENOUS
Refills: 0 | Status: DISCONTINUED | OUTPATIENT
Start: 2019-05-28 | End: 2019-05-30

## 2019-05-28 RX ORDER — ASPIRIN/CALCIUM CARB/MAGNESIUM 324 MG
81 TABLET ORAL DAILY
Refills: 0 | Status: DISCONTINUED | OUTPATIENT
Start: 2019-05-28 | End: 2019-06-04

## 2019-05-28 RX ADMIN — SODIUM CHLORIDE 100 MILLILITER(S): 9 INJECTION, SOLUTION INTRAVENOUS at 13:30

## 2019-05-28 RX ADMIN — HEPARIN SODIUM 5000 UNIT(S): 5000 INJECTION INTRAVENOUS; SUBCUTANEOUS at 05:57

## 2019-05-28 RX ADMIN — Medication 150 MILLIGRAM(S): at 22:30

## 2019-05-28 RX ADMIN — PANTOPRAZOLE SODIUM 40 MILLIGRAM(S): 20 TABLET, DELAYED RELEASE ORAL at 06:07

## 2019-05-28 RX ADMIN — CHLORHEXIDINE GLUCONATE 1 APPLICATION(S): 213 SOLUTION TOPICAL at 06:07

## 2019-05-28 RX ADMIN — CEFTRIAXONE 100 GRAM(S): 500 INJECTION, POWDER, FOR SOLUTION INTRAMUSCULAR; INTRAVENOUS at 17:01

## 2019-05-28 RX ADMIN — SENNA PLUS 2 TABLET(S): 8.6 TABLET ORAL at 22:30

## 2019-05-28 RX ADMIN — Medication 150 MILLIGRAM(S): at 06:08

## 2019-05-28 RX ADMIN — CLOPIDOGREL BISULFATE 75 MILLIGRAM(S): 75 TABLET, FILM COATED ORAL at 13:19

## 2019-05-28 RX ADMIN — Medication 150 MILLIGRAM(S): at 14:23

## 2019-05-28 RX ADMIN — SIMVASTATIN 20 MILLIGRAM(S): 20 TABLET, FILM COATED ORAL at 22:30

## 2019-05-28 RX ADMIN — Medication 100 MILLIGRAM(S): at 05:57

## 2019-05-28 RX ADMIN — HEPARIN SODIUM 5000 UNIT(S): 5000 INJECTION INTRAVENOUS; SUBCUTANEOUS at 17:01

## 2019-05-28 RX ADMIN — Medication 1 TABLET(S): at 13:20

## 2019-05-28 RX ADMIN — Medication 100 MILLIGRAM(S): at 22:30

## 2019-05-28 RX ADMIN — SODIUM CHLORIDE 100 MILLILITER(S): 9 INJECTION, SOLUTION INTRAVENOUS at 20:00

## 2019-05-28 RX ADMIN — Medication 81 MILLIGRAM(S): at 13:18

## 2019-05-28 RX ADMIN — Medication 100 MILLIGRAM(S): at 14:23

## 2019-05-28 RX ADMIN — Medication 25 MICROGRAM(S): at 05:57

## 2019-05-28 NOTE — PROGRESS NOTE ADULT - ASSESSMENT
89 yo F with HFpEF, Vit D deficiency, Hypothyroidism, dementia and chronic alicea initially admitted for pyelonephritis/ subacute stroke, upgraded to CCU yesterday after being slurred and unable to move her R side     - Pyelonephritis  Urine cx with >100K gm Ecoli, pansensitive  c/w rocephine     - Acute CVA  repeat CTH new acute ischemic changes   likely evolution of old strokes/ embolic strokes   pt has R sided weakness. No slurred speech  Neuro recommended repeating MRI today. Will discuss with family   c/w plavix and zocor. added aspirin  No need for neuro checks q4h as per Dr Juárez  PT on board. pt mostly bedbound   c/w dysphagia diet  fu neurology recs     - Hypothyroidism  c/w synthroid     - Hypertension  Keep BP >140s  LR at 100 cc/hr    - Dementia/Depression  Swetha eval pending  Cymbalta on hold given hyponatremia     - Vitamin D deficiency  c/w Vit D supplementation weekly     - Dispo:  discuss code status with family 89 yo F with HFpEF, Vit D deficiency, Hypothyroidism, dementia and chronic alicea initially admitted for pyelonephritis/ subacute stroke, upgraded to CCU yesterday after being slurred and unable to move her R side     - Pyelonephritis  Urine cx with >100K gm Ecoli, pansensitive  c/w rocephine     - Acute CVA  repeat CTH new acute ischemic changes   likely evolution of old strokes/ embolic strokes   pt has R sided weakness. No slurred speech  As per discussion with family and Neuro, will hold off MRI given it will not change the management   c/w plavix, ASA and zocor   No need for neuro checks q4h as per Dr Juárez  PT on board. pt mostly bedbound   c/w dysphagia diet  fu neurology recs     - Hypothyroidism  c/w synthroid     - Hypertension  Keep BP >140s  LR at 100 cc/hr    - Dementia/Depression  Swetha eval pending  Cymbalta on hold given hyponatremia     - Vitamin D deficiency  c/w Vit D supplementation weekly     - Dispo:  pt has 12hr aid at home. needs more assistance  physiatry eval pending   fu with case management

## 2019-05-28 NOTE — PROGRESS NOTE ADULT - SUBJECTIVE AND OBJECTIVE BOX
Neurology Follow up note    Name  ASHVIN CUEVAS    HPI:  This is a 89 year old female with PMHx of right hip ORIF in 2016, Femur fx s/p ORIF 11/2018, diastolic CHF, diastolic CHF, Vit D deficiency, Hypothyroid, hyponatremia, dementia,  chronic alicea brought in by family for worsening lethargy, back pain and nausea x2 days. No fevers, chills, CP, SOB, palpitations, abdominal pain, change in bowel or urinary habits.     Of note, patient has been off her lisinopril and Nifedipine x few months due to controlled BP  Found to have acute/subacute CVA and b/l perinephric stranding (23 May 2019 17:25)      Interval History:    patient is clinically stable  rue weakness worsened from adm  mri with multiple acute bilateral cva and mra with severe VB disease    Vital Signs Last 24 Hrs  T(C): 36.3 (28 May 2019 07:01), Max: 36.6 (27 May 2019 15:01)  T(F): 97.4 (28 May 2019 07:01), Max: 97.8 (27 May 2019 15:01)  HR: 67 (27 May 2019 23:06) (67 - 79)  BP: 164/70 (27 May 2019 23:06) (118/56 - 164/70)  BP(mean): 100 (27 May 2019 23:06) (80 - 100)  RR: 21 (28 May 2019 07:01) (18 - 26)  SpO2: 95% (27 May 2019 23:06) (95% - 97%)    Neurological Exam:   arouses easily, follows simple commands decreased speech output  cr nn grossly intact  rue/rle trace movement, moves l side against gravity  sensory intact to lt    Medications  acetaminophen   Tablet .. 650 milliGRAM(s) Oral every 8 hours PRN  aspirin  chewable 81 milliGRAM(s) Oral daily  cefTRIAXone   IVPB 1 Gram(s) IV Intermittent every 24 hours  chlorhexidine 4% Liquid 1 Application(s) Topical <User Schedule>  clopidogrel Tablet 75 milliGRAM(s) Oral daily  docusate sodium 100 milliGRAM(s) Oral three times a day  heparin  Injectable 5000 Unit(s) SubCutaneous every 12 hours  lactated ringers. 1000 milliLiter(s) IV Continuous <Continuous>  lactulose Syrup 20 Gram(s) Oral two times a day PRN  levothyroxine 25 MICROGram(s) Oral daily  multivitamin 1 Tablet(s) Oral daily  pantoprazole    Tablet 40 milliGRAM(s) Oral before breakfast  pregabalin 150 milliGRAM(s) Oral every 8 hours  senna 2 Tablet(s) Oral at bedtime  simvastatin 20 milliGRAM(s) Oral at bedtime      Lab  05-28    141  |  100  |  13  ----------------------------<  110<H>  4.2   |  29  |  0.6<L>    Ca    9.9      28 May 2019 06:01  Mg     1.9     05-28    TPro  7.4  /  Alb  3.5  /  TBili  0.2  /  DBili  x   /  AST  16  /  ALT  13  /  AlkPhos  106  05-28                          12.1   8.00  )-----------( 436      ( 28 May 2019 06:01 )             37.6     LIVER FUNCTIONS - ( 28 May 2019 06:01 )  Alb: 3.5 g/dL / Pro: 7.4 g/dL / ALK PHOS: 106 U/L / ALT: 13 U/L / AST: 16 U/L / GGT: x             1.9        Radiology      Assessment:  89 yo female with multiple acute bilateral cva in anterior and posterior distributions suggestive of cardioembolic source  patient also with severe vertebrobasilar disease  worsening r sided weakness possibly evolution of infarct vs new stroke    Plan:  f/u to eval for arrhythmia as per cardiology  cont asa and plavix  cont zocor  discussion pending w/ family as to repeat mri as this will not change her management

## 2019-05-28 NOTE — CONSULT NOTE ADULT - SUBJECTIVE AND OBJECTIVE BOX
HPI:  This is a 89 year old female with PMHx of right hip ORIF in 2016, Femur fx s/p ORIF 11/2018, diastolic CHF, diastolic CHF, Vit D deficiency, Hypothyroid, hyponatremia, dementia,  chronic alicea brought in by family for worsening lethargy, back pain and nausea x2 days. No fevers, chills, CP, SOB, palpitations, abdominal pain, change in bowel or urinary habits.     Of note, patient has been off her lisinopril and Nifedipine x few months due to controlled BP  Found to have acute/subacute CVA and b/l perinephric stranding     PTN  REFERRED TO ACUTE  REHAB  FOR  EVAL AND  TX   PAST MEDICAL & SURGICAL HISTORY:  Diastolic heart failure  Neuropathy  Depression  OA (osteoarthritis)  Hypothyroid  HLD (hyperlipidemia)  S/P ORIF (open reduction internal fixation) fracture      Hospital Course:    TODAY'S SUBJECTIVE & REVIEW OF SYMPTOMS:     Constitutional WNL   Cardio WNL   Resp WNL   GI WNL  Heme WNL  Endo WNL  Skin WNL  MSK WNL  Neuro WNL  Cognitive WNL  Psych WNL      MEDICATIONS  (STANDING):  aspirin  chewable 81 milliGRAM(s) Oral daily  cefTRIAXone   IVPB 1 Gram(s) IV Intermittent every 24 hours  chlorhexidine 4% Liquid 1 Application(s) Topical <User Schedule>  clopidogrel Tablet 75 milliGRAM(s) Oral daily  docusate sodium 100 milliGRAM(s) Oral three times a day  heparin  Injectable 5000 Unit(s) SubCutaneous every 12 hours  lactated ringers. 1000 milliLiter(s) (100 mL/Hr) IV Continuous <Continuous>  levothyroxine 25 MICROGram(s) Oral daily  multivitamin 1 Tablet(s) Oral daily  pantoprazole    Tablet 40 milliGRAM(s) Oral before breakfast  pregabalin 150 milliGRAM(s) Oral every 8 hours  senna 2 Tablet(s) Oral at bedtime  simvastatin 20 milliGRAM(s) Oral at bedtime    MEDICATIONS  (PRN):  acetaminophen   Tablet .. 650 milliGRAM(s) Oral every 8 hours PRN Mild Pain (1 - 3)  lactulose Syrup 20 Gram(s) Oral two times a day PRN constipation      FAMILY HISTORY:  No pertinent family history in first degree relatives: no family history of heart disease      Allergies    No Known Allergies    Intolerances        SOCIAL HISTORY:    [  ] Etoh  [  ] Smoking  [  ] Substance abuse     Home Environment:  [  ] Home Alone  [ xx ] Lives with Family DTR  [  ] Home Health Aid    Dwelling:  [  ] Apartment  [ x ] Private House  [  ] Adult Home  [  ] Skilled Nursing Facility      [  ] Short Term  [  ] Long Term  [  x] Stairs       Elevator [  ]    FUNCTIONAL STATUS PTA: (Check all that apply)  Ambulation: [  x ]Independent    [  ] Dependent     [  ] Non-Ambulatory  Assistive Device: [  ] SA Cane  [  ]  Q Cane  [  ]x Walker  [x ]  Wheelchair  ADL : [x  ] Independent  [  x]  Dependent       Vital Signs Last 24 Hrs  T(C): 36.3 (28 May 2019 07:01), Max: 36.6 (27 May 2019 15:01)  T(F): 97.4 (28 May 2019 07:01), Max: 97.8 (27 May 2019 15:01)  HR: 67 (27 May 2019 23:06) (67 - 79)  BP: 164/70 (27 May 2019 23:06) (118/56 - 164/70)  BP(mean): 100 (27 May 2019 23:06) (80 - 100)  RR: 21 (28 May 2019 07:01) (18 - 26)  SpO2: 95% (27 May 2019 23:06) (95% - 97%)      PHYSICAL EXAM: Alert & Oriented X 2  GENERAL: NAD, well-groomed, well-developed  HEAD:  Atraumatic, Normocephalic  EYES: EOMI, PERRLA, conjunctiva and sclera clear  NECK: Supple, No JVD, Normal thyroid  CHEST/LUNG: Clear to percussion bilaterally; No rales, rhonchi, wheezing, or rubs  HEART: Regular rate and rhythm; No murmurs, rubs, or gallops  ABDOMEN: Soft, Nontender, Nondistended; Bowel sounds present  EXTREMITIES:  2+ Peripheral Pulses, No clubbing, cyanosis, or edema    NERVOUS SYSTEM:  Cranial Nerves 2-12 intact [  x] Abnormal  [  ]  ROM: WFL all extremities [  ]  Abnormal [ x]  Motor Strength: WFL all extremities  [  ]  Abnormal [  x]  Sensation: intact to light touch [  ] Abnormal [x  ]  Reflexes: Symmetric [  ]  Abnormal [x  ]    FUNCTIONAL STATUS:  Bed Mobility: Independent [  ]  Supervision [  ]  Needs Assistance [ x ]  N/A [  ]  Transfers: Independent [  ]  Supervision [  ]  Needs Assistance [  x]  N/A [  ]   Ambulation: Independent [  ]  Supervision [  ]  Needs Assistance [x  ]  N/A [  ]  ADL: Independent [  ] Requires Assistance [  ] N/A [x  ]  SEE PT/OT IE NOTES    LABS:                        12.1   8.00  )-----------( 436      ( 28 May 2019 06:01 )             37.6     05-28    141  |  100  |  13  ----------------------------<  110<H>  4.2   |  29  |  0.6<L>    Ca    9.9      28 May 2019 06:01  Mg     1.9     05-28    TPro  7.4  /  Alb  3.5  /  TBili  0.2  /  DBili  x   /  AST  16  /  ALT  13  /  AlkPhos  106  05-28          RADIOLOGY & ADDITIONAL STUDIES: < from: CT Head No Cont (05.26.19 @ 18:40) >  IMPRESSION:    In comparison with the prior noncontrast CT scan of the brain dated May   23, 2019:    Interval development of small foci of of diminished attenuation in the   left periventricular white matter and left basal ganglia, consistent with   new acute ischemic change.    Spoke with GINA RUFFIN on 5/27/2019 10:35 AM with readback.    < end of copied text >      Assesment:

## 2019-05-28 NOTE — PROGRESS NOTE ADULT - SUBJECTIVE AND OBJECTIVE BOX
Patient is a 89y old  Female who presents with a chief complaint of Lethargy, nausea and back pain x2 days (27 May 2019 14:00)      INTERVAL HPI/OVERNIGHT EVENTS:  No acute events overnight  Repeat CTH yesterday: possible new strokes. Neurology contacted and Dr Alberto recommended MRI    ICU Vital Signs Last 24 Hrs  T(C): 36.3 (28 May 2019 07:01), Max: 36.6 (27 May 2019 15:01)  T(F): 97.4 (28 May 2019 07:01), Max: 97.8 (27 May 2019 15:01)  HR: 67 (27 May 2019 23:06) (67 - 79)  BP: 164/70 (27 May 2019 23:06) (118/56 - 164/70)  BP(mean): 100 (27 May 2019 23:06) (80 - 100)  RR: 21 (28 May 2019 07:01) (18 - 26)  SpO2: 95% (27 May 2019 23:06) (95% - 97%)      I&O's Summary  27 May 2019 07:01  -  28 May 2019 07:00  --------------------------------------------------------  IN: 575 mL / OUT: 1060 mL / NET: -485 mL      LABS:                        12.1   8.00  )-----------( 436      ( 28 May 2019 06:01 )             37.6     05-28    141  |  100  |  13  ----------------------------<  110<H>  4.2   |  29  |  0.6<L>    Ca    9.9      28 May 2019 06:01  Mg     1.9     05-28    TPro  7.4  /  Alb  3.5  /  TBili  0.2  /  DBili  x   /  AST  16  /  ALT  13  /  AlkPhos  106  05-28      Consultant(s) Notes Reviewed:  [x ] YES  [ ] NO    MEDICATIONS  (STANDING):  aspirin  chewable 81 milliGRAM(s) Oral daily  cefTRIAXone   IVPB 1 Gram(s) IV Intermittent every 24 hours  chlorhexidine 4% Liquid 1 Application(s) Topical <User Schedule>  clopidogrel Tablet 75 milliGRAM(s) Oral daily  docusate sodium 100 milliGRAM(s) Oral three times a day  heparin  Injectable 5000 Unit(s) SubCutaneous every 12 hours  lactated ringers. 1000 milliLiter(s) (100 mL/Hr) IV Continuous <Continuous>  levothyroxine 25 MICROGram(s) Oral daily  multivitamin 1 Tablet(s) Oral daily  pantoprazole    Tablet 40 milliGRAM(s) Oral before breakfast  pregabalin 150 milliGRAM(s) Oral every 8 hours  senna 2 Tablet(s) Oral at bedtime  simvastatin 20 milliGRAM(s) Oral at bedtime    MEDICATIONS  (PRN):  acetaminophen   Tablet .. 650 milliGRAM(s) Oral every 8 hours PRN Mild Pain (1 - 3)  lactulose Syrup 20 Gram(s) Oral two times a day PRN constipation      PHYSICAL EXAM:  GENERAL: NAD  NERVOUS SYSTEM:  AAOx3, R hemiparesis, no slurred speech   CHEST/LUNG: CTAB  HEART: regular   ABDOMEN: Soft, Nontender  EXTREMITIES: no edema    RADIOLOGY & ADDITIONAL TESTS:  CT Head No Cont (05.26.19 @ 18:40) >  Interval development of small foci of of diminished attenuation in the   left periventricular white matter and left basal ganglia, consistent with   new acute ischemic change.        Care Discussed with Consultants/Other Providers [ x] YES  [ ] NO

## 2019-05-28 NOTE — CONSULT NOTE ADULT - ASSESSMENT
IMPRESSION: Rehab of 89 y/o  f  ptn  rehab  for cva  rt side  weakness      PRECAUTIONS: [  ] Cardiac  [  ] Respiratory  [  ] Seizures [  ] Contact Isolation  [  ] Droplet Isolation  [ FALL ] Other    Weight Bearing Status:     RECOMMENDATION:    Out of Bed to Chair     DVT/Decubiti Prophylaxis    REHAB PLAN:     [  x ] Bedside P/T 3-5 times a week   [   ]   Bedside O/T  2-3 times a week             [   ] No Rehab Therapy Indicated                   [   ]  Speech Therapy   Conditioning/ROM                                    ADL  Bed Mobility                                               Conditioning/ROM  Transfers                                                     Bed Mobility  Sitting /Standing Balance                         Transfers                                        Gait Training                                               Sitting/Standing Balance  Stair Training [   ]Applicable                    Home equipment Eval                                                                        Splinting  [   ] Only      GOALS:   ADL   [   x]   Independent                    Transfers  [   x] Independent                          Ambulation  [  x ] Independent     [ x   ] With device                            [  x  CG                                                         [ x  ]  CG                                                                  [   ] CG                            [    ] Min A                                                   [   ] Min A                                                              [   ] Min  A          DISCHARGE PLAN:   [   ]  Good candidate for Intensive Rehabilitation/Hospital based-[facility]A [facility]                                             Will tolerate 3hrs Intensive Rehab Daily                                       [   xx ]  Short Term Rehab in Skilled Nursing Facility ptn will  need  24  hr  care vs  lTC                                       [    ]  Home with Outpatient or VN services                                         [    ]  Possible Candidate for Intensive Hospital based Rehab

## 2019-05-29 LAB
ALBUMIN SERPL ELPH-MCNC: 3.1 G/DL — LOW (ref 3.5–5.2)
ALP SERPL-CCNC: 91 U/L — SIGNIFICANT CHANGE UP (ref 30–115)
ALT FLD-CCNC: 11 U/L — SIGNIFICANT CHANGE UP (ref 0–41)
ANION GAP SERPL CALC-SCNC: 12 MMOL/L — SIGNIFICANT CHANGE UP (ref 7–14)
AST SERPL-CCNC: 13 U/L — SIGNIFICANT CHANGE UP (ref 0–41)
BASOPHILS # BLD AUTO: 0.09 K/UL — SIGNIFICANT CHANGE UP (ref 0–0.2)
BASOPHILS NFR BLD AUTO: 1.1 % — HIGH (ref 0–1)
BILIRUB SERPL-MCNC: <0.2 MG/DL — SIGNIFICANT CHANGE UP (ref 0.2–1.2)
BUN SERPL-MCNC: 11 MG/DL — SIGNIFICANT CHANGE UP (ref 10–20)
CALCIUM SERPL-MCNC: 9.4 MG/DL — SIGNIFICANT CHANGE UP (ref 8.5–10.1)
CHLORIDE SERPL-SCNC: 101 MMOL/L — SIGNIFICANT CHANGE UP (ref 98–110)
CO2 SERPL-SCNC: 27 MMOL/L — SIGNIFICANT CHANGE UP (ref 17–32)
CREAT SERPL-MCNC: 0.5 MG/DL — LOW (ref 0.7–1.5)
EOSINOPHIL # BLD AUTO: 0.26 K/UL — SIGNIFICANT CHANGE UP (ref 0–0.7)
EOSINOPHIL NFR BLD AUTO: 3.1 % — SIGNIFICANT CHANGE UP (ref 0–8)
GLUCOSE SERPL-MCNC: 106 MG/DL — HIGH (ref 70–99)
HCT VFR BLD CALC: 31.6 % — LOW (ref 37–47)
HGB BLD-MCNC: 10.3 G/DL — LOW (ref 12–16)
IMM GRANULOCYTES NFR BLD AUTO: 0.2 % — SIGNIFICANT CHANGE UP (ref 0.1–0.3)
LYMPHOCYTES # BLD AUTO: 3.79 K/UL — HIGH (ref 1.2–3.4)
LYMPHOCYTES # BLD AUTO: 45.7 % — SIGNIFICANT CHANGE UP (ref 20.5–51.1)
MAGNESIUM SERPL-MCNC: 1.8 MG/DL — SIGNIFICANT CHANGE UP (ref 1.8–2.4)
MCHC RBC-ENTMCNC: 29.3 PG — SIGNIFICANT CHANGE UP (ref 27–31)
MCHC RBC-ENTMCNC: 32.6 G/DL — SIGNIFICANT CHANGE UP (ref 32–37)
MCV RBC AUTO: 89.8 FL — SIGNIFICANT CHANGE UP (ref 81–99)
MONOCYTES # BLD AUTO: 1.13 K/UL — HIGH (ref 0.1–0.6)
MONOCYTES NFR BLD AUTO: 13.6 % — HIGH (ref 1.7–9.3)
NEUTROPHILS # BLD AUTO: 3 K/UL — SIGNIFICANT CHANGE UP (ref 1.4–6.5)
NEUTROPHILS NFR BLD AUTO: 36.3 % — LOW (ref 42.2–75.2)
NRBC # BLD: 0 /100 WBCS — SIGNIFICANT CHANGE UP (ref 0–0)
PLATELET # BLD AUTO: 398 K/UL — SIGNIFICANT CHANGE UP (ref 130–400)
POTASSIUM SERPL-MCNC: 4.2 MMOL/L — SIGNIFICANT CHANGE UP (ref 3.5–5)
POTASSIUM SERPL-SCNC: 4.2 MMOL/L — SIGNIFICANT CHANGE UP (ref 3.5–5)
PROT SERPL-MCNC: 6.3 G/DL — SIGNIFICANT CHANGE UP (ref 6–8)
RBC # BLD: 3.52 M/UL — LOW (ref 4.2–5.4)
RBC # FLD: 15.1 % — HIGH (ref 11.5–14.5)
SODIUM SERPL-SCNC: 140 MMOL/L — SIGNIFICANT CHANGE UP (ref 135–146)
WBC # BLD: 8.29 K/UL — SIGNIFICANT CHANGE UP (ref 4.8–10.8)
WBC # FLD AUTO: 8.29 K/UL — SIGNIFICANT CHANGE UP (ref 4.8–10.8)

## 2019-05-29 RX ADMIN — SENNA PLUS 2 TABLET(S): 8.6 TABLET ORAL at 21:55

## 2019-05-29 RX ADMIN — HEPARIN SODIUM 5000 UNIT(S): 5000 INJECTION INTRAVENOUS; SUBCUTANEOUS at 06:07

## 2019-05-29 RX ADMIN — HEPARIN SODIUM 5000 UNIT(S): 5000 INJECTION INTRAVENOUS; SUBCUTANEOUS at 17:02

## 2019-05-29 RX ADMIN — CHLORHEXIDINE GLUCONATE 1 APPLICATION(S): 213 SOLUTION TOPICAL at 08:34

## 2019-05-29 RX ADMIN — PANTOPRAZOLE SODIUM 40 MILLIGRAM(S): 20 TABLET, DELAYED RELEASE ORAL at 07:55

## 2019-05-29 RX ADMIN — Medication 25 MICROGRAM(S): at 06:07

## 2019-05-29 RX ADMIN — CLOPIDOGREL BISULFATE 75 MILLIGRAM(S): 75 TABLET, FILM COATED ORAL at 11:40

## 2019-05-29 RX ADMIN — Medication 150 MILLIGRAM(S): at 21:54

## 2019-05-29 RX ADMIN — SIMVASTATIN 20 MILLIGRAM(S): 20 TABLET, FILM COATED ORAL at 21:55

## 2019-05-29 RX ADMIN — Medication 100 MILLIGRAM(S): at 21:55

## 2019-05-29 RX ADMIN — SODIUM CHLORIDE 100 MILLILITER(S): 9 INJECTION, SOLUTION INTRAVENOUS at 07:00

## 2019-05-29 RX ADMIN — Medication 150 MILLIGRAM(S): at 06:07

## 2019-05-29 RX ADMIN — Medication 100 MILLIGRAM(S): at 06:07

## 2019-05-29 RX ADMIN — CEFTRIAXONE 100 GRAM(S): 500 INJECTION, POWDER, FOR SOLUTION INTRAMUSCULAR; INTRAVENOUS at 17:27

## 2019-05-29 RX ADMIN — Medication 100 MILLIGRAM(S): at 14:13

## 2019-05-29 RX ADMIN — Medication 150 MILLIGRAM(S): at 14:13

## 2019-05-29 RX ADMIN — Medication 81 MILLIGRAM(S): at 11:39

## 2019-05-29 RX ADMIN — Medication 1 TABLET(S): at 11:39

## 2019-05-29 NOTE — SWALLOW BEDSIDE ASSESSMENT ADULT - SLP GENERAL OBSERVATIONS
The patient is a 58y Female complaining of eye discharge.
Pt seen bedside in chair. Family (two daughters, grandaughter) present. Pt was pleasant and cooperative.
in bed o2 via NC cooperative pleasant

## 2019-05-29 NOTE — CONSULT NOTE ADULT - ASSESSMENT
Patient denies chest pain sob or palpitations She had multiple CVA. Possibly embolic. I would echo her. She needs a outpatient event monitor for 2-4 weeks.

## 2019-05-29 NOTE — SWALLOW BEDSIDE ASSESSMENT ADULT - COMMENTS
Family at bedside stated concerns with diet consistency due to significant residue in oral cavity after the swallow.  Education provided to pt and family regarding safe swallow strategies (e.g., small bites, alternating liquids/solids, clear mouth after meals)

## 2019-05-29 NOTE — CONSULT NOTE ADULT - SUBJECTIVE AND OBJECTIVE BOX
CARDIOLOGY CONSULT NOTE     CHIEF COMPLAINT/REASON FOR CONSULT:    HPI:  This is a 89 year old female with PMHx of right hip ORIF in 2016, Femur fx s/p ORIF 11/2018, diastolic CHF, diastolic CHF, Vit D deficiency, Hypothyroid, hyponatremia, dementia,  chronic alicea brought in by family for worsening lethargy, back pain and nausea x2 days. No fevers, chills, CP, SOB, palpitations, abdominal pain, change in bowel or urinary habits.     Of note, patient has been off her lisinopril and Nifedipine x few months due to controlled BP  Found to have acute/subacute CVA and b/l perinephric stranding (23 May 2019 17:25)      PAST MEDICAL & SURGICAL HISTORY:  Diastolic heart failure  Neuropathy  Depression  OA (osteoarthritis)  Hypothyroid  HLD (hyperlipidemia)  S/P ORIF (open reduction internal fixation) fracture      Cardiac Risks:   [ ]HTN, [ ] DM, [ ] Smoking, [ ] FH,  [x ] Lipids        MEDICATIONS:  MEDICATIONS  (STANDING):  aspirin  chewable 81 milliGRAM(s) Oral daily  cefTRIAXone   IVPB 1 Gram(s) IV Intermittent every 24 hours  chlorhexidine 4% Liquid 1 Application(s) Topical <User Schedule>  clopidogrel Tablet 75 milliGRAM(s) Oral daily  docusate sodium 100 milliGRAM(s) Oral three times a day  heparin  Injectable 5000 Unit(s) SubCutaneous every 12 hours  lactated ringers. 1000 milliLiter(s) (100 mL/Hr) IV Continuous <Continuous>  levothyroxine 25 MICROGram(s) Oral daily  multivitamin 1 Tablet(s) Oral daily  pantoprazole    Tablet 40 milliGRAM(s) Oral before breakfast  pregabalin 150 milliGRAM(s) Oral every 8 hours  senna 2 Tablet(s) Oral at bedtime  simvastatin 20 milliGRAM(s) Oral at bedtime      FAMILY HISTORY:  No pertinent family history in first degree relatives: no family history of heart disease      SOCIAL HISTORY:      [ ] Marital status    Allergies    No Known Allergies        	    REVIEW OF SYSTEMS:  CONSTITUTIONAL: No fever, weight loss, or fatigue  EYES: No eye pain, visual disturbances, or discharge  ENMT:  No difficulty hearing, tinnitus, vertigo; No sinus or throat pain  NECK: No pain or stiffness  RESPIRATORY: No cough, wheezing, chills or hemoptysis; No Shortness of Breath  CARDIOVASCULAR: No chest pain, palpitations, passing out, dizziness, or leg swelling  GASTROINTESTINAL: No abdominal or epigastric pain. No nausea, vomiting, or hematemesis; No diarrhea or constipation. No melena or hematochezia.  GENITOURINARY: No dysuria, frequency, hematuria, or incontinence  NEUROLOGICAL: See above  SKIN: No itching, burning, rashes, or lesions   	    [ ] All others negative	  [ ] Unable to obtain    PHYSICAL EXAM:  T(C): 37 (05-28-19 @ 23:00), Max: 37.3 (05-28-19 @ 15:01)  HR: 80 (05-29-19 @ 02:30) (78 - 80)  BP: 139/63 (05-29-19 @ 02:30) (129/56 - 139/63)  RR: 26 (05-29-19 @ 02:30) (18 - 26)  SpO2: --  Wt(kg): --  I&O's Summary    27 May 2019 07:01  -  28 May 2019 07:00  --------------------------------------------------------  IN: 575 mL / OUT: 1060 mL / NET: -485 mL    28 May 2019 07:01  -  29 May 2019 06:59  --------------------------------------------------------  IN: 2450 mL / OUT: 1300 mL / NET: 1150 mL        Appearance: Normal	  Psychiatry: A & O x 3, Mood & affect appropriate  HEENT:   Normal oral mucosa, PERRL, EOMI	  Lymphatic: No lymphadenopathy  Cardiovascular: Normal S1 S2,RRR, No JVD, I/VI BRAYAN LSB  Respiratory: Lungs clear to auscultation	  Gastrointestinal:  Soft, Non-tender, + BS	  Skin: No rashes, No ecchymoses, No cyanosis	  Neurologic: Non-focal  Extremities: Normal range of motion, No clubbing, cyanosis or edema  Vascular: Peripheral pulses palpable 2+ bilaterally      ECG:  	< from: 12 Lead ECG (05.23.19 @ 14:17) >  Diagnosis Line Normal sinus rhythm  Incomplete right bundle branch block  Nonspecific ST-T changes  Confirmed by HILARIO LEARY, LINK ([id number]) on 5/23/2019 4:31:39 PM    < end of copied text >      	  LABS:	 	    CARDIAC MARKERS:                                    10.3   8.29  )-----------( 398      ( 29 May 2019 05:58 )             31.6     05-28    141  |  100  |  13  ----------------------------<  110<H>  4.2   |  29  |  0.6<L>    Ca    9.9      28 May 2019 06:01  Mg     1.9     05-28    TPro  7.4  /  Alb  3.5  /  TBili  0.2  /  DBili  x   /  AST  16  /  ALT  13  /  AlkPhos  106  05-28

## 2019-05-29 NOTE — SWALLOW BEDSIDE ASSESSMENT ADULT - ORAL PHASE
Within functional limits Lingual stasis/right side residue cleared with 2x sips thin liquid./Delayed oral transit time

## 2019-05-29 NOTE — PROGRESS NOTE ADULT - ASSESSMENT
89 yo F with HFpEF, Vit D deficiency, Hypothyroidism, dementia and chronic alicea initially admitted for pyelonephritis/ subacute stroke, upgraded to CCU after being slurred and unable to move her R side    - Acute CVA  repeat CTH new acute ischemic changes. No MRI as per discussion with family/ neuro  likely evolution of old strokes/ embolic strokes   cardiology eval for possible paroxysmal Afib.  fu 2d echo  will need an event monitor as OP  c/w plavix, ASA and zocor   PT on board. pt mostly bedbound   c/w dysphagia diet  fu neurology recs.   will keep on tele for 24 hrs as per cardiology      - Pyelonephritis  Urine cx with >100K gm Ecoli, pansensitive  will complete course of ceftriaxone today     - Hypothyroidism  c/w synthroid     - Hypertension  Keep BP >140s  LR at 100 cc/hr    - Dementia/Depression  Swetha eval pending  Cymbalta on hold given hyponatremia     - Vitamin D deficiency  c/w Vit D supplementation weekly     - Dispo:  physiatry recommended STR  fu case management/  89 yo F with HFpEF, Vit D deficiency, Hypothyroidism, dementia and chronic alicea initially admitted for pyelonephritis/ subacute stroke, upgraded to CCU after being slurred and unable to move her R side    - Acute CVA  repeat CTH new acute ischemic changes. No MRI as per discussion with family/ neuro  likely evolution of old strokes/ embolic strokes   cardiology eval for possible paroxysmal Afib.  fu 2d echo  will need an event monitor as OP  c/w plavix, ASA and zocor   PT on board. pt mostly bedbound   c/w dysphagia diet  fu neurology recs.   will keep on tele for 24 hrs as per cardiology      - Pyelonephritis  Urine cx with >100K gm Ecoli, pansensitive  will complete course of ceftriaxone today     - Hypothyroidism  c/w synthroid     - Hypertension  Keep BP >140s  LR at 100 cc/hr    - Dementia/Depression  Swetha eval pending  Cymbalta on hold given hyponatremia     - Vitamin D deficiency  c/w Vit D supplementation weekly     - Dispo:  physiatry recommended STR. will likely need LTR   case management on board

## 2019-05-29 NOTE — PROGRESS NOTE ADULT - SUBJECTIVE AND OBJECTIVE BOX
Patient is a 89y old  Female who presents with a chief complaint of Lethargy, nausea and back pain x2 days (29 May 2019 06:58)    INTERVAL HPI/OVERNIGHT EVENTS:  Pt had a run of aflutter overnight. It looks like an artifact  She feels the same. No complaints       ICU Vital Signs Last 24 Hrs  T(C): 35.9 (29 May 2019 07:01), Max: 37.3 (28 May 2019 15:01)  T(F): 96.6 (29 May 2019 07:01), Max: 99.1 (28 May 2019 15:01)  HR: 80 (29 May 2019 02:30) (78 - 80)  BP: 139/63 (29 May 2019 02:30) (129/56 - 139/63)  BP(mean): 90 (29 May 2019 02:30) (87 - 90)  RR: 20 (29 May 2019 07:01) (18 - 26)    I&O's Summary  28 May 2019 07:01  -  29 May 2019 07:00  --------------------------------------------------------  IN: 2450 mL / OUT: 1300 mL / NET: 1150 mL      LABS:                        10.3   8.29  )-----------( 398      ( 29 May 2019 05:58 )             31.6     05-29    140  |  101  |  11  ----------------------------<  106<H>  4.2   |  27  |  0.5<L>    Ca    9.4      29 May 2019 05:58  Mg     1.8     05-29    TPro  6.3  /  Alb  3.1<L>  /  TBili  <0.2  /  DBili  x   /  AST  13  /  ALT  11  /  AlkPhos  91  05-29      Consultant(s) Notes Reviewed:  [x ] YES  [ ] NO      MEDICATIONS  (STANDING):  aspirin  chewable 81 milliGRAM(s) Oral daily  cefTRIAXone   IVPB 1 Gram(s) IV Intermittent every 24 hours  chlorhexidine 4% Liquid 1 Application(s) Topical <User Schedule>  clopidogrel Tablet 75 milliGRAM(s) Oral daily  docusate sodium 100 milliGRAM(s) Oral three times a day  heparin  Injectable 5000 Unit(s) SubCutaneous every 12 hours  lactated ringers. 1000 milliLiter(s) (100 mL/Hr) IV Continuous <Continuous>  levothyroxine 25 MICROGram(s) Oral daily  multivitamin 1 Tablet(s) Oral daily  pantoprazole    Tablet 40 milliGRAM(s) Oral before breakfast  pregabalin 150 milliGRAM(s) Oral every 8 hours  senna 2 Tablet(s) Oral at bedtime  simvastatin 20 milliGRAM(s) Oral at bedtime    MEDICATIONS  (PRN):  acetaminophen   Tablet .. 650 milliGRAM(s) Oral every 8 hours PRN Mild Pain (1 - 3)  lactulose Syrup 20 Gram(s) Oral two times a day PRN constipation      PHYSICAL EXAM:  GENERAL: NAD  NERVOUS SYSTEM: AAOx2, R facial droop, R hemiparesis  CHEST/LUNG: CTAB  HEART: regular   ABDOMEN: Soft, Nontender  EXTREMITIES: no edema      Care Discussed with Consultants/Other Providers [ x] YES  [ ] NO

## 2019-05-29 NOTE — CONSULT NOTE ADULT - REASON FOR ADMISSION
Lethargy, nausea and back pain x2 days

## 2019-05-30 LAB
ALBUMIN SERPL ELPH-MCNC: 3.2 G/DL — LOW (ref 3.5–5.2)
ALP SERPL-CCNC: 94 U/L — SIGNIFICANT CHANGE UP (ref 30–115)
ALT FLD-CCNC: 11 U/L — SIGNIFICANT CHANGE UP (ref 0–41)
ANION GAP SERPL CALC-SCNC: 11 MMOL/L — SIGNIFICANT CHANGE UP (ref 7–14)
AST SERPL-CCNC: 14 U/L — SIGNIFICANT CHANGE UP (ref 0–41)
BASOPHILS # BLD AUTO: 0.11 K/UL — SIGNIFICANT CHANGE UP (ref 0–0.2)
BASOPHILS NFR BLD AUTO: 1.2 % — HIGH (ref 0–1)
BILIRUB SERPL-MCNC: <0.2 MG/DL — SIGNIFICANT CHANGE UP (ref 0.2–1.2)
BUN SERPL-MCNC: 8 MG/DL — LOW (ref 10–20)
CALCIUM SERPL-MCNC: 9.4 MG/DL — SIGNIFICANT CHANGE UP (ref 8.5–10.1)
CHLORIDE SERPL-SCNC: 102 MMOL/L — SIGNIFICANT CHANGE UP (ref 98–110)
CO2 SERPL-SCNC: 27 MMOL/L — SIGNIFICANT CHANGE UP (ref 17–32)
CREAT SERPL-MCNC: 0.6 MG/DL — LOW (ref 0.7–1.5)
EOSINOPHIL # BLD AUTO: 0.27 K/UL — SIGNIFICANT CHANGE UP (ref 0–0.7)
EOSINOPHIL NFR BLD AUTO: 2.9 % — SIGNIFICANT CHANGE UP (ref 0–8)
GLUCOSE SERPL-MCNC: 104 MG/DL — HIGH (ref 70–99)
HCT VFR BLD CALC: 34.2 % — LOW (ref 37–47)
HGB BLD-MCNC: 11 G/DL — LOW (ref 12–16)
IMM GRANULOCYTES NFR BLD AUTO: 0.4 % — HIGH (ref 0.1–0.3)
LYMPHOCYTES # BLD AUTO: 3.19 K/UL — SIGNIFICANT CHANGE UP (ref 1.2–3.4)
LYMPHOCYTES # BLD AUTO: 34.1 % — SIGNIFICANT CHANGE UP (ref 20.5–51.1)
MAGNESIUM SERPL-MCNC: 1.8 MG/DL — SIGNIFICANT CHANGE UP (ref 1.8–2.4)
MCHC RBC-ENTMCNC: 28.9 PG — SIGNIFICANT CHANGE UP (ref 27–31)
MCHC RBC-ENTMCNC: 32.2 G/DL — SIGNIFICANT CHANGE UP (ref 32–37)
MCV RBC AUTO: 89.8 FL — SIGNIFICANT CHANGE UP (ref 81–99)
MONOCYTES # BLD AUTO: 1.19 K/UL — HIGH (ref 0.1–0.6)
MONOCYTES NFR BLD AUTO: 12.7 % — HIGH (ref 1.7–9.3)
NEUTROPHILS # BLD AUTO: 4.55 K/UL — SIGNIFICANT CHANGE UP (ref 1.4–6.5)
NEUTROPHILS NFR BLD AUTO: 48.7 % — SIGNIFICANT CHANGE UP (ref 42.2–75.2)
NRBC # BLD: 0 /100 WBCS — SIGNIFICANT CHANGE UP (ref 0–0)
PLATELET # BLD AUTO: 435 K/UL — HIGH (ref 130–400)
POTASSIUM SERPL-MCNC: 4.5 MMOL/L — SIGNIFICANT CHANGE UP (ref 3.5–5)
POTASSIUM SERPL-SCNC: 4.5 MMOL/L — SIGNIFICANT CHANGE UP (ref 3.5–5)
PROT SERPL-MCNC: 6.6 G/DL — SIGNIFICANT CHANGE UP (ref 6–8)
RBC # BLD: 3.81 M/UL — LOW (ref 4.2–5.4)
RBC # FLD: 15 % — HIGH (ref 11.5–14.5)
SODIUM SERPL-SCNC: 140 MMOL/L — SIGNIFICANT CHANGE UP (ref 135–146)
WBC # BLD: 9.35 K/UL — SIGNIFICANT CHANGE UP (ref 4.8–10.8)
WBC # FLD AUTO: 9.35 K/UL — SIGNIFICANT CHANGE UP (ref 4.8–10.8)

## 2019-05-30 RX ORDER — MORPHINE SULFATE 50 MG/1
1 CAPSULE, EXTENDED RELEASE ORAL ONCE
Refills: 0 | Status: DISCONTINUED | OUTPATIENT
Start: 2019-05-30 | End: 2019-05-30

## 2019-05-30 RX ORDER — SODIUM CHLORIDE 9 MG/ML
1000 INJECTION, SOLUTION INTRAVENOUS
Refills: 0 | Status: DISCONTINUED | OUTPATIENT
Start: 2019-05-30 | End: 2019-05-31

## 2019-05-30 RX ADMIN — Medication 25 MICROGRAM(S): at 05:40

## 2019-05-30 RX ADMIN — Medication 100 MILLIGRAM(S): at 15:09

## 2019-05-30 RX ADMIN — MORPHINE SULFATE 1 MILLIGRAM(S): 50 CAPSULE, EXTENDED RELEASE ORAL at 14:01

## 2019-05-30 RX ADMIN — Medication 150 MILLIGRAM(S): at 05:40

## 2019-05-30 RX ADMIN — Medication 1 TABLET(S): at 11:08

## 2019-05-30 RX ADMIN — PANTOPRAZOLE SODIUM 40 MILLIGRAM(S): 20 TABLET, DELAYED RELEASE ORAL at 06:15

## 2019-05-30 RX ADMIN — LACTULOSE 20 GRAM(S): 10 SOLUTION ORAL at 15:22

## 2019-05-30 RX ADMIN — HEPARIN SODIUM 5000 UNIT(S): 5000 INJECTION INTRAVENOUS; SUBCUTANEOUS at 05:40

## 2019-05-30 RX ADMIN — Medication 100 MILLIGRAM(S): at 22:11

## 2019-05-30 RX ADMIN — CLOPIDOGREL BISULFATE 75 MILLIGRAM(S): 75 TABLET, FILM COATED ORAL at 11:08

## 2019-05-30 RX ADMIN — SODIUM CHLORIDE 100 MILLILITER(S): 9 INJECTION, SOLUTION INTRAVENOUS at 05:00

## 2019-05-30 RX ADMIN — SODIUM CHLORIDE 75 MILLILITER(S): 9 INJECTION, SOLUTION INTRAVENOUS at 10:52

## 2019-05-30 RX ADMIN — Medication 150 MILLIGRAM(S): at 22:11

## 2019-05-30 RX ADMIN — HEPARIN SODIUM 5000 UNIT(S): 5000 INJECTION INTRAVENOUS; SUBCUTANEOUS at 17:49

## 2019-05-30 RX ADMIN — SIMVASTATIN 20 MILLIGRAM(S): 20 TABLET, FILM COATED ORAL at 22:11

## 2019-05-30 RX ADMIN — CHLORHEXIDINE GLUCONATE 1 APPLICATION(S): 213 SOLUTION TOPICAL at 07:28

## 2019-05-30 RX ADMIN — Medication 81 MILLIGRAM(S): at 11:08

## 2019-05-30 RX ADMIN — SENNA PLUS 2 TABLET(S): 8.6 TABLET ORAL at 22:11

## 2019-05-30 RX ADMIN — MORPHINE SULFATE 1 MILLIGRAM(S): 50 CAPSULE, EXTENDED RELEASE ORAL at 12:45

## 2019-05-30 RX ADMIN — Medication 150 MILLIGRAM(S): at 15:09

## 2019-05-30 RX ADMIN — Medication 100 MILLIGRAM(S): at 05:40

## 2019-05-30 RX ADMIN — SODIUM CHLORIDE 75 MILLILITER(S): 9 INJECTION, SOLUTION INTRAVENOUS at 15:10

## 2019-05-30 NOTE — SWALLOW BEDSIDE ASSESSMENT ADULT - SLP PERTINENT HISTORY OF CURRENT PROBLEM
See previous SLP notes. Bedside swallow evaluation (5/24/19) with recommendation for Dysphagia Diet III Mechanical soft consistency with cut up meats and thin liquids
admit with lethargy, found to have CVA
see previous SLP notes. bedside swallow assessment 5/29/19 with recommendation for Dysphagia Diet II mechanical soft consistency with ground meat and thin liquids

## 2019-05-30 NOTE — SWALLOW BEDSIDE ASSESSMENT ADULT - SWALLOW EVAL: CURRENT DIET
Dysphagia diet I puree consistency thin liquids
Dysphagia Diet II mechanical soft consistency with ground meat and thin liquids

## 2019-05-30 NOTE — SWALLOW BEDSIDE ASSESSMENT ADULT - ASR SWALLOW ASPIRATION MONITOR
oral hygiene/fever/pneumonia/position upright (89Y)/cough/gurgly voice/upper respiratory infection/change of breathing pattern/throat clearing
pneumonia/upper respiratory infection/change of breathing pattern/oral hygiene/position upright (89Y)/gurgly voice/cough/fever/throat clearing

## 2019-05-30 NOTE — PROGRESS NOTE ADULT - ASSESSMENT
Patient denies chest pain sob or palpitations She had multiple CVA. Possibly embolic. Echo mild LVH normal function.  She needs a outpatient event monitor for 2-4 weeks.

## 2019-05-30 NOTE — SWALLOW BEDSIDE ASSESSMENT ADULT - SPECIFY REASON(S)
SLP f/u determine safe candidacy with diet consistency s/p transfer to CCU.
cva
SLP followup. Called by RN with concerns for tolerance of current diet

## 2019-05-30 NOTE — SWALLOW BEDSIDE ASSESSMENT ADULT - SWALLOW EVAL: DIAGNOSIS
oral phase deficits characterized by delayed oral transit time and stasis in the right side of oral cavity after the swallow with solid consistencies. No evidence of pharyngeal dysphagia during clinical bedside swallow evaluation
mild oral impairment no signs of pharyngeal impairment
Pt appears very weak and lethargic. Oral phase notable for prolonged effortful mastication with decreased AP transit for solid consistencies. Improved for puree consistencies. No evidence of pharyngeal dysphagia during clinical bedside swallow evaluation

## 2019-05-30 NOTE — PROGRESS NOTE ADULT - SUBJECTIVE AND OBJECTIVE BOX
Patient is a 89y old  Female who presents with a chief complaint of Lethargy, nausea and back pain x2 days (30 May 2019 07:08)    INTERVAL HPI/OVERNIGHT EVENTS:  pt has multiple spells of crying over last 24hrs   she is unable to tolerate mechanical soft diet. will contact speech/ swallow again    ICU Vital Signs Last 24 Hrs  T(C): 37.2 (29 May 2019 23:12), Max: 37.2 (29 May 2019 23:12)  T(F): 98.9 (29 May 2019 23:12), Max: 98.9 (29 May 2019 23:12)  HR: 78 (30 May 2019 07:11) (70 - 78)  BP: 160/71 (30 May 2019 07:11) (142/59 - 192/74)  BP(mean): 102 (30 May 2019 07:11) (85 - 107)  RR: 17 (29 May 2019 23:12) (17 - 18)  SpO2: 97% (30 May 2019 07:11) (94% - 97%)      I&O's Summary  29 May 2019 07:01  -  30 May 2019 07:00  --------------------------------------------------------  IN: 2500 mL / OUT: 1850 mL / NET: 650 mL    30 May 2019 07:01  -  30 May 2019 10:04  --------------------------------------------------------  IN: 400 mL / OUT: 0 mL / NET: 400 mL      LABS:                        11.0   9.35  )-----------( 435      ( 30 May 2019 05:42 )             34.2     05-30    140  |  102  |  8<L>  ----------------------------<  104<H>  4.5   |  27  |  0.6<L>    Ca    9.4      30 May 2019 05:42  Mg     1.8     05-30    TPro  6.6  /  Alb  3.2<L>  /  TBili  <0.2  /  DBili  x   /  AST  14  /  ALT  11  /  AlkPhos  94  05-30      Consultant(s) Notes Reviewed:  [x ] YES  [ ] NO    MEDICATIONS  (STANDING):  aspirin  chewable 81 milliGRAM(s) Oral daily  chlorhexidine 4% Liquid 1 Application(s) Topical <User Schedule>  clopidogrel Tablet 75 milliGRAM(s) Oral daily  docusate sodium 100 milliGRAM(s) Oral three times a day  heparin  Injectable 5000 Unit(s) SubCutaneous every 12 hours  lactated ringers. 1000 milliLiter(s) (100 mL/Hr) IV Continuous <Continuous>  levothyroxine 25 MICROGram(s) Oral daily  multivitamin 1 Tablet(s) Oral daily  pantoprazole    Tablet 40 milliGRAM(s) Oral before breakfast  pregabalin 150 milliGRAM(s) Oral every 8 hours  senna 2 Tablet(s) Oral at bedtime  simvastatin 20 milliGRAM(s) Oral at bedtime    MEDICATIONS  (PRN):  acetaminophen   Tablet .. 650 milliGRAM(s) Oral every 8 hours PRN Mild Pain (1 - 3)  lactulose Syrup 20 Gram(s) Oral two times a day PRN constipation      PHYSICAL EXAM:  GENERAL: NAD  NERVOUS SYSTEM:  AAO, R hemiparesis, R facial droop  CHEST/LUNG: CTAB  HEART: regular   ABDOMEN: Soft, Nontender  EXTREMITIES:  no edema         Care Discussed with Consultants/Other Providers [ x] YES  [ ] NO Patient is a 89y old  Female who presents with a chief complaint of Lethargy, nausea and back pain x2 days (30 May 2019 07:08)    INTERVAL HPI/OVERNIGHT EVENTS:  pt has multiple spells of crying over last 24hrs. She looks weaker today  she is unable to tolerate mechanical soft diet. will contact speech/ swallow again    ICU Vital Signs Last 24 Hrs  T(C): 37.2 (29 May 2019 23:12), Max: 37.2 (29 May 2019 23:12)  T(F): 98.9 (29 May 2019 23:12), Max: 98.9 (29 May 2019 23:12)  HR: 78 (30 May 2019 07:11) (70 - 78)  BP: 160/71 (30 May 2019 07:11) (142/59 - 192/74)  BP(mean): 102 (30 May 2019 07:11) (85 - 107)  RR: 17 (29 May 2019 23:12) (17 - 18)  SpO2: 97% (30 May 2019 07:11) (94% - 97%)      I&O's Summary  29 May 2019 07:01  -  30 May 2019 07:00  --------------------------------------------------------  IN: 2500 mL / OUT: 1850 mL / NET: 650 mL    30 May 2019 07:01  -  30 May 2019 10:04  --------------------------------------------------------  IN: 400 mL / OUT: 0 mL / NET: 400 mL      LABS:                        11.0   9.35  )-----------( 435      ( 30 May 2019 05:42 )             34.2     05-30    140  |  102  |  8<L>  ----------------------------<  104<H>  4.5   |  27  |  0.6<L>    Ca    9.4      30 May 2019 05:42  Mg     1.8     05-30    TPro  6.6  /  Alb  3.2<L>  /  TBili  <0.2  /  DBili  x   /  AST  14  /  ALT  11  /  AlkPhos  94  05-30      Consultant(s) Notes Reviewed:  [x ] YES  [ ] NO    MEDICATIONS  (STANDING):  aspirin  chewable 81 milliGRAM(s) Oral daily  chlorhexidine 4% Liquid 1 Application(s) Topical <User Schedule>  clopidogrel Tablet 75 milliGRAM(s) Oral daily  docusate sodium 100 milliGRAM(s) Oral three times a day  heparin  Injectable 5000 Unit(s) SubCutaneous every 12 hours  lactated ringers. 1000 milliLiter(s) (100 mL/Hr) IV Continuous <Continuous>  levothyroxine 25 MICROGram(s) Oral daily  multivitamin 1 Tablet(s) Oral daily  pantoprazole    Tablet 40 milliGRAM(s) Oral before breakfast  pregabalin 150 milliGRAM(s) Oral every 8 hours  senna 2 Tablet(s) Oral at bedtime  simvastatin 20 milliGRAM(s) Oral at bedtime    MEDICATIONS  (PRN):  acetaminophen   Tablet .. 650 milliGRAM(s) Oral every 8 hours PRN Mild Pain (1 - 3)  lactulose Syrup 20 Gram(s) Oral two times a day PRN constipation      PHYSICAL EXAM:  GENERAL: NAD  NERVOUS SYSTEM:  AAO, R hemiparesis, R facial droop  CHEST/LUNG: CTAB  HEART: regular   ABDOMEN: Soft, Nontender  EXTREMITIES:  no edema         Care Discussed with Consultants/Other Providers [ x] YES  [ ] NO

## 2019-05-30 NOTE — CHART NOTE - NSCHARTNOTEFT_GEN_A_CORE
After thorough discussion with daughters at bedside, HCP (daughter) decided on DNR/ DNI status. She also prefers comfort care at this point given the poor prognosis and clinical deterioration of pt  Hospice consult placed after informing the family.   will continue all medical management for now

## 2019-05-30 NOTE — CHART NOTE - NSCHARTNOTEFT_GEN_A_CORE
Registered Dietitian Follow-Up     Patient Profile Reviewed                           Yes [x]   No []     Nutrition History Previously Obtained        Yes [x]  No []       Pertinent Subjective Information: Pt was seen by SLP on 5/29 and they recommended a dysphagia ll/thin liquid diet. RN reports that the Pt is not tolerating the diet. It took her ~10 minutes to swallow one small bite of pancake. She tried giving her oatmeal and the oatmeal was falling out of her mouth. Pt did well w/ thin liquids and will send Ensure Enlive to supplement. Pt does have a facial droop. Dementia/depression noted. RN reports that it appears she has no motivation to eat. Poor PO is most likely due to tolerance/mental status. For now placing Pt on puree. Left a message w/ speech.      Pertinent Medical Interventions: Pt presented to the ED for worsening lethargy, back pain, and nausea for 2 days. Pt is admitted for pyelonephritis/subacute stroke. Hypothyroidism, HTN, and vit D deficiency noted.      Diet order: Dysphagia 11/thin liquid diet      Anthropometrics:  - Ht. 152.4cm/5'0  - Wt. 67kg/147#  - %wt change 64.5-67kg   - BMI 28.9  - IBW 45kg      Pertinent Lab Data: (5/30) H/H 11/34.2, BUN 8, creat 0.6, glucose 104, albumin 3.2     Pertinent Meds: heparin, simvastatin, docusate sodium, colace, lactulose syrup, MVI, pantoprazole, senna      Physical Findings:  - Appearance: Dementia noted. Pt was unable to answer questions.   - GI function: Unsure of last BM. She did not have one this morning. Abdomen noted as soft/nontender.   - Tubes: n/a  - Oral/Mouth cavity: Left message w/ speech  - Skin: Eliezer score 16. Skin intact.      Nutrition Requirements: Needs assessed on 5/27   Weight Used: 67kg      Estimated Energy Needs    Continue [x]  Adjust []  1214-1316kcals/day (MSJ A.F 1.2-1.3)     Estimated Protein Needs    Continue [x]  Adjust []  67-80g/day (1-1.2g/kg)     Estimated Fluid Needs        Continue [x]  Adjust []  fluid as per CCU team     [] Previous Nutrition Diagnosis: Inadequate oral intake            [x] Ongoing          [] Resolved    [] No active nutrition diagnosis identified at this time    Nutrition Intervention: Meals and snacks/Medical food supplements      Goal/Expected Outcome: Consume >75% of meals and supplements provided for 4 days. At risk.      Indicator/Monitoring: Will monitor energy intake, nutrition focused physical findings, electrolyte/renal/glucose profile. At risk.     Recommendations:  Meals and snacks: Place Pt on a puree/thin liquid diet for now and change diet consistency as per SLP.   Medical food supplements: Ensure Enlive q8

## 2019-05-30 NOTE — SWALLOW BEDSIDE ASSESSMENT ADULT - SWALLOW EVAL: RECOMMENDED DIET
Dysphagia Diet II mechanical soft consistency with ground meat and thin liquids
Dysphagia Diet III Mechanical soft consistency with cut up meats thin liquids.
Dysphagia diet I puree consistency and thin liquids

## 2019-05-30 NOTE — SWALLOW BEDSIDE ASSESSMENT ADULT - PHARYNGEAL PHASE
+ toleration observed without overt symptoms of penetration/aspiration
+ toleration observed without overt symptoms of penetration/aspiration
Within functional limits

## 2019-05-30 NOTE — OCCUPATIONAL THERAPY INITIAL EVALUATION ADULT - RANGE OF MOTION EXAMINATION
no Passive ROM deficits were identified/right shoulder 0 volitional movement; elbow half range; wrist hand 3/4 range; LUE no volitional movement throughout

## 2019-05-30 NOTE — OCCUPATIONAL THERAPY INITIAL EVALUATION ADULT - GENERAL OBSERVATIONS, REHAB EVAL
11:30 - 11:53 11:30 - 11:53 Chart reviewed ok to be seen as per RN. Patient received supine in bed lethargic in NAD. +IV +alicea

## 2019-05-30 NOTE — SWALLOW BEDSIDE ASSESSMENT ADULT - ORAL PHASE
Within functional limits Lingual stasis/clears with liquid wash Delayed oral transit time/residue on right side./Decreased anterior-posterior movement of the bolus/Lingual stasis

## 2019-05-30 NOTE — PROGRESS NOTE ADULT - ASSESSMENT
89 yo F with HFpEF, Vit D deficiency, Hypothyroidism, dementia and chronic alicea initially admitted for pyelonephritis/ subacute stroke, upgraded to CCU after being slurred and unable to move her R side    - Acute CVA  likely embolic in nature  2d echo: nl EF, mild LV hypertrophy  c/w plavix, ASA and zocor   PT on board. pt mostly bedbound. OT ordered   not tolerating dysphagia II diet. needs reassessment   cardiology cleared pt for DC  will need event monitor for 2 weeks to ru paroxysmal afib      - Pyelonephritis  completed course of abxs     - Hypothyroidism  c/w synthroid     - Hypertension  Keep BP >140s  decrease LR to 75ml/hr given high BP readings     - Dementia/Depression  consider resuming cymbalta     - Vitamin D deficiency  c/w Vit D supplementation weekly     - Dispo:  pending STR placement   ? transfer to floor

## 2019-05-30 NOTE — PROGRESS NOTE ADULT - SUBJECTIVE AND OBJECTIVE BOX
Patient is a 89y old  Female who presents with a chief complaint of Lethargy, nausea and back pain x2 days (29 May 2019 07:23)      T(F): 98.9 (05-29-19 @ 23:12), Max: 98.9 (05-29-19 @ 23:12)  HR: 78 (05-29-19 @ 23:12)  BP: 192/74 (05-29-19 @ 23:12)  RR: 17 (05-29-19 @ 23:12)  SpO2: 94% (05-29-19 @ 23:12) (94% - 94%)    PHYSICAL EXAM:  GENERAL: NAD, well-groomed, well-developed  HEAD:  Atraumatic, Normocephalic  EYES: EOMI, PERRLA, conjunctiva and sclera clear  ENMT: No tonsillar erythema, exudates, or enlargement; Moist mucous membranes, Good dentition, No lesions  NECK: Supple, No JVD, Normal thyroid  NERVOUS SYSTEM:  Alert & Oriented X3,  Motor Strength 5/5 B/L upper and lower extremities  CHEST/LUNG: Clear to percussion bilaterally; No rales, rhonchi, wheezing, or rubs  HEART: Regular rate and rhythm; No murmurs, rubs, or gallops  ABDOMEN: Soft, Nontender, Nondistended; Bowel sounds present  EXTREMITIES:   No clubbing, cyanosis, or edema  LYMPH: No lymphadenopathy noted  SKIN: No rashes or lesions    labs  05-29    140  |  101  |  11  ----------------------------<  106<H>  4.2   |  27  |  0.5<L>    Ca    9.4      29 May 2019 05:58  Mg     1.8     05-29    TPro  6.3  /  Alb  3.1<L>  /  TBili  <0.2  /  DBili  x   /  AST  13  /  ALT  11  /  AlkPhos  91  05-29                          11.0   9.35  )-----------( 435      ( 30 May 2019 05:42 )             34.2               acetaminophen   Tablet .. 650 milliGRAM(s) Oral every 8 hours PRN  aspirin  chewable 81 milliGRAM(s) Oral daily  cefTRIAXone   IVPB 1 Gram(s) IV Intermittent every 24 hours  chlorhexidine 4% Liquid 1 Application(s) Topical <User Schedule>  clopidogrel Tablet 75 milliGRAM(s) Oral daily  docusate sodium 100 milliGRAM(s) Oral three times a day  heparin  Injectable 5000 Unit(s) SubCutaneous every 12 hours  lactated ringers. 1000 milliLiter(s) IV Continuous <Continuous>  lactulose Syrup 20 Gram(s) Oral two times a day PRN  levothyroxine 25 MICROGram(s) Oral daily  multivitamin 1 Tablet(s) Oral daily  pantoprazole    Tablet 40 milliGRAM(s) Oral before breakfast  pregabalin 150 milliGRAM(s) Oral every 8 hours  senna 2 Tablet(s) Oral at bedtime  simvastatin 20 milliGRAM(s) Oral at bedtime

## 2019-05-31 LAB
ALBUMIN SERPL ELPH-MCNC: 3.2 G/DL — LOW (ref 3.5–5.2)
ALP SERPL-CCNC: 100 U/L — SIGNIFICANT CHANGE UP (ref 30–115)
ALT FLD-CCNC: 11 U/L — SIGNIFICANT CHANGE UP (ref 0–41)
ANION GAP SERPL CALC-SCNC: 12 MMOL/L — SIGNIFICANT CHANGE UP (ref 7–14)
AST SERPL-CCNC: 15 U/L — SIGNIFICANT CHANGE UP (ref 0–41)
BASOPHILS # BLD AUTO: 0.09 K/UL — SIGNIFICANT CHANGE UP (ref 0–0.2)
BASOPHILS NFR BLD AUTO: 0.9 % — SIGNIFICANT CHANGE UP (ref 0–1)
BILIRUB SERPL-MCNC: 0.3 MG/DL — SIGNIFICANT CHANGE UP (ref 0.2–1.2)
BUN SERPL-MCNC: 8 MG/DL — LOW (ref 10–20)
CALCIUM SERPL-MCNC: 9.4 MG/DL — SIGNIFICANT CHANGE UP (ref 8.5–10.1)
CHLORIDE SERPL-SCNC: 101 MMOL/L — SIGNIFICANT CHANGE UP (ref 98–110)
CO2 SERPL-SCNC: 25 MMOL/L — SIGNIFICANT CHANGE UP (ref 17–32)
CREAT SERPL-MCNC: 0.6 MG/DL — LOW (ref 0.7–1.5)
EOSINOPHIL # BLD AUTO: 0.25 K/UL — SIGNIFICANT CHANGE UP (ref 0–0.7)
EOSINOPHIL NFR BLD AUTO: 2.5 % — SIGNIFICANT CHANGE UP (ref 0–8)
GLUCOSE SERPL-MCNC: 118 MG/DL — HIGH (ref 70–99)
HCT VFR BLD CALC: 36.3 % — LOW (ref 37–47)
HGB BLD-MCNC: 11.7 G/DL — LOW (ref 12–16)
IMM GRANULOCYTES NFR BLD AUTO: 0.3 % — SIGNIFICANT CHANGE UP (ref 0.1–0.3)
LYMPHOCYTES # BLD AUTO: 2.65 K/UL — SIGNIFICANT CHANGE UP (ref 1.2–3.4)
LYMPHOCYTES # BLD AUTO: 26.7 % — SIGNIFICANT CHANGE UP (ref 20.5–51.1)
MAGNESIUM SERPL-MCNC: 1.7 MG/DL — LOW (ref 1.8–2.4)
MCHC RBC-ENTMCNC: 28.9 PG — SIGNIFICANT CHANGE UP (ref 27–31)
MCHC RBC-ENTMCNC: 32.2 G/DL — SIGNIFICANT CHANGE UP (ref 32–37)
MCV RBC AUTO: 89.6 FL — SIGNIFICANT CHANGE UP (ref 81–99)
MONOCYTES # BLD AUTO: 1.13 K/UL — HIGH (ref 0.1–0.6)
MONOCYTES NFR BLD AUTO: 11.4 % — HIGH (ref 1.7–9.3)
NEUTROPHILS # BLD AUTO: 5.78 K/UL — SIGNIFICANT CHANGE UP (ref 1.4–6.5)
NEUTROPHILS NFR BLD AUTO: 58.2 % — SIGNIFICANT CHANGE UP (ref 42.2–75.2)
NRBC # BLD: 0 /100 WBCS — SIGNIFICANT CHANGE UP (ref 0–0)
PLATELET # BLD AUTO: 448 K/UL — HIGH (ref 130–400)
POTASSIUM SERPL-MCNC: 4.2 MMOL/L — SIGNIFICANT CHANGE UP (ref 3.5–5)
POTASSIUM SERPL-SCNC: 4.2 MMOL/L — SIGNIFICANT CHANGE UP (ref 3.5–5)
PROT SERPL-MCNC: 6.8 G/DL — SIGNIFICANT CHANGE UP (ref 6–8)
RBC # BLD: 4.05 M/UL — LOW (ref 4.2–5.4)
RBC # FLD: 15 % — HIGH (ref 11.5–14.5)
SODIUM SERPL-SCNC: 138 MMOL/L — SIGNIFICANT CHANGE UP (ref 135–146)
WBC # BLD: 9.93 K/UL — SIGNIFICANT CHANGE UP (ref 4.8–10.8)
WBC # FLD AUTO: 9.93 K/UL — SIGNIFICANT CHANGE UP (ref 4.8–10.8)

## 2019-05-31 RX ORDER — ACETAMINOPHEN 500 MG
650 TABLET ORAL ONCE
Refills: 0 | Status: COMPLETED | OUTPATIENT
Start: 2019-05-31 | End: 2019-05-31

## 2019-05-31 RX ORDER — MAGNESIUM SULFATE 500 MG/ML
2 VIAL (ML) INJECTION ONCE
Refills: 0 | Status: COMPLETED | OUTPATIENT
Start: 2019-05-31 | End: 2019-05-31

## 2019-05-31 RX ADMIN — Medication 100 MILLIGRAM(S): at 05:40

## 2019-05-31 RX ADMIN — Medication 100 MILLIGRAM(S): at 15:42

## 2019-05-31 RX ADMIN — Medication 100 MILLIGRAM(S): at 22:16

## 2019-05-31 RX ADMIN — Medication 81 MILLIGRAM(S): at 11:06

## 2019-05-31 RX ADMIN — Medication 650 MILLIGRAM(S): at 20:07

## 2019-05-31 RX ADMIN — Medication 50 GRAM(S): at 08:51

## 2019-05-31 RX ADMIN — CHLORHEXIDINE GLUCONATE 1 APPLICATION(S): 213 SOLUTION TOPICAL at 05:40

## 2019-05-31 RX ADMIN — Medication 25 MICROGRAM(S): at 05:40

## 2019-05-31 RX ADMIN — CLOPIDOGREL BISULFATE 75 MILLIGRAM(S): 75 TABLET, FILM COATED ORAL at 11:06

## 2019-05-31 RX ADMIN — SIMVASTATIN 20 MILLIGRAM(S): 20 TABLET, FILM COATED ORAL at 22:16

## 2019-05-31 RX ADMIN — HEPARIN SODIUM 5000 UNIT(S): 5000 INJECTION INTRAVENOUS; SUBCUTANEOUS at 19:03

## 2019-05-31 RX ADMIN — Medication 650 MILLIGRAM(S): at 19:33

## 2019-05-31 RX ADMIN — Medication 1 TABLET(S): at 11:06

## 2019-05-31 RX ADMIN — SENNA PLUS 2 TABLET(S): 8.6 TABLET ORAL at 22:16

## 2019-05-31 RX ADMIN — HEPARIN SODIUM 5000 UNIT(S): 5000 INJECTION INTRAVENOUS; SUBCUTANEOUS at 05:41

## 2019-05-31 RX ADMIN — PANTOPRAZOLE SODIUM 40 MILLIGRAM(S): 20 TABLET, DELAYED RELEASE ORAL at 06:07

## 2019-05-31 NOTE — PROGRESS NOTE ADULT - ATTENDING COMMENTS
Patient seen and evaluated independently medical resident note reviewed, I agree with plan and management, except as I have noted.
Patient seen and evaluated independently medical resident note reviewed, I agree with plan and management, except as I have noted. Physiatry recommendations of STR in NH, DC planning
Patient seen and evaluated independently medical resident note reviewed, I agree with plan and management, except as I have noted. I spoke with HCP Lacey at bedside, pt now DNR, DNI comfort measures only

## 2019-05-31 NOTE — PROGRESS NOTE ADULT - ASSESSMENT
Patient denies chest pain sob or palpitations She had multiple CVA. Possibly embolic. Echo mild LVH normal function.  Now DNR DNI. Hospice to see. Can D/C telemetry. Prognosis poor

## 2019-05-31 NOTE — PROGRESS NOTE ADULT - SUBJECTIVE AND OBJECTIVE BOX
Patient is a 89y old  Female who presents with a chief complaint of Lethargy, nausea and back pain x2 days (31 May 2019 07:07)    INTERVAL HPI/OVERNIGHT EVENTS:  no acute events overnight     ICU Vital Signs Last 24 Hrs  T(C): 36.8 (31 May 2019 07:01), Max: 37.4 (30 May 2019 23:04)  T(F): 98.2 (31 May 2019 07:01), Max: 99.3 (30 May 2019 23:04)  HR: 79 (31 May 2019 07:02) (71 - 79)  BP: 153/65 (31 May 2019 07:02) (153/65 - 179/75)  BP(mean): 93 (31 May 2019 07:02) (93 - 108)  RR: 20 (31 May 2019 07:02) (16 - 20)  SpO2: 95% (31 May 2019 07:02) (94% - 95%)      I&O's Summary  30 May 2019 07:01  -  31 May 2019 07:00  --------------------------------------------------------  IN: 1660 mL / OUT: 1800 mL / NET: -140 mL      LABS:                        11.7   9.93  )-----------( 448      ( 31 May 2019 05:45 )             36.3     05-31    138  |  101  |  8<L>  ----------------------------<  118<H>  4.2   |  25  |  0.6<L>    Ca    9.4      31 May 2019 05:45  Mg     1.7     05-31    TPro  6.8  /  Alb  3.2<L>  /  TBili  0.3  /  DBili  x   /  AST  15  /  ALT  11  /  AlkPhos  100  05-31      Consultant(s) Notes Reviewed:  [x ] YES  [ ] NO      MEDICATIONS  (STANDING):  aspirin  chewable 81 milliGRAM(s) Oral daily  chlorhexidine 4% Liquid 1 Application(s) Topical <User Schedule>  clopidogrel Tablet 75 milliGRAM(s) Oral daily  docusate sodium 100 milliGRAM(s) Oral three times a day  heparin  Injectable 5000 Unit(s) SubCutaneous every 12 hours  lactated ringers. 1000 milliLiter(s) (75 mL/Hr) IV Continuous <Continuous>  levothyroxine 25 MICROGram(s) Oral daily  multivitamin 1 Tablet(s) Oral daily  pantoprazole    Tablet 40 milliGRAM(s) Oral before breakfast  senna 2 Tablet(s) Oral at bedtime  simvastatin 20 milliGRAM(s) Oral at bedtime    MEDICATIONS  (PRN):  acetaminophen   Tablet .. 650 milliGRAM(s) Oral every 8 hours PRN Mild Pain (1 - 3)  lactulose Syrup 20 Gram(s) Oral two times a day PRN constipation      PHYSICAL EXAM:  GENERAL: NAD  NERVOUS SYSTEM:  AAOx3, R facial droop, R hemiparesis   CHEST/LUNG: CTAB  HEART: regular   ABDOMEN: Soft, Nontender  EXTREMITIES: no edema      Care Discussed with Consultants/Other Providers [ x] YES  [ ] NO

## 2019-05-31 NOTE — PROGRESS NOTE ADULT - SUBJECTIVE AND OBJECTIVE BOX
Patient is a 89y old  Female who presents with a chief complaint of Lethargy, nausea and back pain x2 days (30 May 2019 10:04)      T(F): 99.3 (05-30-19 @ 23:04), Max: 99.3 (05-30-19 @ 23:04)  HR: 79 (05-30-19 @ 23:04)  BP: 156/65 (05-30-19 @ 23:04)  RR: 16 (05-30-19 @ 23:04)  SpO2: 94% (05-30-19 @ 23:04) (94% - 97%)    PHYSICAL EXAM:  GENERAL: NAD, well-groomed, well-developed  HEAD:  Atraumatic, Normocephalic  EYES: EOMI, PERRLA, conjunctiva and sclera clear  ENMT: No tonsillar erythema, exudates, or enlargement; Moist mucous membranes, Good dentition, No lesions  NECK: Supple, No JVD, Normal thyroid  NERVOUS SYSTEM:  Alert & Oriented X3,  Motor Strength 5/5 B/L upper and lower extremities  CHEST/LUNG: Clear to percussion bilaterally; No rales, rhonchi, wheezing, or rubs  HEART: Regular rate and rhythm; No murmurs, rubs, or gallops  ABDOMEN: Soft, Nontender, Nondistended; Bowel sounds present  EXTREMITIES:   No clubbing, cyanosis, or edema  LYMPH: No lymphadenopathy noted  SKIN: No rashes or lesions    labs  05-30    140  |  102  |  8<L>  ----------------------------<  104<H>  4.5   |  27  |  0.6<L>    Ca    9.4      30 May 2019 05:42  Mg     1.8     05-30    TPro  6.6  /  Alb  3.2<L>  /  TBili  <0.2  /  DBili  x   /  AST  14  /  ALT  11  /  AlkPhos  94  05-30                          11.7   9.93  )-----------( 448      ( 31 May 2019 05:45 )             36.3               acetaminophen   Tablet .. 650 milliGRAM(s) Oral every 8 hours PRN  aspirin  chewable 81 milliGRAM(s) Oral daily  chlorhexidine 4% Liquid 1 Application(s) Topical <User Schedule>  clopidogrel Tablet 75 milliGRAM(s) Oral daily  docusate sodium 100 milliGRAM(s) Oral three times a day  heparin  Injectable 5000 Unit(s) SubCutaneous every 12 hours  lactated ringers. 1000 milliLiter(s) IV Continuous <Continuous>  lactulose Syrup 20 Gram(s) Oral two times a day PRN  levothyroxine 25 MICROGram(s) Oral daily  multivitamin 1 Tablet(s) Oral daily  pantoprazole    Tablet 40 milliGRAM(s) Oral before breakfast  senna 2 Tablet(s) Oral at bedtime  simvastatin 20 milliGRAM(s) Oral at bedtime

## 2019-05-31 NOTE — PROGRESS NOTE ADULT - ASSESSMENT
89 yo F with HFpEF, Vit D deficiency, Hypothyroidism, dementia and chronic alicea initially admitted for pyelonephritis/ subacute stroke, upgraded to CCU after being slurred and unable to move her R side    - Acute CVA  likely embolic in nature  2d echo: nl EF, mild LV hypertrophy  c/w plavix, ASA and zocor   PT / OT on board   c/w dysphagia I diet  DC tele      - Pyelonephritis  completed course of abxs     - Hypothyroidism  c/w synthroid     - Hypertension  BP elevated  DC IVFs   resume if BP<140s.     - Vitamin D deficiency  c/w Vit D supplementation weekly     - Dispo:  pending STR placement   Family dont want hospice anymore   DNR/ DNI  transfer to floor 89 yo F with HFpEF, Vit D deficiency, Hypothyroidism, dementia and chronic alicea initially admitted for pyelonephritis/ subacute stroke, upgraded to CCU after being slurred and unable to move her R side    - Acute CVA  likely embolic in nature  2d echo: nl EF, mild LV hypertrophy  c/w plavix, ASA and zocor   PT / OT on board   c/w dysphagia I diet  DC tele      - Pyelonephritis  completed course of abxs     - Hypothyroidism  c/w synthroid     - Hypertension  BP elevated  DC IVFs   resume if BP<140s.     - Vitamin D deficiency  c/w Vit D supplementation weekly     - Dispo:  pending STR placement   Family doesn't want hospice anymore   DNR/ DNI  transfer to floor

## 2019-06-01 LAB
ALBUMIN SERPL ELPH-MCNC: 2.9 G/DL — LOW (ref 3.5–5.2)
ALP SERPL-CCNC: 94 U/L — SIGNIFICANT CHANGE UP (ref 30–115)
ALT FLD-CCNC: 10 U/L — SIGNIFICANT CHANGE UP (ref 0–41)
ANION GAP SERPL CALC-SCNC: 10 MMOL/L — SIGNIFICANT CHANGE UP (ref 7–14)
AST SERPL-CCNC: 14 U/L — SIGNIFICANT CHANGE UP (ref 0–41)
BASOPHILS # BLD AUTO: 0.07 K/UL — SIGNIFICANT CHANGE UP (ref 0–0.2)
BASOPHILS NFR BLD AUTO: 0.6 % — SIGNIFICANT CHANGE UP (ref 0–1)
BILIRUB SERPL-MCNC: 0.3 MG/DL — SIGNIFICANT CHANGE UP (ref 0.2–1.2)
BUN SERPL-MCNC: 11 MG/DL — SIGNIFICANT CHANGE UP (ref 10–20)
CALCIUM SERPL-MCNC: 8.9 MG/DL — SIGNIFICANT CHANGE UP (ref 8.5–10.1)
CHLORIDE SERPL-SCNC: 102 MMOL/L — SIGNIFICANT CHANGE UP (ref 98–110)
CO2 SERPL-SCNC: 26 MMOL/L — SIGNIFICANT CHANGE UP (ref 17–32)
CREAT SERPL-MCNC: 0.5 MG/DL — LOW (ref 0.7–1.5)
EOSINOPHIL # BLD AUTO: 0.23 K/UL — SIGNIFICANT CHANGE UP (ref 0–0.7)
EOSINOPHIL NFR BLD AUTO: 2.1 % — SIGNIFICANT CHANGE UP (ref 0–8)
GLUCOSE SERPL-MCNC: 115 MG/DL — HIGH (ref 70–99)
HCT VFR BLD CALC: 33.8 % — LOW (ref 37–47)
HGB BLD-MCNC: 10.7 G/DL — LOW (ref 12–16)
IMM GRANULOCYTES NFR BLD AUTO: 0.4 % — HIGH (ref 0.1–0.3)
LYMPHOCYTES # BLD AUTO: 2.67 K/UL — SIGNIFICANT CHANGE UP (ref 1.2–3.4)
LYMPHOCYTES # BLD AUTO: 24.6 % — SIGNIFICANT CHANGE UP (ref 20.5–51.1)
MAGNESIUM SERPL-MCNC: 2.2 MG/DL — SIGNIFICANT CHANGE UP (ref 1.8–2.4)
MCHC RBC-ENTMCNC: 28.6 PG — SIGNIFICANT CHANGE UP (ref 27–31)
MCHC RBC-ENTMCNC: 31.7 G/DL — LOW (ref 32–37)
MCV RBC AUTO: 90.4 FL — SIGNIFICANT CHANGE UP (ref 81–99)
MONOCYTES # BLD AUTO: 1.26 K/UL — HIGH (ref 0.1–0.6)
MONOCYTES NFR BLD AUTO: 11.6 % — HIGH (ref 1.7–9.3)
NEUTROPHILS # BLD AUTO: 6.6 K/UL — HIGH (ref 1.4–6.5)
NEUTROPHILS NFR BLD AUTO: 60.7 % — SIGNIFICANT CHANGE UP (ref 42.2–75.2)
NRBC # BLD: 0 /100 WBCS — SIGNIFICANT CHANGE UP (ref 0–0)
PLATELET # BLD AUTO: 407 K/UL — HIGH (ref 130–400)
POTASSIUM SERPL-MCNC: 4 MMOL/L — SIGNIFICANT CHANGE UP (ref 3.5–5)
POTASSIUM SERPL-SCNC: 4 MMOL/L — SIGNIFICANT CHANGE UP (ref 3.5–5)
PROT SERPL-MCNC: 6.5 G/DL — SIGNIFICANT CHANGE UP (ref 6–8)
RBC # BLD: 3.74 M/UL — LOW (ref 4.2–5.4)
RBC # FLD: 15.2 % — HIGH (ref 11.5–14.5)
SODIUM SERPL-SCNC: 138 MMOL/L — SIGNIFICANT CHANGE UP (ref 135–146)
WBC # BLD: 10.87 K/UL — HIGH (ref 4.8–10.8)
WBC # FLD AUTO: 10.87 K/UL — HIGH (ref 4.8–10.8)

## 2019-06-01 PROCEDURE — 99233 SBSQ HOSP IP/OBS HIGH 50: CPT

## 2019-06-01 RX ORDER — OXYCODONE AND ACETAMINOPHEN 5; 325 MG/1; MG/1
1 TABLET ORAL EVERY 6 HOURS
Refills: 0 | Status: DISCONTINUED | OUTPATIENT
Start: 2019-06-01 | End: 2019-06-04

## 2019-06-01 RX ADMIN — Medication 81 MILLIGRAM(S): at 11:10

## 2019-06-01 RX ADMIN — Medication 100 MILLIGRAM(S): at 14:00

## 2019-06-01 RX ADMIN — Medication 150 MILLIGRAM(S): at 21:43

## 2019-06-01 RX ADMIN — HEPARIN SODIUM 5000 UNIT(S): 5000 INJECTION INTRAVENOUS; SUBCUTANEOUS at 17:23

## 2019-06-01 RX ADMIN — SENNA PLUS 2 TABLET(S): 8.6 TABLET ORAL at 21:43

## 2019-06-01 RX ADMIN — Medication 25 MICROGRAM(S): at 05:20

## 2019-06-01 RX ADMIN — PANTOPRAZOLE SODIUM 40 MILLIGRAM(S): 20 TABLET, DELAYED RELEASE ORAL at 05:20

## 2019-06-01 RX ADMIN — CLOPIDOGREL BISULFATE 75 MILLIGRAM(S): 75 TABLET, FILM COATED ORAL at 11:10

## 2019-06-01 RX ADMIN — Medication 150 MILLIGRAM(S): at 14:27

## 2019-06-01 RX ADMIN — Medication 100 MILLIGRAM(S): at 21:44

## 2019-06-01 RX ADMIN — OXYCODONE AND ACETAMINOPHEN 1 TABLET(S): 5; 325 TABLET ORAL at 14:30

## 2019-06-01 RX ADMIN — HEPARIN SODIUM 5000 UNIT(S): 5000 INJECTION INTRAVENOUS; SUBCUTANEOUS at 05:20

## 2019-06-01 RX ADMIN — Medication 100 MILLIGRAM(S): at 05:20

## 2019-06-01 RX ADMIN — Medication 1 TABLET(S): at 11:10

## 2019-06-01 RX ADMIN — OXYCODONE AND ACETAMINOPHEN 1 TABLET(S): 5; 325 TABLET ORAL at 14:00

## 2019-06-01 RX ADMIN — SIMVASTATIN 20 MILLIGRAM(S): 20 TABLET, FILM COATED ORAL at 21:44

## 2019-06-01 NOTE — PROGRESS NOTE ADULT - SUBJECTIVE AND OBJECTIVE BOX
ASHVIN CUEVAS  89y, Female  Allergy: No Known Allergies    Hospital Day: 9d    Patient seen and examined earlier today.  sitting in chair, no acute issues    PMH/PSH:  PAST MEDICAL & SURGICAL HISTORY:  Diastolic heart failure  Neuropathy  Depression  OA (osteoarthritis)  Hypothyroid  HLD (hyperlipidemia)  S/P ORIF (open reduction internal fixation) fracture      VITALS:  T(F): 98.5 (06-01-19 @ 11:27), Max: 101.9 (05-31-19 @ 18:55)  HR: 80 (06-01-19 @ 05:17)  BP: 149/68 (06-01-19 @ 05:17) (139/66 - 149/68)  RR: 16 (06-01-19 @ 05:17)  SpO2: 96% (06-01-19 @ 06:46)    PHYSICAL EXAM:  GENERAL: NAD  HEENT: MMM  CVS:  RRR  CHEST/LUNG: Good air entry, no wheeze  ABD:  soft, NT/ND +bs  EXTREMITIES:  no edema  NEURO: AOx3, Rt hemiparesis    TESTS & MEASUREMENTS:                          10.7   10.87 )-----------( 407      ( 01 Jun 2019 06:28 )             33.8       06-01    138  |  102  |  11  ----------------------------<  115<H>  4.0   |  26  |  0.5<L>    Ca    8.9      01 Jun 2019 06:28  Mg     2.2     06-01    TPro  6.5  /  Alb  2.9<L>  /  TBili  0.3  /  DBili  x   /  AST  14  /  ALT  10  /  AlkPhos  94  06-01    LIVER FUNCTIONS - ( 01 Jun 2019 06:28 )  Alb: 2.9 g/dL / Pro: 6.5 g/dL / ALK PHOS: 94 U/L / ALT: 10 U/L / AST: 14 U/L / GGT: x             RADIOLOGY & ADDITIONAL TESTS:  < from: CT Head No Cont (05.26.19 @ 18:40) >  IMPRESSION:    In comparison with the prior noncontrast CT scan of the brain dated May   23, 2019:    Interval development of small foci of of diminished attenuation in the   left periventricular white matter and left basal ganglia, consistent with   new acute ischemic change.    Spoke with GINA RUFFIN on 5/27/2019 10:35 AM with readback.    < end of copied text >      MEDICATIONS:  MEDICATIONS  (STANDING):  aspirin  chewable 81 milliGRAM(s) Oral daily  chlorhexidine 4% Liquid 1 Application(s) Topical <User Schedule>  clopidogrel Tablet 75 milliGRAM(s) Oral daily  docusate sodium 100 milliGRAM(s) Oral three times a day  heparin  Injectable 5000 Unit(s) SubCutaneous every 12 hours  levothyroxine 25 MICROGram(s) Oral daily  multivitamin 1 Tablet(s) Oral daily  pantoprazole    Tablet 40 milliGRAM(s) Oral before breakfast  senna 2 Tablet(s) Oral at bedtime  simvastatin 20 milliGRAM(s) Oral at bedtime    MEDICATIONS  (PRN):  acetaminophen   Tablet .. 650 milliGRAM(s) Oral every 8 hours PRN Mild Pain (1 - 3)  lactulose Syrup 20 Gram(s) Oral two times a day PRN constipation      HOME MEDICATIONS:  aspirin 81 mg oral tablet, chewable (05-23)  docusate sodium 100 mg oral capsule (05-23)  DULoxetine 20 mg oral delayed release capsule (05-23)  fesoterodine 4 mg oral tablet, extended release (05-23)  lactulose (05-23)  levothyroxine 25 mcg (0.025 mg) oral tablet (05-23)  Linzess 72 mcg oral capsule (05-23)  lisinopril 5 mg oral tablet (05-23)  Lyrica 150 mg oral capsule (05-23)  mirtazapine 15 mg oral tablet (05-23)  Multiple Vitamins oral tablet (05-23)  NIFEdipine 30 mg oral tablet, extended release (05-23)  omeprazole 20 mg oral delayed release capsule (05-23)  senna oral tablet (05-23)  simvastatin 5 mg oral tablet (05-23)  Toviaz (05-23)  Vitamin D3 50,000 intl units oral capsule (05-23)

## 2019-06-02 LAB
ANION GAP SERPL CALC-SCNC: 8 MMOL/L — SIGNIFICANT CHANGE UP (ref 7–14)
BASOPHILS # BLD AUTO: 0.08 K/UL — SIGNIFICANT CHANGE UP (ref 0–0.2)
BASOPHILS NFR BLD AUTO: 0.9 % — SIGNIFICANT CHANGE UP (ref 0–1)
BUN SERPL-MCNC: 11 MG/DL — SIGNIFICANT CHANGE UP (ref 10–20)
CALCIUM SERPL-MCNC: 8.7 MG/DL — SIGNIFICANT CHANGE UP (ref 8.5–10.1)
CHLORIDE SERPL-SCNC: 103 MMOL/L — SIGNIFICANT CHANGE UP (ref 98–110)
CHOLEST SERPL-MCNC: 123 MG/DL — SIGNIFICANT CHANGE UP (ref 100–200)
CO2 SERPL-SCNC: 28 MMOL/L — SIGNIFICANT CHANGE UP (ref 17–32)
CREAT SERPL-MCNC: 0.5 MG/DL — LOW (ref 0.7–1.5)
EOSINOPHIL # BLD AUTO: 0.24 K/UL — SIGNIFICANT CHANGE UP (ref 0–0.7)
EOSINOPHIL NFR BLD AUTO: 2.6 % — SIGNIFICANT CHANGE UP (ref 0–8)
GLUCOSE SERPL-MCNC: 100 MG/DL — HIGH (ref 70–99)
HCT VFR BLD CALC: 31.3 % — LOW (ref 37–47)
HDLC SERPL-MCNC: 31 MG/DL — LOW
HGB BLD-MCNC: 10 G/DL — LOW (ref 12–16)
IMM GRANULOCYTES NFR BLD AUTO: 0.3 % — SIGNIFICANT CHANGE UP (ref 0.1–0.3)
LIPID PNL WITH DIRECT LDL SERPL: 63 MG/DL — SIGNIFICANT CHANGE UP (ref 4–129)
LYMPHOCYTES # BLD AUTO: 2.74 K/UL — SIGNIFICANT CHANGE UP (ref 1.2–3.4)
LYMPHOCYTES # BLD AUTO: 29.4 % — SIGNIFICANT CHANGE UP (ref 20.5–51.1)
MAGNESIUM SERPL-MCNC: 2.2 MG/DL — SIGNIFICANT CHANGE UP (ref 1.8–2.4)
MCHC RBC-ENTMCNC: 28.7 PG — SIGNIFICANT CHANGE UP (ref 27–31)
MCHC RBC-ENTMCNC: 31.9 G/DL — LOW (ref 32–37)
MCV RBC AUTO: 89.7 FL — SIGNIFICANT CHANGE UP (ref 81–99)
MONOCYTES # BLD AUTO: 1.1 K/UL — HIGH (ref 0.1–0.6)
MONOCYTES NFR BLD AUTO: 11.8 % — HIGH (ref 1.7–9.3)
NEUTROPHILS # BLD AUTO: 5.12 K/UL — SIGNIFICANT CHANGE UP (ref 1.4–6.5)
NEUTROPHILS NFR BLD AUTO: 55 % — SIGNIFICANT CHANGE UP (ref 42.2–75.2)
NRBC # BLD: 0 /100 WBCS — SIGNIFICANT CHANGE UP (ref 0–0)
PLATELET # BLD AUTO: 363 K/UL — SIGNIFICANT CHANGE UP (ref 130–400)
POTASSIUM SERPL-MCNC: 4 MMOL/L — SIGNIFICANT CHANGE UP (ref 3.5–5)
POTASSIUM SERPL-SCNC: 4 MMOL/L — SIGNIFICANT CHANGE UP (ref 3.5–5)
RBC # BLD: 3.49 M/UL — LOW (ref 4.2–5.4)
RBC # FLD: 14.9 % — HIGH (ref 11.5–14.5)
SODIUM SERPL-SCNC: 139 MMOL/L — SIGNIFICANT CHANGE UP (ref 135–146)
TOTAL CHOLESTEROL/HDL RATIO MEASUREMENT: 4 RATIO — SIGNIFICANT CHANGE UP (ref 4–5.5)
TRIGL SERPL-MCNC: 202 MG/DL — HIGH (ref 10–149)
WBC # BLD: 9.31 K/UL — SIGNIFICANT CHANGE UP (ref 4.8–10.8)
WBC # FLD AUTO: 9.31 K/UL — SIGNIFICANT CHANGE UP (ref 4.8–10.8)

## 2019-06-02 PROCEDURE — 99232 SBSQ HOSP IP/OBS MODERATE 35: CPT

## 2019-06-02 RX ADMIN — Medication 100 MILLIGRAM(S): at 21:24

## 2019-06-02 RX ADMIN — SENNA PLUS 2 TABLET(S): 8.6 TABLET ORAL at 21:24

## 2019-06-02 RX ADMIN — SIMVASTATIN 20 MILLIGRAM(S): 20 TABLET, FILM COATED ORAL at 21:24

## 2019-06-02 RX ADMIN — Medication 100 MILLIGRAM(S): at 06:12

## 2019-06-02 RX ADMIN — Medication 100 MILLIGRAM(S): at 14:33

## 2019-06-02 RX ADMIN — CLOPIDOGREL BISULFATE 75 MILLIGRAM(S): 75 TABLET, FILM COATED ORAL at 11:51

## 2019-06-02 RX ADMIN — Medication 25 MICROGRAM(S): at 06:11

## 2019-06-02 RX ADMIN — Medication 81 MILLIGRAM(S): at 11:51

## 2019-06-02 RX ADMIN — HEPARIN SODIUM 5000 UNIT(S): 5000 INJECTION INTRAVENOUS; SUBCUTANEOUS at 17:21

## 2019-06-02 RX ADMIN — CHLORHEXIDINE GLUCONATE 1 APPLICATION(S): 213 SOLUTION TOPICAL at 06:12

## 2019-06-02 RX ADMIN — Medication 150 MILLIGRAM(S): at 14:32

## 2019-06-02 RX ADMIN — Medication 150 MILLIGRAM(S): at 21:24

## 2019-06-02 RX ADMIN — Medication 1 TABLET(S): at 11:51

## 2019-06-02 RX ADMIN — Medication 150 MILLIGRAM(S): at 06:11

## 2019-06-02 RX ADMIN — PANTOPRAZOLE SODIUM 40 MILLIGRAM(S): 20 TABLET, DELAYED RELEASE ORAL at 06:11

## 2019-06-02 RX ADMIN — HEPARIN SODIUM 5000 UNIT(S): 5000 INJECTION INTRAVENOUS; SUBCUTANEOUS at 06:12

## 2019-06-02 RX ADMIN — OXYCODONE AND ACETAMINOPHEN 1 TABLET(S): 5; 325 TABLET ORAL at 14:44

## 2019-06-02 NOTE — PROGRESS NOTE ADULT - ASSESSMENT
89 yo F with HFpEF, Vit D deficiency, Hypothyroidism, dementia and chronic alicea initially admitted for pyelonephritis/ subacute stroke, upgraded to CCU after being slurred and unable to move her R side    - Acute CVA  likely embolic in nature  2d echo: nl EF, mild LV hypertrophy  c/w plavix, ASA and zocor   PT / OT on board   c/w dysphagia I diet  -d/c planning      - Pyelonephritis  completed course of abxs     - Hypothyroidism  c/w synthroid     - Hypertension  BP elevated  resume if BP<140s.     - Vitamin D deficiency  c/w Vit D supplementation weekly    PPx - HSQ    DNR/DNI    Progress Note Handoff  Pending:  STR placement.    Patient/Family discussion: family doesn't want hospice anymore  Disposition: STR pending likely Monday     no need for new labs

## 2019-06-03 ENCOUNTER — TRANSCRIPTION ENCOUNTER (OUTPATIENT)
Age: 84
End: 2019-06-03

## 2019-06-03 PROCEDURE — 99232 SBSQ HOSP IP/OBS MODERATE 35: CPT

## 2019-06-03 RX ADMIN — Medication 100 MILLIGRAM(S): at 14:18

## 2019-06-03 RX ADMIN — Medication 100 MILLIGRAM(S): at 21:11

## 2019-06-03 RX ADMIN — HEPARIN SODIUM 5000 UNIT(S): 5000 INJECTION INTRAVENOUS; SUBCUTANEOUS at 05:39

## 2019-06-03 RX ADMIN — Medication 1 TABLET(S): at 11:42

## 2019-06-03 RX ADMIN — Medication 150 MILLIGRAM(S): at 14:18

## 2019-06-03 RX ADMIN — Medication 150 MILLIGRAM(S): at 21:12

## 2019-06-03 RX ADMIN — CHLORHEXIDINE GLUCONATE 1 APPLICATION(S): 213 SOLUTION TOPICAL at 05:38

## 2019-06-03 RX ADMIN — OXYCODONE AND ACETAMINOPHEN 1 TABLET(S): 5; 325 TABLET ORAL at 18:16

## 2019-06-03 RX ADMIN — Medication 150 MILLIGRAM(S): at 05:38

## 2019-06-03 RX ADMIN — SIMVASTATIN 20 MILLIGRAM(S): 20 TABLET, FILM COATED ORAL at 21:11

## 2019-06-03 RX ADMIN — PANTOPRAZOLE SODIUM 40 MILLIGRAM(S): 20 TABLET, DELAYED RELEASE ORAL at 06:00

## 2019-06-03 RX ADMIN — HEPARIN SODIUM 5000 UNIT(S): 5000 INJECTION INTRAVENOUS; SUBCUTANEOUS at 17:23

## 2019-06-03 RX ADMIN — SENNA PLUS 2 TABLET(S): 8.6 TABLET ORAL at 21:11

## 2019-06-03 RX ADMIN — Medication 81 MILLIGRAM(S): at 11:42

## 2019-06-03 RX ADMIN — Medication 25 MICROGRAM(S): at 05:38

## 2019-06-03 RX ADMIN — CLOPIDOGREL BISULFATE 75 MILLIGRAM(S): 75 TABLET, FILM COATED ORAL at 11:42

## 2019-06-03 RX ADMIN — Medication 100 MILLIGRAM(S): at 05:39

## 2019-06-03 NOTE — DISCHARGE NOTE NURSING/CASE MANAGEMENT/SOCIAL WORK - NSDCPEPT PROEDHF_GEN_ALL_CORE
Monitor weight daily/Low salt diet/Call primary care provider for follow up after discharge/Activities as tolerated/Report signs and symptoms to primary care provider

## 2019-06-03 NOTE — DISCHARGE NOTE NURSING/CASE MANAGEMENT/SOCIAL WORK - NSDCDPATPORTLINK_GEN_ALL_CORE
You can access the [facility], offered by [facility], by registering with the following website: [email or web address]

## 2019-06-03 NOTE — PROGRESS NOTE ADULT - SUBJECTIVE AND OBJECTIVE BOX
TIANABERNIE KRAMERHELEN  89y, Female  Allergy: No Known Allergies    Hospital Day: 9d    Patient seen and examined at bedside this morning; sitting up in bed  -no events overnight  -alicea renewed   -d/c planning to STR   -frequent turning and skin care as per nursing     PMH/PSH:  PAST MEDICAL & SURGICAL HISTORY:  Diastolic heart failure  Neuropathy  Depression  OA (osteoarthritis)  Hypothyroid  HLD (hyperlipidemia)  S/P ORIF (open reduction internal fixation) fracture    Vital Signs Last 24 Hrs  T(C): 36.3 (03 Jun 2019 05:20), Max: 36.6 (02 Jun 2019 22:00)  T(F): 97.4 (03 Jun 2019 05:20), Max: 97.9 (02 Jun 2019 22:00)  HR: 70 (03 Jun 2019 05:20) (65 - 70)  BP: 137/61 (03 Jun 2019 05:20) (137/60 - 147/63)  RR: 16 (03 Jun 2019 08:36) (16 - 16)  SpO2: 99% (03 Jun 2019 08:36) (99% - 99%)    PHYSICAL EXAM:  GENERAL: NAD  HEENT: MMM  CVS:  RRR  CHEST/LUNG: Good air entry, no wheeze  ABD:  soft, NT/ND +bs  EXTREMITIES:  no edema  NEURO: AOx3, Rt hemiparesis    TESTS & MEASUREMENTS:                          10.7   10.87 )-----------( 407      ( 01 Jun 2019 06:28 )             33.8       06-01    138  |  102  |  11  ----------------------------<  115<H>  4.0   |  26  |  0.5<L>    Ca    8.9      01 Jun 2019 06:28  Mg     2.2     06-01    TPro  6.5  /  Alb  2.9<L>  /  TBili  0.3  /  DBili  x   /  AST  14  /  ALT  10  /  AlkPhos  94  06-01    LIVER FUNCTIONS - ( 01 Jun 2019 06:28 )  Alb: 2.9 g/dL / Pro: 6.5 g/dL / ALK PHOS: 94 U/L / ALT: 10 U/L / AST: 14 U/L / GGT: x             RADIOLOGY & ADDITIONAL TESTS:  < from: CT Head No Cont (05.26.19 @ 18:40) >  IMPRESSION:    In comparison with the prior noncontrast CT scan of the brain dated May   23, 2019:    Interval development of small foci of of diminished attenuation in the   left periventricular white matter and left basal ganglia, consistent with   new acute ischemic change.    Spoke with GINA RUFFIN on 5/27/2019 10:35 AM with readback.    < end of copied text >      MEDICATIONS  (STANDING):  aspirin  chewable 81 milliGRAM(s) Oral daily  chlorhexidine 4% Liquid 1 Application(s) Topical <User Schedule>  clopidogrel Tablet 75 milliGRAM(s) Oral daily  docusate sodium 100 milliGRAM(s) Oral three times a day  heparin  Injectable 5000 Unit(s) SubCutaneous every 12 hours  levothyroxine 25 MICROGram(s) Oral daily  multivitamin 1 Tablet(s) Oral daily  pantoprazole    Tablet 40 milliGRAM(s) Oral before breakfast  pregabalin 150 milliGRAM(s) Oral every 8 hours  senna 2 Tablet(s) Oral at bedtime  simvastatin 20 milliGRAM(s) Oral at bedtime    MEDICATIONS  (PRN):  acetaminophen   Tablet .. 650 milliGRAM(s) Oral every 8 hours PRN Mild Pain (1 - 3)  lactulose Syrup 20 Gram(s) Oral two times a day PRN constipation  oxyCODONE    5 mG/acetaminophen 325 mG 1 Tablet(s) Oral every 6 hours PRN Severe Pain (7 - 10)      HOME MEDICATIONS:  aspirin 81 mg oral tablet, chewable (05-23)  docusate sodium 100 mg oral capsule (05-23)  DULoxetine 20 mg oral delayed release capsule (05-23)  fesoterodine 4 mg oral tablet, extended release (05-23)  lactulose (05-23)  levothyroxine 25 mcg (0.025 mg) oral tablet (05-23)  Linzess 72 mcg oral capsule (05-23)  lisinopril 5 mg oral tablet (05-23)  Lyrica 150 mg oral capsule (05-23)  mirtazapine 15 mg oral tablet (05-23)  Multiple Vitamins oral tablet (05-23)  NIFEdipine 30 mg oral tablet, extended release (05-23)  omeprazole 20 mg oral delayed release capsule (05-23)  senna oral tablet (05-23)  simvastatin 5 mg oral tablet (05-23)  Toviaz (05-23)  Vitamin D3 50,000 intl units oral capsule (05-23) TIANABERNIE KRAMERHELEN  89y, Female  Allergy: No Known Allergies    Hospital Day: 10d    Patient seen and examined at bedside this morning; sitting up in bed  -no events overnight  -alicea renewed   -d/c planning to STR   -frequent turning and skin care as per nursing     PMH/PSH:  PAST MEDICAL & SURGICAL HISTORY:  Diastolic heart failure  Neuropathy  Depression  OA (osteoarthritis)  Hypothyroid  HLD (hyperlipidemia)  S/P ORIF (open reduction internal fixation) fracture    Vital Signs Last 24 Hrs  T(C): 36.3 (03 Jun 2019 05:20), Max: 36.6 (02 Jun 2019 22:00)  T(F): 97.4 (03 Jun 2019 05:20), Max: 97.9 (02 Jun 2019 22:00)  HR: 70 (03 Jun 2019 05:20) (65 - 70)  BP: 137/61 (03 Jun 2019 05:20) (137/60 - 147/63)  RR: 16 (03 Jun 2019 08:36) (16 - 16)  SpO2: 99% (03 Jun 2019 08:36) (99% - 99%)    PHYSICAL EXAM:  GENERAL: NAD  HEENT: MMM  CVS:  RRR  CHEST/LUNG: Good air entry, no wheeze  ABD:  soft, NT/ND +bs  EXTREMITIES:  no edema  NEURO: AOx3, Rt hemiparesis    TESTS & MEASUREMENTS:                          10.7   10.87 )-----------( 407      ( 01 Jun 2019 06:28 )             33.8       06-01    138  |  102  |  11  ----------------------------<  115<H>  4.0   |  26  |  0.5<L>    Ca    8.9      01 Jun 2019 06:28  Mg     2.2     06-01    TPro  6.5  /  Alb  2.9<L>  /  TBili  0.3  /  DBili  x   /  AST  14  /  ALT  10  /  AlkPhos  94  06-01    LIVER FUNCTIONS - ( 01 Jun 2019 06:28 )  Alb: 2.9 g/dL / Pro: 6.5 g/dL / ALK PHOS: 94 U/L / ALT: 10 U/L / AST: 14 U/L / GGT: x             RADIOLOGY & ADDITIONAL TESTS:  < from: CT Head No Cont (05.26.19 @ 18:40) >  IMPRESSION:    In comparison with the prior noncontrast CT scan of the brain dated May   23, 2019:    Interval development of small foci of of diminished attenuation in the   left periventricular white matter and left basal ganglia, consistent with   new acute ischemic change.    Spoke with GINA RUFFIN on 5/27/2019 10:35 AM with readback.    < end of copied text >      MEDICATIONS  (STANDING):  aspirin  chewable 81 milliGRAM(s) Oral daily  chlorhexidine 4% Liquid 1 Application(s) Topical <User Schedule>  clopidogrel Tablet 75 milliGRAM(s) Oral daily  docusate sodium 100 milliGRAM(s) Oral three times a day  heparin  Injectable 5000 Unit(s) SubCutaneous every 12 hours  levothyroxine 25 MICROGram(s) Oral daily  multivitamin 1 Tablet(s) Oral daily  pantoprazole    Tablet 40 milliGRAM(s) Oral before breakfast  pregabalin 150 milliGRAM(s) Oral every 8 hours  senna 2 Tablet(s) Oral at bedtime  simvastatin 20 milliGRAM(s) Oral at bedtime    MEDICATIONS  (PRN):  acetaminophen   Tablet .. 650 milliGRAM(s) Oral every 8 hours PRN Mild Pain (1 - 3)  lactulose Syrup 20 Gram(s) Oral two times a day PRN constipation  oxyCODONE    5 mG/acetaminophen 325 mG 1 Tablet(s) Oral every 6 hours PRN Severe Pain (7 - 10)      HOME MEDICATIONS:  aspirin 81 mg oral tablet, chewable (05-23)  docusate sodium 100 mg oral capsule (05-23)  DULoxetine 20 mg oral delayed release capsule (05-23)  fesoterodine 4 mg oral tablet, extended release (05-23)  lactulose (05-23)  levothyroxine 25 mcg (0.025 mg) oral tablet (05-23)  Linzess 72 mcg oral capsule (05-23)  lisinopril 5 mg oral tablet (05-23)  Lyrica 150 mg oral capsule (05-23)  mirtazapine 15 mg oral tablet (05-23)  Multiple Vitamins oral tablet (05-23)  NIFEdipine 30 mg oral tablet, extended release (05-23)  omeprazole 20 mg oral delayed release capsule (05-23)  senna oral tablet (05-23)  simvastatin 5 mg oral tablet (05-23)  Toviaz (05-23)  Vitamin D3 50,000 intl units oral capsule (05-23)

## 2019-06-03 NOTE — PROGRESS NOTE ADULT - ASSESSMENT
89 yo F with HFpEF, Vit D deficiency, Hypothyroidism, dementia and chronic alicea initially admitted for pyelonephritis/ subacute stroke, upgraded to CCU after being slurred and unable to move her R side    - Acute CVA  likely embolic in nature  2d echo: nl EF, mild LV hypertrophy  c/w plavix, ASA and zocor   PT / OT on board   c/w dysphagia I diet  -d/c planning      - Pyelonephritis  completed course of abxs     - Hypothyroidism  c/w synthroid     - Hypertension  BP elevated  resume if BP<140s.     - Vitamin D deficiency  c/w Vit D supplementation weekly    PPx - HSQ    DNR/DNI    Progress Note Handoff  Pending:  STR placement.    Patient/Family discussion: family doesn't want hospice anymore  Disposition: STR pending; may d/c once bed available     no need for new labs

## 2019-06-03 NOTE — DISCHARGE NOTE NURSING/CASE MANAGEMENT/SOCIAL WORK - NSDCPEPTSTRK_GEN_ALL_CORE
Stroke education booklet/Stroke support groups for patients, families, and friends/Stroke warning signs and symptoms/Signs and symptoms of stroke/Prescribed medications/Risk factors for stroke/Call 911 for stroke/Need for follow up after discharge

## 2019-06-04 ENCOUNTER — TRANSCRIPTION ENCOUNTER (OUTPATIENT)
Age: 84
End: 2019-06-04

## 2019-06-04 VITALS
DIASTOLIC BLOOD PRESSURE: 72 MMHG | RESPIRATION RATE: 16 BRPM | SYSTOLIC BLOOD PRESSURE: 171 MMHG | HEART RATE: 68 BPM | TEMPERATURE: 98 F

## 2019-06-04 PROCEDURE — 99239 HOSP IP/OBS DSCHRG MGMT >30: CPT

## 2019-06-04 RX ORDER — SIMVASTATIN 20 MG/1
1 TABLET, FILM COATED ORAL
Qty: 30 | Refills: 0
Start: 2019-06-04 | End: 2019-07-03

## 2019-06-04 RX ORDER — CHOLECALCIFEROL (VITAMIN D3) 125 MCG
1 CAPSULE ORAL
Qty: 0 | Refills: 0 | DISCHARGE

## 2019-06-04 RX ORDER — CLOPIDOGREL BISULFATE 75 MG/1
1 TABLET, FILM COATED ORAL
Qty: 30 | Refills: 0
Start: 2019-06-04 | End: 2019-07-03

## 2019-06-04 RX ORDER — FUROSEMIDE 40 MG
1 TABLET ORAL
Qty: 30 | Refills: 0
Start: 2019-06-04 | End: 2019-07-03

## 2019-06-04 RX ORDER — MIRTAZAPINE 45 MG/1
1 TABLET, ORALLY DISINTEGRATING ORAL
Qty: 0 | Refills: 0 | DISCHARGE

## 2019-06-04 RX ORDER — LINACLOTIDE 145 UG/1
1 CAPSULE, GELATIN COATED ORAL
Qty: 0 | Refills: 0 | DISCHARGE

## 2019-06-04 RX ORDER — ACETAMINOPHEN 500 MG
2 TABLET ORAL
Qty: 0 | Refills: 0 | DISCHARGE
Start: 2019-06-04

## 2019-06-04 RX ORDER — LACTULOSE 10 G/15ML
0 SOLUTION ORAL
Qty: 0 | Refills: 0 | DISCHARGE

## 2019-06-04 RX ORDER — SIMVASTATIN 20 MG/1
1 TABLET, FILM COATED ORAL
Qty: 0 | Refills: 0 | DISCHARGE

## 2019-06-04 RX ORDER — PANTOPRAZOLE SODIUM 20 MG/1
1 TABLET, DELAYED RELEASE ORAL
Qty: 30 | Refills: 0
Start: 2019-06-04 | End: 2019-07-03

## 2019-06-04 RX ORDER — FESOTERODINE FUMARATE 8 MG/1
0 TABLET, FILM COATED, EXTENDED RELEASE ORAL
Qty: 0 | Refills: 0 | DISCHARGE

## 2019-06-04 RX ORDER — OMEPRAZOLE 10 MG/1
1 CAPSULE, DELAYED RELEASE ORAL
Qty: 0 | Refills: 0 | DISCHARGE

## 2019-06-04 RX ADMIN — Medication 25 MICROGRAM(S): at 05:10

## 2019-06-04 RX ADMIN — CHLORHEXIDINE GLUCONATE 1 APPLICATION(S): 213 SOLUTION TOPICAL at 05:10

## 2019-06-04 RX ADMIN — Medication 1 TABLET(S): at 11:06

## 2019-06-04 RX ADMIN — Medication 150 MILLIGRAM(S): at 05:10

## 2019-06-04 RX ADMIN — HEPARIN SODIUM 5000 UNIT(S): 5000 INJECTION INTRAVENOUS; SUBCUTANEOUS at 05:10

## 2019-06-04 RX ADMIN — Medication 81 MILLIGRAM(S): at 11:06

## 2019-06-04 RX ADMIN — Medication 100 MILLIGRAM(S): at 13:47

## 2019-06-04 RX ADMIN — Medication 100 MILLIGRAM(S): at 05:10

## 2019-06-04 RX ADMIN — CLOPIDOGREL BISULFATE 75 MILLIGRAM(S): 75 TABLET, FILM COATED ORAL at 11:06

## 2019-06-04 RX ADMIN — PANTOPRAZOLE SODIUM 40 MILLIGRAM(S): 20 TABLET, DELAYED RELEASE ORAL at 06:04

## 2019-06-04 RX ADMIN — Medication 150 MILLIGRAM(S): at 13:47

## 2019-06-04 NOTE — DISCHARGE NOTE PROVIDER - CARE PROVIDER_API CALL
SUMAN,   Phone: (   )    -  Fax: (   )    -  Follow Up Time:     Marilin Gaming)  Neurology  [address] [place] [address], [address]  [place] [address]  Phone: [phone]  Fax: [phone]  Follow Up Time:

## 2019-06-04 NOTE — PROGRESS NOTE ADULT - PROVIDER SPECIALTY LIST ADULT
CCU
Cardiology
Cardiology
Hospitalist
Internal Medicine
Neurology
CCU

## 2019-06-04 NOTE — DISCHARGE NOTE PROVIDER - CARE PROVIDERS DIRECT ADDRESSES
,DirectAddress_Unknown,[email or web address]

## 2019-06-04 NOTE — PROGRESS NOTE ADULT - ASSESSMENT
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

## 2019-06-04 NOTE — PROGRESS NOTE ADULT - REASON FOR ADMISSION
Lethargy, nausea and back pain x2 days

## 2019-06-04 NOTE — PROGRESS NOTE ADULT - SUBJECTIVE AND OBJECTIVE BOX
TIANABERNIE KRAMERHELEN  89y, Female  Allergy: No Known Allergies    Hospital Day: 10d    Patient seen and examined at bedside this morning; sitting up in bed  -no events overnight  -alicea renewed   -HHA arranged by daughter and she wants to take the patient home today (no STR as they owe money to NH)  -pt is stable to be discharged home with HHA/home services   -medications sent to her pharmacy     PMH/PSH:  PAST MEDICAL & SURGICAL HISTORY:  Diastolic heart failure  Neuropathy  Depression  OA (osteoarthritis)  Hypothyroid  HLD (hyperlipidemia)  S/P ORIF (open reduction internal fixation) fracture    Vital Signs Last 24 Hrs  T(C): 35.8 (04 Jun 2019 05:05), Max: 36.6 (03 Jun 2019 13:31)  T(F): 96.5 (04 Jun 2019 05:05), Max: 97.8 (03 Jun 2019 13:31)  HR: 62 (04 Jun 2019 05:05) (62 - 84)  BP: 148/65 (04 Jun 2019 05:05) (144/65 - 152/62)  RR: 16 (04 Jun 2019 05:05) (16 - 16)  SpO2: 95% (04 Jun 2019 05:31) (94% - 96%)    PHYSICAL EXAM:  GENERAL: NAD  HEENT: MMM  CVS:  RRR  CHEST/LUNG: Good air entry, no wheeze  ABD:  soft, NT/ND +bs  EXTREMITIES:  no edema  NEURO: AOx3, Rt hemiparesis    TESTS & MEASUREMENTS:                          10.7   10.87 )-----------( 407      ( 01 Jun 2019 06:28 )             33.8       06-01    138  |  102  |  11  ----------------------------<  115<H>  4.0   |  26  |  0.5<L>    Ca    8.9      01 Jun 2019 06:28  Mg     2.2     06-01    TPro  6.5  /  Alb  2.9<L>  /  TBili  0.3  /  DBili  x   /  AST  14  /  ALT  10  /  AlkPhos  94  06-01    LIVER FUNCTIONS - ( 01 Jun 2019 06:28 )  Alb: 2.9 g/dL / Pro: 6.5 g/dL / ALK PHOS: 94 U/L / ALT: 10 U/L / AST: 14 U/L / GGT: x             RADIOLOGY & ADDITIONAL TESTS:  < from: CT Head No Cont (05.26.19 @ 18:40) >  IMPRESSION:    In comparison with the prior noncontrast CT scan of the brain dated May   23, 2019:    Interval development of small foci of of diminished attenuation in the   left periventricular white matter and left basal ganglia, consistent with   new acute ischemic change.    Spoke with GINA RUFFIN on 5/27/2019 10:35 AM with readback.    < end of copied text >      MEDICATIONS  (STANDING):  aspirin  chewable 81 milliGRAM(s) Oral daily  chlorhexidine 4% Liquid 1 Application(s) Topical <User Schedule>  clopidogrel Tablet 75 milliGRAM(s) Oral daily  docusate sodium 100 milliGRAM(s) Oral three times a day  heparin  Injectable 5000 Unit(s) SubCutaneous every 12 hours  levothyroxine 25 MICROGram(s) Oral daily  multivitamin 1 Tablet(s) Oral daily  pantoprazole    Tablet 40 milliGRAM(s) Oral before breakfast  pregabalin 150 milliGRAM(s) Oral every 8 hours  senna 2 Tablet(s) Oral at bedtime  simvastatin 20 milliGRAM(s) Oral at bedtime    MEDICATIONS  (PRN):  acetaminophen   Tablet .. 650 milliGRAM(s) Oral every 8 hours PRN Mild Pain (1 - 3)  lactulose Syrup 20 Gram(s) Oral two times a day PRN constipation  oxyCODONE    5 mG/acetaminophen 325 mG 1 Tablet(s) Oral every 6 hours PRN Severe Pain (7 - 10)    aspirin 81 mg oral tablet, chewable (05-23)  docusate sodium 100 mg oral capsule (05-23)  DULoxetine 20 mg oral delayed release capsule (05-23)  fesoterodine 4 mg oral tablet, extended release (05-23)  lactulose (05-23)  levothyroxine 25 mcg (0.025 mg) oral tablet (05-23)  Linzess 72 mcg oral capsule (05-23)  lisinopril 5 mg oral tablet (05-23)  Lyrica 150 mg oral capsule (05-23)  mirtazapine 15 mg oral tablet (05-23)  Multiple Vitamins oral tablet (05-23)  NIFEdipine 30 mg oral tablet, extended release (05-23)  omeprazole 20 mg oral delayed release capsule (05-23)  senna oral tablet (05-23)  simvastatin 5 mg oral tablet (05-23)  Toviaz (05-23)  Vitamin D3 50,000 intl units oral capsule (05-23)

## 2019-06-04 NOTE — DISCHARGE NOTE PROVIDER - NSDCCPCAREPLAN_GEN_ALL_CORE_FT
PRINCIPAL DISCHARGE DIAGNOSIS  Diagnosis: UTI (urinary tract infection)  Assessment and Plan of Treatment: completed course of antibiotics      SECONDARY DISCHARGE DIAGNOSES  Diagnosis: Stroke  Assessment and Plan of Treatment: subacute CVA; continue with Aspirin, plavix, and statins with outpatient neurology follow up and PT as tolerated

## 2019-06-04 NOTE — DISCHARGE NOTE PROVIDER - HOSPITAL COURSE
This is a 89 year old female with PMHx of right hip ORIF in 2016, Femur fx s/p ORIF 11/2018, diastolic CHF, diastolic CHF, Vit D deficiency, Hypothyroid, hyponatremia, dementia,  chronic alicea brought in by family for worsening lethargy, back pain and nausea x2 days. No fevers, chills, CP, SOB, palpitations, abdominal pain, change in bowel or urinary habits.         Of note, patient has been off her lisinopril and Nifedipine x few months due to controlled BP    Found to have acute/subacute CVA and b/l perinephric         patient found to have subacute CVA (ASA/Plavix/Statins; Neuro follow ups) and pyelonephritis; monitored in CCU; treated with antibiotics (completed course of antibiotics)    -pt downgraded to medical floor for d/c planning to STR. Case management worked on the dispo and the daughter arranged HHA and wanted to take her mother home     -medications sent to her pharmacy    -low dose CHF meds resumed with instructions of close BP and BMP monitoring        pt stable to be discharged home with HHA today        Spent over 45 mins d/c planning and med rec/papers

## 2019-06-05 PROBLEM — I50.30 UNSPECIFIED DIASTOLIC (CONGESTIVE) HEART FAILURE: Chronic | Status: ACTIVE | Noted: 2019-05-23

## 2019-06-06 ENCOUNTER — APPOINTMENT (OUTPATIENT)
Dept: GERIATRICS | Facility: HOME HEALTH | Age: 84
End: 2019-06-06
Payer: MEDICARE

## 2019-06-06 PROCEDURE — 99496 TRANSJ CARE MGMT HIGH F2F 7D: CPT

## 2019-06-07 DIAGNOSIS — R29.709 NIHSS SCORE 9: ICD-10-CM

## 2019-06-07 DIAGNOSIS — E03.9 HYPOTHYROIDISM, UNSPECIFIED: ICD-10-CM

## 2019-06-07 DIAGNOSIS — E55.9 VITAMIN D DEFICIENCY, UNSPECIFIED: ICD-10-CM

## 2019-06-07 DIAGNOSIS — Z66 DO NOT RESUSCITATE: ICD-10-CM

## 2019-06-07 DIAGNOSIS — I48.92 UNSPECIFIED ATRIAL FLUTTER: ICD-10-CM

## 2019-06-07 DIAGNOSIS — F32.9 MAJOR DEPRESSIVE DISORDER, SINGLE EPISODE, UNSPECIFIED: ICD-10-CM

## 2019-06-07 DIAGNOSIS — N18.2 CHRONIC KIDNEY DISEASE, STAGE 2 (MILD): ICD-10-CM

## 2019-06-07 DIAGNOSIS — I63.40 CEREBRAL INFARCTION DUE TO EMBOLISM OF UNSPECIFIED CEREBRAL ARTERY: ICD-10-CM

## 2019-06-07 DIAGNOSIS — B96.20 UNSPECIFIED ESCHERICHIA COLI [E. COLI] AS THE CAUSE OF DISEASES CLASSIFIED ELSEWHERE: ICD-10-CM

## 2019-06-07 DIAGNOSIS — N15.1 RENAL AND PERINEPHRIC ABSCESS: ICD-10-CM

## 2019-06-07 DIAGNOSIS — N10 ACUTE PYELONEPHRITIS: ICD-10-CM
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
## 2019-06-07 DIAGNOSIS — R41.82 ALTERED MENTAL STATUS, UNSPECIFIED: ICD-10-CM

## 2019-06-07 DIAGNOSIS — H70.91 UNSPECIFIED MASTOIDITIS, RIGHT EAR: ICD-10-CM

## 2019-06-07 DIAGNOSIS — Z79.82 LONG TERM (CURRENT) USE OF ASPIRIN: ICD-10-CM

## 2019-06-07 DIAGNOSIS — T83.511A INFECTION AND INFLAMMATORY REACTION DUE TO INDWELLING URETHRAL CATHETER, INITIAL ENCOUNTER: ICD-10-CM

## 2019-06-07 DIAGNOSIS — I50.32 CHRONIC DIASTOLIC (CONGESTIVE) HEART FAILURE: ICD-10-CM

## 2019-06-07 DIAGNOSIS — F03.90 UNSPECIFIED DEMENTIA WITHOUT BEHAVIORAL DISTURBANCE: ICD-10-CM

## 2019-06-07 DIAGNOSIS — E87.6 HYPOKALEMIA: ICD-10-CM

## 2019-06-07 DIAGNOSIS — I13.0 HYPERTENSIVE HEART AND CHRONIC KIDNEY DISEASE WITH HEART FAILURE AND STAGE 1 THROUGH STAGE 4 CHRONIC KIDNEY DISEASE, OR UNSPECIFIED CHRONIC KIDNEY DISEASE: ICD-10-CM

## 2019-06-07 DIAGNOSIS — E78.5 HYPERLIPIDEMIA, UNSPECIFIED: ICD-10-CM

## 2019-06-07 DIAGNOSIS — Z74.01 BED CONFINEMENT STATUS: ICD-10-CM

## 2019-06-27 ENCOUNTER — MOBILE ON CALL (OUTPATIENT)
Age: 84
End: 2019-06-27

## 2019-06-27 ENCOUNTER — INPATIENT (INPATIENT)
Facility: HOSPITAL | Age: 84
LOS: 4 days | Discharge: HOME HEALTH | End: 2019-07-02
Attending: HOSPITALIST | Admitting: HOSPITALIST
Payer: MEDICARE

## 2019-06-27 VITALS
OXYGEN SATURATION: 97 % | RESPIRATION RATE: 20 BRPM | SYSTOLIC BLOOD PRESSURE: 105 MMHG | HEART RATE: 62 BPM | DIASTOLIC BLOOD PRESSURE: 47 MMHG

## 2019-06-27 DIAGNOSIS — Z96.7 PRESENCE OF OTHER BONE AND TENDON IMPLANTS: Chronic | ICD-10-CM

## 2019-06-27 LAB
ALBUMIN SERPL ELPH-MCNC: 3.2 G/DL — LOW (ref 3.5–5.2)
ALP SERPL-CCNC: 83 U/L — SIGNIFICANT CHANGE UP (ref 30–115)
ALT FLD-CCNC: 19 U/L — SIGNIFICANT CHANGE UP (ref 0–41)
ANION GAP SERPL CALC-SCNC: 13 MMOL/L — SIGNIFICANT CHANGE UP (ref 7–14)
APPEARANCE UR: ABNORMAL
AST SERPL-CCNC: 22 U/L — SIGNIFICANT CHANGE UP (ref 0–41)
BACTERIA # UR AUTO: ABNORMAL
BASE EXCESS BLDV CALC-SCNC: -5.6 MMOL/L — LOW (ref -2–2)
BASOPHILS # BLD AUTO: 0.05 K/UL — SIGNIFICANT CHANGE UP (ref 0–0.2)
BASOPHILS NFR BLD AUTO: 0.6 % — SIGNIFICANT CHANGE UP (ref 0–1)
BILIRUB SERPL-MCNC: 0.4 MG/DL — SIGNIFICANT CHANGE UP (ref 0.2–1.2)
BILIRUB UR-MCNC: NEGATIVE — SIGNIFICANT CHANGE UP
BUN SERPL-MCNC: 45 MG/DL — HIGH (ref 10–20)
CA-I SERPL-SCNC: 1.18 MMOL/L — SIGNIFICANT CHANGE UP (ref 1.12–1.3)
CALCIUM SERPL-MCNC: 8.5 MG/DL — SIGNIFICANT CHANGE UP (ref 8.5–10.1)
CHLORIDE SERPL-SCNC: 94 MMOL/L — LOW (ref 98–110)
CO2 SERPL-SCNC: 20 MMOL/L — SIGNIFICANT CHANGE UP (ref 17–32)
COLOR SPEC: YELLOW — SIGNIFICANT CHANGE UP
CREAT SERPL-MCNC: 1.4 MG/DL — SIGNIFICANT CHANGE UP (ref 0.7–1.5)
DIFF PNL FLD: ABNORMAL
EOSINOPHIL # BLD AUTO: 0.08 K/UL — SIGNIFICANT CHANGE UP (ref 0–0.7)
EOSINOPHIL NFR BLD AUTO: 1 % — SIGNIFICANT CHANGE UP (ref 0–8)
EPI CELLS # UR: ABNORMAL /HPF
GAS PNL BLDV: 126 MMOL/L — LOW (ref 136–145)
GAS PNL BLDV: SIGNIFICANT CHANGE UP
GLUCOSE SERPL-MCNC: 98 MG/DL — SIGNIFICANT CHANGE UP (ref 70–99)
GLUCOSE UR QL: NEGATIVE MG/DL — SIGNIFICANT CHANGE UP
HCO3 BLDV-SCNC: 21 MMOL/L — LOW (ref 22–29)
HCT VFR BLD CALC: 32.9 % — LOW (ref 37–47)
HCT VFR BLDA CALC: 39.4 % — SIGNIFICANT CHANGE UP (ref 34–44)
HGB BLD CALC-MCNC: 12.9 G/DL — LOW (ref 14–18)
HGB BLD-MCNC: 10.8 G/DL — LOW (ref 12–16)
HOROWITZ INDEX BLDV+IHG-RTO: 21 — SIGNIFICANT CHANGE UP
IMM GRANULOCYTES NFR BLD AUTO: 0.2 % — SIGNIFICANT CHANGE UP (ref 0.1–0.3)
KETONES UR-MCNC: NEGATIVE — SIGNIFICANT CHANGE UP
LACTATE BLDV-MCNC: 2.1 MMOL/L — HIGH (ref 0.5–1.6)
LEUKOCYTE ESTERASE UR-ACNC: SIGNIFICANT CHANGE UP
LYMPHOCYTES # BLD AUTO: 3.4 K/UL — SIGNIFICANT CHANGE UP (ref 1.2–3.4)
LYMPHOCYTES # BLD AUTO: 40.8 % — SIGNIFICANT CHANGE UP (ref 20.5–51.1)
MCHC RBC-ENTMCNC: 28.5 PG — SIGNIFICANT CHANGE UP (ref 27–31)
MCHC RBC-ENTMCNC: 32.8 G/DL — SIGNIFICANT CHANGE UP (ref 32–37)
MCV RBC AUTO: 86.8 FL — SIGNIFICANT CHANGE UP (ref 81–99)
MONOCYTES # BLD AUTO: 1.32 K/UL — HIGH (ref 0.1–0.6)
MONOCYTES NFR BLD AUTO: 15.8 % — HIGH (ref 1.7–9.3)
NEUTROPHILS # BLD AUTO: 3.47 K/UL — SIGNIFICANT CHANGE UP (ref 1.4–6.5)
NEUTROPHILS NFR BLD AUTO: 41.6 % — LOW (ref 42.2–75.2)
NITRITE UR-MCNC: NEGATIVE — SIGNIFICANT CHANGE UP
NRBC # BLD: 0 /100 WBCS — SIGNIFICANT CHANGE UP (ref 0–0)
PCO2 BLDV: 43 MMHG — SIGNIFICANT CHANGE UP (ref 41–51)
PH BLDV: 7.3 — SIGNIFICANT CHANGE UP (ref 7.26–7.43)
PH UR: 6.5 — SIGNIFICANT CHANGE UP (ref 5–8)
PLATELET # BLD AUTO: 319 K/UL — SIGNIFICANT CHANGE UP (ref 130–400)
PO2 BLDV: 32 MMHG — SIGNIFICANT CHANGE UP (ref 20–40)
POTASSIUM BLDV-SCNC: 4.6 MMOL/L — SIGNIFICANT CHANGE UP (ref 3.3–5.6)
POTASSIUM SERPL-MCNC: 5 MMOL/L — SIGNIFICANT CHANGE UP (ref 3.5–5)
POTASSIUM SERPL-SCNC: 5 MMOL/L — SIGNIFICANT CHANGE UP (ref 3.5–5)
PROT SERPL-MCNC: 6.4 G/DL — SIGNIFICANT CHANGE UP (ref 6–8)
PROT UR-MCNC: 30 MG/DL
RBC # BLD: 3.79 M/UL — LOW (ref 4.2–5.4)
RBC # FLD: 14.2 % — SIGNIFICANT CHANGE UP (ref 11.5–14.5)
RBC CASTS # UR COMP ASSIST: ABNORMAL /HPF
SAO2 % BLDV: 53 % — SIGNIFICANT CHANGE UP
SODIUM SERPL-SCNC: 127 MMOL/L — LOW (ref 135–146)
SP GR SPEC: 1.01 — SIGNIFICANT CHANGE UP (ref 1.01–1.03)
TROPONIN T SERPL-MCNC: <0.01 NG/ML — SIGNIFICANT CHANGE UP
UROBILINOGEN FLD QL: 0.2 MG/DL — SIGNIFICANT CHANGE UP (ref 0.2–0.2)
WBC # BLD: 8.34 K/UL — SIGNIFICANT CHANGE UP (ref 4.8–10.8)
WBC # FLD AUTO: 8.34 K/UL — SIGNIFICANT CHANGE UP (ref 4.8–10.8)
WBC UR QL: >50 /HPF

## 2019-06-27 PROCEDURE — 99233 SBSQ HOSP IP/OBS HIGH 50: CPT

## 2019-06-27 PROCEDURE — 71045 X-RAY EXAM CHEST 1 VIEW: CPT | Mod: 26

## 2019-06-27 PROCEDURE — 99285 EMERGENCY DEPT VISIT HI MDM: CPT | Mod: GC

## 2019-06-27 PROCEDURE — 70450 CT HEAD/BRAIN W/O DYE: CPT | Mod: 26

## 2019-06-27 PROCEDURE — 74177 CT ABD & PELVIS W/CONTRAST: CPT | Mod: 26

## 2019-06-27 RX ORDER — HEPARIN SODIUM 5000 [USP'U]/ML
5000 INJECTION INTRAVENOUS; SUBCUTANEOUS EVERY 12 HOURS
Refills: 0 | Status: DISCONTINUED | OUTPATIENT
Start: 2019-06-27 | End: 2019-07-02

## 2019-06-27 RX ORDER — ASPIRIN/CALCIUM CARB/MAGNESIUM 324 MG
81 TABLET ORAL DAILY
Refills: 0 | Status: DISCONTINUED | OUTPATIENT
Start: 2019-06-27 | End: 2019-07-02

## 2019-06-27 RX ORDER — DULOXETINE HYDROCHLORIDE 30 MG/1
1 CAPSULE, DELAYED RELEASE ORAL
Qty: 0 | Refills: 0 | DISCHARGE

## 2019-06-27 RX ORDER — CLOPIDOGREL BISULFATE 75 MG/1
75 TABLET, FILM COATED ORAL DAILY
Refills: 0 | Status: DISCONTINUED | OUTPATIENT
Start: 2019-06-27 | End: 2019-06-29

## 2019-06-27 RX ORDER — LEVOTHYROXINE SODIUM 125 MCG
1 TABLET ORAL
Qty: 0 | Refills: 0 | DISCHARGE

## 2019-06-27 RX ORDER — SODIUM CHLORIDE 9 MG/ML
1000 INJECTION INTRAMUSCULAR; INTRAVENOUS; SUBCUTANEOUS ONCE
Refills: 0 | Status: COMPLETED | OUTPATIENT
Start: 2019-06-27 | End: 2019-06-27

## 2019-06-27 RX ORDER — PANTOPRAZOLE SODIUM 20 MG/1
40 TABLET, DELAYED RELEASE ORAL
Refills: 0 | Status: DISCONTINUED | OUTPATIENT
Start: 2019-06-27 | End: 2019-07-02

## 2019-06-27 RX ORDER — SODIUM CHLORIDE 9 MG/ML
1000 INJECTION INTRAMUSCULAR; INTRAVENOUS; SUBCUTANEOUS
Refills: 0 | Status: DISCONTINUED | OUTPATIENT
Start: 2019-06-27 | End: 2019-07-02

## 2019-06-27 RX ORDER — CHLORHEXIDINE GLUCONATE 213 G/1000ML
1 SOLUTION TOPICAL
Refills: 0 | Status: DISCONTINUED | OUTPATIENT
Start: 2019-06-27 | End: 2019-07-02

## 2019-06-27 RX ORDER — SODIUM CHLORIDE 9 MG/ML
1000 INJECTION, SOLUTION INTRAVENOUS ONCE
Refills: 0 | Status: COMPLETED | OUTPATIENT
Start: 2019-06-27 | End: 2019-06-27

## 2019-06-27 RX ORDER — ASPIRIN/CALCIUM CARB/MAGNESIUM 324 MG
325 TABLET ORAL ONCE
Refills: 0 | Status: COMPLETED | OUTPATIENT
Start: 2019-06-27 | End: 2019-06-27

## 2019-06-27 RX ORDER — FESOTERODINE FUMARATE 8 MG/1
0 TABLET, FILM COATED, EXTENDED RELEASE ORAL
Qty: 0 | Refills: 0 | DISCHARGE

## 2019-06-27 RX ORDER — SIMVASTATIN 20 MG/1
20 TABLET, FILM COATED ORAL AT BEDTIME
Refills: 0 | Status: DISCONTINUED | OUTPATIENT
Start: 2019-06-27 | End: 2019-07-02

## 2019-06-27 RX ADMIN — Medication 325 MILLIGRAM(S): at 19:40

## 2019-06-27 RX ADMIN — SODIUM CHLORIDE 50 MILLILITER(S): 9 INJECTION INTRAMUSCULAR; INTRAVENOUS; SUBCUTANEOUS at 22:01

## 2019-06-27 RX ADMIN — SODIUM CHLORIDE 1000 MILLILITER(S): 9 INJECTION INTRAMUSCULAR; INTRAVENOUS; SUBCUTANEOUS at 22:24

## 2019-06-27 RX ADMIN — SODIUM CHLORIDE 2000 MILLILITER(S): 9 INJECTION, SOLUTION INTRAVENOUS at 15:36

## 2019-06-27 NOTE — H&P ADULT - NSHPLABSRESULTS_GEN_ALL_CORE
I&O's Detail    2019 07:01  -  2019 20:29  --------------------------------------------------------  IN:  Total IN: 0 mL    OUT:    Indwelling Catheter - Urethral: 1370 mL  Total OUT: 1370 mL    Total NET: -1370 mL          LABS:                        10.8   8.34  )-----------( 319      ( 2019 14:38 )             32.9     2019 14:38    127    |  94     |  45     ----------------------------<  98     5.0     |  20     |  1.4      Ca    8.5        2019 14:38    TPro  6.4    /  Alb  3.2    /  TBili  0.4    /  DBili  x      /  AST  22     /  ALT  19     /  AlkPhos  83     2019 14:38  Amylase x     lipase x          CARDIAC MARKERS ( 2019 14:38 )  x     / <0.01 ng/mL / x     / x     / x          CAPILLARY BLOOD GLUCOSE      POCT Blood Glucose.: 94 mg/dL (2019 14:01)      Urinalysis Basic - ( 2019 15:00 )    Color: Yellow / Appearance: Cloudy / S.015 / pH: x  Gluc: x / Ketone: Negative  / Bili: Negative / Urobili: 0.2 mg/dL   Blood: x / Protein: 30 mg/dL / Nitrite: Negative   Leuk Esterase: Large / RBC: 6-10 /HPF / WBC >50 /HPF   Sq Epi: x / Non Sq Epi: Many /HPF / Bacteria: Many      Culture        MEDICATIONS  (STANDING):    MEDICATIONS  (PRN):        RADIOLOGY:   < from: CT Head No Cont (19 @ 16:42) >    IMPRESSION:    In comparison with the prior noncontrast CT scan of the brain dated May   26, 2019:    New focus of diminished attenuation in the right side of the martin   consistent with ischemic change, age indeterminant.    < end of copied text >          < from: CT Abdomen and Pelvis w/ IV Cont (19 @ 16:42) >    IMPRESSION:   Intermediate focal infiltrative findings in the anterior abdominal   omentum, 1.8 x 3.5 cm previously 0.9 x 2.6 cm). Differential diagnosis   includes infectious/inflammatory etiology versus hematoma (is there is   any associated trauma, correlate with the clinical findings).      < end of copied text >

## 2019-06-27 NOTE — ED ADULT NURSE NOTE - PMH
Depression    Diastolic heart failure    HLD (hyperlipidemia)    Hypothyroid    Neuropathy    OA (osteoarthritis) Depression    Diastolic heart failure    Musa catheter in place    HLD (hyperlipidemia)    Hypothyroid    Neuropathy    OA (osteoarthritis)

## 2019-06-27 NOTE — ED ADULT TRIAGE NOTE - CHIEF COMPLAINT QUOTE
BIBA for altered mental status. EMS  states pt BP 70/50 .EMS inserted L foot #20 900 cc IVF infused.

## 2019-06-27 NOTE — H&P ADULT - HISTORY OF PRESENT ILLNESS
1pm BP 70s and dropping  right away called EMS  mild right droop, better now, fixated   back to baseline in ED    asa, statin 89 yo female BIBEMS h/o CVA 5/19 with residual b/l deficit, HLD, hypothyroidism, depression p/w hypotension and right facial droop. Today. home nurse came around 1pm, and pt had SBP < 70s. Family noticed pt having right facial droop and looking more lethargic, so called EMS. In ED, family reports pt back to her post-stroke baseline (weakness, dysphasia, pt has not ambulated since the CVA). CT head shows new acute ischemic changes in R martin. Family reports pt taking her home meds daily including asa and stain. Family denies pt having head trauma, LOC, HA, CP, SOB, cough, abd pain, N/V/D, calf pain, or edema. 89 yo female BIBEMS h/o CVA 5/19 with residual b/l deficit, HLD, hypothyroidism, depression p/w hypotension and right facial droop. Today. home nurse came around 1pm, and pt had SBP < 70s. Family noticed pt having right facial droop and looking more lethargic, so called EMS. In ED, family reports pt back to her post-stroke baseline (weakness, dysphasia, pt has not ambulated since the CVA). CT head shows new acute ischemic changes in R martin. Family reports pt taking her home meds daily including asa and stain. Family said pt seemed dehydrated at home, looks much better after IVF in ED. Family denies pt having head trauma, LOC, HA, CP, SOB, cough, abd pain, N/V/D, calf pain, or edema.

## 2019-06-27 NOTE — ED PROVIDER NOTE - NS ED ROS FT
Constitutional: No fever, chills. Weakness.  Eyes:  No visual changes, eye pain or discharge.  ENMT:  No hearing changes, pain, no sore throat or runny nose, no difficulty swallowing  Cardiac:  No chest pain, SOB or edema. No chest pain with exertion.  Respiratory:  No cough or respiratory distress. No hemoptysis. No history of asthma or RAD.  GI:  No nausea, vomiting, diarrhea or abdominal pain.  :  No dysuria, frequency or burning.  MS:  No myalgia, muscle weakness, joint pain or back pain.  Neuro:  No headache.  No LOC.  Skin:  No skin rash.   Endocrine: No history of thyroid disease or diabetes.

## 2019-06-27 NOTE — ED ADULT NURSE NOTE - PRO INTERPRETER NEED 2
"Chief Complaint-nasal congestion and cough with difficulty feeding.    HPI    Review of Systems   Constitutional: Negative for fever and weight loss.   HENT: Positive for congestion.    Eyes: Negative for discharge and redness.   Respiratory: Positive for cough. Negative for shortness of breath, wheezing and stridor.    Gastrointestinal: Negative for vomiting.   Skin: Negative for rash.   Neurological: Negative for weakness.     5-week-old female infant presents in the office today with mother who has concerns over nasal congestion and cough and having difficulty feeding.   She is a breast-fed infant and becomes frustrated when she tries to latch onto the nasal congestion. Mother has a humidifier in the home but has not tried to use it yet.  Mother denies any fever, rash, vomiting, diarrhea.  No contact with any sick family members.      ROS:    All other systems reviewed and are negative, except as in HPI.     There are no active problems to display for this patient.      No current outpatient prescriptions on file.     No current facility-administered medications for this visit.        Review of patient's allergies indicates no known allergies.    Past Medical History   Diagnosis Date   • Healthy infant        Family History   Problem Relation Age of Onset   • No Known Problems Mother    • No Known Problems Father           Other Topics Concern   • Second-Hand Smoke Exposure No   • Violence Concerns No   • Family Concerns Vehicle Safety No     Social History Narrative         PHYSICAL EXAM    Pulse 136  Temp(Src) 36.6 °C (97.9 °F)  Resp 32  Ht 0.533 m (1' 8.98\")  Wt 3.856 kg (8 lb 8 oz)  BMI 13.57 kg/m2  HC 37.3 cm (14.69\")  SpO2 98%    Constitutional:Alert, active. No distress.   HEENT: Pupils equal, round and reactive to light, Conjunctivae and EOM are normal. Right TM normal. Left TM normal. Oropharynx moist with no erythema or exudate.   Neck:       Supple, Normal range of motion  Lymphatic:  No " cervical or supraclavicular lymphadenopathy  Lungs:     Effort normal. Clear to auscultation bilaterally, no wheezes/rales/rhonchi  CV:          Regular rate and rhythm. Normal S1/S2.  No murmurs.  Intact distal pulses.  Abd:        Soft,  non tender, non distended. Normal active bowel sounds.  No rebound or guarding.  No hepatosplenomegaly.  Ext:         Well perfused, no clubbing/cyanosis/edema. Moving all extremities well.   Skin:       No rashes or bruising.  Neurologic: Active    ASSESSMENT & PLAN    1. Nasal congestion of -    Reassured mother that the main reason the infant is probably not feeding well is due to nasal congestion. Advised using nasal saline and nasal suctioning especially before feeding due to obligate nose breathing. Place humidifier in room or sit with infant in hot steamy bathroom and then suction nose.  Reassured mother that weight gain is appropriate.  Discussed that if infant gets a temperature over 100.4 needs to be seen by a provider promptly. Return for 2 month well-child check.      Patient/Caregiver verbalized understanding and agrees with the plan of care.      English

## 2019-06-27 NOTE — ED PROVIDER NOTE - PROGRESS NOTE DETAILS
Admit to medicine Back to baseline here, not hypotensive. Fatigued however AAOX3. CT Abdomen findings noted, no h/o trauma as per the family or patient. Admit to medicine Back to baseline here, not hypotensive. Fatigued however AAOX3. CT Abdomen findings noted, no h/o trauma as per the family or patient. Will consult neuro for CT Head findings, admit to telemetry. Neuro consult sent. Pending callback. Signed out to Dr Corbett. Neuro consult sent. Pending callback. Spoke to Dr guardado, admit to stroke unit bed in ICU overnight. 325mg aspirin. Sayed approved bed. Endorsed to [facility] hospitalist.

## 2019-06-27 NOTE — CHART NOTE - NSCHARTNOTEFT_GEN_A_CORE
I spoke to Emergency Medicine team regarding this 89 year old patient with recent stroke in May leaving her dependent on others.  She is reportedly on aspirin and plavix already and   previously identified vertebrobrasilar disease on vascular imaging.  She has new stroke since last imaging now identified in left martin.      I recommended aspirin 325 mg x 1 and continue her daily plavix 75 mg and aspirin 81 mg.  I recommend patient come in to monitored stroke bed in CCU until neurologist sees her in am. I spoke to Emergency Medicine team regarding this 89 year old patient with recent stroke in May leaving her dependent on others.  She is reportedly on aspirin and plavix already and   previously identified vertebrobrasilar disease on vascular imaging.  She has new stroke since last imaging now identified in left martin.  Patient is not TPA candidate due to recent stroke and  is not neurointervention candidate due to elevated MRS.    I recommended aspirin 325 mg x 1 and continue her daily plavix 75 mg and aspirin 81 mg.  I recommend patient come in to monitored stroke bed in CCU until neurologist sees her in am.

## 2019-06-27 NOTE — ED PROVIDER NOTE - CLINICAL SUMMARY MEDICAL DECISION MAKING FREE TEXT BOX
pt here with low bp, ams, bp improved with iv fluids   pt with ct showing new martin infarct. ct abd pelvis with Intermediate focal infiltrative findings in the anterior abdominal omentum, 1.8 x 3.5 cm previously 0.9 x 2.6 cm). Differential diagnosis   includes infectious/inflammatory etiology versus hematoma. pt has no trauma hx. case discussed with neuro admit to ccu-tele for neuro checks.

## 2019-06-27 NOTE — ED PROVIDER NOTE - PHYSICAL EXAMINATION
Constitutional: Weak appearing. NAD.  Head: Atraumatic.  Eyes: PERRLA. EOMI without discomfort.   ENT: No nasal discharge. Mucous membranes moist.  Neck: Supple. Painless ROM.  Cardiovascular: Regular rhythm. Regular rate. Normal S1 and S2. No murmurs. 2+ pulses in all extremities.   Pulmonary: Normal respiratory rate and effort. Lungs clear to auscultation bilaterally. No wheezing, rales, or rhonchi. Bilateral, equal lung expansion.   Abdominal: Soft. Nondistended. Nontender. No rebound or guarding.   Extremities. Pelvis stable. No lower extremity edema. Symmetric calves.  Skin: Pale, warm, dry.  Neuro: AAOx3. Unable to assess due to CVA and left sided deficits.  Psych: Normal mood. Normal affect.

## 2019-06-27 NOTE — H&P ADULT - NSICDXPASTMEDICALHX_GEN_ALL_CORE_FT
PAST MEDICAL HISTORY:  Depression     Diastolic heart failure     Musa catheter in place     HLD (hyperlipidemia)     Hypothyroid     Neuropathy     OA (osteoarthritis)

## 2019-06-27 NOTE — ED ADULT NURSE NOTE - NSSUHOSCREENINGYN_ED_ALL_ED
Patient was not available for their Occupational Therapy session at this time. Reason not seen: Nursing requests holding patient due to medical status(increased respiratory supports ) (12/14/18 3671). Re-Attempt Plan: Will re-attempt later today(as schedule permits ) (28/61/26 5172). Yes - the patient is able to be screened

## 2019-06-27 NOTE — H&P ADULT - NSHPPHYSICALEXAM_GEN_ALL_CORE
ICU Vital Signs Last 24 Hrs  T(C): 36.9 (27 Jun 2019 14:53), Max: 36.9 (27 Jun 2019 14:53)  T(F): 98.4 (27 Jun 2019 14:53), Max: 98.4 (27 Jun 2019 14:53)  HR: 50 (27 Jun 2019 19:25) (50 - 62)  BP: 136/61 (27 Jun 2019 19:25) (105/47 - 145/55)  RR: 17 (27 Jun 2019 19:25) (17 - 20)  SpO2: 99% (27 Jun 2019 19:25) (97% - 99%)        Physical Examination:    General: No acute distress.  Alert, oriented, interactive, nonfocal    HEENT: Pupils equal, reactive to light.  Symmetric.    PULM: Clear to auscultation bilaterally, no significant sputum production    CVS: Regular rate and rhythm, no murmurs, rubs, or gallops    ABD: Soft, nondistended, nontender, normoactive bowel sounds, no masses    EXT: No edema, nontender    SKIN: Warm and well perfused, no rashes noted.    Neuro: AAOx3. Unable to assess due to CVA and b/l deficits.

## 2019-06-27 NOTE — H&P ADULT - ASSESSMENT
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 Today. home nurse came around 1pm, and pt had SBP < 70s. Family noticed pt having right facial droop and looking more lethargic, so called EMS. In ED, family reports pt back to her post-stroke baseline (weakness, dysphasia, pt has not ambulated since the CVA). CT head shows new acute ischemic changes in R martin. Family reports pt taking her home meds daily including asa and stain. Family denies pt having head trauma, LOC, HA, CP, SOB, cough, abd pain, N/V/D, calf pain, or edema.       IMPRESSION:  Acute ischemic stroke      PLAN:    CNS: Not tpa candidate as per Neurology.   c/w asa, plavix, and statin  possible MRI/MRA tmrw  Neurochecks q4h   Neurology consult.    HEENT: oral care     PULMONARY: keep pulse ox > 92%    CARDIOVASCULAR: NS 50cc/hr. Keep I = O.  2D echo in am.     GI: GI prophylaxis. NPO for now.  Speech and Swallow eval due to dysphagia    RENAL: f/u lytes    INFECTIOUS DISEASE: no need for abx    HEMATOLOGICAL:  DVT prophylaxis.    ENDOCRINE:  Follow up FS.  Insulin protocol if needed.    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 Today. home nurse came around 1pm, and pt had SBP < 70s. Family noticed pt having right facial droop and looking more lethargic, so called EMS. In ED, family reports pt back to her post-stroke baseline (weakness, dysphasia, pt has not ambulated since the CVA). CT head shows new acute ischemic changes in R martin. Family reports pt taking her home meds daily including asa and stain. Family denies pt having head trauma, LOC, HA, CP, SOB, cough, abd pain, N/V/D, calf pain, or edema.       IMPRESSION:  Acute ischemic stroke  focal infiltrative findings in the anterior abdominal omentum      PLAN:    CNS: Not tpa candidate as per Neurology.   c/w asa, plavix, and statin  possible MRI/MRA tmrw  Neurochecks q4h   Neurology consult.    HEENT: oral care     PULMONARY: keep pulse ox > 92%    CARDIOVASCULAR: NS 50cc/hr. Keep I = O.  2D echo in am.     GI: GI prophylaxis. NPO for now.  Speech and Swallow eval due to dysphagia  GI consult for infiltrative changes as seen on CT A/P     RENAL: f/u lytes    INFECTIOUS DISEASE: no need for abx    HEMATOLOGICAL:  DVT prophylaxis.    ENDOCRINE:  Follow up FS.  Insulin protocol if needed.    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 Today. home nurse came around 1pm, and pt had SBP < 70s. Family noticed pt having right facial droop and looking more lethargic, so called EMS. In ED, family reports pt back to her post-stroke baseline (weakness, dysphasia, pt has not ambulated since the CVA). CT head shows new acute ischemic changes in R martin. Family reports pt taking her home meds daily including asa and stain. Family denies pt having head trauma, LOC, HA, CP, SOB, cough, abd pain, N/V/D, calf pain, or edema.       IMPRESSION:  Acute ischemic stroke  focal infiltrative findings in the anterior abdominal omentum      PLAN:    CNS: Not tpa candidate as per Neurology.   c/w asa, plavix, and statin  possible MRI/MRA tmrw  Neurochecks q4h   Neurology consult.    HEENT: oral care     PULMONARY: keep pulse ox > 92%    CARDIOVASCULAR: NS 50cc/hr. Hold Lasix for now. Reassess tmrw am.  Keep I = O.  2D echo and CXR in am.      GI: GI prophylaxis. NPO for now.  Speech and Swallow eval due to dysphagia  GI consult for infiltrative changes as seen on CT A/P     RENAL: f/u lytes    INFECTIOUS DISEASE: no need for abx    HEMATOLOGICAL:  DVT prophylaxis.    ENDOCRINE:  Follow up FS.  Insulin protocol if needed.    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 Today. home nurse came around 1pm, and pt had SBP < 70s. Family noticed pt having right facial droop and looking more lethargic, so called EMS. In ED, family reports pt back to her post-stroke baseline (weakness, dysphasia, pt has not ambulated since the CVA). CT head shows new acute ischemic changes in R martin. Family reports pt taking her home meds daily including asa and stain. Family denies pt having head trauma, LOC, HA, CP, SOB, cough, abd pain, N/V/D, calf pain, or edema.       IMPRESSION:  Acute ischemic stroke  focal infiltrative findings in the anterior abdominal omentum      PLAN:    CNS: Not tpa candidate as per Neurology.   c/w asa, plavix, and statin  possible MRI/MRA tmrw  Neurochecks q4h   Neurology consult.    HEENT: oral care     PULMONARY: keep pulse ox > 92%    CARDIOVASCULAR: NS 50cc/hr. Hold Lasix for now. Reassess tmrw am.  Keep I = O.  Hold Lisinopril to allow permissive HTN for stroke  2D echo and CXR in am.      GI: GI prophylaxis. NPO for now.  Speech and Swallow eval due to dysphagia  GI consult for infiltrative changes as seen on CT A/P     RENAL: f/u lytes    INFECTIOUS DISEASE: no need for abx    HEMATOLOGICAL:  DVT prophylaxis.    ENDOCRINE:  Follow up FS.  Insulin protocol if needed.    MUSCULOSKELETAL:        CRITICAL CARE TIME SPENT: ***

## 2019-06-27 NOTE — ED PROVIDER NOTE - OBJECTIVE STATEMENT
89 y/o female BIBEMS h/o CVA '5/19 with residual left sided deficit, HLD, hypothyroidism, depression p/w confusion, hypotension. Last seen 2 days ago and was speaking and eating normally. Yesterday pt was seen by her daughter and was not acting normally, more fatigued than normal and not talking as much as she normally does. Today daughter called 911 due to worsening of condition. Pt denies fever, chills, CP, SOB, abdominal pain, vomiting, diarrhea, dysuria. Pt has not ambulated since the CVA. 89 y/o female BIBEMS h/o CVA '5/19 with residual left sided deficit, HLD, hypothyroidism, depression p/w confusion, hypotension. Last seen 2 days ago and was speaking and eating normally. Yesterday pt was seen by her daughter and was not acting normally, more fatigued than normal and not talking as much as she normally does. Today daughter called 911 due to worsening of condition. Pt denies fever, chills, CP, SOB, abdominal pain, vomiting, diarrhea, dysuria. Pt has not ambulated since the CVA. As per daughters, pt is back to baseline.

## 2019-06-27 NOTE — ED PROVIDER NOTE - ATTENDING CONTRIBUTION TO CARE
89yr f bibems for eval of low blood pressure and confusion. family reports since yesterday has not been acting herself, more fatigued, tired. ems was called for worsening condition.  on exam pt pale appearing, follows commands, s1s2 karli ctab, soft nt/nd      when pt picked up by ems reportedly hypotensive, here bp improved as pt got 800cc of saline by ems. pt with ct showing new martin infarct. ct abd pelvis with Intermediate focal infiltrative findings in the anterior abdominal omentum, 1.8 x 3.5 cm previously 0.9 x 2.6 cm). Differential diagnosis   includes infectious/inflammatory etiology versus hematoma. pt has no trauma hx. case discussed with neuro admit to [facility]-tele for neuro checks

## 2019-06-27 NOTE — ED PROVIDER NOTE - PMH
Depression    Diastolic heart failure    HLD (hyperlipidemia)    Hypothyroid    Neuropathy    OA (osteoarthritis)

## 2019-06-28 LAB
ALBUMIN SERPL ELPH-MCNC: 3.1 G/DL — LOW (ref 3.5–5.2)
ALP SERPL-CCNC: 75 U/L — SIGNIFICANT CHANGE UP (ref 30–115)
ALT FLD-CCNC: 13 U/L — SIGNIFICANT CHANGE UP (ref 0–41)
ANION GAP SERPL CALC-SCNC: 8 MMOL/L — SIGNIFICANT CHANGE UP (ref 7–14)
APPEARANCE UR: CLEAR — SIGNIFICANT CHANGE UP
AST SERPL-CCNC: 17 U/L — SIGNIFICANT CHANGE UP (ref 0–41)
BILIRUB SERPL-MCNC: <0.2 MG/DL — SIGNIFICANT CHANGE UP (ref 0.2–1.2)
BILIRUB UR-MCNC: NEGATIVE — SIGNIFICANT CHANGE UP
BUN SERPL-MCNC: 28 MG/DL — HIGH (ref 10–20)
CALCIUM SERPL-MCNC: 7.9 MG/DL — LOW (ref 8.5–10.1)
CHLORIDE SERPL-SCNC: 106 MMOL/L — SIGNIFICANT CHANGE UP (ref 98–110)
CHOLEST SERPL-MCNC: 99 MG/DL — LOW (ref 100–200)
CO2 SERPL-SCNC: 21 MMOL/L — SIGNIFICANT CHANGE UP (ref 17–32)
COLOR SPEC: YELLOW — SIGNIFICANT CHANGE UP
CREAT SERPL-MCNC: 0.6 MG/DL — LOW (ref 0.7–1.5)
CULTURE RESULTS: SIGNIFICANT CHANGE UP
DIFF PNL FLD: ABNORMAL
ESTIMATED AVERAGE GLUCOSE: 117 MG/DL — HIGH (ref 68–114)
GLUCOSE SERPL-MCNC: 91 MG/DL — SIGNIFICANT CHANGE UP (ref 70–99)
GLUCOSE UR QL: NEGATIVE MG/DL — SIGNIFICANT CHANGE UP
GRAN CASTS # UR COMP ASSIST: ABNORMAL /LPF
HBA1C BLD-MCNC: 5.7 % — HIGH (ref 4–5.6)
HCT VFR BLD CALC: 30.8 % — LOW (ref 37–47)
HDLC SERPL-MCNC: 30 MG/DL — LOW
HGB BLD-MCNC: 9.9 G/DL — LOW (ref 12–16)
KETONES UR-MCNC: NEGATIVE — SIGNIFICANT CHANGE UP
LEUKOCYTE ESTERASE UR-ACNC: ABNORMAL
LIPID PNL WITH DIRECT LDL SERPL: 49 MG/DL — SIGNIFICANT CHANGE UP (ref 4–129)
MAGNESIUM SERPL-MCNC: 1.8 MG/DL — SIGNIFICANT CHANGE UP (ref 1.8–2.4)
MCHC RBC-ENTMCNC: 28.7 PG — SIGNIFICANT CHANGE UP (ref 27–31)
MCHC RBC-ENTMCNC: 32.1 G/DL — SIGNIFICANT CHANGE UP (ref 32–37)
MCV RBC AUTO: 89.3 FL — SIGNIFICANT CHANGE UP (ref 81–99)
NITRITE UR-MCNC: NEGATIVE — SIGNIFICANT CHANGE UP
NRBC # BLD: 0 /100 WBCS — SIGNIFICANT CHANGE UP (ref 0–0)
PH UR: 6 — SIGNIFICANT CHANGE UP (ref 5–8)
PHOSPHATE SERPL-MCNC: 3.2 MG/DL — SIGNIFICANT CHANGE UP (ref 2.1–4.9)
PLATELET # BLD AUTO: 249 K/UL — SIGNIFICANT CHANGE UP (ref 130–400)
POTASSIUM SERPL-MCNC: 4 MMOL/L — SIGNIFICANT CHANGE UP (ref 3.5–5)
POTASSIUM SERPL-SCNC: 4 MMOL/L — SIGNIFICANT CHANGE UP (ref 3.5–5)
PROT SERPL-MCNC: 6 G/DL — SIGNIFICANT CHANGE UP (ref 6–8)
PROT UR-MCNC: 30 MG/DL
RBC # BLD: 3.45 M/UL — LOW (ref 4.2–5.4)
RBC # FLD: 14.3 % — SIGNIFICANT CHANGE UP (ref 11.5–14.5)
RBC CASTS # UR COMP ASSIST: ABNORMAL /HPF
SODIUM SERPL-SCNC: 135 MMOL/L — SIGNIFICANT CHANGE UP (ref 135–146)
SP GR SPEC: 1.01 — SIGNIFICANT CHANGE UP (ref 1.01–1.03)
SPECIMEN SOURCE: SIGNIFICANT CHANGE UP
TOTAL CHOLESTEROL/HDL RATIO MEASUREMENT: 3.3 RATIO — LOW (ref 4–5.5)
TRIGL SERPL-MCNC: 161 MG/DL — HIGH (ref 10–149)
UROBILINOGEN FLD QL: 0.2 MG/DL — SIGNIFICANT CHANGE UP (ref 0.2–0.2)
WBC # BLD: 6.2 K/UL — SIGNIFICANT CHANGE UP (ref 4.8–10.8)
WBC # FLD AUTO: 6.2 K/UL — SIGNIFICANT CHANGE UP (ref 4.8–10.8)
WBC UR QL: ABNORMAL /HPF

## 2019-06-28 PROCEDURE — 99223 1ST HOSP IP/OBS HIGH 75: CPT

## 2019-06-28 PROCEDURE — 71045 X-RAY EXAM CHEST 1 VIEW: CPT | Mod: 26

## 2019-06-28 PROCEDURE — 99233 SBSQ HOSP IP/OBS HIGH 50: CPT

## 2019-06-28 PROCEDURE — 99222 1ST HOSP IP/OBS MODERATE 55: CPT

## 2019-06-28 RX ORDER — CEFTRIAXONE 500 MG/1
1000 INJECTION, POWDER, FOR SOLUTION INTRAMUSCULAR; INTRAVENOUS EVERY 24 HOURS
Refills: 0 | Status: COMPLETED | OUTPATIENT
Start: 2019-06-28 | End: 2019-06-29

## 2019-06-28 RX ADMIN — SIMVASTATIN 20 MILLIGRAM(S): 20 TABLET, FILM COATED ORAL at 21:19

## 2019-06-28 RX ADMIN — SODIUM CHLORIDE 50 MILLILITER(S): 9 INJECTION INTRAMUSCULAR; INTRAVENOUS; SUBCUTANEOUS at 13:10

## 2019-06-28 RX ADMIN — Medication 81 MILLIGRAM(S): at 13:10

## 2019-06-28 RX ADMIN — HEPARIN SODIUM 5000 UNIT(S): 5000 INJECTION INTRAVENOUS; SUBCUTANEOUS at 07:13

## 2019-06-28 RX ADMIN — CHLORHEXIDINE GLUCONATE 1 APPLICATION(S): 213 SOLUTION TOPICAL at 18:41

## 2019-06-28 RX ADMIN — HEPARIN SODIUM 5000 UNIT(S): 5000 INJECTION INTRAVENOUS; SUBCUTANEOUS at 18:41

## 2019-06-28 RX ADMIN — CHLORHEXIDINE GLUCONATE 1 APPLICATION(S): 213 SOLUTION TOPICAL at 07:14

## 2019-06-28 RX ADMIN — CEFTRIAXONE 100 MILLIGRAM(S): 500 INJECTION, POWDER, FOR SOLUTION INTRAMUSCULAR; INTRAVENOUS at 18:41

## 2019-06-28 RX ADMIN — CLOPIDOGREL BISULFATE 75 MILLIGRAM(S): 75 TABLET, FILM COATED ORAL at 13:10

## 2019-06-28 NOTE — PHYSICAL THERAPY INITIAL EVALUATION ADULT - LEVEL OF INDEPENDENCE: GAIT, REHAB EVAL
unable to perform/As per patient's daughters, patient has been non-ambulatory and require susan lift since CVA in May 2019

## 2019-06-28 NOTE — CONSULT NOTE ADULT - ATTENDING COMMENTS
Pt seen and examined with SHAYNE Doyle.  Pt admitted with possible CVA and we were asked to see patient for an abnormal abdominal CT which shows focal inflammatory changes in the omentum possibly related to an infection or inflammatory process.  The patient denies abdominal pain or tendernes and there is no report of diarrhea or bleeding.  I recommend placing her on ciprofloxacin for possible possible mesenteritis and rescanning her once the treatment has concluded to document resolution, otherwise further work up may be required.

## 2019-06-28 NOTE — PATIENT PROFILE ADULT - VISION (WITH CORRECTIVE LENSES IF THE PATIENT USUALLY WEARS THEM):
Severely impaired: cannot locate objects without hearing or touching them or patient nonresponsive./pt id lethargic as noted

## 2019-06-28 NOTE — CONSULT NOTE ADULT - SUBJECTIVE AND OBJECTIVE BOX
HPI:  89 yo female BIBEMS h/o CVA  with residual b/l deficit, HLD, hypothyroidism, depression p/w hypotension and right facial droop. Today. home nurse came around 1pm, and pt had SBP < 70s. Family noticed pt having right facial droop and looking more lethargic, so called EMS. In ED, family reports pt back to her post-stroke baseline (weakness, dysphasia, pt has not ambulated since the CVA). CT head shows new acute ischemic changes in R martin. Family reports pt taking her home meds daily including asa and stain. Family said pt seemed dehydrated at home, looks much better after IVF in ED. Family denies pt having head trauma, LOC, HA, CP, SOB, cough, abd pain, N/V/D, calf pain, or edema.   PTN  REFERRED TO ACUTE  REHAB  FOR  EVAL AND  TX   PAST MEDICAL & SURGICAL HISTORY:  Umsa catheter in place  Diastolic heart failure  Neuropathy  Depression  OA (osteoarthritis)  Hypothyroid  HLD (hyperlipidemia)  S/P ORIF (open reduction internal fixation) fracture      Hospital Course:    TODAY'S SUBJECTIVE & REVIEW OF SYMPTOMS:     Constitutional WNL   Cardio WNL   Resp WNL   GI WNL  Heme WNL  Endo WNL  Skin WNL  MSK WNL  Neuro WNL  Cognitive WNL  Psych WNL      MEDICATIONS  (STANDING):  aspirin  chewable 81 milliGRAM(s) Oral daily  cefTRIAXone   IVPB 1000 milliGRAM(s) IV Intermittent every 24 hours  chlorhexidine 4% Liquid 1 Application(s) Topical two times a day  clopidogrel Tablet 75 milliGRAM(s) Oral daily  heparin  Injectable 5000 Unit(s) SubCutaneous every 12 hours  pantoprazole    Tablet 40 milliGRAM(s) Oral before breakfast  simvastatin 20 milliGRAM(s) Oral at bedtime  sodium chloride 0.9%. 1000 milliLiter(s) (50 mL/Hr) IV Continuous <Continuous>    MEDICATIONS  (PRN):      FAMILY HISTORY:  No pertinent family history in first degree relatives: no family history of heart disease      Allergies    No Known Allergies    Intolerances        SOCIAL HISTORY:    [  ] Etoh  [  ] Smoking  [  ] Substance abuse     Home Environment:  [  ] Home Alone  [ x ] Lives with Family  [  x] Home Health Aid 12  hrs     Dwelling:  [  ] Apartment  [x] Private House  [  ] Adult Home  [  ] Skilled Nursing Facility      [  ] Short Term  [  ] Long Term  [ x ] Stairs       Elevator [  ]    FUNCTIONAL STATUS PTA: (Check all that apply)  Ambulation: [   ]Independent    [x  ] Dependent     [  ] Non-Ambulatory  Assistive Device: [  ] SA Cane  [  ]  Q Cane  [ x ] Walker  [ x ]  Wheelchair  ADL : [  ] Independent  [ x ]  Dependent       Vital Signs Last 24 Hrs  T(C): 34.9 (2019 15:00), Max: 35.9 (2019 02:15)  T(F): 94.8 (2019 15:00), Max: 96.6 (2019 02:15)  HR: 49 (2019 11:11) (42 - 50)  BP: 123/57 (2019 11:11) (113/48 - 149/58)  BP(mean): --  RR: 16 (2019 11:11) (15 - 17)  SpO2: 96% (2019 11:11) (96% - 100%)      PHYSICAL EXAM: Alert & Oriented X 1  GENERAL: NAD, well-groomed, well-developed  HEAD:  Atraumatic, Normocephalic  EYES: EOMI, PERRLA, conjunctiva and sclera clear  NECK: Supple, No JVD, Normal thyroid  CHEST/LUNG: Clear to percussion bilaterally; No rales, rhonchi, wheezing, or rubs  HEART: Regular rate and rhythm; No murmurs, rubs, or gallops  ABDOMEN: Soft, Nontender, Nondistended; Bowel sounds present  EXTREMITIES:  2+ Peripheral Pulses, No clubbing, cyanosis, or edema    NERVOUS SYSTEM:  Cranial Nerves 2-12 intact [  ] Abnormal  [x  ]  ROM: WFL all extremities [  ]  Abnormal [x  ]  Motor Strength: WFL all extremities  [  ]  Abnormal [ x ]  Sensation: intact to light touch [  ] Abnormal [ x ]  Reflexes: Symmetric [  ]  Abnormal [x  ]    FUNCTIONAL STATUS:  Bed Mobility: Independent [  ]  Supervision [  ]  Needs Assistance [  ]  N/A [  ]  Transfers: Independent [  ]  Supervision [  ]  Needs Assistance [ x ]  N/A [  ]   Ambulation: Independent [  ]  Supervision [  ]  Needs Assistance [ x ]  N/A [  ]  ADL: Independent [  ] Requires Assistance [  ] N/A [ x ]  SEE PT/OT IE NOTES    LABS:                        9.9    6.20  )-----------( 249      ( 2019 09:09 )             30.8     06-28    135  |  106  |  28<H>  ----------------------------<  91  4.0   |  21  |  0.6<L>    Ca    7.9<L>      2019 09:09  Phos  3.2       Mg     1.8         TPro  6.0  /  Alb  3.1<L>  /  TBili  <0.2  /  DBili  x   /  AST  17  /  ALT  13  /  AlkPhos  75        Urinalysis Basic - ( 2019 09:02 )    Color: Yellow / Appearance: Clear / S.010 / pH: x  Gluc: x / Ketone: Negative  / Bili: Negative / Urobili: 0.2 mg/dL   Blood: x / Protein: 30 mg/dL / Nitrite: Negative   Leuk Esterase: Trace / RBC: 26-50 /HPF / WBC 6-10 /HPF   Sq Epi: x / Non Sq Epi: x / Bacteria: x        RADIOLOGY & ADDITIONAL STUDIES:    Assesment:

## 2019-06-28 NOTE — CONSULT NOTE ADULT - SUBJECTIVE AND OBJECTIVE BOX
Chief complaint/Reason for consult: Abnormal CT scan findings     HPI:  89 yo female BIBEMS h/o CVA 5/19 with residual b/l deficit, HLD, hypothyroidism, depression p/w hypotension and right facial droop. Today. home nurse came around 1pm, and pt had SBP < 70s. Family noticed pt having right facial droop and looking more lethargic, so called EMS. In ED, family reports pt back to her post-stroke baseline (weakness, dysphasia, pt has not ambulated since the CVA). CT head shows new acute ischemic changes in R martin. Family reports pt taking her home meds daily including asa and stain. Family said pt seemed dehydrated at home, looks much better after IVF in ED. Family denies pt having head trauma, LOC, HA, CP, SOB, cough, abd pain, N/V/D, calf pain, or edema. (27 Jun 2019 19:53)    GI updates 89 yo female pmh CVA 5/19/19 on aspirin and Plavix presents for altered mental status. GI Consulted for abnormal CT scan findings. Patient denies nausea, vomiting, hematemesis, melena, blood in stool, diarrhea, constipation, abdominal pain. Patient has never had an endoscopy or colonoscopy before.    PMHX/PSHX: PAST MEDICAL & SURGICAL HISTORY:  Musa catheter in place  Diastolic heart failure  Neuropathy  Depression  OA (osteoarthritis)  Hypothyroid  HLD (hyperlipidemia)  S/P ORIF (open reduction internal fixation) fracture      Family history:  FAMILY HISTORY:  No pertinent family history in first degree relatives: no family history of heart disease    No GI cancers in first or second degree relatives    Social History: No smoking. No alcohol. No illegal drug use.    Allergies:  No Known Allergies      MEDICATIONS: Home Medications:  aspirin 81 mg oral tablet, chewable: 1 tab(s) orally once a day (27 Jun 2019 20:41)  fluticasone 50 mcg/inh inhalation powder:  (27 Jun 2019 20:41)  Lasix 40 mg oral tablet: 1 tab(s) orally once a day (27 Jun 2019 20:41)  lisinopril 5 mg oral tablet: 1 tab(s) orally once a day (27 Jun 2019 20:41)  Lyrica 150 mg oral capsule: 1 cap(s) orally every 8 hours (27 Jun 2019 20:41)  Pepcid 40 mg oral tablet: 1 tab(s) orally once a day (at bedtime) (27 Jun 2019 20:41)  Toviaz 4 mg oral tablet, extended release: 1 tab(s) orally once a day (27 Jun 2019 20:41)    MEDICATIONS  (STANDING):  aspirin  chewable 81 milliGRAM(s) Oral daily  chlorhexidine 4% Liquid 1 Application(s) Topical two times a day  clopidogrel Tablet 75 milliGRAM(s) Oral daily  heparin  Injectable 5000 Unit(s) SubCutaneous every 12 hours  pantoprazole    Tablet 40 milliGRAM(s) Oral before breakfast  simvastatin 20 milliGRAM(s) Oral at bedtime  sodium chloride 0.9%. 1000 milliLiter(s) (50 mL/Hr) IV Continuous <Continuous>      REVIEW OF SYSTEMS  General:  No weight loss, fevers, or chills.  Eyes:  No reported pain or visual changes  ENT:  No sore throat or runny nose.  NECK: No stiffness or lymphadenopathy  CV:  No chest pain or palpitations.  Resp:  No shortness of breath, cough, wheezing or hemoptysis  GI:  No abdominal pain, nausea, vomiting, dysphagia, diarrhea or constipation. No rectal bleeding, melena, or hematemesis.  Neuro:  No tingling, numbness       VITALS:   T(F): 96 (06-28-19 @ 09:17), Max: 98.4 (06-27-19 @ 14:53)  HR: 46 (06-28-19 @ 09:17) (42 - 62)  BP: 113/48 (06-28-19 @ 09:17) (105/47 - 149/58)  RR: 16 (06-28-19 @ 09:17) (15 - 20)  SpO2: 100% (06-28-19 @ 09:17) (97% - 100%)    PHYSICAL EXAM:  GENERAL: AAOx2 to person and place not time, no acute distress.  HEAD:  Atraumatic, Normocephalic  EYES: conjunctiva and sclera clear  NECK: Supple, No thyromegaly   CHEST/LUNG: Clear to auscultation bilaterally; No wheeze, rhonchi, or rales  HEART: Regular rate and rhythm; normal S1, S2, No murmurs.  ABDOMEN: Soft, nontender, nondistended; Bowel sounds present, no abdominal bruit, masses, or hepatosplenomegaly  NEUROLOGY: No asterixis or tremor  SKIN: Intact, no jaundice          LABS:  06-28    135  |  106  |  28<H>  ----------------------------<  91  4.0   |  21  |  0.6<L>    Ca    7.9<L>      28 Jun 2019 09:09  Phos  3.2     06-28  Mg     1.8     06-28    TPro  6.0  /  Alb  3.1<L>  /  TBili  <0.2  /  DBili  x   /  AST  17  /  ALT  13  /  AlkPhos  75  06-28                          9.9    6.20  )-----------( 249      ( 28 Jun 2019 09:09 )             30.8     LIVER FUNCTIONS - ( 28 Jun 2019 09:09 )  Alb: 3.1 g/dL / Pro: 6.0 g/dL / ALK PHOS: 75 U/L / ALT: 13 U/L / AST: 17 U/L / GGT: x               IMAGING:    < from: CT Abdomen and Pelvis w/ IV Cont (06.27.19 @ 16:42) >  EXAM:  CT ABDOMEN AND PELVIS IC            PROCEDURE DATE:  06/27/2019            INTERPRETATION:  CLINICAL STATEMENT: Sepsis      TECHNIQUE: Contiguous axial CT images were obtained from the lower chest   to the pubic symphysis following administration of 100cc Optiray 320   intravenous contrast.  Oral contrast was not administered.  Reformatted   images in the coronal and sagittal planes were acquired.    COMPARISON CT: 5/23/2019    OTHER STUDIES USED FOR CORRELATION: None.       FINDINGS:    LOWER CHEST: Bibasilar atelectasis. Cardiomegaly..    HEPATOBILIARY: Unremarkable.    SPLEEN: Unremarkable.    PANCREAS: Unremarkable.    ADRENAL GLANDS: Unremarkable.    KIDNEYS: Symmetrical enhancement without any evidence of obstructive   findings. Redemonstration of extrarenal pelvis. Nonspecific perinephric   stranding.    ABDOMINOPELVIC NODES: Unremarkable.    PELVIC ORGANS: Presence of Musa catheter with collapsed urinary bladder.   Air within the urinary bladder, recent instrumentation.    PERITONEUM/MESENTERY/BOWEL: No evidence of bowel obstruction. There is   focal infiltrative changes in the omentum, anterior to left hepatic lobe   on image #119 of series 4, measures 1.8 x 3.5 cm (previously 0.9 x 2.6   cm).    BONES/SOFT TISSUES:Noted again is multilevel degenerative changes of   spine. Bilateral gluteal subcutaneous edema. Partially visualized right   femoral intramedullary nail and fixative screw.    OTHER: Atherosclerosis      IMPRESSION:   Intermediate focal infiltrativefindings in the anterior abdominal   omentum, 1.8 x 3.5 cm previously 0.9 x 2.6 cm). Differential diagnosis   includes infectious/inflammatory etiology versus hematoma (is there is   any associated trauma, correlate with the clinical findings).      Spoke with LIZA SALDIVAR on 6/27/2019 5:13 PM with readback.                  ILANA GOMES M.D., ATTENDING RADIOLOGIST  This document has been electronically signed. Jun 27 2019  5:13PM              < end of copied text >      < from: CT Head No Cont (06.27.19 @ 16:42) >    EXAM:  CT BRAIN            PROCEDURE DATE:  06/27/2019            INTERPRETATION:  Clinical History / Reason for exam: Change in mental   status.    CT SCAN OF THE BRAIN WITHOUT CONTRAST    TECHNIQUE:    Multiple transaxial noncontrast CT images ofthe brain were obtained from   base to vertex. Sagittal and coronal reformatted images were obtained.   The exam was performed twice due to motion artifact.     COMPARISON:    Noncontrast CT scan of the brain dated May 26, 2019.    FINDINGS:    The third and lateral ventricles are mildly enlarged as are the cortical   sulci consistent with a mild degree of cortical atrophy.  The fourth   ventricle is normal in size and position.    There is no shift of the midline structures, evidence of acute   intracranial hemorrhage, or depressed skull fracture.    Minimal widening of the cerebellopontine angle cisterns and minimal   prominence of the cerebellar folia consistent with a minimal degree of   cerebellar atrophy.    Patchy foci of diminished attenuation the periventricular white matter,   internal and external capsules, left basal ganglia and left martin   consistent with chronic ischemic change.    There is a new focus of diminished attenuation in the right side of the   martin consistent withischemic change, age indeterminant.    Opacification of the right mastoid air cells and right middle ear canal   consistent with inflammation or infection.    IMPRESSION:    In comparison with the prior noncontrast CT scan of the brain dated May   26, 2019:    New focus of diminished attenuation in the right side of the martin   consistent with ischemic change, age indeterminant.                  RODNEY AHMADI M.D., ATTENDING RADIOLOGIST  This document has been electronically signed. Jun 27 20195:01PM              < end of copied text >

## 2019-06-28 NOTE — PHYSICAL THERAPY INITIAL EVALUATION ADULT - PASSIVE RANGE OF MOTION EXAMINATION, REHAB EVAL
no Passive ROM deficits were identified/BUE and BLE PROM WFL grossly throughout, assessed in semi-reclined position

## 2019-06-28 NOTE — PHYSICAL THERAPY INITIAL EVALUATION ADULT - STANDING BALANCE: DYNAMIC, REHAB EVAL
Unable to perform. As per patient's daughters, patient has been non-ambulatory and require susan lift since CVA in May 2019

## 2019-06-28 NOTE — CONSULT NOTE ADULT - ASSESSMENT
89 yo female pmh CVA 5/19/19 on aspirin and Plavix presents for altered mental status. GI Consulted for abnormal CT scan findings.    Problem 1-Intermediate focal infiltrativefindings in the anterior abdominal omentum, 1.8 x 3.5 cm previously 0.9 x 2.6 cm). Differential diagnosis includes infectious/inflammatory etiology versus hematoma (is there is any associated trauma, correlate with the clinical findings)  Rec  -To discuss with Dr. Tracy Today    Problem 2-Altered Mental Status  Rec  -Care as per Neuro and primary team      Please read the Attending Attestation below by Dr. Andrews Tracy the Attending Gastroenterologist on service today.

## 2019-06-28 NOTE — PHYSICAL THERAPY INITIAL EVALUATION ADULT - GENERAL OBSERVATIONS, REHAB EVAL
13:25 - 13:45. Chart reviewed. Patient available to be seen for physical therapy, confirmed with nurse. Patient encountered semi-reclined in bed, +O2 via nasal cannula, +tele, +alicea, +family at bedside.  Agreeable for PT evaluation.

## 2019-06-28 NOTE — PHYSICAL THERAPY INITIAL EVALUATION ADULT - ADDITIONAL COMMENTS
As per patient's daughters, patient has been non-ambulatory and require susan lift since CVA in May 2019

## 2019-06-28 NOTE — PHYSICAL THERAPY INITIAL EVALUATION ADULT - MANUAL MUSCLE TESTING RESULTS, REHAB EVAL
Left UE and LE 0/5; Right toe extension/flexion 1/5, rest RLE 0/5 ; R wrist and elbow 2- /5, R shoulder 0/5

## 2019-06-28 NOTE — PATIENT PROFILE ADULT - NSPROEDALEARNPREFOTH_GEN_A_NUR
group instruction/pictorial/verbal instruction/video/audio/computer/internet/individual instruction/skill demonstration

## 2019-06-28 NOTE — CONSULT NOTE ADULT - ASSESSMENT
IMPRESSION: Rehab of 89 y/o f rehab  for CVA     PRECAUTIONS: [  ] Cardiac  [  ] Respiratory  [  ] Seizures [  ] Contact Isolation  [  ] Droplet Isolation  [ FALL ] Other    Weight Bearing Status:     RECOMMENDATION:    Out of Bed to Chair     DVT/Decubiti Prophylaxis    REHAB PLAN:     [ x  ] Bedside P/T 3-5 times a week   [   ]   Bedside O/T  2-3 times a week             [   ] No Rehab Therapy Indicated                   [   ]  Speech Therapy   Conditioning/ROM                                    ADL  Bed Mobility                                               Conditioning/ROM  Transfers                                                     Bed Mobility  Sitting /Standing Balance                         Transfers                                        Gait Training                                               Sitting/Standing Balance  Stair Training [   ]Applicable                    Home equipment Eval                                                                        Splinting  [   ] Only      GOALS:   ADL   [   ]   Independent                    Transfers  [   ] Independent                          Ambulation  [   ] Independent     [    ] With device                            [   ]  CG                                                         [   ]  CG                                                                  [   ] CG                            [    ] Min A                                                   [   ] Min A                                                              [   ] Min  A          DISCHARGE PLAN:   [   ]  Good candidate for Intensive Rehabilitation/Hospital based-[facility]A [facility]                                             Will tolerate 3hrs Intensive Rehab Daily                                       [    ]  Short Term Rehab in Skilled Nursing Facility                                       [x    ]  Home with Outpatient or VN services d/w  ptn dtr  recommond  dc home  with  24  hr  home care  cont skin  care  and current  care                                           [    ]  Possible Candidate for Intensive Hospital based Rehab

## 2019-06-28 NOTE — PROGRESS NOTE ADULT - ASSESSMENT
89 yo F with Chronic HFpEF, Vit D deficiency, Hypothyroidism, Dementia and chronic Musa, bedbound, DNR/DNI  h/o CVA 5/19 with residual b/l deficit BIBEMS due to Hypotension and right facial droop. She was seen by her home nurse with a SBP < 70s. Family also reports generalized weakness and a new right facial droop. CT head showed a new acute ischemic changes in R martin. She was upgraded to CCU for further monitoring.      Assessment & Plan:    1. Left Facial droop: r/o New CVA  h/o Cardioembolic CVA.  Neurology following: ? New CVA vs. Unmasking of old Deficits.  Continue DAPT & Statin.  Continue Dysphagia diet pending SLP eval.  PT eval.  Goal of care discussion will need to be held with the Family to determine the extent of Management.  As per neurology, family declined any further work up on her previous admission.      2. Hypothyroidism  Continue Synthroid.        3. Hypotension:  h/o Hypertension but hypotensive in the ED.  Resolved with IVF.  Hold anti HTN for now.      4. Stage 3 Sacral Ulcer:  Continue local wound care.  Wound care consult.        5. Chronic Diastolic CHF:  No acute exacerbation.  Hold Lasix for now.          Prophylaxis: Heparin, PPI  Code status: DNR/DNI    Progress Note Handoff:  Pending consults: SLP  Pending Tests: Repeat CT head.  Significant Test results: CT head.  Family/Patient discussion: Plan of care yet to be discussed with family.  Disposition: pending stabilization      Attending: Dr. Yojana Ruiz. Spectra 1503. 89 yo F with Chronic HFpEF, Vit D deficiency, Hypothyroidism, Dementia and chronic Musa, bedbound, DNR/DNI  h/o CVA 5/19 with residual b/l deficit BIBEMS due to Hypotension and right facial droop. She was seen by her home nurse with a SBP < 70s. Family also reports generalized weakness and a new right facial droop. CT head showed a new acute ischemic changes in R martin. She was upgraded to CCU for further monitoring.      Assessment & Plan:    1. Left Facial droop: r/o New CVA  h/o Cardioembolic CVA.  Neurology following: ? New CVA vs. Unmasking of old Deficits.  Continue DAPT & Statin.  Continue Dysphagia diet pending SLP eval.  PT eval.  Goals of care discussion will need to be held with the Family to determine the extent of Management.  As per neurology, family declined any further work up on her previous admission.      2. Hypothyroidism  Continue Synthroid.        3. Hypotension:  h/o Hypertension but hypotensive in the ED.  Resolved with IVF.  Hold anti HTN for now.      4. Stage 3 Sacral Ulcer:  Continue local wound care.  Wound care consult.        5. Chronic Diastolic CHF:  No acute exacerbation.  Hold Lasix for now.        6. UTI:  Patient with chronic Musa.  UA: Pyuria with Trace LE.  CT Abdomen: Nonspecific perinephric stranding. Continue Rocephin pending urine cultures.        Prophylaxis: Heparin, PPI  Code status: DNR/DNI      Progress Note Handoff:  Pending consults: SLP  Pending Tests: Repeat CT head.  Significant Test results: CT head.  Family/Patient discussion: Plan of care yet to be discussed with family. Unable to reach family.   Disposition: pending stabilization.      Attending: Dr. Yojana Ruiz. Spectra 1503.

## 2019-06-28 NOTE — CONSULT NOTE ADULT - SUBJECTIVE AND OBJECTIVE BOX
Neurology Consultation note    Name  ASHVIN CUEVAS    HPI:  89 yo female BIBEMS h/o CVA 5/19 with residual b/l deficit, HLD, hypothyroidism, depression p/w hypotension and right facial droop. Today. home nurse came around 1pm, and pt had SBP < 70s. Family noticed pt having right facial droop and looking more lethargic, so called EMS. In ED, family reports pt back to her post-stroke baseline (weakness, dysphasia, pt has not ambulated since the CVA). CT head shows new acute ischemic changes in R martin. Family reports pt taking her home meds daily including asa and stain. Family said pt seemed dehydrated at home, looks much better after IVF in ED. Family denies pt having head trauma, LOC, HA, CP, SOB, cough, abd pain, N/V/D, calf pain, or edema. (27 Jun 2019 19:53)    NEURO:  PATIENT IS A 89 YO FEMALE KNOWN TO ME FROM PAST ADM IN MAY 2019 WHERE PATIENT PRESENTED WITH R SIDED WEAKNESS AND BILATERAL ACUTE AND SUBACUTE BILATERAL CVAs SUSPICIOUS FOR CARDIOEMBOLIC ETIOLOGY. NO ARRHYTHMIA FOUND DURING ADM AND ECHO WAS NORMAL.  2-4 WK EVENT MONITOR REC BY CARDIO AT THE TIME. PATIENT ALSO WITH SEVERE VB DISEASE. SHE WAS D/C ON ASA, PLAVIX AND STATIN. AT THE TIME FAMILY DECIDED AGAINST MRI AS IT WOULD NOT .   YESTERDAY PATIENT PRESENTED WITH       Vital Signs Last 24 Hrs  T(C): 35.9 (28 Jun 2019 02:15), Max: 36.9 (27 Jun 2019 14:53)  T(F): 96.6 (28 Jun 2019 02:15), Max: 98.4 (27 Jun 2019 14:53)  HR: 50 (28 Jun 2019 03:25) (45 - 62)  BP: 149/58 (28 Jun 2019 03:25) (105/47 - 149/58)  BP(mean): --  RR: 16 (28 Jun 2019 03:25) (15 - 20)  SpO2: 100% (28 Jun 2019 03:25) (97% - 100%)    Neurological Exam:   Mental status: Awake, alert and oriented x3.  Recent and remote memory intact.  Naming, repetition and comprehension intact.  Attention/concentration intact.  No dysarthria, no aphasia.  Fund of knowledge appropriate.    Cranial nerves: Pupils equally round and reactive to light, visual fields full, no nystagmus, extraocular muscles intact, V1 through V3 intact bilaterally and symmetric, face symmetric, hearing intact to finger rub, palate elevation symmetric, tongue was midline.  Motor:  MRC grading 5/5 b/l UE/LE.   strength 5/5.  Normal tone and bulk.  No abnormal movements.    Sensation: Intact to light touch, proprioception, and pinprick.   Coordination: No dysmetria on finger-to-nose and heel-to-shin.  No dysdiadokinesia.  Reflexes: 2+ in bilateral UE/LE, downgoing toes bilaterally. (-) Garcia.  Gait: Narrow and steady. No ataxia.  Romberg negative    Medications  aspirin  chewable 81 milliGRAM(s) Oral daily  chlorhexidine 4% Liquid 1 Application(s) Topical two times a day  clopidogrel Tablet 75 milliGRAM(s) Oral daily  heparin  Injectable 5000 Unit(s) SubCutaneous every 12 hours  pantoprazole    Tablet 40 milliGRAM(s) Oral before breakfast  simvastatin 20 milliGRAM(s) Oral at bedtime  sodium chloride 0.9%. 1000 milliLiter(s) IV Continuous <Continuous>      Lab  06-27    127<L>  |  94<L>  |  45<H>  ----------------------------<  98  5.0   |  20  |  1.4    Ca    8.5      27 Jun 2019 14:38    TPro  6.4  /  Alb  3.2<L>  /  TBili  0.4  /  DBili  x   /  AST  22  /  ALT  19  /  AlkPhos  83  06-27                          10.8   8.34  )-----------( 319      ( 27 Jun 2019 14:38 )             32.9     LIVER FUNCTIONS - ( 27 Jun 2019 14:38 )  Alb: 3.2 g/dL / Pro: 6.4 g/dL / ALK PHOS: 83 U/L / ALT: 19 U/L / AST: 22 U/L / GGT: x                   Radiology  CT Head No Cont:   EXAM:  CT BRAIN            PROCEDURE DATE:  06/27/2019            INTERPRETATION:  Clinical History / Reason for exam: Change in mental   status.    CT SCAN OF THE BRAIN WITHOUT CONTRAST    TECHNIQUE:    Multiple transaxial noncontrast CT images ofthe brain were obtained from   base to vertex. Sagittal and coronal reformatted images were obtained.   The exam was performed twice due to motion artifact.     COMPARISON:    Noncontrast CT scan of the brain dated May 26, 2019.    FINDINGS:    The third and lateral ventricles are mildly enlarged as are the cortical   sulci consistent with a mild degree of cortical atrophy.  The fourth   ventricle is normal in size and position.    There is no shift of the midline structures, evidence of acute   intracranial hemorrhage, or depressed skull fracture.    Minimal widening of the cerebellopontine angle cisterns and minimal   prominence of the cerebellar folia consistent with a minimal degree of   cerebellar atrophy.    Patchy foci of diminished attenuation the periventricular white matter,   internal and external capsules, left basal ganglia and left martin   consistent with chronic ischemic change.    There is a new focus of diminished attenuation in the right side of the   martin consistent withischemic change, age indeterminant.    Opacification of the right mastoid air cells and right middle ear canal   consistent with inflammation or infection.    IMPRESSION:    In comparison with the prior noncontrast CT scan of the brain dated May   26, 2019:    New focus of diminished attenuation in the right side of the martin   consistent with ischemic change, age indeterminant.            Assessment: 89 YO     Plan: Neurology Consultation note    Name  ASHVIN CUEVAS    HPI:  89 yo female BIBEMS h/o CVA 5/19 with residual b/l deficit, HLD, hypothyroidism, depression p/w hypotension and right facial droop. Today. home nurse came around 1pm, and pt had SBP < 70s. Family noticed pt having right facial droop and looking more lethargic, so called EMS. In ED, family reports pt back to her post-stroke baseline (weakness, dysphasia, pt has not ambulated since the CVA). CT head shows new acute ischemic changes in R martin. Family reports pt taking her home meds daily including asa and stain. Family said pt seemed dehydrated at home, looks much better after IVF in ED. Family denies pt having head trauma, LOC, HA, CP, SOB, cough, abd pain, N/V/D, calf pain, or edema. (27 Jun 2019 19:53)    NEURO:  PATIENT IS A 89 YO FEMALE KNOWN TO ME FROM PAST ADM IN MAY 2019 WHERE PATIENT PRESENTED WITH R SIDED WEAKNESS AND BILATERAL ACUTE AND SUBACUTE BILATERAL CVAs SUSPICIOUS FOR CARDIOEMBOLIC ETIOLOGY. NO ARRHYTHMIA FOUND DURING ADM AND ECHO WAS NORMAL.  2-4 WK EVENT MONITOR REC BY CARDIO AT THE TIME. PATIENT ALSO WITH SEVERE VB DISEASE. SHE WAS D/C ON ASA, PLAVIX AND STATIN. AT THE TIME FAMILY DECIDED AGAINST MRI AS IT WOULD NOT .   YESTERDAY PATIENT PRESENTED WITH r sided FACIAL AND HYPOTENSION  PATIENT WITH BASELINE L HP FROM OLD CVA. UNSURE IF PATIENT HAD OUTPT CARDIAC EVENT MONITOR AS REC BY CARDIOLOGY ON LAST ADM  INTERVAL R PONTINE CVA ON CTH  PATIENT WITH KNOWN VB DISEASE      Vital Signs Last 24 Hrs  T(C): 35.9 (28 Jun 2019 02:15), Max: 36.9 (27 Jun 2019 14:53)  T(F): 96.6 (28 Jun 2019 02:15), Max: 98.4 (27 Jun 2019 14:53)  HR: 50 (28 Jun 2019 03:25) (45 - 62)  BP: 149/58 (28 Jun 2019 03:25) (105/47 - 149/58)  BP(mean): --  RR: 16 (28 Jun 2019 03:25) (15 - 20)  SpO2: 100% (28 Jun 2019 03:25) (97% - 100%)    Neurological Exam:   AWAKE AND ALERT, MINIMALLY VERBAL , FOLLOWS SIMPLE COMMANDS  EYES MILDINE, FULL EOM, L FACIAL  MOVES ARMS AGAINST GRAVITY BUT R>L  TRACE MOVEMENT B LE    Medications  aspirin  chewable 81 milliGRAM(s) Oral daily  chlorhexidine 4% Liquid 1 Application(s) Topical two times a day  clopidogrel Tablet 75 milliGRAM(s) Oral daily  heparin  Injectable 5000 Unit(s) SubCutaneous every 12 hours  pantoprazole    Tablet 40 milliGRAM(s) Oral before breakfast  simvastatin 20 milliGRAM(s) Oral at bedtime  sodium chloride 0.9%. 1000 milliLiter(s) IV Continuous <Continuous>      Lab  06-27    127<L>  |  94<L>  |  45<H>  ----------------------------<  98  5.0   |  20  |  1.4    Ca    8.5      27 Jun 2019 14:38    TPro  6.4  /  Alb  3.2<L>  /  TBili  0.4  /  DBili  x   /  AST  22  /  ALT  19  /  AlkPhos  83  06-27                          10.8   8.34  )-----------( 319      ( 27 Jun 2019 14:38 )             32.9     LIVER FUNCTIONS - ( 27 Jun 2019 14:38 )  Alb: 3.2 g/dL / Pro: 6.4 g/dL / ALK PHOS: 83 U/L / ALT: 19 U/L / AST: 22 U/L / GGT: x                   Radiology  CT Head No Cont:   EXAM:  CT BRAIN            PROCEDURE DATE:  06/27/2019            INTERPRETATION:  Clinical History / Reason for exam: Change in mental   status.    CT SCAN OF THE BRAIN WITHOUT CONTRAST    TECHNIQUE:    Multiple transaxial noncontrast CT images ofthe brain were obtained from   base to vertex. Sagittal and coronal reformatted images were obtained.   The exam was performed twice due to motion artifact.     COMPARISON:    Noncontrast CT scan of the brain dated May 26, 2019.    FINDINGS:    The third and lateral ventricles are mildly enlarged as are the cortical   sulci consistent with a mild degree of cortical atrophy.  The fourth   ventricle is normal in size and position.    There is no shift of the midline structures, evidence of acute   intracranial hemorrhage, or depressed skull fracture.    Minimal widening of the cerebellopontine angle cisterns and minimal   prominence of the cerebellar folia consistent with a minimal degree of   cerebellar atrophy.    Patchy foci of diminished attenuation the periventricular white matter,   internal and external capsules, left basal ganglia and left martin   consistent with chronic ischemic change.    There is a new focus of diminished attenuation in the right side of the   martin consistent withischemic change, age indeterminant.    Opacification of the right mastoid air cells and right middle ear canal   consistent with inflammation or infection.    IMPRESSION:    In comparison with the prior noncontrast CT scan of the brain dated May   26, 2019:    New focus of diminished attenuation in the right side of the martin   consistent with ischemic change, age indeterminant.            Assessment: 89 YO FEMALE WITH HX AS ABOVE P/W NEW FACIAL DROOP. ON EXAM BASELINE L HP. PATIENT HAS PICTURE OF CARDIOEMBOLIC SHOWER ON LAST ADM. FAMILY DECIDED AGAINST AGGRESSIVE W/U AT THAT TIME AS IT WOULD NOT   NEW CVA VS UNMASKING OF OLD DEFICITS      Plan:  FIRST NEED TO CLARIFY GOALS OF CARE WITH FAMILY  IF UNCHANGED FROM PREVIOUS, THEN WOULD NOT PERFORM MRI  ALSO NEED TO CLARIFY IF PATIENT HAD RECOMMENDED CARDIAC W/U AND IF ANY ARRHYTHMIA WAS FOUND  FOR NOW CONT ASA , PLAVIX AND ZOCOR  REPEAT CTH TOMORROW  Q 4 NEURO CHECKS X 24 HRS  PLEASE CALL WITH ANY QUESTIONS

## 2019-06-29 LAB
ANION GAP SERPL CALC-SCNC: 10 MMOL/L — SIGNIFICANT CHANGE UP (ref 7–14)
BASOPHILS # BLD AUTO: 0.08 K/UL — SIGNIFICANT CHANGE UP (ref 0–0.2)
BASOPHILS NFR BLD AUTO: 1 % — SIGNIFICANT CHANGE UP (ref 0–1)
BLD GP AB SCN SERPL QL: SIGNIFICANT CHANGE UP
BUN SERPL-MCNC: 18 MG/DL — SIGNIFICANT CHANGE UP (ref 10–20)
CALCIUM SERPL-MCNC: 8.6 MG/DL — SIGNIFICANT CHANGE UP (ref 8.5–10.1)
CHLORIDE SERPL-SCNC: 106 MMOL/L — SIGNIFICANT CHANGE UP (ref 98–110)
CO2 SERPL-SCNC: 21 MMOL/L — SIGNIFICANT CHANGE UP (ref 17–32)
CREAT SERPL-MCNC: 0.6 MG/DL — LOW (ref 0.7–1.5)
EOSINOPHIL # BLD AUTO: 0.13 K/UL — SIGNIFICANT CHANGE UP (ref 0–0.7)
EOSINOPHIL NFR BLD AUTO: 1.7 % — SIGNIFICANT CHANGE UP (ref 0–8)
GLUCOSE SERPL-MCNC: 97 MG/DL — SIGNIFICANT CHANGE UP (ref 70–99)
HCT VFR BLD CALC: 30.6 % — LOW (ref 37–47)
HCT VFR BLD CALC: 33.9 % — LOW (ref 37–47)
HGB BLD-MCNC: 10.9 G/DL — LOW (ref 12–16)
HGB BLD-MCNC: 9.9 G/DL — LOW (ref 12–16)
IMM GRANULOCYTES NFR BLD AUTO: 0.3 % — SIGNIFICANT CHANGE UP (ref 0.1–0.3)
LYMPHOCYTES # BLD AUTO: 2.8 K/UL — SIGNIFICANT CHANGE UP (ref 1.2–3.4)
LYMPHOCYTES # BLD AUTO: 35.9 % — SIGNIFICANT CHANGE UP (ref 20.5–51.1)
MCHC RBC-ENTMCNC: 28.5 PG — SIGNIFICANT CHANGE UP (ref 27–31)
MCHC RBC-ENTMCNC: 28.8 PG — SIGNIFICANT CHANGE UP (ref 27–31)
MCHC RBC-ENTMCNC: 32.2 G/DL — SIGNIFICANT CHANGE UP (ref 32–37)
MCHC RBC-ENTMCNC: 32.4 G/DL — SIGNIFICANT CHANGE UP (ref 32–37)
MCV RBC AUTO: 88.5 FL — SIGNIFICANT CHANGE UP (ref 81–99)
MCV RBC AUTO: 89 FL — SIGNIFICANT CHANGE UP (ref 81–99)
MONOCYTES # BLD AUTO: 1 K/UL — HIGH (ref 0.1–0.6)
MONOCYTES NFR BLD AUTO: 12.8 % — HIGH (ref 1.7–9.3)
NEUTROPHILS # BLD AUTO: 3.77 K/UL — SIGNIFICANT CHANGE UP (ref 1.4–6.5)
NEUTROPHILS NFR BLD AUTO: 48.3 % — SIGNIFICANT CHANGE UP (ref 42.2–75.2)
NRBC # BLD: 0 /100 WBCS — SIGNIFICANT CHANGE UP (ref 0–0)
NRBC # BLD: 0 /100 WBCS — SIGNIFICANT CHANGE UP (ref 0–0)
OB PNL STL: NEGATIVE — SIGNIFICANT CHANGE UP
PLATELET # BLD AUTO: 279 K/UL — SIGNIFICANT CHANGE UP (ref 130–400)
PLATELET # BLD AUTO: 295 K/UL — SIGNIFICANT CHANGE UP (ref 130–400)
POTASSIUM SERPL-MCNC: 4.1 MMOL/L — SIGNIFICANT CHANGE UP (ref 3.5–5)
POTASSIUM SERPL-SCNC: 4.1 MMOL/L — SIGNIFICANT CHANGE UP (ref 3.5–5)
RBC # BLD: 3.44 M/UL — LOW (ref 4.2–5.4)
RBC # BLD: 3.83 M/UL — LOW (ref 4.2–5.4)
RBC # FLD: 14.4 % — SIGNIFICANT CHANGE UP (ref 11.5–14.5)
RBC # FLD: 14.4 % — SIGNIFICANT CHANGE UP (ref 11.5–14.5)
SODIUM SERPL-SCNC: 137 MMOL/L — SIGNIFICANT CHANGE UP (ref 135–146)
WBC # BLD: 7.8 K/UL — SIGNIFICANT CHANGE UP (ref 4.8–10.8)
WBC # BLD: 8.15 K/UL — SIGNIFICANT CHANGE UP (ref 4.8–10.8)
WBC # FLD AUTO: 7.8 K/UL — SIGNIFICANT CHANGE UP (ref 4.8–10.8)
WBC # FLD AUTO: 8.15 K/UL — SIGNIFICANT CHANGE UP (ref 4.8–10.8)

## 2019-06-29 PROCEDURE — 99233 SBSQ HOSP IP/OBS HIGH 50: CPT

## 2019-06-29 PROCEDURE — 99232 SBSQ HOSP IP/OBS MODERATE 35: CPT

## 2019-06-29 PROCEDURE — 71045 X-RAY EXAM CHEST 1 VIEW: CPT | Mod: 26

## 2019-06-29 RX ORDER — TICAGRELOR 90 MG/1
90 TABLET ORAL EVERY 12 HOURS
Refills: 0 | Status: DISCONTINUED | OUTPATIENT
Start: 2019-06-30 | End: 2019-07-02

## 2019-06-29 RX ADMIN — SODIUM CHLORIDE 50 MILLILITER(S): 9 INJECTION INTRAMUSCULAR; INTRAVENOUS; SUBCUTANEOUS at 07:42

## 2019-06-29 RX ADMIN — Medication 81 MILLIGRAM(S): at 11:15

## 2019-06-29 RX ADMIN — CHLORHEXIDINE GLUCONATE 1 APPLICATION(S): 213 SOLUTION TOPICAL at 19:02

## 2019-06-29 RX ADMIN — CEFTRIAXONE 100 MILLIGRAM(S): 500 INJECTION, POWDER, FOR SOLUTION INTRAMUSCULAR; INTRAVENOUS at 19:01

## 2019-06-29 RX ADMIN — HEPARIN SODIUM 5000 UNIT(S): 5000 INJECTION INTRAVENOUS; SUBCUTANEOUS at 19:01

## 2019-06-29 RX ADMIN — CHLORHEXIDINE GLUCONATE 1 APPLICATION(S): 213 SOLUTION TOPICAL at 04:59

## 2019-06-29 RX ADMIN — HEPARIN SODIUM 5000 UNIT(S): 5000 INJECTION INTRAVENOUS; SUBCUTANEOUS at 05:09

## 2019-06-29 RX ADMIN — CLOPIDOGREL BISULFATE 75 MILLIGRAM(S): 75 TABLET, FILM COATED ORAL at 11:15

## 2019-06-29 RX ADMIN — PANTOPRAZOLE SODIUM 40 MILLIGRAM(S): 20 TABLET, DELAYED RELEASE ORAL at 06:46

## 2019-06-29 RX ADMIN — SIMVASTATIN 20 MILLIGRAM(S): 20 TABLET, FILM COATED ORAL at 21:12

## 2019-06-29 NOTE — CONSULT NOTE ADULT - SUBJECTIVE AND OBJECTIVE BOX
CARDIOLOGY CONSULT NOTE     CHIEF COMPLAINT/REASON FOR CONSULT:    HPI:  89 yo female BIBEMS h/o CVA 5/19 with residual b/l deficit, HLD, hypothyroidism, depression right facial droop. Yesterday nurse came around 1pm, and pt had SBP < 70s. Family noticed pt having right facial droop and looking more lethargic, so called EMS. In ED, family reports pt back to her post-stroke baseline (weakness, dysphasia, pt has not ambulated since the CVA). CT head shows new acute ischemic changes in R martin. Family reports pt taking her home meds daily including asa and stain. Family said pt seemed dehydrated at home,  better after IVF in ED. Family denies pt having head trauma, LOC, HA, CP, SOB, cough, abd pain, N/V/D, calf pain, or edema. (27 Jun 2019 19:53)      PAST MEDICAL & SURGICAL HISTORY:  Musa catheter in place  Diastolic heart failure  Neuropathy  Depression  OA (osteoarthritis)  Hypothyroid  HLD (hyperlipidemia)  S/P ORIF (open reduction internal fixation) fracture      Cardiac Risks:   [ ]HTN, [ ] DM, [ ] Smoking, [ ] FH,  [x ] Lipids        MEDICATIONS:  MEDICATIONS  (STANDING):  aspirin  chewable 81 milliGRAM(s) Oral daily  cefTRIAXone   IVPB 1000 milliGRAM(s) IV Intermittent every 24 hours  chlorhexidine 4% Liquid 1 Application(s) Topical two times a day  clopidogrel Tablet 75 milliGRAM(s) Oral daily  heparin  Injectable 5000 Unit(s) SubCutaneous every 12 hours  pantoprazole    Tablet 40 milliGRAM(s) Oral before breakfast  simvastatin 20 milliGRAM(s) Oral at bedtime  sodium chloride 0.9%. 1000 milliLiter(s) (50 mL/Hr) IV Continuous <Continuous>      FAMILY HISTORY:  No pertinent family history in first degree relatives: no family history of heart disease      SOCIAL HISTORY:      [ ] Marital status    Allergies    No Known Allergies        	    REVIEW OF SYSTEMS:  CONSTITUTIONAL: No fever, weight loss, or fatigue  EYES: No eye pain, visual disturbances, or discharge  ENMT:  No difficulty hearing, tinnitus, vertigo; No sinus or throat pain  NECK: No pain or stiffness  RESPIRATORY: No cough, wheezing, chills or hemoptysis; No Shortness of Breath  CARDIOVASCULAR: No chest pain, palpitations, passing out, dizziness, or leg swelling  GASTROINTESTINAL: No abdominal or epigastric pain. No nausea, vomiting, or hematemesis; No diarrhea or constipation. No melena or hematochezia.  GENITOURINARY: No dysuria, frequency, hematuria, or incontinence  NEUROLOGICAL: See above  SKIN: No itching, burning, rashes, or lesions   	        PHYSICAL EXAM:  T(C): 36.2 (06-28-19 @ 23:22), Max: 36.8 (06-28-19 @ 20:01)  HR: 54 (06-29-19 @ 02:00) (42 - 54)  BP: 106/44 (06-29-19 @ 02:00) (99/45 - 124/55)  RR: 18 (06-28-19 @ 23:22) (16 - 18)  SpO2: 100% (06-29-19 @ 02:00) (96% - 100%)  Wt(kg): --  I&O's Summary    27 Jun 2019 07:01  -  28 Jun 2019 07:00  --------------------------------------------------------  IN: 0 mL / OUT: 1370 mL / NET: -1370 mL    28 Jun 2019 07:01  -  29 Jun 2019 06:41  --------------------------------------------------------  IN: 800 mL / OUT: 955 mL / NET: -155 mL        Appearance: Normal	  Psychiatry: A & O x 3, Mood & affect appropriate  HEENT:   Normal oral mucosa, PERRL, EOMI	  Lymphatic: No lymphadenopathy  Cardiovascular: Normal S1 S2,RRR, No JVD, I/VI BRAYAN LSB  Respiratory: Lungs clear to auscultation	  Gastrointestinal:  Soft, Non-tender, + BS	  Skin: No rashes, No ecchymoses, No cyanosis	  Neurologic: Non-focal  Extremities: Normal range of motion, No clubbing, cyanosis or edema  Vascular: Peripheral pulses palpable 2+ bilaterally      ECG:  	< from: 12 Lead ECG (06.27.19 @ 13:56) >  Diagnosis Line Sinus bradycardia  Low voltage QRS  Incomplete right bundle branch block  T wave abnormality, consider anterior ischemia  Abnormal ECG    Confirmed by LINK RICH MD ([id number]) on 6/27/2019 4:10:53 PM    < end of copied text >      	  LABS:	 	    CARDIAC MARKERS:                                    9.9    6.20  )-----------( 249      ( 28 Jun 2019 09:09 )             30.8     06-28    135  |  106  |  28<H>  ----------------------------<  91  4.0   |  21  |  0.6<L>    Ca    7.9<L>      28 Jun 2019 09:09  Phos  3.2     06-28  Mg     1.8     06-28    TPro  6.0  /  Alb  3.1<L>  /  TBili  <0.2  /  DBili  x   /  AST  17  /  ALT  13  /  AlkPhos  75  06-28        HgA1c: Hemoglobin A1C, Whole Blood: 5.7 % (06-28 @ 09:09)

## 2019-06-29 NOTE — DIETITIAN INITIAL EVALUATION ADULT. - MALNUTRITION
Unspecified severe protein calorie malnutrition in the context of chronic illness related to CVA history as evidenced by <75% estimated energy requirement >1 month, severe 13% unintentional wt loss x ~7 months, severe fat wasting to orbital region.

## 2019-06-29 NOTE — DIETITIAN INITIAL EVALUATION ADULT. - ENERGY NEEDS
Calories: 30-35 kcal/kg due to malnutrition/decub= 7653-9217 kcal/day  Protein: 1.4-1.6 due to malnutrition/decub=  g/day  Fluid: per CCU team

## 2019-06-29 NOTE — CHART NOTE - NSCHARTNOTEFT_GEN_A_CORE
Upon Nutritional Assessment by the Registered Dietitian your patient was determined to meet criteria / has evidence of the following diagnosis/diagnoses:          [ ]  Mild Protein Calorie Malnutrition        [ ]  Moderate Protein Calorie Malnutrition        [x] Severe Protein Calorie Malnutrition        [ ] Unspecified Protein Calorie Malnutrition        [ ] Underweight / BMI <19        [ ] Morbid Obesity / BMI > 40      Patient meets malnutrition criteria- Unspecified severe protein calorie malnutrition in the context of chronic illness related to CVA history as evidenced by <75% estimated energy requirement >1 month, severe 13% unintentional wt loss x ~7 months, severe fat wasting to orbital region.     Treatment:    The following diet has been recommended: Recommend removing DASH/TLC diet modifier to promote PO intake. Recommend MVI q 24 hrs, vit C 500 mg q 12 hrs, zinc sulfate 220 mg q 24 hrs x 14 days, diet per SLP recommendations, prosource gelatein plus q 8 hrs.    PROVIDER Section:     By signing this assessment you are acknowledging and agree with the diagnosis/diagnoses assigned by the Registered Dietitian    Comments:

## 2019-06-29 NOTE — PROGRESS NOTE ADULT - ASSESSMENT
89 yo F with Chronic HFpEF, Vit D deficiency, Hypothyroidism, Dementia and chronic Musa, bedbound, DNR/DNI  h/o CVA 5/19 with residual b/l deficit BIBEMS due to Hypotension and right facial droop. She was seen by her home nurse with a SBP < 70s. Family also reports generalized weakness and a new right facial droop. CT head showed a new acute ischemic changes in R martin. She was upgraded to CCU for further monitoring.    1. Left Facial droop: r/o New CVA  h/o Cardioembolic CVA.  Neurology following: ? New CVA vs. Unmasking of old Deficits.  Continue DAPT & Statin.  Continue Dysphagia diet pending SLP eval.  PT eval.    2. Hypothyroidism  Continue Synthroid.    3. Hypotension:  h/o Hypertension but hypotensive in the ED.  Resolved with IVF.  Hold anti HTN for now.    4. Stage 3 Sacral Ulcer:  Continue local wound care.  Wound care consult.      5. UTI:  Patient with chronic Musa.  UA: Pyuria with Trace LE.  CT Abdomen: Nonspecific perinephric stranding. Continue Rocephin pending urine cultures.      DVT, GI PPX    DIspo: Still acute- May benefit STR vs Home care  PT-rehab once downgraded    Pager number:  [phone]

## 2019-06-29 NOTE — CONSULT NOTE ADULT - ASSESSMENT
Patient with hx cva. Possibly embolic. Family did not wish W/U. Reported CVA . But She was mildy dehydrated . BP was low now better. She is DNR/DNI. Would check lytes. If taking PO stop fluids. Support . Poor prognosis

## 2019-06-29 NOTE — PROGRESS NOTE ADULT - SUBJECTIVE AND OBJECTIVE BOX
Chief Complaint:  Patient is a 89y old  Female who presents with a chief complaint of Stroke (2019 06:40)      Interval Events:     Allergies:  No Known Allergies      Home Medications:    Hospital Medications:  aspirin  chewable 81 milliGRAM(s) Oral daily  cefTRIAXone   IVPB 1000 milliGRAM(s) IV Intermittent every 24 hours  chlorhexidine 4% Liquid 1 Application(s) Topical two times a day  clopidogrel Tablet 75 milliGRAM(s) Oral daily  heparin  Injectable 5000 Unit(s) SubCutaneous every 12 hours  pantoprazole    Tablet 40 milliGRAM(s) Oral before breakfast  simvastatin 20 milliGRAM(s) Oral at bedtime  sodium chloride 0.9%. 1000 milliLiter(s) IV Continuous <Continuous>      PMHX/PSHX:  Musa catheter in place  Diastolic heart failure  Neuropathy  Depression  OA (osteoarthritis)  Hypothyroid  HLD (hyperlipidemia)  S/P ORIF (open reduction internal fixation) fracture      Family history:  No pertinent family history in first degree relatives      ROS:   As mentioned below      PHYSICAL EXAM:   Vital Signs:  Vital Signs Last 24 Hrs  T(C): 36.6 (2019 07:30), Max: 36.8 (2019 20:01)  T(F): 97.9 (2019 07:30), Max: 98.2 (2019 20:01)  HR: 51 (2019 07:08) (43 - 59)  BP: 109/48 (2019 07:08) (99/45 - 123/57)  BP(mean): 69 (2019 07:08) (62 - 76)  RR: 17 (2019 07:08) (16 - 18)  SpO2: 99% (2019 07:08) (96% - 100%)  Daily Height in cm: 149.86 (2019 11:11)    Daily Weight in k (2019 11:11)    GENERAL:  NAD  HEENT:  NC/AT,  No Thyromegaly  CHEST:  CTA B/L  HEART:  S1, S2- No M, R, G  ABDOMEN:  Soft, NT, ND  EXTEREMITIES:  no cyanosis,clubbing or edema  SKIN:  NAD  NEURO:  Grossly Nr.    LABS:                        10.9   7.80  )-----------( 295      ( 2019 06:19 )             33.9     06-29    137  |  106  |  18  ----------------------------<  97  4.1   |  21  |  0.6<L>    Ca    8.6      2019 06:19  Phos  3.2     -  Mg     1.8         TPro  6.0  /  Alb  3.1<L>  /  TBili  <0.2  /  DBili  x   /  AST  17  /  ALT  13  /  AlkPhos  75      LIVER FUNCTIONS - ( 2019 09:09 )  Alb: 3.1 g/dL / Pro: 6.0 g/dL / ALK PHOS: 75 U/L / ALT: 13 U/L / AST: 17 U/L / GGT: x             Urinalysis Basic - ( 2019 09:02 )    Color: Yellow / Appearance: Clear / S.010 / pH: x  Gluc: x / Ketone: Negative  / Bili: Negative / Urobili: 0.2 mg/dL   Blood: x / Protein: 30 mg/dL / Nitrite: Negative   Leuk Esterase: Trace / RBC: 26-50 /HPF / WBC 6-10 /HPF   Sq Epi: x / Non Sq Epi: x / Bacteria: x          Imaging:

## 2019-06-29 NOTE — DIETITIAN INITIAL EVALUATION ADULT. - DIET TYPE
Diet as above, pending SLP eval Recommend MVI q 24 hrs, vit C 500 mg q 12 hrs, zinc sulfate 220 mg q 24 hrs x 14 days, diet order as per SLP recommendation, prosource gelatein plus q 8 hrs. Recommend removing DASH/TLC diet modifier to promote PO intake. Recommend MVI q 24 hrs, vit C 500 mg q 12 hrs, zinc sulfate 220 mg q 24 hrs x 14 days, diet order as per SLP recommendation, prosource gelatein plus q 8 hrs.

## 2019-06-29 NOTE — PROGRESS NOTE ADULT - SUBJECTIVE AND OBJECTIVE BOX
Neurology Progress Note    Interval History:    Pt seen today in f/u for right pontine lacunar infarct.  She has had multiple prior strokes and is on aspirin 81 mg and plavix 75 mg.  She reports no significant bruising.   She has mild swallow difficulty but able to eat soft diet.      Vital Signs Last 24 Hrs  T(C): 36.9 (2019 10:00), Max: 36.9 (2019 10:00)  T(F): 98.4 (2019 10:00), Max: 98.4 (2019 10:00)  HR: 68 (2019 10:00) (43 - 68)  BP: 119/51 (2019 10:00) (99/45 - 123/52)  BP(mean): 73 (2019 10:00) (62 - 76)  RR: 16 (2019 10:00) (16 - 18)  SpO2: 100% (2019 10:00) (98% - 100%)    Neurological Exam:   Mental status: Awake, alert and follows commands.   Cranial nerves: Pupils equally round and reactive to light, visual fields full, no nystagmus, extraocular muscles intact, V1 through V3 intact bilaterally and symmetric, mild facial droop.  Motor: mild left sided weakness.  Sensation: Intact to light touch in extremities.    Labs:  CBC Full  -  ( 2019 06:19 )  WBC Count : 7.80 K/uL  RBC Count : 3.83 M/uL  Hemoglobin : 10.9 g/dL  Hematocrit : 33.9 %  Platelet Count - Automated : 295 K/uL  Mean Cell Volume : 88.5 fL  Mean Cell Hemoglobin : 28.5 pg  Mean Cell Hemoglobin Concentration : 32.2 g/dL  Auto Neutrophil # : 3.77 K/uL  Auto Lymphocyte # : 2.80 K/uL  Auto Monocyte # : 1.00 K/uL  Auto Eosinophil # : 0.13 K/uL  Auto Basophil # : 0.08 K/uL  Auto Neutrophil % : 48.3 %  Auto Lymphocyte % : 35.9 %  Auto Monocyte % : 12.8 %  Auto Eosinophil % : 1.7 %  Auto Basophil % : 1.0 %        137  |  106  |  18  ----------------------------<  97  4.1   |  21  |  0.6<L>    Ca    8.6      2019 06:19  Phos  3.2     -  Mg     1.8         TPro  6.0  /  Alb  3.1<L>  /  TBili  <0.2  /  DBili  x   /  AST  17  /  ALT  13  /  AlkPhos  75      LIVER FUNCTIONS - ( 2019 09:09 )  Alb: 3.1 g/dL / Pro: 6.0 g/dL / ALK PHOS: 75 U/L / ALT: 13 U/L / AST: 17 U/L / GGT: x             Urinalysis Basic - ( 2019 09:02 )    Color: Yellow / Appearance: Clear / S.010 / pH: x  Gluc: x / Ketone: Negative  / Bili: Negative / Urobili: 0.2 mg/dL   Blood: x / Protein: 30 mg/dL / Nitrite: Negative   Leuk Esterase: Trace / RBC: 26-50 /HPF / WBC 6-10 /HPF   Sq Epi: x / Non Sq Epi: x / Bacteria: x      Lipid Profile (19 @ 09:09)    Total Cholesterol/HDL Ratio Measurement: 3.3 Ratio    Cholesterol, Serum: 99 mg/dL    Triglycerides, Serum: 161 mg/dL    HDL Cholesterol, Serum: 30: HDL Levels >/= 60 mg/dL are considered beneficial and a "negative" risk  factor.    Direct LDL: 49: LDL Cholesterol (mg/dL) --- Interpretive Comment (for adults 18 and over)      Assessment:  This is a 89y Female w/ h/o acute right pontine lacunar infarct.  She has history of prior strokes and has been maintained on  plavix and aspirin and cholesterol medication for stroke prevention.  It is possible she is a plavix nonresponder and when I discussed this with her  family they were interested in trying a different antiplatelet agent when I mentioned Brilenta having less treatment failures.    Plan:  1.  Continue aspirin 81 mg/day.  2.  Discontinue plavix.  3.  Begin Brilenta 90 mg po bid.  I discussed with family members at bedside risks and benefits of treatment with this including increased  risk for a bleeding complication but given the multiple strokes she has had they would like her to switch to Brilenta.  I spoke to internal medicine   denzel to implement the switch.

## 2019-06-29 NOTE — DIETITIAN INITIAL EVALUATION ADULT. - PHYSICAL APPEARANCE
Patient is confused/disoriented, did not respond when spoken to. BMI 28. Observed with severe fat wasting to orbital region. Stage III pressure ulcer to sacrum.

## 2019-06-29 NOTE — DIETITIAN INITIAL EVALUATION ADULT. - ADD RECOMMEND
RD to monitor diet order, energy intake, body composition (wt trends), nutrition focused physical findings (skin, PO tolerance, fat/muscle wasting).

## 2019-06-29 NOTE — PROGRESS NOTE ADULT - SUBJECTIVE AND OBJECTIVE BOX
Pt evaluated at bedside earlier today, full note to follow. Chief complaint/Reason for consult: Abnormal CT scan findings     HPI:  89 yo female BIBEMS h/o CVA 5/19 with residual b/l deficit, HLD, hypothyroidism, depression p/w hypotension and right facial droop. Today. home nurse came around 1pm, and pt had SBP < 70s. Family noticed pt having right facial droop and looking more lethargic, so called EMS. In ED, family reports pt back to her post-stroke baseline (weakness, dysphasia, pt has not ambulated since the CVA). CT head shows new acute ischemic changes in R martin. Family reports pt taking her home meds daily including asa and stain. Family said pt seemed dehydrated at home, looks much better after IVF in ED. Family denies pt having head trauma, LOC, HA, CP, SOB, cough, abd pain, N/V/D, calf pain, or edema. (27 Jun 2019 19:53)  PMHX/PSHX: PAST MEDICAL & SURGICAL HISTORY:  Musa catheter in place  Diastolic heart failure  Neuropathy  Depression  OA (osteoarthritis)  Hypothyroid  HLD (hyperlipidemia)  S/P ORIF (open reduction internal fixation) fracture      Family history:    No pertinent family history in first degree relatives: no family history of heart disease    No GI cancers in first or second degree relatives    Social History: No smoking. No alcohol. No illegal drug use.    Allergies:  No Known Allergies      MEDICATIONS: Home Medications:  aspirin 81 mg oral tablet, chewable: 1 tab(s) orally once a day (27 Jun 2019 20:41)  fluticasone 50 mcg/inh inhalation powder:  (27 Jun 2019 20:41)  Lasix 40 mg oral tablet: 1 tab(s) orally once a day (27 Jun 2019 20:41)  lisinopril 5 mg oral tablet: 1 tab(s) orally once a day (27 Jun 2019 20:41)  Lyrica 150 mg oral capsule: 1 cap(s) orally every 8 hours (27 Jun 2019 20:41)  Pepcid 40 mg oral tablet: 1 tab(s) orally once a day (at bedtime) (27 Jun 2019 20:41)  Toviaz 4 mg oral tablet, extended release: 1 tab(s) orally once a day (27 Jun 2019 20:41)  ROS:  General:  No weight loss, fevers, or chills.  Eyes:  No reported pain or visual changes  ENT:  No sore throat or runny nose.  NECK: No stiffness or lymphadenopathy  CV:  No chest pain or palpitations.  Resp:  No shortness of breath, cough, wheezing or hemoptysis  GI:  No abdominal pain, nausea, vomiting, dysphagia, diarrhea or constipation. No rectal bleeding, melena, or hematemesis.  Neuro:  No tingling, numbness       VITALS:     PHYSICAL EXAM:  GENERAL: AAOx2 to person and place not time, no acute distress.  HEAD:  Atraumatic, Normocephalic  EYES: conjunctiva and sclera clear  NECK: Supple, No thyromegaly   CHEST/LUNG: Clear to auscultation bilaterally; No wheeze, rhonchi, or rales  HEART: Regular rate and rhythm; normal S1, S2, No murmurs.  ABDOMEN: Soft, nontender, nondistended; Bowel sounds present, no abdominal bruit, masses, or hepatosplenomegaly  NEUROLOGY: No asterixis or tremor  SKIN: Intact, no jaundice      IMAGING:    < from: CT Abdomen and Pelvis w/ IV Cont (06.27.19 @ 16:42) >  EXAM:  CT ABDOMEN AND PELVIS IC            PROCEDURE DATE:  06/27/2019            INTERPRETATION:  CLINICAL STATEMENT: Sepsis      TECHNIQUE: Contiguous axial CT images were obtained from the lower chest   to the pubic symphysis following administration of 100cc Optiray 320   intravenous contrast.  Oral contrast was not administered.  Reformatted   images in the coronal and sagittal planes were acquired.    COMPARISON CT: 5/23/2019    OTHER STUDIES USED FOR CORRELATION: None.       FINDINGS:    LOWER CHEST: Bibasilar atelectasis. Cardiomegaly..    HEPATOBILIARY: Unremarkable.    SPLEEN: Unremarkable.    PANCREAS: Unremarkable.    ADRENAL GLANDS: Unremarkable.    KIDNEYS: Symmetrical enhancement without any evidence of obstructive   findings. Redemonstration of extrarenal pelvis. Nonspecific perinephric   stranding.    ABDOMINOPELVIC NODES: Unremarkable.    PELVIC ORGANS: Presence of Musa catheter with collapsed urinary bladder.   Air within the urinary bladder, recent instrumentation.    PERITONEUM/MESENTERY/BOWEL: No evidence of bowel obstruction. There is   focal infiltrative changes in the omentum, anterior to left hepatic lobe   on image #119 of series 4, measures 1.8 x 3.5 cm (previously 0.9 x 2.6   cm).    BONES/SOFT TISSUES:Noted again is multilevel degenerative changes of   spine. Bilateral gluteal subcutaneous edema. Partially visualized right   femoral intramedullary nail and fixative screw.    OTHER: Atherosclerosis      IMPRESSION:   Intermediate focal infiltrativefindings in the anterior abdominal   omentum, 1.8 x 3.5 cm previously 0.9 x 2.6 cm). Differential diagnosis   includes infectious/inflammatory etiology versus hematoma (is there is   any associated trauma, correlate with the clinical findings).    EXAM:  CT BRAIN            PROCEDURE DATE:  06/27/2019        INTERPRETATION:  Clinical History / Reason for exam: Change in mental   status.    CT SCAN OF THE BRAIN WITHOUT CONTRAST    TECHNIQUE:    Multiple transaxial noncontrast CT images ofthe brain were obtained from   base to vertex. Sagittal and coronal reformatted images were obtained.   The exam was performed twice due to motion artifact.     COMPARISON:    Noncontrast CT scan of the brain dated May 26, 2019.    FINDINGS:    The third and lateral ventricles are mildly enlarged as are the cortical   sulci consistent with a mild degree of cortical atrophy.  The fourth   ventricle is normal in size and position.    There is no shift of the midline structures, evidence of acute   intracranial hemorrhage, or depressed skull fracture.    Minimal widening of the cerebellopontine angle cisterns and minimal   prominence of the cerebellar folia consistent with a minimal degree of   cerebellar atrophy.    Patchy foci of diminished attenuation the periventricular white matter,   internal and external capsules, left basal ganglia and left martin   consistent with chronic ischemic change.    There is a new focus of diminished attenuation in the right side of the   martin consistent with ischemic change, age indeterminant.    Opacification of the right mastoid air cells and right middle ear canal   consistent with inflammation or infection.    IMPRESSION:    In comparison with the prior noncontrast CT scan of the brain dated May   26, 2019:    New focus of diminished attenuation in the right side of the martin   consistent with ischemic change, age indeterminan        Assessment and Recommendation:   · Assessment		  89 yo female pmh CVA 5/19/19 on aspirin and Plavix presents for altered mental status.  Problem 1-Intermediate focal infiltrative findings in the anterior abdominal omentum, 1.8 x 3.5 cm previously 0.9 x 2.6 cm). Differential diagnosis includes infectious/inflammatory etiology versus hematoma. No signs of inflammatory bowel ds (no pain, diarrhea or bleeding)  - no GI intervention indicated at this time, risk of EGD/cscope outweighs benefit to r/o malignancy - may consider repeat CT imaging after 2 weeks.   - if WBC elevated then abx indicated, otherwise no signs of acute infection noted.   - resume antiplatelet agents based on risk of recurrent CVA, GI risk does not outweigh benefit of anticoagulation/antiplatelet tx

## 2019-06-29 NOTE — DIETITIAN INITIAL EVALUATION ADULT. - OTHER INFO
Family called EMS for patient due to generalized weakness and new facial droop. CT head showed new ischemic changes. New CVA vs unmasking of old deficits. RD unable to obtain nutrition hx from patient due to confusion/disorientation, pt did not respond when spoken to. RD obtained nutrition history from daughter over phone ([phone]). RD observed <25% of breakfast tray consumed this am. Daughter reports previous decline in appetite while living in NH, but recently has been eating better0 at home however gets full fast and eating less since stroke. Daughter provides pureed/ground foods at home. Patient consumes Ensure at home ~1/day. NKFA. No report of cultural/[employer]ab[employer] food preferences. Daughter reports tolerating current diet provided, patient pending SLP eval. Last BM noted 6/28, loose stool noted. Daughter reports patient being 160# in November and likely wt loss due to eating less than usual since stroke. This indicates a 13% severe wt loss x ~7 months. Patient with stage III pressure ulcer to sacrum. Meets malnutrition criteria. Daughter agreeable to supplementation to prevent further wt loss/promote wound healing. Recommend diet order as per SLP eval.

## 2019-06-30 LAB
-  AMIKACIN: SIGNIFICANT CHANGE UP
-  AMPICILLIN/SULBACTAM: SIGNIFICANT CHANGE UP
-  AMPICILLIN: SIGNIFICANT CHANGE UP
-  AZTREONAM: SIGNIFICANT CHANGE UP
-  CEFEPIME: SIGNIFICANT CHANGE UP
-  CEFOXITIN: SIGNIFICANT CHANGE UP
-  CEFTRIAXONE: SIGNIFICANT CHANGE UP
-  CIPROFLOXACIN: SIGNIFICANT CHANGE UP
-  COAGULASE NEGATIVE STAPHYLOCOCCUS: SIGNIFICANT CHANGE UP
-  ERTAPENEM: SIGNIFICANT CHANGE UP
-  GENTAMICIN: SIGNIFICANT CHANGE UP
-  IMIPENEM: SIGNIFICANT CHANGE UP
-  LEVOFLOXACIN: SIGNIFICANT CHANGE UP
-  MEROPENEM: SIGNIFICANT CHANGE UP
-  NITROFURANTOIN: SIGNIFICANT CHANGE UP
-  PIPERACILLIN/TAZOBACTAM: SIGNIFICANT CHANGE UP
-  TIGECYCLINE: SIGNIFICANT CHANGE UP
-  TOBRAMYCIN: SIGNIFICANT CHANGE UP
-  TRIMETHOPRIM/SULFAMETHOXAZOLE: SIGNIFICANT CHANGE UP
ANION GAP SERPL CALC-SCNC: 10 MMOL/L — SIGNIFICANT CHANGE UP (ref 7–14)
BASOPHILS # BLD AUTO: 0.07 K/UL — SIGNIFICANT CHANGE UP (ref 0–0.2)
BASOPHILS NFR BLD AUTO: 0.9 % — SIGNIFICANT CHANGE UP (ref 0–1)
BUN SERPL-MCNC: 13 MG/DL — SIGNIFICANT CHANGE UP (ref 10–20)
CALCIUM SERPL-MCNC: 8.3 MG/DL — LOW (ref 8.5–10.1)
CHLORIDE SERPL-SCNC: 106 MMOL/L — SIGNIFICANT CHANGE UP (ref 98–110)
CO2 SERPL-SCNC: 23 MMOL/L — SIGNIFICANT CHANGE UP (ref 17–32)
CREAT SERPL-MCNC: 0.5 MG/DL — LOW (ref 0.7–1.5)
CULTURE RESULTS: SIGNIFICANT CHANGE UP
EOSINOPHIL # BLD AUTO: 0.1 K/UL — SIGNIFICANT CHANGE UP (ref 0–0.7)
EOSINOPHIL NFR BLD AUTO: 1.3 % — SIGNIFICANT CHANGE UP (ref 0–8)
GLUCOSE SERPL-MCNC: 105 MG/DL — HIGH (ref 70–99)
GRAM STN FLD: SIGNIFICANT CHANGE UP
HCT VFR BLD CALC: 34.7 % — LOW (ref 37–47)
HGB BLD-MCNC: 11.1 G/DL — LOW (ref 12–16)
IMM GRANULOCYTES NFR BLD AUTO: 0.3 % — SIGNIFICANT CHANGE UP (ref 0.1–0.3)
LYMPHOCYTES # BLD AUTO: 3.2 K/UL — SIGNIFICANT CHANGE UP (ref 1.2–3.4)
LYMPHOCYTES # BLD AUTO: 41.4 % — SIGNIFICANT CHANGE UP (ref 20.5–51.1)
MAGNESIUM SERPL-MCNC: 1.7 MG/DL — LOW (ref 1.8–2.4)
MCHC RBC-ENTMCNC: 28.2 PG — SIGNIFICANT CHANGE UP (ref 27–31)
MCHC RBC-ENTMCNC: 32 G/DL — SIGNIFICANT CHANGE UP (ref 32–37)
MCV RBC AUTO: 88.3 FL — SIGNIFICANT CHANGE UP (ref 81–99)
METHOD TYPE: SIGNIFICANT CHANGE UP
METHOD TYPE: SIGNIFICANT CHANGE UP
MONOCYTES # BLD AUTO: 0.88 K/UL — HIGH (ref 0.1–0.6)
MONOCYTES NFR BLD AUTO: 11.4 % — HIGH (ref 1.7–9.3)
NEUTROPHILS # BLD AUTO: 3.46 K/UL — SIGNIFICANT CHANGE UP (ref 1.4–6.5)
NEUTROPHILS NFR BLD AUTO: 44.7 % — SIGNIFICANT CHANGE UP (ref 42.2–75.2)
NRBC # BLD: 0 /100 WBCS — SIGNIFICANT CHANGE UP (ref 0–0)
ORGANISM # SPEC MICROSCOPIC CNT: SIGNIFICANT CHANGE UP
ORGANISM # SPEC MICROSCOPIC CNT: SIGNIFICANT CHANGE UP
PLATELET # BLD AUTO: 298 K/UL — SIGNIFICANT CHANGE UP (ref 130–400)
POTASSIUM SERPL-MCNC: 3.6 MMOL/L — SIGNIFICANT CHANGE UP (ref 3.5–5)
POTASSIUM SERPL-SCNC: 3.6 MMOL/L — SIGNIFICANT CHANGE UP (ref 3.5–5)
RBC # BLD: 3.93 M/UL — LOW (ref 4.2–5.4)
RBC # FLD: 14.3 % — SIGNIFICANT CHANGE UP (ref 11.5–14.5)
SODIUM SERPL-SCNC: 139 MMOL/L — SIGNIFICANT CHANGE UP (ref 135–146)
SPECIMEN SOURCE: SIGNIFICANT CHANGE UP
WBC # BLD: 7.73 K/UL — SIGNIFICANT CHANGE UP (ref 4.8–10.8)
WBC # FLD AUTO: 7.73 K/UL — SIGNIFICANT CHANGE UP (ref 4.8–10.8)

## 2019-06-30 PROCEDURE — 99233 SBSQ HOSP IP/OBS HIGH 50: CPT

## 2019-06-30 PROCEDURE — 70450 CT HEAD/BRAIN W/O DYE: CPT | Mod: 26

## 2019-06-30 RX ORDER — MAGNESIUM SULFATE 500 MG/ML
1 VIAL (ML) INJECTION ONCE
Refills: 0 | Status: COMPLETED | OUTPATIENT
Start: 2019-06-30 | End: 2019-06-30

## 2019-06-30 RX ORDER — POTASSIUM CHLORIDE 20 MEQ
40 PACKET (EA) ORAL ONCE
Refills: 0 | Status: DISCONTINUED | OUTPATIENT
Start: 2019-06-30 | End: 2019-06-30

## 2019-06-30 RX ORDER — POTASSIUM CHLORIDE 20 MEQ
20 PACKET (EA) ORAL ONCE
Refills: 0 | Status: COMPLETED | OUTPATIENT
Start: 2019-06-30 | End: 2019-06-30

## 2019-06-30 RX ADMIN — CHLORHEXIDINE GLUCONATE 1 APPLICATION(S): 213 SOLUTION TOPICAL at 05:08

## 2019-06-30 RX ADMIN — Medication 81 MILLIGRAM(S): at 11:04

## 2019-06-30 RX ADMIN — SIMVASTATIN 20 MILLIGRAM(S): 20 TABLET, FILM COATED ORAL at 21:13

## 2019-06-30 RX ADMIN — TICAGRELOR 90 MILLIGRAM(S): 90 TABLET ORAL at 05:08

## 2019-06-30 RX ADMIN — HEPARIN SODIUM 5000 UNIT(S): 5000 INJECTION INTRAVENOUS; SUBCUTANEOUS at 05:08

## 2019-06-30 RX ADMIN — SODIUM CHLORIDE 50 MILLILITER(S): 9 INJECTION INTRAMUSCULAR; INTRAVENOUS; SUBCUTANEOUS at 07:52

## 2019-06-30 RX ADMIN — TICAGRELOR 90 MILLIGRAM(S): 90 TABLET ORAL at 18:07

## 2019-06-30 RX ADMIN — PANTOPRAZOLE SODIUM 40 MILLIGRAM(S): 20 TABLET, DELAYED RELEASE ORAL at 06:31

## 2019-06-30 RX ADMIN — CHLORHEXIDINE GLUCONATE 1 APPLICATION(S): 213 SOLUTION TOPICAL at 18:06

## 2019-06-30 RX ADMIN — Medication 50 MILLIEQUIVALENT(S): at 14:37

## 2019-06-30 RX ADMIN — HEPARIN SODIUM 5000 UNIT(S): 5000 INJECTION INTRAVENOUS; SUBCUTANEOUS at 18:06

## 2019-06-30 RX ADMIN — Medication 100 GRAM(S): at 12:39

## 2019-06-30 NOTE — SWALLOW BEDSIDE ASSESSMENT ADULT - SWALLOW EVAL: RECOMMENDED FEEDING/EATING TECHNIQUES
allow for swallow between intakes/check mouth frequently for oral residue/pocketing/Only feed patient when awake/alert./maintain upright posture during/after eating for 30 mins/oral hygiene/small sips/bites

## 2019-06-30 NOTE — PROGRESS NOTE ADULT - SUBJECTIVE AND OBJECTIVE BOX
· Subjective and Objective: 	  Chief complaint/Reason for consult: Abnormal CT scan findings     HPI:  89 yo female BIBEMS h/o CVA 5/19 with residual b/l deficit, HLD, hypothyroidism, depression p/w hypotension and right facial droop. Today. home nurse came around 1pm, and pt had SBP < 70s. Family noticed pt having right facial droop and looking more lethargic, so called EMS. In ED, family reports pt back to her post-stroke baseline (weakness, dysphasia, pt has not ambulated since the CVA). CT head shows new acute ischemic changes in R martin. Family reports pt taking her home meds daily including asa and stain. Family said pt seemed dehydrated at home, looks much better after IVF in ED. Family denies pt having head trauma, LOC, HA, CP, SOB, cough, abd pain, N/V/D, calf pain, or edema. (27 Jun 2019 19:53)  PMHX/PSHX: PAST MEDICAL & SURGICAL HISTORY:  Musa catheter in place  Diastolic heart failure  Neuropathy  Depression  OA (osteoarthritis)  Hypothyroid  HLD (hyperlipidemia)  S/P ORIF (open reduction internal fixation) fracture      Family history:    No pertinent family history in first degree relatives: no family history of heart disease    No GI cancers in first or second degree relatives    Social History: No smoking. No alcohol. No illegal drug use.    Allergies:  No Known Allergies      MEDICATIONS: Home Medications:  aspirin 81 mg oral tablet, chewable: 1 tab(s) orally once a day (27 Jun 2019 20:41)  fluticasone 50 mcg/inh inhalation powder:  (27 Jun 2019 20:41)  Lasix 40 mg oral tablet: 1 tab(s) orally once a day (27 Jun 2019 20:41)  lisinopril 5 mg oral tablet: 1 tab(s) orally once a day (27 Jun 2019 20:41)  Lyrica 150 mg oral capsule: 1 cap(s) orally every 8 hours (27 Jun 2019 20:41)  Pepcid 40 mg oral tablet: 1 tab(s) orally once a day (at bedtime) (27 Jun 2019 20:41)  Toviaz 4 mg oral tablet, extended release: 1 tab(s) orally once a day (27 Jun 2019 20:41)  ROS:  General:  No weight loss, fevers, or chills.  Eyes:  No reported pain or visual changes  ENT:  No sore throat or runny nose.  NECK: No stiffness or lymphadenopathy  CV:  No chest pain or palpitations.  Resp:  No shortness of breath, cough, wheezing or hemoptysis  GI:  No abdominal pain, nausea, vomiting, dysphagia, diarrhea or constipation. No rectal bleeding, melena, or hematemesis.  Neuro:  No tingling, numbness       VITALS:     PHYSICAL EXAM:  GENERAL: AAOx2 to person and place not time, no acute distress.  HEAD:  Atraumatic, Normocephalic  EYES: conjunctiva and sclera clear  NECK: Supple, No thyromegaly   CHEST/LUNG: Clear to auscultation bilaterally; No wheeze, rhonchi, or rales  HEART: Regular rate and rhythm; normal S1, S2, No murmurs.  ABDOMEN: Soft, nontender, nondistended; Bowel sounds present, no abdominal bruit, masses, or hepatosplenomegaly  NEUROLOGY: No asterixis or tremor  SKIN: Intact, no jaundice      IMAGING:    < from: CT Abdomen and Pelvis w/ IV Cont (06.27.19 @ 16:42) >  EXAM:  CT ABDOMEN AND PELVIS IC            PROCEDURE DATE:  06/27/2019            INTERPRETATION:  CLINICAL STATEMENT: Sepsis      TECHNIQUE: Contiguous axial CT images were obtained from the lower chest   to the pubic symphysis following administration of 100cc Optiray 320   intravenous contrast.  Oral contrast was not administered.  Reformatted   images in the coronal and sagittal planes were acquired.    COMPARISON CT: 5/23/2019    OTHER STUDIES USED FOR CORRELATION: None.       FINDINGS:    LOWER CHEST: Bibasilar atelectasis. Cardiomegaly..    HEPATOBILIARY: Unremarkable.    SPLEEN: Unremarkable.    PANCREAS: Unremarkable.    ADRENAL GLANDS: Unremarkable.    KIDNEYS: Symmetrical enhancement without any evidence of obstructive   findings. Redemonstration of extrarenal pelvis. Nonspecific perinephric   stranding.    ABDOMINOPELVIC NODES: Unremarkable.    PELVIC ORGANS: Presence of Musa catheter with collapsed urinary bladder.   Air within the urinary bladder, recent instrumentation.    PERITONEUM/MESENTERY/BOWEL: No evidence of bowel obstruction. There is   focal infiltrative changes in the omentum, anterior to left hepatic lobe   on image #119 of series 4, measures 1.8 x 3.5 cm (previously 0.9 x 2.6   cm).    BONES/SOFT TISSUES:Noted again is multilevel degenerative changes of   spine. Bilateral gluteal subcutaneous edema. Partially visualized right   femoral intramedullary nail and fixative screw.    OTHER: Atherosclerosis      IMPRESSION:   Intermediate focal infiltrativefindings in the anterior abdominal   omentum, 1.8 x 3.5 cm previously 0.9 x 2.6 cm). Differential diagnosis   includes infectious/inflammatory etiology versus hematoma (is there is   any associated trauma, correlate with the clinical findings).    EXAM:  CT BRAIN            PROCEDURE DATE:  06/27/2019        INTERPRETATION:  Clinical History / Reason for exam: Change in mental   status.    CT SCAN OF THE BRAIN WITHOUT CONTRAST    TECHNIQUE:    Multiple transaxial noncontrast CT images ofthe brain were obtained from   base to vertex. Sagittal and coronal reformatted images were obtained.   The exam was performed twice due to motion artifact.     COMPARISON:    Noncontrast CT scan of the brain dated May 26, 2019.    FINDINGS:    The third and lateral ventricles are mildly enlarged as are the cortical   sulci consistent with a mild degree of cortical atrophy.  The fourth   ventricle is normal in size and position.    There is no shift of the midline structures, evidence of acute   intracranial hemorrhage, or depressed skull fracture.    Minimal widening of the cerebellopontine angle cisterns and minimal   prominence of the cerebellar folia consistent with a minimal degree of   cerebellar atrophy.    Patchy foci of diminished attenuation the periventricular white matter,   internal and external capsules, left basal ganglia and left martin   consistent with chronic ischemic change.    There is a new focus of diminished attenuation in the right side of the   martin consistent with ischemic change, age indeterminant.    Opacification of the right mastoid air cells and right middle ear canal   consistent with inflammation or infection.    IMPRESSION:    In comparison with the prior noncontrast CT scan of the brain dated May   26, 2019:    New focus of diminished attenuation in the right side of the martin   consistent with ischemic change, age indeterminan        Assessment and Recommendation:   · Assessment		  89 yo female pmh CVA 5/19/19 on aspirin and Plavix presents for altered mental status.  Problem 1-Intermediate focal infiltrative findings in the anterior abdominal omentum, 1.8 x 3.5 cm previously 0.9 x 2.6 cm). Differential diagnosis includes infectious/inflammatory etiology versus hematoma. No signs of inflammatory bowel ds (no pain, diarrhea or bleeding)  - no GI intervention indicated at this time, risk of EGD/cscope outweighs benefit to r/o malignancy - may consider repeat CT imaging after 2 weeks.   - if WBC elevated then abx indicated, otherwise no signs of acute infection noted.   - resume antiplatelet agents based on risk of recurrent CVA, GI risk does not outweigh benefit of anticoagulation/antiplatelet tx

## 2019-06-30 NOTE — SWALLOW BEDSIDE ASSESSMENT ADULT - SLP GENERAL OBSERVATIONS
Patient overall appeared lethargic and weak. AOx2 (-time). Patient was repositioned and provided with oral care prior to beginning PO trials.

## 2019-06-30 NOTE — SWALLOW BEDSIDE ASSESSMENT ADULT - SWALLOW EVAL: CURRENT DIET
Patient was NPO and started on a puree consistency diet with honey thickened liquids on 6/28/19, per staff.

## 2019-06-30 NOTE — SWALLOW BEDSIDE ASSESSMENT ADULT - ORAL PHASE
Delayed oral transit time/bolus holding and pocketing/Decreased anterior-posterior movement of the bolus/Lingual stasis Within functional limits Lingual stasis

## 2019-06-30 NOTE — PROGRESS NOTE ADULT - ASSESSMENT
Patient with no overt complaints.  NSR no afib. No bradycardic episodes. Now on ticagrelor. OOB to chair. Prognosis guarded

## 2019-06-30 NOTE — PROGRESS NOTE ADULT - SUBJECTIVE AND OBJECTIVE BOX
Patient is a 89y old  Female who presents with a chief complaint of Stroke (29 Jun 2019 23:58)      T(F): 98.3 (06-29-19 @ 23:00), Max: 98.7 (06-29-19 @ 15:12)  HR: 66 (06-30-19 @ 04:56)  BP: 178/74 (06-30-19 @ 04:56)  RR: 17 (06-29-19 @ 23:00)  SpO2: 100% (06-30-19 @ 04:56) (97% - 100%)    PHYSICAL EXAM:  GENERAL: NAD, well-groomed, well-developed  HEAD:  Atraumatic, Normocephalic  EYES: EOMI, PERRLA, conjunctiva and sclera clear  ENMT: No tonsillar erythema, exudates, or enlargement; Moist mucous membranes, Good dentition, No lesions  NECK: Supple, No JVD, Normal thyroid  NERVOUS SYSTEM:  Alert & Oriented X3,  Motor Strength 5/5 B/L upper and lower extremities  CHEST/LUNG: Clear to percussion bilaterally; No rales, rhonchi, wheezing, or rubs  HEART: Regular rate and rhythm; No murmurs, rubs, or gallops  ABDOMEN: Soft, Nontender, Nondistended; Bowel sounds present  EXTREMITIES:   No clubbing, cyanosis, or edema  LYMPH: No lymphadenopathy noted  SKIN: No rashes or lesions    labs  06-29    137  |  106  |  18  ----------------------------<  97  4.1   |  21  |  0.6<L>    Ca    8.6      29 Jun 2019 06:19  Phos  3.2     06-28  Mg     1.8     06-28    TPro  6.0  /  Alb  3.1<L>  /  TBili  <0.2  /  DBili  x   /  AST  17  /  ALT  13  /  AlkPhos  75  06-28                          9.9    8.15  )-----------( 279      ( 29 Jun 2019 18:58 )             30.6       Culture - Urine (collected 28 Jun 2019 09:02)  Source: .Urine Clean Catch (Midstream)  Preliminary Report (29 Jun 2019 19:34):    50,000 - 99,000 CFU/mL Gram Negative Rods    Culture - Urine (collected 27 Jun 2019 15:00)  Source: .Urine Catheterized  Final Report (28 Jun 2019 18:36):    >=3 organisms. Probable collection contamination.    Culture - Blood (collected 27 Jun 2019 14:38)  Source: .Blood Blood  Preliminary Report (28 Jun 2019 22:01):    No growth to date.    Culture - Blood (collected 27 Jun 2019 14:38)  Source: .Blood Blood  Preliminary Report (28 Jun 2019 22:01):    No growth to date.              aspirin  chewable 81 milliGRAM(s) Oral daily  chlorhexidine 4% Liquid 1 Application(s) Topical two times a day  heparin  Injectable 5000 Unit(s) SubCutaneous every 12 hours  pantoprazole    Tablet 40 milliGRAM(s) Oral before breakfast  simvastatin 20 milliGRAM(s) Oral at bedtime  sodium chloride 0.9%. 1000 milliLiter(s) IV Continuous <Continuous>  ticagrelor 90 milliGRAM(s) Oral every 12 hours

## 2019-06-30 NOTE — SWALLOW BEDSIDE ASSESSMENT ADULT - ASR SWALLOW ASPIRATION MONITOR
fever/upper respiratory infection/change of breathing pattern/position upright (89Y)/oral hygiene/cough/gurgly voice/pneumonia/throat clearing

## 2019-06-30 NOTE — SWALLOW BEDSIDE ASSESSMENT ADULT - H & P REVIEW
yes/89y.o. female, h/o CVA 5/19 with residual b/l deficit, HLD, hypothyroidism, depression p/w hypotension and right facial droop. Family noticed patient having right facial droop and looking more lethargic. In ED, family reports patient back to her post-stroke baseline (weakness. dysphasia, pt. no tambulating since CVA). CT head shows new acute ischemic changes in right martin. Chest X-Ray on 6/29/19 shows left lower lobe opacity/pleural effusions, unchanged and no air leak.

## 2019-06-30 NOTE — SWALLOW BEDSIDE ASSESSMENT ADULT - SWALLOW EVAL: DIAGNOSIS
Mild-moderate oral impairment with no suspected pharyngeal dysphagia noted during the clinical bedside swallow evaluation.

## 2019-06-30 NOTE — SPEECH LANGUAGE PATHOLOGY EVALUATION - SLP VERBAL EXPRESSION
intact/Patient presents with low vocal intensity. Patient's family states she is usually more talkative and singing.

## 2019-06-30 NOTE — PROGRESS NOTE ADULT - ASSESSMENT
89 yo F with Chronic HFpEF, Vit D deficiency, Hypothyroidism, Dementia and chronic Musa, bedbound, DNR/DNI  h/o CVA 5/19 with residual b/l deficit BIBEMS due to Hypotension and right facial droop. She was seen by her home nurse with a SBP < 70s. Family also reports generalized weakness and a new right facial droop. CT head showed a new acute ischemic changes in R martin. She was upgraded to CCU for further monitoring.    1. Left Facial droop: r/o New CVA  h/o Cardioembolic CVA.  Neurology following: ? New CVA vs. Unmasking of old Deficits.  Continue DAPT & Statin.  Continue Dysphagia diet pending SLP eval.  PT eval.    2. Hypothyroidism  Continue Synthroid.    3. Hypotension:  h/o Hypertension but hypotensive in the ED.  Resolved with IVF.  Hold anti HTN for now.    4. Stage 3 Sacral Ulcer:  Continue local wound care.  Wound care consult.      5. UTI:  Patient with chronic Musa.  UA: Pyuria with Trace LE.  CT Abdomen: Nonspecific perinephric stranding. Continue Rocephin pending urine cultures.      DVT, GI PPX    DIspo: Still acute- May benefit STR vs Home care  PT-rehab 89 yo F with Chronic HFpEF, Vit D deficiency, Hypothyroidism, Dementia and chronic Musa, bedbound, DNR/DNI  h/o CVA 5/19 with residual b/l deficit BIBEMS due to Hypotension and right facial droop. She was seen by her home nurse with a SBP < 70s. Family also reports generalized weakness and a new right facial droop. CT head showed a new acute ischemic changes in R martin. She was upgraded to CCU-Tele for further monitoring.    1. Left Facial droop: r/o New CVA  h/o Cardioembolic CVA.  Neurology following:  New CVA vs. Unmasking of old Deficits.  I spoke with neurology today who requested repeat ct head today prior to downgrade  Continue DAPT & Statin.  Continue Dysphagia diet pending SLP eval.  PT eval.    2. Hypothyroidism  Continue Synthroid.    3. Hypotension:  h/o Hypertension but hypotensive in the ED.  Resolved with IVF.  Hold anti HTN for now.    4. Stage 3 Sacral Ulcer:  Continue local wound care.  Wound care consult.      5. UTI:  Patient with chronic Musa.  UA: Pyuria with Trace LE.  CT Abdomen: Nonspecific perinephric stranding.   gram neg rods on urine culture  opacity seen on cxr  will start Levaquin and follow final urine culture results and repeat cxr      DVT, GI PPX    DIspo: - May benefit STR vs Home care  PT-rehab 89 yo F with Chronic HFpEF, Vit D deficiency, Hypothyroidism, Dementia and chronic Musa, bedbound, DNR/DNI  h/o CVA 5/19 with residual b/l deficit BIBEMS due to Hypotension and right facial droop. She was seen by her home nurse with a SBP < 70s. Family also reports generalized weakness and a new right facial droop. CT head showed a new acute ischemic changes in R martin. She was upgraded to CCU-Tele for further monitoring.    1. Left Facial droop: r/o New CVA  h/o Cardioembolic CVA.  Neurology following:  New CVA vs. Unmasking of old Deficits.  I spoke with neurology today who requested repeat ct head today prior to downgrade  Continue DAPT & Statin.  Continue Dysphagia diet pending SLP eval.  PT eval.    2. Hypothyroidism  Continue Synthroid.    3. Hypomagnesemia:  Supplement and re check lytes    4. Stage 3 Sacral Ulcer:  Continue local wound care.  Wound care consult.      5. UTI:  Patient with chronic Musa.  UA: Pyuria with Trace LE.  CT Abdomen: Nonspecific perinephric stranding.   gram neg rods on urine culture  opacity seen on cxr  will start Levaquin and follow final urine culture results and repeat cxr      DVT, GI PPX    DIspo: - May benefit STR vs Home care  PT-rehab

## 2019-06-30 NOTE — CHART NOTE - NSCHARTNOTEFT_GEN_A_CORE
LENGTH OF HOSPITAL STAY: 3d    CHIEF COMPLAINT:   Patient is a 89y old  Female who presents with a chief complaint of Stroke (2019 06:23)      Overnight events:    No acute events overnight    ALLERGIES:  No Known Allergies    MEDICATIONS:  STANDING MEDICATIONS  aspirin  chewable 81 milliGRAM(s) Oral daily  chlorhexidine 4% Liquid 1 Application(s) Topical two times a day  heparin  Injectable 5000 Unit(s) SubCutaneous every 12 hours  pantoprazole    Tablet 40 milliGRAM(s) Oral before breakfast  simvastatin 20 milliGRAM(s) Oral at bedtime  sodium chloride 0.9%. 1000 milliLiter(s) IV Continuous <Continuous>  ticagrelor 90 milliGRAM(s) Oral every 12 hours    PRN MEDICATIONS    VITALS:   T(F): 97  HR: 50  BP: 149/65  RR: 18  SpO2: 100%    LABS:                        11.1   7.73  )-----------( 298      ( 2019 05:30 )             34.7     06-30    139  |  106  |  13  ----------------------------<  105<H>  3.6   |  23  |  0.5<L>    Ca    8.3<L>      2019 05:30  Phos  3.2     06-28  Mg     1.7     06-30    TPro  6.0  /  Alb  3.1<L>  /  TBili  <0.2  /  DBili  x   /  AST  17  /  ALT  13  /  AlkPhos  75  06-28      Urinalysis Basic - ( 2019 09:02 )    Color: Yellow / Appearance: Clear / S.010 / pH: x  Gluc: x / Ketone: Negative  / Bili: Negative / Urobili: 0.2 mg/dL   Blood: x / Protein: 30 mg/dL / Nitrite: Negative   Leuk Esterase: Trace / RBC: 26-50 /HPF / WBC 6-10 /HPF   Sq Epi: x / Non Sq Epi: x / Bacteria: x            Culture - Urine (collected 2019 09:02)  Source: .Urine Clean Catch (Midstream)  Preliminary Report (2019 19:34):    50,000 - 99,000 CFU/mL Gram Negative Rods    Culture - Urine (collected 2019 15:00)  Source: .Urine Catheterized  Final Report (2019 18:36):    >=3 organisms. Probable collection contamination.    Culture - Blood (collected 2019 14:38)  Source: .Blood Blood  Preliminary Report (2019 22:01):    No growth to date.    Culture - Blood (collected 2019 14:38)  Source: .Blood Blood  Gram Stain (2019 08:44):    Growth in aerobic bottle: Gram Positive Cocci in Clusters  Preliminary Report (2019 08:43):    Growth in aerobic bottle: Gram Positive Cocci in Clusters    "Due to technical problems, Proteus sp. will Not be reported as part of    the BCID panel until further notice"    ***Blood Panel PCR results on this specimen are available    approximately 3 hours after the Gram stain result.***    Gram stain, PCR, and/or culture results may not always    correspond due to difference in methodologies.    ************************************************************    This PCR assay was performed using [employer].    The following targets are tested for: Enterococcus,    vancomycin resistant enterococci, Listeria monocytogenes,    coagulase negative staphylococci, S. aureus,    methicillin resistant S. aureus, Streptococcus agalactiae    (Group B), S. pneumoniae, S.pyogenes (Group A),    Acinetobacter baumannii, Enterobacter cloacae, E. coli,    Klebsiella oxytoca, K. pneumoniae, Proteus sp.,    Serratia marcescens, Haemophilus influenzae,    Neisseria meningitidis, Pseudomonas aeruginosa, Candida    albicans, C. glabrata, C krusei, C parapsilosis,    C. tropicalis and the KPC resistance gene.            Cultures:    Culture - Urine (collected 2019 09:02)  Source: .Urine Clean Catch (Midstream)  Preliminary Report (2019 19:34):    50,000 - 99,000 CFU/mL Gram Negative Rods    Culture - Urine (collected 2019 15:00)  Source: .Urine Catheterized  Final Report (2019 18:36):    >=3 organisms. Probable collection contamination.    Culture - Blood (collected 2019 14:38)  Source: .Blood Blood  Preliminary Report (2019 22:01):    No growth to date.    Culture - Blood (collected 2019 14:38)  Source: .Blood Blood  Gram Stain (2019 08:44):    Growth in aerobic bottle: Gram Positive Cocci in Clusters  Preliminary Report (2019 08:43):    Growth in aerobic bottle: Gram Positive Cocci in Clusters    "Due to technical problems, Proteus sp. will Not be reported as part of    the BCID panel until further notice"    ***Blood Panel PCR results on this specimen are available    approximately 3 hours after the Gram stain result.***    Gram stain, PCR, and/or culture results may not always    correspond due to difference in methodologies.    ************************************************************    This PCR assay was performed using [employer].    The following targets are tested for: Enterococcus,    vancomycin resistant enterococci, Listeria monocytogenes,    coagulase negative staphylococci, S. aureus,    methicillin resistant S. aureus, Streptococcus agalactiae    (Group B), S. pneumoniae, S.pyogenes (Group A),    Acinetobacter baumannii, Enterobacter cloacae, E. coli,    Klebsiella oxytoca, K. pneumoniae, Proteus sp.,    Serratia marcescens, Haemophilus influenzae,    Neisseria meningitidis, Pseudomonas aeruginosa, Candida    albicans, C. glabrata, C krusei, C parapsilosis,    C. tropicalis and the KPC resistance gene.      TRANSFER NOTE INSTRUCTIONS AS BELOW  1. Left Facial droop: r/o New CVA  h/o Cardioembolic CVA.  Neurology following: ? New CVA vs. Unmasking of old Deficits.  Continue DAPT & Statin.  Continue Dysphagia diet pending SLP eval.  PT eval.    2. Hypothyroidism  Continue Synthroid.    3. Hypotension:  h/o Hypertension but hypotensive in the ED.  Resolved with IVF.  Hold anti HTN for now.    4. Stage 3 Sacral Ulcer:  Continue local wound care.  Wound care consult.      5. UTI:  Patient with chronic Musa.  UA: Pyuria with Trace LE.  CT Abdomen: Nonspecific perinephric stranding. Continue Rocephin pending urine cultures.      DVT, GI PPX

## 2019-06-30 NOTE — PROGRESS NOTE ADULT - SUBJECTIVE AND OBJECTIVE BOX
Patient is following commands today, NAD      T(F): 97 (06-30-19 @ 07:54), Max: 98.7 (06-29-19 @ 15:12)  HR: 50 (06-30-19 @ 07:32)  BP: 149/65 (06-30-19 @ 07:32)  RR: 18 (06-30-19 @ 07:54)  SpO2: 100% (06-30-19 @ 07:32) (97% - 100%)    PHYSICAL EXAM:  GENERAL: NAD  HEAD:  Atraumatic, Normocephalic  NERVOUS SYSTEM: left HP  CHEST/LUNG: Clear to percussion bilaterally; No rales, rhonchi, wheezing, or rubs  HEART: Regular rate and rhythm; No murmurs, rubs, or gallops  ABDOMEN: Soft, Nontender, Nondistended; Bowel sounds present  EXTREMITIES:  2+ Peripheral Pulses, No clubbing, cyanosis, or edema  LYMPH: No lymphadenopathy noted  SKIN: No rashes or lesions    LABS  06-30    139  |  106  |  13  ----------------------------<  105<H>  3.6   |  23  |  0.5<L>    Ca    8.3<L>      30 Jun 2019 05:30  Mg     1.7     06-30                            11.1   7.73  )-----------( 298      ( 30 Jun 2019 05:30 )             34.7         CARDIAC ENZYMES      Troponin T, Serum: <0.01 ng/mL (06-27-19 @ 14:38)      Culture Results:   50,000 - 99,000 CFU/mL Gram Negative Rods (06-28-19)  Culture Results: . (06-27-19)  Culture Results:   No growth to date. (06-27-19)    RADIOLOGY  < from: Xray Chest 1 View- PORTABLE-Routine (06.29.19 @ 05:11) >    Impression:      Left lower lobe opacity/pleural effusion unchanged. No air leak.      < from: CT Head No Cont (06.27.19 @ 16:42) >  IMPRESSION:    In comparison with the prior noncontrast CT scan of the brain dated May   26, 2019:    New focus of diminished attenuation in the right side of the martin   consistent with ischemic change, age indeterminant.    < end of copied text >      MEDICATIONS  (STANDING):  aspirin  chewable 81 milliGRAM(s) Oral daily  chlorhexidine 4% Liquid 1 Application(s) Topical two times a day  heparin  Injectable 5000 Unit(s) SubCutaneous every 12 hours  pantoprazole    Tablet 40 milliGRAM(s) Oral before breakfast  simvastatin 20 milliGRAM(s) Oral at bedtime  sodium chloride 0.9%. 1000 milliLiter(s) (50 mL/Hr) IV Continuous <Continuous>  ticagrelor 90 milliGRAM(s) Oral every 12 hours    MEDICATIONS  (PRN):

## 2019-06-30 NOTE — SWALLOW BEDSIDE ASSESSMENT ADULT - ADDITIONAL RECOMMENDATIONS
Patient seen by SLP and noted to be weak and lethargic. Nursing states she ate her breakfast with + toleration observed without overt symptoms of penetration/aspiration. During PO trials patient presented with anterior loss of bolus and pocketing food/liquids across all consistencies. Nursing reported a few minutes later that patient presented more awake/alert and trials were completed again.

## 2019-07-01 LAB
ANION GAP SERPL CALC-SCNC: 11 MMOL/L — SIGNIFICANT CHANGE UP (ref 7–14)
BASOPHILS # BLD AUTO: 0.04 K/UL — SIGNIFICANT CHANGE UP (ref 0–0.2)
BASOPHILS NFR BLD AUTO: 0.7 % — SIGNIFICANT CHANGE UP (ref 0–1)
BUN SERPL-MCNC: 6 MG/DL — LOW (ref 10–20)
CALCIUM SERPL-MCNC: 7.5 MG/DL — LOW (ref 8.5–10.1)
CHLORIDE SERPL-SCNC: 110 MMOL/L — SIGNIFICANT CHANGE UP (ref 98–110)
CO2 SERPL-SCNC: 19 MMOL/L — SIGNIFICANT CHANGE UP (ref 17–32)
CREAT SERPL-MCNC: <0.5 MG/DL — LOW (ref 0.7–1.5)
CULTURE RESULTS: SIGNIFICANT CHANGE UP
EOSINOPHIL # BLD AUTO: 0.05 K/UL — SIGNIFICANT CHANGE UP (ref 0–0.7)
EOSINOPHIL NFR BLD AUTO: 0.8 % — SIGNIFICANT CHANGE UP (ref 0–8)
GLUCOSE SERPL-MCNC: 106 MG/DL — HIGH (ref 70–99)
HCT VFR BLD CALC: 28.6 % — LOW (ref 37–47)
HGB BLD-MCNC: 9.2 G/DL — LOW (ref 12–16)
IMM GRANULOCYTES NFR BLD AUTO: 0.2 % — SIGNIFICANT CHANGE UP (ref 0.1–0.3)
LYMPHOCYTES # BLD AUTO: 2 K/UL — SIGNIFICANT CHANGE UP (ref 1.2–3.4)
LYMPHOCYTES # BLD AUTO: 32.8 % — SIGNIFICANT CHANGE UP (ref 20.5–51.1)
MAGNESIUM SERPL-MCNC: 1.5 MG/DL — LOW (ref 1.8–2.4)
MCHC RBC-ENTMCNC: 28.8 PG — SIGNIFICANT CHANGE UP (ref 27–31)
MCHC RBC-ENTMCNC: 32.2 G/DL — SIGNIFICANT CHANGE UP (ref 32–37)
MCV RBC AUTO: 89.7 FL — SIGNIFICANT CHANGE UP (ref 81–99)
MONOCYTES # BLD AUTO: 0.66 K/UL — HIGH (ref 0.1–0.6)
MONOCYTES NFR BLD AUTO: 10.8 % — HIGH (ref 1.7–9.3)
NEUTROPHILS # BLD AUTO: 3.33 K/UL — SIGNIFICANT CHANGE UP (ref 1.4–6.5)
NEUTROPHILS NFR BLD AUTO: 54.7 % — SIGNIFICANT CHANGE UP (ref 42.2–75.2)
NRBC # BLD: 0 /100 WBCS — SIGNIFICANT CHANGE UP (ref 0–0)
ORGANISM # SPEC MICROSCOPIC CNT: SIGNIFICANT CHANGE UP
ORGANISM # SPEC MICROSCOPIC CNT: SIGNIFICANT CHANGE UP
PHOSPHATE SERPL-MCNC: 2.4 MG/DL — SIGNIFICANT CHANGE UP (ref 2.1–4.9)
PLATELET # BLD AUTO: 228 K/UL — SIGNIFICANT CHANGE UP (ref 130–400)
POTASSIUM SERPL-MCNC: 3.3 MMOL/L — LOW (ref 3.5–5)
POTASSIUM SERPL-SCNC: 3.3 MMOL/L — LOW (ref 3.5–5)
RBC # BLD: 3.19 M/UL — LOW (ref 4.2–5.4)
RBC # FLD: 14.3 % — SIGNIFICANT CHANGE UP (ref 11.5–14.5)
SODIUM SERPL-SCNC: 140 MMOL/L — SIGNIFICANT CHANGE UP (ref 135–146)
SPECIMEN SOURCE: SIGNIFICANT CHANGE UP
WBC # BLD: 6.09 K/UL — SIGNIFICANT CHANGE UP (ref 4.8–10.8)
WBC # FLD AUTO: 6.09 K/UL — SIGNIFICANT CHANGE UP (ref 4.8–10.8)

## 2019-07-01 PROCEDURE — 71045 X-RAY EXAM CHEST 1 VIEW: CPT | Mod: 26

## 2019-07-01 PROCEDURE — 99233 SBSQ HOSP IP/OBS HIGH 50: CPT

## 2019-07-01 RX ORDER — POTASSIUM CHLORIDE 20 MEQ
40 PACKET (EA) ORAL ONCE
Refills: 0 | Status: COMPLETED | OUTPATIENT
Start: 2019-07-01 | End: 2019-07-02

## 2019-07-01 RX ORDER — MAGNESIUM SULFATE 500 MG/ML
2 VIAL (ML) INJECTION ONCE
Refills: 0 | Status: COMPLETED | OUTPATIENT
Start: 2019-07-01 | End: 2019-07-01

## 2019-07-01 RX ADMIN — Medication 81 MILLIGRAM(S): at 11:21

## 2019-07-01 RX ADMIN — HEPARIN SODIUM 5000 UNIT(S): 5000 INJECTION INTRAVENOUS; SUBCUTANEOUS at 05:07

## 2019-07-01 RX ADMIN — PANTOPRAZOLE SODIUM 40 MILLIGRAM(S): 20 TABLET, DELAYED RELEASE ORAL at 05:08

## 2019-07-01 RX ADMIN — SODIUM CHLORIDE 50 MILLILITER(S): 9 INJECTION INTRAMUSCULAR; INTRAVENOUS; SUBCUTANEOUS at 05:08

## 2019-07-01 RX ADMIN — Medication 50 GRAM(S): at 17:49

## 2019-07-01 RX ADMIN — CHLORHEXIDINE GLUCONATE 1 APPLICATION(S): 213 SOLUTION TOPICAL at 17:48

## 2019-07-01 RX ADMIN — TICAGRELOR 90 MILLIGRAM(S): 90 TABLET ORAL at 05:07

## 2019-07-01 RX ADMIN — CHLORHEXIDINE GLUCONATE 1 APPLICATION(S): 213 SOLUTION TOPICAL at 05:08

## 2019-07-01 RX ADMIN — HEPARIN SODIUM 5000 UNIT(S): 5000 INJECTION INTRAVENOUS; SUBCUTANEOUS at 17:48

## 2019-07-01 RX ADMIN — TICAGRELOR 90 MILLIGRAM(S): 90 TABLET ORAL at 17:48

## 2019-07-01 NOTE — PROGRESS NOTE ADULT - SUBJECTIVE AND OBJECTIVE BOX
ASHVIN CUEVAS  89y  Female      Patient is a 89y old  Female who presents with stroke like symptoms      INTERVAL HPI/OVERNIGHT EVENTS:  Patient seen and examined earlier this morning.  Sitting comfortably in chair.  Nonverbal.  No events over the past 24hrs reported.       REVIEW OF SYSTEMS:  UTO - pt nonverbal      T(C): 35.6 (07-01-19 @ 13:22), Max: 37.4 (06-30-19 @ 16:29)  HR: 65 (07-01-19 @ 13:22) (65 - 82)  BP: 170/49 (07-01-19 @ 13:22) (136/63 - 170/49)  RR: 16 (07-01-19 @ 06:02) (16 - 18)  SpO2: 98% (06-30-19 @ 22:25) (98% - 100%)      PHYSICAL EXAM:  GENERAL: NAD  HEAD:  Atraumatic, Normocephalic  NERVOUS SYSTEM: left sided hemiparesis  CHEST/LUNG: Clear to percussion bilaterally; No rales, rhonchi, wheezing, or rubs  HEART: Regular rate and rhythm; No murmurs, rubs, or gallops  ABDOMEN: Soft, Nontender, Nondistended; Bowel sounds present  EXTREMITIES:  2+ Peripheral Pulses, No clubbing, cyanosis, or edema  LYMPH: No lymphadenopathy noted  SKIN: No rashes or lesions      Consultant(s) Notes Reviewed:  [x ] YES  [ ] NO  Care Discussed with Consultants/Other Providers [ x] YES  [ ] NO    LAB:                        9.2    6.09  )-----------( 228      ( 01 Jul 2019 11:17 )             28.6     07-01    140  |  110  |  6<L>  ----------------------------<  106<H>  3.3<L>   |  19  |  <0.5<L>    Ca    7.5<L>      01 Jul 2019 11:17  Phos  2.4     07-01  Mg     1.5     07-01      Drug Dosing Weight  Height (cm): 149.86 (30 Jun 2019 16:29)  Weight (kg): 65 (30 Jun 2019 16:29)  BMI (kg/m2): 28.9 (30 Jun 2019 16:29)  BSA (m2): 1.6 (30 Jun 2019 16:29)      I&O's Summary    30 Jun 2019 07:01  -  01 Jul 2019 07:00  --------------------------------------------------------  IN: 870 mL / OUT: 650 mL / NET: 220 mL        RADIOLOGY & ADDITIONAL TESTS:  Imaging Personally Reviewed:  [x] YES  [ ] NO  < from: CT Head No Cont (06.30.19 @ 12:05) >  IMPRESSION:     CT of the head dated 2019:    1.  Unchanged rounded foci noted in the right martin consistent with   subacute/chronic infarct.    2.  No evidence of acute intracranial hemorrhage, mass effect, or midline   shift.    3.  Additional findings are unchanged on this short-term follow-up   examination.    < end of copied text >      MEDS:  aspirin  chewable 81 milliGRAM(s) Oral daily  chlorhexidine 4% Liquid 1 Application(s) Topical two times a day  heparin  Injectable 5000 Unit(s) SubCutaneous every 12 hours  pantoprazole    Tablet 40 milliGRAM(s) Oral before breakfast  simvastatin 20 milliGRAM(s) Oral at bedtime  sodium chloride 0.9%. 1000 milliLiter(s) IV Continuous <Continuous>  ticagrelor 90 milliGRAM(s) Oral every 12 hours

## 2019-07-01 NOTE — PROGRESS NOTE ADULT - ASSESSMENT
89 yo F with Chronic HFpEF, Vit D deficiency, Hypothyroidism, Dementia and chronic Musa, bedbound, DNR/DNI  h/o CVA 5/19 with residual b/l deficit BIBEMS due to Hypotension and right facial droop. She was seen by her home nurse with a SBP < 70s. Family also reports generalized weakness and a new right facial droop. CT head showed a new acute ischemic changes in R martin. She was upgraded to CCU-Tele for further monitoring.    1. Left Facial droop: r/o New CVA  h/o Cardioembolic CVA.  Neurology following:  New CVA vs. Unmasking of old Deficits.  repeat ct head shows subacute/chronic infarct   Continue DAPT & Statin.  Continue puree diet with nectar thickened liquids  PT following    2. Hypothyroidism  Continue Synthroid.    3. Hypomagnesemia:  Supplement and re check lytes in am    4. Stage 3 Sacral Ulcer:  Continue local wound care.  Wound care consult.      5. UTI:  Patient with chronic Musa.  UA: Pyuria with Trace LE.  CT Abdomen: Nonspecific perinephric stranding.   gram neg rods on urine culture  opacity seen on cxr  will start Levaquin and follow final urine culture results and repeat cxr      DVT, GI PPX    Progress Note Handoff  Pending Consults: none  Pending Tests:  none  Pending Results: none  Family Discussion: CM to speak to family about dispo.  Pt owes 10K to NH  Disposition: Home_____/SNF______/Other_____/Unknown at this time__x___    Please call me with any questions at extension [phone]

## 2019-07-02 ENCOUNTER — TRANSCRIPTION ENCOUNTER (OUTPATIENT)
Age: 84
End: 2019-07-02

## 2019-07-02 VITALS — TEMPERATURE: 97 F | DIASTOLIC BLOOD PRESSURE: 49 MMHG | SYSTOLIC BLOOD PRESSURE: 114 MMHG | HEART RATE: 72 BPM

## 2019-07-02 LAB
ALBUMIN SERPL ELPH-MCNC: 3.4 G/DL — LOW (ref 3.5–5.2)
ALP SERPL-CCNC: 91 U/L — SIGNIFICANT CHANGE UP (ref 30–115)
ALT FLD-CCNC: 14 U/L — SIGNIFICANT CHANGE UP (ref 0–41)
ANION GAP SERPL CALC-SCNC: 12 MMOL/L — SIGNIFICANT CHANGE UP (ref 7–14)
AST SERPL-CCNC: 15 U/L — SIGNIFICANT CHANGE UP (ref 0–41)
BILIRUB SERPL-MCNC: 0.4 MG/DL — SIGNIFICANT CHANGE UP (ref 0.2–1.2)
BUN SERPL-MCNC: 6 MG/DL — LOW (ref 10–20)
CALCIUM SERPL-MCNC: 8.3 MG/DL — LOW (ref 8.5–10.1)
CHLORIDE SERPL-SCNC: 105 MMOL/L — SIGNIFICANT CHANGE UP (ref 98–110)
CO2 SERPL-SCNC: 22 MMOL/L — SIGNIFICANT CHANGE UP (ref 17–32)
CREAT SERPL-MCNC: <0.5 MG/DL — LOW (ref 0.7–1.5)
CULTURE RESULTS: SIGNIFICANT CHANGE UP
GLUCOSE SERPL-MCNC: 125 MG/DL — HIGH (ref 70–99)
HCT VFR BLD CALC: 34.8 % — LOW (ref 37–47)
HGB BLD-MCNC: 11.4 G/DL — LOW (ref 12–16)
MAGNESIUM SERPL-MCNC: 2.2 MG/DL — SIGNIFICANT CHANGE UP (ref 1.8–2.4)
MCHC RBC-ENTMCNC: 28.6 PG — SIGNIFICANT CHANGE UP (ref 27–31)
MCHC RBC-ENTMCNC: 32.8 G/DL — SIGNIFICANT CHANGE UP (ref 32–37)
MCV RBC AUTO: 87.2 FL — SIGNIFICANT CHANGE UP (ref 81–99)
NRBC # BLD: 0 /100 WBCS — SIGNIFICANT CHANGE UP (ref 0–0)
PLATELET # BLD AUTO: 307 K/UL — SIGNIFICANT CHANGE UP (ref 130–400)
POTASSIUM SERPL-MCNC: 3.4 MMOL/L — LOW (ref 3.5–5)
POTASSIUM SERPL-SCNC: 3.4 MMOL/L — LOW (ref 3.5–5)
PROT SERPL-MCNC: 6.4 G/DL — SIGNIFICANT CHANGE UP (ref 6–8)
RBC # BLD: 3.99 M/UL — LOW (ref 4.2–5.4)
RBC # FLD: 14.2 % — SIGNIFICANT CHANGE UP (ref 11.5–14.5)
SODIUM SERPL-SCNC: 139 MMOL/L — SIGNIFICANT CHANGE UP (ref 135–146)
SPECIMEN SOURCE: SIGNIFICANT CHANGE UP
WBC # BLD: 7.73 K/UL — SIGNIFICANT CHANGE UP (ref 4.8–10.8)
WBC # FLD AUTO: 7.73 K/UL — SIGNIFICANT CHANGE UP (ref 4.8–10.8)

## 2019-07-02 PROCEDURE — 99232 SBSQ HOSP IP/OBS MODERATE 35: CPT

## 2019-07-02 PROCEDURE — 99233 SBSQ HOSP IP/OBS HIGH 50: CPT

## 2019-07-02 RX ORDER — FUROSEMIDE 40 MG
1 TABLET ORAL
Qty: 0 | Refills: 0 | DISCHARGE

## 2019-07-02 RX ORDER — ONDANSETRON 8 MG/1
4 TABLET, FILM COATED ORAL EVERY 6 HOURS
Refills: 0 | Status: DISCONTINUED | OUTPATIENT
Start: 2019-07-02 | End: 2019-07-02

## 2019-07-02 RX ORDER — TICAGRELOR 90 MG/1
1 TABLET ORAL
Qty: 60 | Refills: 0
Start: 2019-07-02 | End: 2019-07-31

## 2019-07-02 RX ADMIN — PANTOPRAZOLE SODIUM 40 MILLIGRAM(S): 20 TABLET, DELAYED RELEASE ORAL at 06:03

## 2019-07-02 RX ADMIN — CHLORHEXIDINE GLUCONATE 1 APPLICATION(S): 213 SOLUTION TOPICAL at 05:35

## 2019-07-02 RX ADMIN — TICAGRELOR 90 MILLIGRAM(S): 90 TABLET ORAL at 05:36

## 2019-07-02 RX ADMIN — HEPARIN SODIUM 5000 UNIT(S): 5000 INJECTION INTRAVENOUS; SUBCUTANEOUS at 05:36

## 2019-07-02 RX ADMIN — SODIUM CHLORIDE 50 MILLILITER(S): 9 INJECTION INTRAMUSCULAR; INTRAVENOUS; SUBCUTANEOUS at 05:35

## 2019-07-02 RX ADMIN — Medication 150 MILLIGRAM(S): at 05:37

## 2019-07-02 RX ADMIN — Medication 40 MILLIEQUIVALENT(S): at 05:37

## 2019-07-02 NOTE — PROGRESS NOTE ADULT - SUBJECTIVE AND OBJECTIVE BOX
ASHVIN CUEVAS  89y  Female      Patient is a 89y old Female who presents with stroke like symptoms      INTERVAL HPI/OVERNIGHT EVENTS:  Patient seen and examined earlier this morning.  Lying comfrotqably in bed. Vomited today after potassium was given.   Responds to verbal stimuli but minimally verbal      REVIEW OF SYSTEMS:  UTO - pt nonverbal      Vital Signs Last 24 Hrs  T(C): 37 (02 Jul 2019 06:05), Max: 37 (02 Jul 2019 06:05)  T(F): 98.6 (02 Jul 2019 06:05), Max: 98.6 (02 Jul 2019 06:05)  HR: 69 (02 Jul 2019 06:05) (66 - 69)  BP: 150/51 (02 Jul 2019 06:05) (149/63 - 150/51)  BP(mean): --  RR: 16 (02 Jul 2019 06:05) (16 - 16)  SpO2: 97% (02 Jul 2019 07:32) (97% - 97%)    PHYSICAL EXAM:  GENERAL: NAD  HEAD:  Atraumatic, Normocephalic  NERVOUS SYSTEM: left sided hemiparesis  CHEST/LUNG: Clear to percussion bilaterally; No rales, rhonchi, wheezing, or rubs  HEART: Regular rate and rhythm; No murmurs, rubs, or gallops  ABDOMEN: Soft, Nontender, Nondistended; Bowel sounds present  EXTREMITIES:  2+ Peripheral Pulses, No clubbing, cyanosis, or edema  LYMPH: No lymphadenopathy noted  SKIN: No rashes or lesions      Consultant(s) Notes Reviewed:  [x ] YES  [ ] NO  Care Discussed with Consultants/Other Providers [ x] YES  [ ] NO    LAB:                          11.4   7.73  )-----------( 307      ( 02 Jul 2019 07:08 )             34.8     07-02    139  |  105  |  6<L>  ----------------------------<  125<H>  3.4<L>   |  22  |  <0.5<L>    Ca    8.3<L>      02 Jul 2019 07:08  Phos  2.4     07-01  Mg     2.2     07-02    TPro  6.4  /  Alb  3.4<L>  /  TBili  0.4  /  DBili  x   /  AST  15  /  ALT  14  /  AlkPhos  91  07-02        Drug Dosing Weight  Height (cm): 149.86 (30 Jun 2019 16:29)  Weight (kg): 65 (30 Jun 2019 16:29)  BMI (kg/m2): 28.9 (30 Jun 2019 16:29)  BSA (m2): 1.6 (30 Jun 2019 16:29)      RADIOLOGY & ADDITIONAL TESTS:  Imaging Personally Reviewed:  [x] YES  [ ] NO  < from: CT Head No Cont (06.30.19 @ 12:05) >  IMPRESSION:     CT of the head dated 2019:    1.  Unchanged rounded foci noted in the right martin consistent with   subacute/chronic infarct.    2.  No evidence of acute intracranial hemorrhage, mass effect, or midline   shift.    3.  Additional findings are unchanged on this short-term follow-up   examination.    < end of copied text >      MEDICATIONS  (STANDING):  aspirin  chewable 81 milliGRAM(s) Oral daily  chlorhexidine 4% Liquid 1 Application(s) Topical two times a day  heparin  Injectable 5000 Unit(s) SubCutaneous every 12 hours  pantoprazole    Tablet 40 milliGRAM(s) Oral before breakfast  pregabalin 150 milliGRAM(s) Oral every 8 hours  simvastatin 20 milliGRAM(s) Oral at bedtime  sodium chloride 0.9%. 1000 milliLiter(s) (50 mL/Hr) IV Continuous <Continuous>  ticagrelor 90 milliGRAM(s) Oral every 12 hours    MEDICATIONS  (PRN):  ondansetron Injectable 4 milliGRAM(s) IV Push every 6 hours PRN Nausea and/or Vomiting

## 2019-07-02 NOTE — PROGRESS NOTE ADULT - ASSESSMENT
89 yo F with Chronic HFpEF, Vit D deficiency, Hypothyroidism, Dementia and chronic Musa, bedbound, DNR/DNI  h/o CVA 5/19 with residual b/l deficit BIBEMS due to Hypotension and right facial droop. She was seen by her home nurse with a SBP < 70s. Family also reports generalized weakness and a new right facial droop. CT head showed a new acute ischemic changes in R martin. She was upgraded to CCU-Tele for further monitoring.    1. Left Facial droop: r/o New CVA  h/o Cardioembolic CVA.  Neurology following:  New CVA vs. Unmasking of old Deficits.  repeat ct head shows subacute/chronic infarct   Continue DAPT & Statin.  Continue puree diet with nectar thickened liquids  PT following - home pt    2. Hypothyroidism  Continue Synthroid.    3. Hypomagnesemia:  resolved    4. Stage 3 Sacral Ulcer:  Continue local wound care.  Wound care consult.      5. UTI:  Patient with chronic Musa.  UA: Pyuria with Trace LE.  CT Abdomen: Nonspecific perinephric stranding.   completed course of abx        DVT, GI PPX    Progress Note Handoff  Pending Consults: none  Pending Tests:  none  Pending Results: none  Family Discussion: d/c home with homecare - home visit program in place   Disposition: Home_____/SNF______/Other_____/Unknown at this time__x___    Please call me with any questions at extension [phone]

## 2019-07-02 NOTE — DISCHARGE NOTE NURSING/CASE MANAGEMENT/SOCIAL WORK - NSDCDPATPORTLINK_GEN_ALL_CORE
You can access the [facility] Patient Portal, offered by [facility], by registering with the following website: [email or web address]

## 2019-07-02 NOTE — PROGRESS NOTE ADULT - PROVIDER SPECIALTY LIST ADULT
Cardiology
Gastroenterology
Gastroenterology
Hospitalist
Internal Medicine
Neurology
Neurology
Hospitalist

## 2019-07-02 NOTE — DISCHARGE NOTE PROVIDER - CARE PROVIDER_API CALL
Yadira Sheldon)  Geriatric Medicine; Internal Medicine  [address] [place] [address]  Phone: [phone]  Fax: [phone]  Follow Up Time: 1-3 days    Marilin Gaming)  Neurology  [address] [place] [address], [address]  [place] [address]  Phone: [phone]  Fax: [phone]  Follow Up Time:     Cody Owen)  Cardiovascular Disease; Internal Medicine  [address] [place] [address]  Phone: [phone]  Fax: [phone]  Follow Up Time: 2 weeks

## 2019-07-02 NOTE — DISCHARGE NOTE PROVIDER - NSDCCPCAREPLAN_GEN_ALL_CORE_FT
PRINCIPAL DISCHARGE DIAGNOSIS  Diagnosis: Altered mental status  Assessment and Plan of Treatment: due to cva  take medication as prescribed  follow up with neurology  continue PT  follow up with PMD  please watch for signs of bleeding on Brilinta

## 2019-07-02 NOTE — PROGRESS NOTE ADULT - SUBJECTIVE AND OBJECTIVE BOX
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 Today. home nurse came around 1pm, and pt had SBP < 70s. Family noticed pt having right facial droop and looking more lethargic, so called EMS. In ED, family reports pt back to her post-stroke baseline (weakness, dysphasia, pt has not ambulated since the CVA). CT head shows new acute ischemic changes in R martin. Family reports pt taking her home meds daily including asa and stain. Family said pt seemed dehydrated at home, looks much better after IVF in ED. Family denies pt having head trauma, LOC, HA, CP, SOB, cough, abd pain, N/V/D, calf pain, or edema. (27 Jun 2019 19:53)      Interval History:    patient is stable  no events overnight    Vital Signs Last 24 Hrs  T(C): 36.2 (02 Jul 2019 13:45), Max: 37 (02 Jul 2019 06:05)  T(F): 97.1 (02 Jul 2019 13:45), Max: 98.6 (02 Jul 2019 06:05)  HR: 72 (02 Jul 2019 13:45) (66 - 72)  BP: 114/49 (02 Jul 2019 13:45) (114/49 - 150/51)  BP(mean): --  RR: 16 (02 Jul 2019 06:05) (16 - 16)  SpO2: 97% (02 Jul 2019 07:32) (97% - 97%)    Neurological Exam:   Mental status: Awake, somewhat lethargic and oriented to self. states name, follows simple commands  Cranial nerves: Pupils equally round and reactive to light, visual fields full, no nystagmus, extraocular muscles intact, V1 through V3 intact bilaterally and symmetric, face symmetric, hearing intact to finger rub, palate elevation symmetric, tongue was midline.  Motor:  poorly cooperative, withdraws symm x 4  Sensation: Intact to light touch,       Medications  aspirin  chewable 81 milliGRAM(s) Oral daily  chlorhexidine 4% Liquid 1 Application(s) Topical two times a day  heparin  Injectable 5000 Unit(s) SubCutaneous every 12 hours  ondansetron Injectable 4 milliGRAM(s) IV Push every 6 hours PRN  pantoprazole    Tablet 40 milliGRAM(s) Oral before breakfast  pregabalin 150 milliGRAM(s) Oral every 8 hours  simvastatin 20 milliGRAM(s) Oral at bedtime  sodium chloride 0.9%. 1000 milliLiter(s) IV Continuous <Continuous>  ticagrelor 90 milliGRAM(s) Oral every 12 hours      Lab  07-02    139  |  105  |  6<L>  ----------------------------<  125<H>  3.4<L>   |  22  |  <0.5<L>    Ca    8.3<L>      02 Jul 2019 07:08  Phos  2.4     07-01  Mg     2.2     07-02    TPro  6.4  /  Alb  3.4<L>  /  TBili  0.4  /  DBili  x   /  AST  15  /  ALT  14  /  AlkPhos  91  07-02                          11.4   7.73  )-----------( 307      ( 02 Jul 2019 07:08 )             34.8     LIVER FUNCTIONS - ( 02 Jul 2019 07:08 )  Alb: 3.4 g/dL / Pro: 6.4 g/dL / ALK PHOS: 91 U/L / ALT: 14 U/L / AST: 15 U/L / GGT: x             2.2        Radiology      Assessment: 89 yo female with recurrent  cva ? embolic etiology. no arrhythmia found thus far  patient with known intracranial athero  patient failed asa and plavix x 2 events    Plan:  cont ticagrelor , asa and statin  PT  family understands risks/benefits and will take patient home  no other w/u needed   please call with any questions

## 2019-07-02 NOTE — DISCHARGE NOTE PROVIDER - HOSPITAL COURSE
89 yo female BIBEMS h/o CVA 5/19 with residual b/l deficit, HLD, hypothyroidism, depression p/w hypotension and right facial droop. Today. home nurse came around 1pm, and pt had SBP < 70s. Family noticed pt having right facial droop and looking more lethargic, so called EMS. In ED, family reports pt back to her post-stroke baseline (weakness, dysphasia, pt has not ambulated since the CVA). CT head shows new acute ischemic changes in R martin. Family reports pt taking her home meds daily including asa and stain. Family said pt seemed dehydrated at home, looks much better after IVF in ED. Family denies pt having head trauma, LOC, HA, CP, SOB, cough, abd pain, N/V/D, calf pain, or edema.         Pt found to have a right pontine lacunar infarct.    She has had multiple prior strokes and is on aspirin 81 mg and plavix 75 mg.    She reports no significant bruising.     She has mild swallow difficulty but able to eat soft diet.    She was seen by speech and swallow and rehab.    Neurology followed her daily and she was switched to Brilinta from plavix. Family was spoken to about increased risk of bleeding.    Patient was appropriate for SNF but due to the family owing 10K to a nurshign home, she was discharged home to be part of the home     visit program.    There is a high likely garcia that the pt will be rehospitalized in the near future.     Over all poor prognosis.    Time spent > 65

## 2019-07-02 NOTE — DISCHARGE NOTE PROVIDER - CARE PROVIDERS DIRECT ADDRESSES
[email or web address]

## 2019-07-03 PROBLEM — Z96.0 PRESENCE OF UROGENITAL IMPLANTS: Chronic | Status: ACTIVE | Noted: 2019-06-27

## 2019-07-05 ENCOUNTER — APPOINTMENT (OUTPATIENT)
Dept: GERIATRICS | Facility: HOME HEALTH | Age: 84
End: 2019-07-05

## 2019-07-08 ENCOUNTER — APPOINTMENT (OUTPATIENT)
Dept: GERIATRICS | Facility: HOME HEALTH | Age: 84
End: 2019-07-08

## 2019-07-09 DIAGNOSIS — I63.81 OTHER CEREBRAL INFARCTION DUE TO OCCLUSION OR STENOSIS OF SMALL ARTERY: ICD-10-CM

## 2019-07-09 DIAGNOSIS — E86.0 DEHYDRATION: ICD-10-CM

## 2019-07-09 DIAGNOSIS — G62.9 POLYNEUROPATHY, UNSPECIFIED: ICD-10-CM

## 2019-07-09 DIAGNOSIS — Z66 DO NOT RESUSCITATE: ICD-10-CM

## 2019-07-09 DIAGNOSIS — I50.32 CHRONIC DIASTOLIC (CONGESTIVE) HEART FAILURE: ICD-10-CM

## 2019-07-09 DIAGNOSIS — Z74.01 BED CONFINEMENT STATUS: ICD-10-CM

## 2019-07-09 DIAGNOSIS — E78.5 HYPERLIPIDEMIA, UNSPECIFIED: ICD-10-CM

## 2019-07-09 DIAGNOSIS — L89.153 PRESSURE ULCER OF SACRAL REGION, STAGE 3: ICD-10-CM

## 2019-07-09 DIAGNOSIS — E03.9 HYPOTHYROIDISM, UNSPECIFIED: ICD-10-CM

## 2019-07-09 DIAGNOSIS — E43 UNSPECIFIED SEVERE PROTEIN-CALORIE MALNUTRITION: ICD-10-CM

## 2019-07-09 DIAGNOSIS — R41.82 ALTERED MENTAL STATUS, UNSPECIFIED: ICD-10-CM

## 2019-07-09 DIAGNOSIS — I95.9 HYPOTENSION, UNSPECIFIED: ICD-10-CM

## 2019-07-09 DIAGNOSIS — E55.9 VITAMIN D DEFICIENCY, UNSPECIFIED: ICD-10-CM

## 2019-07-09 DIAGNOSIS — F32.9 MAJOR DEPRESSIVE DISORDER, SINGLE EPISODE, UNSPECIFIED: ICD-10-CM

## 2019-07-09 DIAGNOSIS — N39.0 URINARY TRACT INFECTION, SITE NOT SPECIFIED: ICD-10-CM

## 2019-07-09 DIAGNOSIS — R29.810 FACIAL WEAKNESS: ICD-10-CM

## 2019-07-09 DIAGNOSIS — E83.42 HYPOMAGNESEMIA: ICD-10-CM

## 2019-07-09 DIAGNOSIS — I69.954 HEMIPLEGIA AND HEMIPARESIS FOLLOWING UNSPECIFIED CEREBROVASCULAR DISEASE AFFECTING LEFT NON-DOMINANT SIDE: ICD-10-CM

## 2019-07-09 DIAGNOSIS — G45.0 VERTEBRO-BASILAR ARTERY SYNDROME: ICD-10-CM

## 2019-07-09 DIAGNOSIS — M19.90 UNSPECIFIED OSTEOARTHRITIS, UNSPECIFIED SITE: ICD-10-CM

## 2019-07-09 DIAGNOSIS — F03.90 UNSPECIFIED DEMENTIA WITHOUT BEHAVIORAL DISTURBANCE: ICD-10-CM

## 2019-07-10 ENCOUNTER — APPOINTMENT (OUTPATIENT)
Dept: GERIATRICS | Facility: HOME HEALTH | Age: 84
End: 2019-07-10
Payer: MEDICARE

## 2019-07-10 PROCEDURE — YYYYY: CPT

## 2019-07-10 PROCEDURE — 99348 HOME/RES VST EST LOW MDM 30: CPT

## 2019-07-16 ENCOUNTER — OTHER (OUTPATIENT)
Age: 84
End: 2019-07-16

## 2019-07-22 ENCOUNTER — LABORATORY RESULT (OUTPATIENT)
Age: 84
End: 2019-07-22

## 2019-07-22 ENCOUNTER — OUTPATIENT (OUTPATIENT)
Dept: OUTPATIENT SERVICES | Facility: HOSPITAL | Age: 84
LOS: 1 days | Discharge: HOME | End: 2019-07-22

## 2019-07-22 DIAGNOSIS — Z96.7 PRESENCE OF OTHER BONE AND TENDON IMPLANTS: Chronic | ICD-10-CM

## 2019-07-22 DIAGNOSIS — R79.89 OTHER SPECIFIED ABNORMAL FINDINGS OF BLOOD CHEMISTRY: ICD-10-CM

## 2019-07-22 DIAGNOSIS — E03.9 HYPOTHYROIDISM, UNSPECIFIED: ICD-10-CM

## 2019-07-22 DIAGNOSIS — D64.9 ANEMIA, UNSPECIFIED: ICD-10-CM

## 2019-07-22 DIAGNOSIS — E53.8 DEFICIENCY OF OTHER SPECIFIED B GROUP VITAMINS: ICD-10-CM

## 2019-07-22 DIAGNOSIS — E78.00 PURE HYPERCHOLESTEROLEMIA, UNSPECIFIED: ICD-10-CM

## 2019-08-14 ENCOUNTER — APPOINTMENT (OUTPATIENT)
Dept: GERIATRICS | Facility: HOME HEALTH | Age: 84
End: 2019-08-14
Payer: MEDICARE

## 2019-08-14 PROCEDURE — YYYYY: CPT

## 2019-08-19 ENCOUNTER — LABORATORY RESULT (OUTPATIENT)
Age: 84
End: 2019-08-19

## 2019-08-19 ENCOUNTER — OUTPATIENT (OUTPATIENT)
Dept: OUTPATIENT SERVICES | Facility: HOSPITAL | Age: 84
LOS: 1 days | Discharge: HOME | End: 2019-08-19

## 2019-08-19 ENCOUNTER — OTHER (OUTPATIENT)
Age: 84
End: 2019-08-19

## 2019-08-19 DIAGNOSIS — R79.89 OTHER SPECIFIED ABNORMAL FINDINGS OF BLOOD CHEMISTRY: ICD-10-CM

## 2019-08-19 DIAGNOSIS — D64.9 ANEMIA, UNSPECIFIED: ICD-10-CM

## 2019-08-19 DIAGNOSIS — Z96.7 PRESENCE OF OTHER BONE AND TENDON IMPLANTS: Chronic | ICD-10-CM

## 2019-08-20 ENCOUNTER — INPATIENT (INPATIENT)
Facility: HOSPITAL | Age: 84
LOS: 2 days | Discharge: HOME HEALTH | End: 2019-08-23
Attending: HOSPITALIST | Admitting: HOSPITALIST
Payer: MEDICARE

## 2019-08-20 VITALS — TEMPERATURE: 99 F

## 2019-08-20 DIAGNOSIS — Z96.7 PRESENCE OF OTHER BONE AND TENDON IMPLANTS: Chronic | ICD-10-CM

## 2019-08-20 LAB
ALBUMIN SERPL ELPH-MCNC: 2.8 G/DL — LOW (ref 3.5–5.2)
ALP SERPL-CCNC: 114 U/L — SIGNIFICANT CHANGE UP (ref 30–115)
ALT FLD-CCNC: 10 U/L — SIGNIFICANT CHANGE UP (ref 0–41)
ANION GAP SERPL CALC-SCNC: 14 MMOL/L — SIGNIFICANT CHANGE UP (ref 7–14)
APTT BLD: 31.1 SEC — SIGNIFICANT CHANGE UP (ref 27–39.2)
AST SERPL-CCNC: 17 U/L — SIGNIFICANT CHANGE UP (ref 0–41)
BASOPHILS # BLD AUTO: 0.04 K/UL — SIGNIFICANT CHANGE UP (ref 0–0.2)
BASOPHILS # BLD AUTO: 0.04 K/UL — SIGNIFICANT CHANGE UP (ref 0–0.2)
BASOPHILS NFR BLD AUTO: 0.3 % — SIGNIFICANT CHANGE UP (ref 0–1)
BASOPHILS NFR BLD AUTO: 0.3 % — SIGNIFICANT CHANGE UP (ref 0–1)
BILIRUB SERPL-MCNC: 0.4 MG/DL — SIGNIFICANT CHANGE UP (ref 0.2–1.2)
BLD GP AB SCN SERPL QL: SIGNIFICANT CHANGE UP
BUN SERPL-MCNC: 10 MG/DL — SIGNIFICANT CHANGE UP (ref 10–20)
CALCIUM SERPL-MCNC: 8.5 MG/DL — SIGNIFICANT CHANGE UP (ref 8.5–10.1)
CHLORIDE SERPL-SCNC: 99 MMOL/L — SIGNIFICANT CHANGE UP (ref 98–110)
CO2 SERPL-SCNC: 24 MMOL/L — SIGNIFICANT CHANGE UP (ref 17–32)
CREAT SERPL-MCNC: <0.5 MG/DL — LOW (ref 0.7–1.5)
EOSINOPHIL # BLD AUTO: 0.22 K/UL — SIGNIFICANT CHANGE UP (ref 0–0.7)
EOSINOPHIL # BLD AUTO: 0.26 K/UL — SIGNIFICANT CHANGE UP (ref 0–0.7)
EOSINOPHIL NFR BLD AUTO: 1.4 % — SIGNIFICANT CHANGE UP (ref 0–8)
EOSINOPHIL NFR BLD AUTO: 2 % — SIGNIFICANT CHANGE UP (ref 0–8)
GLUCOSE SERPL-MCNC: 109 MG/DL — HIGH (ref 70–99)
HCT VFR BLD CALC: 24.7 % — LOW (ref 37–47)
HCT VFR BLD CALC: 27.2 % — LOW (ref 37–47)
HGB BLD-MCNC: 7.6 G/DL — LOW (ref 12–16)
HGB BLD-MCNC: 8.4 G/DL — LOW (ref 12–16)
IMM GRANULOCYTES NFR BLD AUTO: 0.6 % — HIGH (ref 0.1–0.3)
IMM GRANULOCYTES NFR BLD AUTO: 0.8 % — HIGH (ref 0.1–0.3)
INR BLD: 1.37 RATIO — HIGH (ref 0.65–1.3)
LACTATE SERPL-SCNC: 1.5 MMOL/L — SIGNIFICANT CHANGE UP (ref 0.5–2.2)
LIDOCAIN IGE QN: 16 U/L — SIGNIFICANT CHANGE UP (ref 7–60)
LYMPHOCYTES # BLD AUTO: 18.7 % — LOW (ref 20.5–51.1)
LYMPHOCYTES # BLD AUTO: 2.91 K/UL — SIGNIFICANT CHANGE UP (ref 1.2–3.4)
LYMPHOCYTES # BLD AUTO: 23.8 % — SIGNIFICANT CHANGE UP (ref 20.5–51.1)
LYMPHOCYTES # BLD AUTO: 3.11 K/UL — SIGNIFICANT CHANGE UP (ref 1.2–3.4)
MAGNESIUM SERPL-MCNC: 2.1 MG/DL — SIGNIFICANT CHANGE UP (ref 1.8–2.4)
MCHC RBC-ENTMCNC: 25.4 PG — LOW (ref 27–31)
MCHC RBC-ENTMCNC: 25.5 PG — LOW (ref 27–31)
MCHC RBC-ENTMCNC: 30.8 G/DL — LOW (ref 32–37)
MCHC RBC-ENTMCNC: 30.9 G/DL — LOW (ref 32–37)
MCV RBC AUTO: 82.6 FL — SIGNIFICANT CHANGE UP (ref 81–99)
MCV RBC AUTO: 82.7 FL — SIGNIFICANT CHANGE UP (ref 81–99)
MONOCYTES # BLD AUTO: 1.41 K/UL — HIGH (ref 0.1–0.6)
MONOCYTES # BLD AUTO: 1.56 K/UL — HIGH (ref 0.1–0.6)
MONOCYTES NFR BLD AUTO: 10 % — HIGH (ref 1.7–9.3)
MONOCYTES NFR BLD AUTO: 10.8 % — HIGH (ref 1.7–9.3)
NEUTROPHILS # BLD AUTO: 10.7 K/UL — HIGH (ref 1.4–6.5)
NEUTROPHILS # BLD AUTO: 8.19 K/UL — HIGH (ref 1.4–6.5)
NEUTROPHILS NFR BLD AUTO: 62.5 % — SIGNIFICANT CHANGE UP (ref 42.2–75.2)
NEUTROPHILS NFR BLD AUTO: 68.8 % — SIGNIFICANT CHANGE UP (ref 42.2–75.2)
NRBC # BLD: 0 /100 WBCS — SIGNIFICANT CHANGE UP (ref 0–0)
NRBC # BLD: 0 /100 WBCS — SIGNIFICANT CHANGE UP (ref 0–0)
PLATELET # BLD AUTO: 722 K/UL — HIGH (ref 130–400)
PLATELET # BLD AUTO: 725 K/UL — HIGH (ref 130–400)
POTASSIUM SERPL-MCNC: 4.2 MMOL/L — SIGNIFICANT CHANGE UP (ref 3.5–5)
POTASSIUM SERPL-SCNC: 4.2 MMOL/L — SIGNIFICANT CHANGE UP (ref 3.5–5)
PROT SERPL-MCNC: 6.9 G/DL — SIGNIFICANT CHANGE UP (ref 6–8)
PROTHROM AB SERPL-ACNC: 15.7 SEC — HIGH (ref 9.95–12.87)
RBC # BLD: 2.99 M/UL — LOW (ref 4.2–5.4)
RBC # BLD: 3.29 M/UL — LOW (ref 4.2–5.4)
RBC # FLD: 15.9 % — HIGH (ref 11.5–14.5)
RBC # FLD: 15.9 % — HIGH (ref 11.5–14.5)
SODIUM SERPL-SCNC: 137 MMOL/L — SIGNIFICANT CHANGE UP (ref 135–146)
TROPONIN T SERPL-MCNC: <0.01 NG/ML — SIGNIFICANT CHANGE UP
WBC # BLD: 13.09 K/UL — HIGH (ref 4.8–10.8)
WBC # BLD: 15.56 K/UL — HIGH (ref 4.8–10.8)
WBC # FLD AUTO: 13.09 K/UL — HIGH (ref 4.8–10.8)
WBC # FLD AUTO: 15.56 K/UL — HIGH (ref 4.8–10.8)

## 2019-08-20 PROCEDURE — 99222 1ST HOSP IP/OBS MODERATE 55: CPT

## 2019-08-20 PROCEDURE — 99223 1ST HOSP IP/OBS HIGH 75: CPT | Mod: AI

## 2019-08-20 PROCEDURE — 99285 EMERGENCY DEPT VISIT HI MDM: CPT

## 2019-08-20 PROCEDURE — 71045 X-RAY EXAM CHEST 1 VIEW: CPT | Mod: 26

## 2019-08-20 RX ORDER — OXYBUTYNIN CHLORIDE 5 MG
5 TABLET ORAL
Refills: 0 | Status: DISCONTINUED | OUTPATIENT
Start: 2019-08-20 | End: 2019-08-20

## 2019-08-20 RX ORDER — PANTOPRAZOLE SODIUM 20 MG/1
80 TABLET, DELAYED RELEASE ORAL ONCE
Refills: 0 | Status: COMPLETED | OUTPATIENT
Start: 2019-08-20 | End: 2019-08-20

## 2019-08-20 RX ORDER — COLLAGENASE CLOSTRIDIUM HIST. 250 UNIT/G
1 OINTMENT (GRAM) TOPICAL ONCE
Refills: 0 | Status: COMPLETED | OUTPATIENT
Start: 2019-08-20 | End: 2019-08-20

## 2019-08-20 RX ORDER — PANTOPRAZOLE SODIUM 20 MG/1
8 TABLET, DELAYED RELEASE ORAL
Qty: 80 | Refills: 0 | Status: DISCONTINUED | OUTPATIENT
Start: 2019-08-20 | End: 2019-08-22

## 2019-08-20 RX ORDER — SIMVASTATIN 20 MG/1
20 TABLET, FILM COATED ORAL AT BEDTIME
Refills: 0 | Status: DISCONTINUED | OUTPATIENT
Start: 2019-08-20 | End: 2019-08-20

## 2019-08-20 RX ORDER — FAMOTIDINE 10 MG/ML
1 INJECTION INTRAVENOUS
Qty: 0 | Refills: 0 | DISCHARGE

## 2019-08-20 RX ORDER — SODIUM HYPOCHLORITE 0.125 %
1 SOLUTION, NON-ORAL MISCELLANEOUS ONCE
Refills: 0 | Status: COMPLETED | OUTPATIENT
Start: 2019-08-20 | End: 2019-08-20

## 2019-08-20 RX ORDER — ACETAMINOPHEN 500 MG
650 TABLET ORAL EVERY 6 HOURS
Refills: 0 | Status: DISCONTINUED | OUTPATIENT
Start: 2019-08-20 | End: 2019-08-23

## 2019-08-20 RX ORDER — SODIUM CHLORIDE 9 MG/ML
1000 INJECTION, SOLUTION INTRAVENOUS
Refills: 0 | Status: DISCONTINUED | OUTPATIENT
Start: 2019-08-20 | End: 2019-08-23

## 2019-08-20 RX ORDER — ASPIRIN/CALCIUM CARB/MAGNESIUM 324 MG
1 TABLET ORAL
Qty: 0 | Refills: 0 | DISCHARGE

## 2019-08-20 RX ORDER — FLUTICASONE PROPIONATE 220 MCG
0 AEROSOL WITH ADAPTER (GRAM) INHALATION
Qty: 0 | Refills: 0 | DISCHARGE

## 2019-08-20 RX ORDER — SODIUM CHLORIDE 9 MG/ML
1000 INJECTION INTRAMUSCULAR; INTRAVENOUS; SUBCUTANEOUS ONCE
Refills: 0 | Status: COMPLETED | OUTPATIENT
Start: 2019-08-20 | End: 2019-08-20

## 2019-08-20 RX ORDER — FESOTERODINE FUMARATE 8 MG/1
1 TABLET, FILM COATED, EXTENDED RELEASE ORAL
Qty: 0 | Refills: 0 | DISCHARGE

## 2019-08-20 RX ADMIN — Medication 150 MILLIGRAM(S): at 21:52

## 2019-08-20 RX ADMIN — Medication 1 APPLICATION(S): at 11:30

## 2019-08-20 RX ADMIN — PANTOPRAZOLE SODIUM 80 MILLIGRAM(S): 20 TABLET, DELAYED RELEASE ORAL at 12:03

## 2019-08-20 RX ADMIN — SODIUM CHLORIDE 1000 MILLILITER(S): 9 INJECTION INTRAMUSCULAR; INTRAVENOUS; SUBCUTANEOUS at 12:02

## 2019-08-20 RX ADMIN — SODIUM CHLORIDE 75 MILLILITER(S): 9 INJECTION, SOLUTION INTRAVENOUS at 18:02

## 2019-08-20 RX ADMIN — PANTOPRAZOLE SODIUM 10 MG/HR: 20 TABLET, DELAYED RELEASE ORAL at 21:52

## 2019-08-20 RX ADMIN — Medication 650 MILLIGRAM(S): at 19:57

## 2019-08-20 RX ADMIN — PANTOPRAZOLE SODIUM 10 MG/HR: 20 TABLET, DELAYED RELEASE ORAL at 12:16

## 2019-08-20 NOTE — H&P ADULT - ASSESSMENT
A/P:    1. Generalized fatigue 2/2 normocytic anemia 2/2 likely upper GIB  -Out-pt Hgb was 13 2 weeks ago, now ~8  -Seen by GI in the ED, wants to perform endo in the AM  -Wants Neuro onboard to evaluate need for brillinta  -NPO at MN  -Pre-procedure at labs  -IV PPI  -Transfuse to keep Hgb > 8 as per GI recs    2. Hypoalbuminemia likely 2/2 poor oral intake  -Dietician eval after procedure    3. Hx of CVA with residual deficits  -PT eval while in the hospital    4. Hx of HLD and hypothyroidism  -C/W home medications    GI PPX  Hold DVT PPX    DNR/DNI A/P:    1. Generalized fatigue 2/2 normocytic anemia 2/2 likely upper GIB  -Out-pt Hgb was 13 2 weeks ago, now ~8  -Seen by GI in the ED, wants to perform endo in the AM  -Wants Neuro onboard to evaluate need for brillinta  -NPO at MN  -Pre-procedure at labs  -IV PPI  -Transfuse to keep Hgb > 8 as per GI recs  -Hold ASA and Brillinta for now    2. Hypoalbuminemia likely 2/2 poor oral intake  -Dietician eval after procedure    3. Hx of CVA with residual deficits  -PT eval while in the hospital    4. Hx of HLD and hypothyroidism  -C/W home medications    GI PPX  Hold DVT PPX    DNR/DNI

## 2019-08-20 NOTE — H&P ADULT - NSHPPHYSICALEXAM_GEN_ALL_CORE
Vital Signs Last 24 Hrs  T(C): 37.2 (20 Aug 2019 10:04), Max: 37.2 (20 Aug 2019 10:04)  T(F): 99 (20 Aug 2019 10:04), Max: 99 (20 Aug 2019 10:04)  HR: 78 (20 Aug 2019 13:40) (74 - 78)  BP: 101/40 (20 Aug 2019 13:40) (100/43 - 110/48)  BP(mean): --  RR: 20 (20 Aug 2019 13:40) (20 - 20)  SpO2: 96% (20 Aug 2019 13:40) (95% - 96%)    General: WN/WD NAD  Neurology: A&Ox0, nonfocal, JOE x 4  Eyes: PERRL/EOMI  ENT/Neck: Neck supple, trachea midline, No JVD,   Respiratory: CTA B/L, No wheezing, rales, rhonchi  CV: RRR, S1S2, There is a systolic murmur in the pulmonic area  Abdominal: Soft, NT, ND +BS,   Extremities: No edema, + peripheral pulses  Skin: Stage 4 sacral pressure ulcer present Vital Signs Last 24 Hrs  T(C): 37.2 (20 Aug 2019 10:04), Max: 37.2 (20 Aug 2019 10:04)  T(F): 99 (20 Aug 2019 10:04), Max: 99 (20 Aug 2019 10:04)  HR: 78 (20 Aug 2019 13:40) (74 - 78)  BP: 101/40 (20 Aug 2019 13:40) (100/43 - 110/48)  BP(mean): --  RR: 20 (20 Aug 2019 13:40) (20 - 20)  SpO2: 96% (20 Aug 2019 13:40) (95% - 96%)    General: WN/WD NAD  Neurology: A&Ox3, nonfocal, JOE x 4, right-sided facial droop  Eyes: PERRL/EOMI  ENT/Neck: Neck supple, trachea midline, No JVD,   Respiratory: CTA B/L, No wheezing, rales, rhonchi  CV: RRR, S1S2, There is a systolic murmur in the pulmonic area  Abdominal: Soft, NT, ND +BS,   Extremities: No edema, + peripheral pulses  Skin: Stage 4 sacral pressure ulcer present

## 2019-08-20 NOTE — H&P ADULT - HISTORY OF PRESENT ILLNESS
89 YO F with a PMH of CVA (May 2019 with residual B/L deficit including right facial droop), HLD, hypothyroidism, and depression who was sent in by her visiting home nurse after noticing a significant drop in Hgb (13 to 8). Associated with generalized fatigue and black stools for the past x 2 weeks. The family denies any symptoms of ABD pain, hematemesis, N/V/D, or other symptoms at this time.     In the ED, the ED staff performed a guiac which was positive and noted that her Hgb here was 8.4. GI consulted and they want to perform endoscopy in the AM. Would like for Neuro to be consulted for necessity of brillinta treatment in the setting of recent stroke and likely upper GIB. The Ed started the pt on PPI drip and gave bolus of fluid

## 2019-08-20 NOTE — ED PROVIDER NOTE - ATTENDING CONTRIBUTION TO CARE
89 year old female, pmhx as documented above, presenting with anemia found as outpatient and black stools x 2 weeks. Patient is otherwise at her baseline but has been feeling weak. Family at bedside have MOLST form confirming patient is DNR/DNI. Patient is AAOx0 at baseline 2/2 dementia and cannot provide further history. Daughters deny known fevers, vomiting, abdominal pain, urinary symptoms, syncope, or any other symptoms.    Vital Signs: I have reviewed the initial vital signs.  Constitutional: NAD, well-nourished, appears stated age, no acute distress.  HEENT: Airway patent, moist MM, no erythema/swelling/deformity of oral structures. EOMI, PERRLA.  CV: regular rate, regular rhythm, well-perfused extremities, 2+ b/l DP and radial pulses equal.  Lungs: BCTA, no increased WOB.  ABD: NTND, no guarding or rebound, no pulsatile mass, no hernias.   RECTAL: (performed with ACP) - dark stools, guaiac positive  MSK: Neck supple, nontender, nl ROM, no stepoff. Chest nontender. Back nontender in TLS spine or to b/l bony structures or flanks. Ext nontender, nl rom, no deformity.   INTEG: Skin warm, dry, no rash.  NEURO: A&Ox0 (baseline), moves all extremities  PSYCH: Unable to assess 2/2 dementia    Will obtain labs, T+S, speak with GI. Patient otherwise HD stable. Confirmed with daughters that patient is indeed DNR/DNI but blood transfusions and GI intervention is acceptable.

## 2019-08-20 NOTE — H&P ADULT - NSHPREVIEWOFSYSTEMS_GEN_ALL_CORE
REVIEW OF SYSTEMS:    CONSTITUTIONAL: No weakness, fevers or chills  EYES/ENT: No visual changes;  No vertigo or throat pain   NECK: No pain or stiffness  RESPIRATORY: No cough, wheezing, hemoptysis; No shortness of breath  CARDIOVASCULAR: No chest pain or palpitations  GASTROINTESTINAL: See HPI  GENITOURINARY: No dysuria, frequency or hematuria  NEUROLOGICAL: No numbness or weakness  SKIN: No itching, rashes REVIEW OF SYSTEMS:    CONSTITUTIONAL: See HPI  EYES/ENT: No visual changes;  No vertigo or throat pain   NECK: No pain or stiffness  RESPIRATORY: No cough, wheezing, hemoptysis; No shortness of breath  CARDIOVASCULAR: No chest pain or palpitations  GASTROINTESTINAL: See HPI  GENITOURINARY: No dysuria, frequency or hematuria  NEUROLOGICAL: No numbness or weakness  SKIN: No itching, rashes

## 2019-08-20 NOTE — CONSULT NOTE ADULT - SUBJECTIVE AND OBJECTIVE BOX
Chief complaint/Reason for consult: Anemia     HPI: 89yFemale pmh CVA  in May and June on aspirin and Brilinta last dose aspirin two days ago and last dose of Brilinta today. As per home health aide, patient's has been having what looked to her as black stools for two weeks intermittently. Patient denies nausea, vomiting, hematemesis, blood in stool, diarrhea, constipation, abdominal pain. Patient had a colonoscopy more than 30 years ago and does not remember the results or who did it. Patient had 3 soft bowel movements last night. Patient had one formed bowel movement I evacuated from her rectal vault at 12:10p.m. that was dark brown and not black.      PAST MEDICAL & SURGICAL HISTORY:   Musa catheter in place  Diastolic heart failure  Neuropathy  Depression  OA (osteoarthritis)  Hypothyroid  HLD (hyperlipidemia)  S/P ORIF (open reduction internal fixation) fracture        Family history:  FAMILY HISTORY:  No pertinent family history in first degree relatives: no family history of heart disease    No GI cancers in first or second degree relatives    Social History: No smoking. No alcohol. No illegal drug use.    Allergies:     No Known Allergies        MEDICATIONS: Home Medications:  aspirin 81 mg oral tablet, chewable: 1 tab(s) orally once a day (27 Jun 2019 20:41)  fluticasone 50 mcg/inh inhalation powder:  (27 Jun 2019 20:41)  Lyrica 150 mg oral capsule: 1 cap(s) orally every 8 hours (27 Jun 2019 20:41)  Pepcid 40 mg oral tablet: 1 tab(s) orally once a day (at bedtime) (27 Jun 2019 20:41)  Toviaz 4 mg oral tablet, extended release: 1 tab(s) orally once a day (27 Jun 2019 20:41)    MEDICATIONS  (STANDING):  pantoprazole Infusion 8 mG/Hr (10 mL/Hr) IV Continuous <Continuous>            REVIEW OF SYSTEMS  General:  No weight loss, fevers, or chills.  Eyes:  No reported pain or visual changes  ENT:  No sore throat or runny nose.  NECK: No stiffness or lymphadenopathy  CV:  No chest pain or palpitations.  Resp:  No shortness of breath, cough, wheezing or hemoptysis  GI:  No abdominal pain, nausea, vomiting, dysphagia, diarrhea or constipation. No rectal bleeding, or hematemesis. +intermittent melena as per home health aide  Neuro:  No tingling, numbness       VITALS:   T(F): --  HR: --  BP: --  RR: --  SpO2: --    PHYSICAL EXAM:  GENERAL: Alert and oriented to person and place not time, no acute distress.  HEAD:  Atraumatic, Normocephalic  EYES: conjunctiva and sclera clear  NECK: Supple, No thyromegaly   CHEST/LUNG: Clear to auscultation bilaterally; No wheeze, rhonchi, or rales  HEART: Regular rate and rhythm; normal S1, S2, No murmurs.  ABDOMEN: Soft, nontender, nondistended; Bowel sounds present, no abdominal bruit, masses, or hepatosplenomegaly  NEUROLOGY: No asterixis or tremor  SKIN: Intact, no jaundice  Rectal Exam-Rectal stool burden evacuated, formed dark brown stool in vault noted        LABS:  08-20    137  |  99  |  10  ----------------------------<  109<H>  4.2   |  24  |  <0.5<L>    Ca    8.5      20 Aug 2019 11:18  Mg     2.1     08-20    TPro  6.9  /  Alb  2.8<L>  /  TBili  0.4  /  DBili  x   /  AST  17  /  ALT  10  /  AlkPhos  114  08-20                          8.4    13.09 )-----------( 722      ( 20 Aug 2019 11:18 )             27.2     LIVER FUNCTIONS - ( 20 Aug 2019 11:18 )  Alb: 2.8 g/dL / Pro: 6.9 g/dL / ALK PHOS: 114 U/L / ALT: 10 U/L / AST: 17 U/L / GGT: x           PT/INR - ( 20 Aug 2019 11:18 )   PT: 15.70 sec;   INR: 1.37 ratio         PTT - ( 20 Aug 2019 11:18 )  PTT:31.1 sec

## 2019-08-20 NOTE — ED PROVIDER NOTE - PROGRESS NOTE DETAILS
spoke with GI SHAYNE Reyes who will consult on patient in the ED. recommendations from GI: endoscopy , neuro consultation for brilinta and admission serial Hbs hospitalist aware

## 2019-08-20 NOTE — H&P ADULT - NSHPLABSRESULTS_GEN_ALL_CORE
8.4    13.09 )-----------( 722      ( 20 Aug 2019 11:18 )             27.2     08-20    137  |  99  |  10  ----------------------------<  109<H>  4.2   |  24  |  <0.5<L>    Ca    8.5      20 Aug 2019 11:18  Mg     2.1     08-20    TPro  6.9  /  Alb  2.8<L>  /  TBili  0.4  /  DBili  x   /  AST  17  /  ALT  10  /  AlkPhos  114  08-20    PT/INR - ( 20 Aug 2019 11:18 )   PT: 15.70 sec;   INR: 1.37 ratio    PTT - ( 20 Aug 2019 11:18 )  PTT:31.1 sec    < from: Xray Chest 1 View-PORTABLE IMMEDIATE (08.20.19 @ 11:04) >    EXAM:  XR CHEST PORTABLE IMMED 1V        PROCEDURE DATE:  08/20/2019      Impression:    Support devices: None.    Cardiac/mediastinum/hilum: Stable    Lung parenchyma/Pleura: Prominent pulmonary vascular markings, in   appropriate clinical setting would represent pulmonary vascular   congestion. No evidence of pneumothorax or focal consolidation.    Skeleton/soft tissues: Unchanged

## 2019-08-20 NOTE — ED PROVIDER NOTE - OBJECTIVE STATEMENT
89 y/o female on brilinta and ASA presents with family for weakness , decreasing Hb, and black stool over 2 weeks. patient has dc asa two days ago. patient without any vomiting. as per family patient with hx of CVA, OA, alicea catheter, hypothyroid, HLD. no fever, chills, abdominal pain, cp, sob, headache or falls. As per family, patient with baseline Hb of 13. patient had lab work on Monday and was noted to have Hb of 8.

## 2019-08-20 NOTE — PATIENT PROFILE ADULT - NSPROGENPREVTRANSF_GEN_A_NUR
Impression: Acute atopic conjunctivitis, bilateral: H10.13 OU. Plan: Hemorrhagic conjunctivitis OU. Continue Ofloxacin QID OU and Pred QID OU. Consider medrol dose pack if swelling not improved after 5 days (patient declined at today's visit).
no

## 2019-08-20 NOTE — CONSULT NOTE ADULT - ASSESSMENT
89[facility]  in May and June on aspirin and Brilinta last dose aspirin two days ago 8/18/19 and last dose of Brilinta today 8/20/19. As per home health aide, patient's has been having what looked to her as black stools for two weeks intermittently. Patient denies nausea, vomiting, hematemesis, blood in stool, diarrhea, constipation, abdominal pain. Patient had a colonoscopy more than 30 years ago and does not remember the results or who did it. Patient had 3 soft bowel movements last night. Patient had one formed bowel movement I evacuated from her rectal vault at 12:10p.m. that was dark brown and not black. Baseline hgb 12.1 5/28/19 today hgb 8.4.    Problem 1-Anemia with intermittent melenic stools  Rec  -Maintain Hemodynamic Stability   -Patient needs a neurology consult to safely D/C Brilinta prior to EGD   -Monitor CBC  -CMP,Optimize Electrolytes  -PT,PTT,INR  -EKG, Chest-Xray   -Monitor H and H  -Transfuse prn to hgb >8  -Continue PPI Drip  -Monitor Vital Signs  -Keep patient NPO  -Monitor Stool For blood, frequency, consistency, melena  -Hold Anticoagulation if benefits outweigh risks  -Active Type and Screen 89yFemale [facility] CVA  in May and June on aspirin and Brilinta last dose aspirin two days ago 8/18/19 and last dose of Brilinta today 8/20/19. As per home health aide, patient's has been having what looked to her as black stools for two weeks intermittently. Patient denies nausea, vomiting, hematemesis, blood in stool, diarrhea, constipation, abdominal pain. Patient had a colonoscopy more than 30 years ago and does not remember the results or who did it. Patient had 3 soft bowel movements last night. Patient had one formed bowel movement I evacuated from her rectal vault at 12:10p.m. that was dark brown and not black. Baseline hgb 12.1 5/28/19 today hgb 8.4.    Problem 1-Anemia with intermittent melenic stools  Rec  -Maintain Hemodynamic Stability   -Patient needs a neurology consult to safely D/C Brilinta prior to EGD   -Monitor CBC  -CMP,Optimize Electrolytes  -PT,PTT,INR  -EKG, Chest-Xray   -Monitor H and H  -Transfuse prn to hgb >8  -Continue PPI Drip  -Monitor Vital Signs  -Keep patient NPO  -Monitor Stool For blood, frequency, consistency, melena  -Active Type and Screen    Please also read the Attending Attestation below by Dr. Andrews Tracy the Attending Gastroenterologist on service today.

## 2019-08-20 NOTE — ED ADULT NURSE NOTE - PMH
Depression    Diastolic heart failure    Musa catheter in place    HLD (hyperlipidemia)    Hypothyroid    Neuropathy    OA (osteoarthritis)

## 2019-08-20 NOTE — CONSULT NOTE ADULT - ATTENDING COMMENTS
Pt seen and examined with SHAYNE Doyle and case discussed with ER MD.  Pt is on ASA and Brilinta for a recent CVA on 5/19/19 and she had two episodes of melena.  Her BP and HR are NL.  Her Hb is 8.4 and PLT is elevated at 722K.  I recommend neuro consult to see if Brilinta can be stopped given recent CVA (and ASA continued) to allow for therapeutic intervention at the time of egd and colonoscopy.  Initial conversations about endoscopic procedures between SHAYNE Doyle and patient's dtr resulted in them agreeing to egd but not colonoscopy.  I recommend both and our staff will re address this week.

## 2019-08-20 NOTE — ED ADULT TRIAGE NOTE - CHIEF COMPLAINT QUOTE
[facility]IB[facility] EMS states pt been having dark stools. Daughter states hgb 8.1.. EMS inserted 18 guage L wrist. Pt received 750 cc NS . On scene FSBS 138

## 2019-08-20 NOTE — ED PROVIDER NOTE - CLINICAL SUMMARY MEDICAL DECISION MAKING FREE TEXT BOX
Patient presented with GIB, dark stools, generalized weakness, low Hb as outpatient. Otherwise afebrile, HD stable, at baseline mental status on arrival to ED. Abd non-tender, rectal exam confirmed blood in stool. Obtained Hb which was ~8 (baseline is 13 per daughters). Otherwise labs grossly unremarkable. Spoke with Gi who evaluated patient in ED - recommended admission for endoscopy, discontinuation of Brilinta as well. No transfusion necessary at this time given Hb, but serial H/H necessary. Will admit for further monitoring and management. Family agreeable with plan. HD stable at time of admission.

## 2019-08-20 NOTE — ED PROVIDER NOTE - PROGRESS NOTE ADDITIONAL1
Patient updated on negative urine culture. Advised patient that if she is improving on abx to finish them, if not improving to stop. Patient understands.  Discussed to follow up with PCP after abx if not improving.   Additional Progress Note...

## 2019-08-21 DIAGNOSIS — Z02.9 ENCOUNTER FOR ADMINISTRATIVE EXAMINATIONS, UNSPECIFIED: ICD-10-CM

## 2019-08-21 LAB
APTT BLD: 29.5 SEC — SIGNIFICANT CHANGE UP (ref 27–39.2)
HCT VFR BLD CALC: 24.6 % — LOW (ref 37–47)
HCT VFR BLD CALC: 29.9 % — LOW (ref 37–47)
HGB BLD-MCNC: 7.7 G/DL — LOW (ref 12–16)
HGB BLD-MCNC: 9.3 G/DL — LOW (ref 12–16)
INR BLD: 1.5 RATIO — HIGH (ref 0.65–1.3)
MCHC RBC-ENTMCNC: 25.4 PG — LOW (ref 27–31)
MCHC RBC-ENTMCNC: 25.7 PG — LOW (ref 27–31)
MCHC RBC-ENTMCNC: 31.1 G/DL — LOW (ref 32–37)
MCHC RBC-ENTMCNC: 31.3 G/DL — LOW (ref 32–37)
MCV RBC AUTO: 81.2 FL — SIGNIFICANT CHANGE UP (ref 81–99)
MCV RBC AUTO: 82.6 FL — SIGNIFICANT CHANGE UP (ref 81–99)
NRBC # BLD: 0 /100 WBCS — SIGNIFICANT CHANGE UP (ref 0–0)
NRBC # BLD: 0 /100 WBCS — SIGNIFICANT CHANGE UP (ref 0–0)
PLATELET # BLD AUTO: 644 K/UL — HIGH (ref 130–400)
PLATELET # BLD AUTO: 648 K/UL — HIGH (ref 130–400)
PROTHROM AB SERPL-ACNC: 17.2 SEC — HIGH (ref 9.95–12.87)
RBC # BLD: 3.03 M/UL — LOW (ref 4.2–5.4)
RBC # BLD: 3.62 M/UL — LOW (ref 4.2–5.4)
RBC # FLD: 15.2 % — HIGH (ref 11.5–14.5)
RBC # FLD: 15.8 % — HIGH (ref 11.5–14.5)
WBC # BLD: 11.01 K/UL — HIGH (ref 4.8–10.8)
WBC # BLD: 13.59 K/UL — HIGH (ref 4.8–10.8)
WBC # FLD AUTO: 11.01 K/UL — HIGH (ref 4.8–10.8)
WBC # FLD AUTO: 13.59 K/UL — HIGH (ref 4.8–10.8)

## 2019-08-21 PROCEDURE — 99233 SBSQ HOSP IP/OBS HIGH 50: CPT

## 2019-08-21 PROCEDURE — 99222 1ST HOSP IP/OBS MODERATE 55: CPT

## 2019-08-21 RX ORDER — OXYBUTYNIN CHLORIDE 5 MG
5 TABLET ORAL
Refills: 0 | Status: DISCONTINUED | OUTPATIENT
Start: 2019-08-21 | End: 2019-08-23

## 2019-08-21 RX ORDER — TICAGRELOR 90 MG/1
90 TABLET ORAL
Refills: 0 | Status: DISCONTINUED | OUTPATIENT
Start: 2019-08-21 | End: 2019-08-21

## 2019-08-21 RX ORDER — SODIUM HYPOCHLORITE 0.125 %
1 SOLUTION, NON-ORAL MISCELLANEOUS DAILY
Refills: 0 | Status: DISCONTINUED | OUTPATIENT
Start: 2019-08-21 | End: 2019-08-23

## 2019-08-21 RX ORDER — ACETAMINOPHEN 500 MG
650 TABLET ORAL ONCE
Refills: 0 | Status: COMPLETED | OUTPATIENT
Start: 2019-08-21 | End: 2019-08-21

## 2019-08-21 RX ORDER — SODIUM HYPOCHLORITE 0.125 %
1 SOLUTION, NON-ORAL MISCELLANEOUS DAILY
Refills: 0 | Status: DISCONTINUED | OUTPATIENT
Start: 2019-08-21 | End: 2019-08-21

## 2019-08-21 RX ORDER — COLLAGENASE CLOSTRIDIUM HIST. 250 UNIT/G
1 OINTMENT (GRAM) TOPICAL DAILY
Refills: 0 | Status: DISCONTINUED | OUTPATIENT
Start: 2019-08-21 | End: 2019-08-23

## 2019-08-21 RX ORDER — TICAGRELOR 90 MG/1
90 TABLET ORAL
Refills: 0 | Status: DISCONTINUED | OUTPATIENT
Start: 2019-08-21 | End: 2019-08-23

## 2019-08-21 RX ORDER — FUROSEMIDE 40 MG
20 TABLET ORAL ONCE
Refills: 0 | Status: COMPLETED | OUTPATIENT
Start: 2019-08-21 | End: 2019-08-21

## 2019-08-21 RX ADMIN — PANTOPRAZOLE SODIUM 10 MG/HR: 20 TABLET, DELAYED RELEASE ORAL at 06:30

## 2019-08-21 RX ADMIN — TICAGRELOR 90 MILLIGRAM(S): 90 TABLET ORAL at 17:55

## 2019-08-21 RX ADMIN — Medication 20 MILLIGRAM(S): at 18:31

## 2019-08-21 RX ADMIN — SODIUM CHLORIDE 75 MILLILITER(S): 9 INJECTION, SOLUTION INTRAVENOUS at 06:31

## 2019-08-21 RX ADMIN — Medication 150 MILLIGRAM(S): at 21:08

## 2019-08-21 RX ADMIN — Medication 1 APPLICATION(S): at 15:47

## 2019-08-21 RX ADMIN — Medication 650 MILLIGRAM(S): at 15:00

## 2019-08-21 RX ADMIN — Medication 150 MILLIGRAM(S): at 15:00

## 2019-08-21 RX ADMIN — Medication 5 MILLIGRAM(S): at 17:55

## 2019-08-21 RX ADMIN — Medication 650 MILLIGRAM(S): at 00:22

## 2019-08-21 RX ADMIN — Medication 1 APPLICATION(S): at 17:56

## 2019-08-21 NOTE — PROGRESS NOTE ADULT - SUBJECTIVE AND OBJECTIVE BOX
MASONBERNIEHELEN  89y, Female  Allergy: No Known Allergies    Hospital Day: 1d    Patient seen and examined earlier today.  Family at bedside, pt voices no complaints    PMH/PSH:  PAST MEDICAL & SURGICAL HISTORY:  Musa catheter in place  Diastolic heart failure  Neuropathy  Depression  OA (osteoarthritis)  Hypothyroid  HLD (hyperlipidemia)  S/P ORIF (open reduction internal fixation) fracture        VITALS:  T(F): 98.8 (08-21-19 @ 05:15), Max: 99.5 (08-20-19 @ 22:54)  HR: 83 (08-21-19 @ 05:15)  BP: 135/57 (08-21-19 @ 05:15) (101/40 - 135/57)  RR: 18 (08-21-19 @ 05:15)  SpO2: 96% (08-20-19 @ 13:40)    PHYSICAL EXAM:  GENERAL: NAD  HEENT: MMM  CVS:  RRR  CHEST/LUNG: Good air entry, no wheeze  ABD:  soft, NT/ND +bs  EXTREMITIES:  no edema  NEURO: awake, alert    TESTS & MEASUREMENTS:                          7.7    11.01 )-----------( 644      ( 21 Aug 2019 06:02 )             24.6     PT/INR - ( 21 Aug 2019 06:02 )   PT: 17.20 sec;   INR: 1.50 ratio         PTT - ( 21 Aug 2019 06:02 )  PTT:29.5 sec  08-20    137  |  99  |  10  ----------------------------<  109<H>  4.2   |  24  |  <0.5<L>    Ca    8.5      20 Aug 2019 11:18  Mg     2.1     08-20    TPro  6.9  /  Alb  2.8<L>  /  TBili  0.4  /  DBili  x   /  AST  17  /  ALT  10  /  AlkPhos  114  08-20    LIVER FUNCTIONS - ( 20 Aug 2019 11:18 )  Alb: 2.8 g/dL / Pro: 6.9 g/dL / ALK PHOS: 114 U/L / ALT: 10 U/L / AST: 17 U/L / GGT: x           CARDIAC MARKERS ( 20 Aug 2019 11:18 )  x     / <0.01 ng/mL / x     / x     / x          RADIOLOGY & ADDITIONAL TESTS:  < from: Xray Chest 1 View-PORTABLE IMMEDIATE (08.20.19 @ 11:04) >    Support devices: None.    Cardiac/mediastinum/hilum: Stable    Lung parenchyma/Pleura: Prominent pulmonary vascular markings, in   appropriate clinical setting would represent pulmonary vascular   congestion. No evidence of pneumothorax or focal consolidation.    Skeleton/soft tissues: Unchanged    < end of copied text >      MEDICATIONS:  MEDICATIONS  (STANDING):  lactated ringers. 1000 milliLiter(s) (75 mL/Hr) IV Continuous <Continuous>  oxybutynin 5 milliGRAM(s) Oral two times a day  pantoprazole Infusion 8 mG/Hr (10 mL/Hr) IV Continuous <Continuous>  pregabalin 150 milliGRAM(s) Oral every 8 hours  ticagrelor 90 milliGRAM(s) Oral two times a day    MEDICATIONS  (PRN):  acetaminophen    Suspension .. 650 milliGRAM(s) Oral every 6 hours PRN Temp greater or equal to 38C (100.4F)  acetaminophen   Tablet .. 650 milliGRAM(s) Oral every 6 hours PRN Temp greater or equal to 38C (100.4F)      HOME MEDICATIONS:  aspirin 81 mg oral tablet, chewable (08-20)  Lyrica 150 mg oral capsule (08-20) ASHVIN CUEVAS  89y, Female  Allergy: No Known Allergies    Hospital Day: 1d    Patient seen and examined earlier today.  Family at bedside, pt voices no complaints    PMH/PSH:  PAST MEDICAL & SURGICAL HISTORY:  Musa catheter in place  Diastolic heart failure  Neuropathy  Depression  OA (osteoarthritis)  Hypothyroid  HLD (hyperlipidemia)  S/P ORIF (open reduction internal fixation) fracture        VITALS:  T(F): 98.8 (08-21-19 @ 05:15), Max: 99.5 (08-20-19 @ 22:54)  HR: 83 (08-21-19 @ 05:15)  BP: 135/57 (08-21-19 @ 05:15) (101/40 - 135/57)  RR: 18 (08-21-19 @ 05:15)  SpO2: 96% (08-20-19 @ 13:40)    PHYSICAL EXAM:  GENERAL: NAD  HEENT: MMM  CVS:  RRR  CHEST/LUNG: Good air entry, no wheeze  ABD:  soft, NT/ND +bs  EXTREMITIES:  no edema  NEURO: awake, alert  skin:  stage IV sacral decub POA    TESTS & MEASUREMENTS:                          7.7    11.01 )-----------( 644      ( 21 Aug 2019 06:02 )             24.6     PT/INR - ( 21 Aug 2019 06:02 )   PT: 17.20 sec;   INR: 1.50 ratio         PTT - ( 21 Aug 2019 06:02 )  PTT:29.5 sec  08-20    137  |  99  |  10  ----------------------------<  109<H>  4.2   |  24  |  <0.5<L>    Ca    8.5      20 Aug 2019 11:18  Mg     2.1     08-20    TPro  6.9  /  Alb  2.8<L>  /  TBili  0.4  /  DBili  x   /  AST  17  /  ALT  10  /  AlkPhos  114  08-20    LIVER FUNCTIONS - ( 20 Aug 2019 11:18 )  Alb: 2.8 g/dL / Pro: 6.9 g/dL / ALK PHOS: 114 U/L / ALT: 10 U/L / AST: 17 U/L / GGT: x           CARDIAC MARKERS ( 20 Aug 2019 11:18 )  x     / <0.01 ng/mL / x     / x     / x          RADIOLOGY & ADDITIONAL TESTS:  < from: Xray Chest 1 View-PORTABLE IMMEDIATE (08.20.19 @ 11:04) >    Support devices: None.    Cardiac/mediastinum/hilum: Stable    Lung parenchyma/Pleura: Prominent pulmonary vascular markings, in   appropriate clinical setting would represent pulmonary vascular   congestion. No evidence of pneumothorax or focal consolidation.    Skeleton/soft tissues: Unchanged    < end of copied text >      MEDICATIONS:  MEDICATIONS  (STANDING):  lactated ringers. 1000 milliLiter(s) (75 mL/Hr) IV Continuous <Continuous>  oxybutynin 5 milliGRAM(s) Oral two times a day  pantoprazole Infusion 8 mG/Hr (10 mL/Hr) IV Continuous <Continuous>  pregabalin 150 milliGRAM(s) Oral every 8 hours  ticagrelor 90 milliGRAM(s) Oral two times a day    MEDICATIONS  (PRN):  acetaminophen    Suspension .. 650 milliGRAM(s) Oral every 6 hours PRN Temp greater or equal to 38C (100.4F)  acetaminophen   Tablet .. 650 milliGRAM(s) Oral every 6 hours PRN Temp greater or equal to 38C (100.4F)      HOME MEDICATIONS:  aspirin 81 mg oral tablet, chewable (08-20)  Lyrica 150 mg oral capsule (08-20)

## 2019-08-21 NOTE — PROGRESS NOTE ADULT - SUBJECTIVE AND OBJECTIVE BOX
89yFemale  Being followed for Anemia no overt signs of GI bleeding   Interval history: Patient denies nausea, vomiting, hematemesis, melena, blood in stool, diarrhea, constipation, abdominal pain. As per nursing team last bowel movement has been dark brown last night, no hematemesis, melena, or blood in stool noted.      PAST MEDICAL & SURGICAL HISTORY:   Musa catheter in place  Diastolic heart failure  Neuropathy  Depression  OA (osteoarthritis)  Hypothyroid  HLD (hyperlipidemia)  S/P ORIF (open reduction internal fixation) fracture          Social History: No smoking. No alcohol. No illegal drug use.          MEDICATIONS:  MEDICATIONS  (STANDING):  lactated ringers. 1000 milliLiter(s) (75 mL/Hr) IV Continuous <Continuous>  oxybutynin 5 milliGRAM(s) Oral two times a day  pantoprazole Infusion 8 mG/Hr (10 mL/Hr) IV Continuous <Continuous>  pregabalin 150 milliGRAM(s) Oral every 8 hours  ticagrelor 90 milliGRAM(s) Oral two times a day    MEDICATIONS  (PRN):  acetaminophen    Suspension .. 650 milliGRAM(s) Oral every 6 hours PRN Temp greater or equal to 38C (100.4F)  acetaminophen   Tablet .. 650 milliGRAM(s) Oral every 6 hours PRN Temp greater or equal to 38C (100.4F)      Allergies:     No Known Allergies              REVIEW OF SYSTEMS:  General:  No fevers, or chills.  Eyes:  No reported pain or visual changes  ENT:  No sore throat or runny nose.  NECK: No stiffness   CV:  No chest pain or palpitations.  Resp:  No shortness of breath, cough  GI:  No abdominal pain, nausea, vomiting, dysphagia, diarrhea or constipation. No rectal bleeding, melena, or hematemesis.  Neuro:  No tingling, numbness           VITAL SIGNS:   T(F): 98.8 (08-21-19 @ 05:15), Max: 99.5 (08-20-19 @ 22:54)  HR: 83 (08-21-19 @ 05:15) (74 - 88)  BP: 135/57 (08-21-19 @ 05:15) (100/43 - 135/57)  RR: 18 (08-21-19 @ 05:15) (16 - 20)  SpO2: 96% (08-20-19 @ 13:40) (95% - 96%)    PHYSICAL EXAM:  GENERAL: Alert and oriented to person and place not time, no acute distress.  HEAD:  Atraumatic, Normocephalic  EYES: conjunctiva and sclera clear  NECK: Supple, no JVD or thyromegaly  CHEST/LUNG: Clear to auscultation bilaterally; No wheeze, rhonchi, or rales  HEART: Regular rate and rhythm; normal S1, S2, No murmurs.  ABDOMEN: Soft, nontender, nondistended; Bowel sounds present, no abdominal bruit, masses, or hepatosplenomegaly  NEUROLOGY: No asterixis or tremor.   SKIN: Intact, no jaundice            LABS:                        7.7    11.01 )-----------( 644      ( 21 Aug 2019 06:02 )             24.6     08-20    137  |  99  |  10  ----------------------------<  109<H>  4.2   |  24  |  <0.5<L>    Ca    8.5      20 Aug 2019 11:18  Mg     2.1     08-20    TPro  6.9  /  Alb  2.8<L>  /  TBili  0.4  /  DBili  x   /  AST  17  /  ALT  10  /  AlkPhos  114  08-20    LIVER FUNCTIONS - ( 20 Aug 2019 11:18 )  Alb: 2.8 g/dL / Pro: 6.9 g/dL / ALK PHOS: 114 U/L / ALT: 10 U/L / AST: 17 U/L / GGT: x           PT/INR - ( 21 Aug 2019 06:02 )   PT: 17.20 sec;   INR: 1.50 ratio         PTT - ( 21 Aug 2019 06:02 )  PTT:29.5 sec    IMAGING:    < from: Xray Chest 1 View-PORTABLE IMMEDIATE (08.20.19 @ 11:04) >  EXAM:  XR CHEST PORTABLE IMMED 1V            PROCEDURE DATE:  08/20/2019            INTERPRETATION:  Clinical History / Reason for exam: Shortness of breath    Comparison : Chest radiograph 7/1/2019.    Technique/Positioning: Portable technique.    Impression:    Support devices: None.    Cardiac/mediastinum/hilum: Stable    Lung parenchyma/Pleura: Prominent pulmonary vascular markings, in   appropriate clinical setting would represent pulmonary vascular   congestion. No evidence of pneumothorax or focal consolidation.    Skeleton/soft tissues: Unchanged                    ILANA GOMES M.D., ATTENDING RADIOLOGIST  This document has been electronically signed. Aug 20 2019 12:31PM              < end of copied text >

## 2019-08-21 NOTE — PROGRESS NOTE ADULT - ASSESSMENT
89F with hx CVA on brilinta, bladder spasms, who presents with generalized fatigue, found to have anemia.    #possible UGIB  #acute blood loss anemia  pt had cva while on plavix and aspirin; recently changed to brilinta  d/w family today, are concerned about the need to hold brilinta x 5d prior to doing EGD.    agreeable for PRBC transfusion; will transfuse 1U PRBC now  await neuro and GI recs  allow to eat  continue on PPI drip    #Hypoalbuminemia likely 2/2 poor oral intake  #Hx of CVA with residual deficits - continue brilinta, consider starting statin, PT  #bladder spasms   #PPx - SCD    DNR/DNI     Progress Note Handoff  Pending:  gi/neuro, prbc  Patient/Family discussion: d/w daughters at bedside, ok with blood transfusion.  worried about holding brilinta.    Disposition: home with 24h care when stable 89F with hx CVA on brilinta, bladder spasms, who presents with generalized fatigue, found to have anemia.    #possible UGIB  #acute blood loss anemia  pt had cva while on plavix and aspirin; recently changed to brilinta  d/w family today, are concerned about the need to hold brilinta x 5d prior to doing EGD.    agreeable for PRBC transfusion; will transfuse 1U PRBC now  await neuro and GI recs  allow to eat  continue on PPI drip    #Hypoalbuminemia likely 2/2 poor oral intake  #Hx of CVA with residual deficits - continue brilinta, consider starting statin, PT  #bladder spasms   #chronic urinary retention w/ alicea  #stage IV sacral decub POA - continue local wound care, alicea for assistance in wound healing  #PPx - SCD    DNR/DNI     Progress Note Handoff  Pending:  gi/neuro, prbc  Patient/Family discussion: d/w daughters at bedside, ok with blood transfusion.  worried about holding brilinta.    Disposition: home with 24h care when stable

## 2019-08-21 NOTE — PROGRESS NOTE ADULT - ASSESSMENT
89yFemale [facility] CVA  in May and June on aspirin and Brilinta last dose aspirin two days ago 8/18/19 and last dose of Brilinta today 8/20/19. As per home health aide, patient's has been having what looked to her as black stools for two weeks intermittently. Patient denies nausea, vomiting, hematemesis, blood in stool, diarrhea, constipation, abdominal pain. Patient had a colonoscopy more than 30 years ago and does not remember the results or who did it. Patient had 3 soft bowel movements 8/19/19. Patient had one formed bowel movement I evacuated from her rectal vault at 12:10p.m on 8/20/19 that was dark brown and not black. Baseline hgb 12.1 5/28/19 admission hgb 8.4.    Problem 1-Anemia with intermittent melenic stools  Rec  -Maintain Hemodynamic Stability   -Patient needs a neurology consult to safely D/C Brilinta at least 5 days prior to EGD, family agreeable to EGD at this time and would prefer to hold off on Colonoscopy, risks and benefits discussed with family at bedside.  -Monitor CBC  -CMP,Optimize Electrolytes  -PT,PTT,INR  -Monitor H and H  -Transfuse prn to hgb >8  -Continue PPI IV BID  -Monitor Vital Signs  -okay to advance diet in interim   -Monitor Stool For blood, frequency, consistency, melena  -Active Type and Screen    Problem 2-Prominent pulmonary vascular markings, in appropriate clinical setting would represent pulmonary vascular congestion. No evidence of pneumothorax or focal consolidation.  Rec  - Care as per primary team 89y[facility] CVA  in May and June on aspirin and Brilinta last dose aspirin two days ago 8/18/19 and last dose of Brilinta today 8/20/19. As per home health aide, patient's has been having what looked to her as black stools for two weeks intermittently. Patient denies nausea, vomiting, hematemesis, blood in stool, diarrhea, constipation, abdominal pain. Patient had a colonoscopy more than 30 years ago and does not remember the results or who did it. Patient had 3 soft bowel movements 8/19/19. Patient had one formed bowel movement I evacuated from her rectal vault at 12:10p.m on 8/20/19 that was dark brown and not black. Baseline hgb 12.1 5/28/19 admission hgb 8.4.    Problem 1-Anemia with intermittent melenic stools at home but currently having dark brown bowel movements   Rec  -Case discussed extensively with patient's family at bedside. Patient's daughters have decided against EGD/Colonoscopy for their mother given her age. Risks and benefits of EGD/Colonoscopy discussed extensively to patient and patient's daughters  -Advance diet as tolerated   -Maintain Hemodynamic Stability   -Monitor CBC  -CMP,Optimize Electrolytes  -Monitor H and H  -Transfuse prn to hgb >8  -Continue PPI IV BID  -Monitor Vital Signs  -Monitor Stool For blood, frequency, consistency, melena  -Active Type and Screen    Problem 2-Prominent pulmonary vascular markings, in appropriate clinical setting would represent pulmonary vascular congestion. No evidence of pneumothorax or focal consolidation.  Rec  - Care as per primary team     Recall GI service if needed

## 2019-08-21 NOTE — CONSULT NOTE ADULT - SUBJECTIVE AND OBJECTIVE BOX
Neurology Consultation note    Name  ASHVIN CUEVAS    HPI:  89 YO F with a PMH of CVA (May 2019 with residual B/L deficit including right facial droop), HLD, hypothyroidism, and depression who was sent in by her visiting home nurse after noticing a significant drop in Hgb (13 to 8). Associated with generalized fatigue and black stools for the past x 2 weeks. The family denies any symptoms of ABD pain, hematemesis, N/V/D, or other symptoms at this time.     In the ED, the ED staff performed a guiac which was positive and noted that her Hgb here was 8.4. GI consulted and they want to perform endoscopy in the AM. Would like for Neuro to be consulted for necessity of brillinta treatment in the setting of recent stroke and likely upper GIB. The Ed started the pt on PPI drip and gave bolus of fluid (20 Aug 2019 13:55)      PATIENT ADM IN MAY WITH MULTIPLE BILATERAL ACUTE CVA IN ANTERIOR AND POSTERIOR CIRCULATION  ALSO WITH SEVERE VERTEBROBASILAR DISEASE      Vital Signs Last 24 Hrs  T(C): 37.1 (21 Aug 2019 05:15), Max: 37.5 (20 Aug 2019 22:54)  T(F): 98.8 (21 Aug 2019 05:15), Max: 99.5 (20 Aug 2019 22:54)  HR: 83 (21 Aug 2019 05:15) (78 - 88)  BP: 135/57 (21 Aug 2019 05:15) (100/43 - 135/57)  BP(mean): --  RR: 18 (21 Aug 2019 05:15) (16 - 20)  SpO2: 96% (20 Aug 2019 13:40) (96% - 96%)    Neurological Exam:   Mental status: Awake, alert and oriented x3.  Recent and remote memory intact.  Naming, repetition and comprehension intact.  Attention/concentration intact.  No dysarthria, no aphasia.  Fund of knowledge appropriate.    Cranial nerves: Pupils equally round and reactive to light, visual fields full, no nystagmus, extraocular muscles intact, V1 through V3 intact bilaterally and symmetric, face symmetric, hearing intact to finger rub, palate elevation symmetric, tongue was midline.  Motor:  MRC grading 5/5 b/l UE/LE.   strength 5/5.  Normal tone and bulk.  No abnormal movements.    Sensation: Intact to light touch, proprioception, and pinprick.   Coordination: No dysmetria on finger-to-nose and heel-to-shin.  No dysdiadokinesia.  Reflexes: 2+ in bilateral UE/LE, downgoing toes bilaterally. (-) Garcia.  Gait: Narrow and steady. No ataxia.  Romberg negative    Medications  acetaminophen    Suspension .. 650 milliGRAM(s) Oral every 6 hours PRN  acetaminophen   Tablet .. 650 milliGRAM(s) Oral every 6 hours PRN  lactated ringers. 1000 milliLiter(s) IV Continuous <Continuous>  oxybutynin 5 milliGRAM(s) Oral two times a day  pantoprazole Infusion 8 mG/Hr IV Continuous <Continuous>  pregabalin 150 milliGRAM(s) Oral every 8 hours  ticagrelor 90 milliGRAM(s) Oral two times a day      Lab  08-20    137  |  99  |  10  ----------------------------<  109<H>  4.2   |  24  |  <0.5<L>    Ca    8.5      20 Aug 2019 11:18  Mg     2.1     08-20    TPro  6.9  /  Alb  2.8<L>  /  TBili  0.4  /  DBili  x   /  AST  17  /  ALT  10  /  AlkPhos  114  08-20                          7.7    11.01 )-----------( 644      ( 21 Aug 2019 06:02 )             24.6     LIVER FUNCTIONS - ( 20 Aug 2019 11:18 )  Alb: 2.8 g/dL / Pro: 6.9 g/dL / ALK PHOS: 114 U/L / ALT: 10 U/L / AST: 17 U/L / GGT: x                   Radiology      Assessment:    Plan: Neurology Consultation note    Name  ASHVIN CUEVAS    HPI:  89 YO F with a PMH of CVA (May 2019 with residual B/L deficit including right facial droop), HLD, hypothyroidism, and depression who was sent in by her visiting home nurse after noticing a significant drop in Hgb (13 to 8). Associated with generalized fatigue and black stools for the past x 2 weeks. The family denies any symptoms of ABD pain, hematemesis, N/V/D, or other symptoms at this time.     In the ED, the ED staff performed a guiac which was positive and noted that her Hgb here was 8.4. GI consulted and they want to perform endoscopy in the AM. Would like for Neuro to be consulted for necessity of brillinta treatment in the setting of recent stroke and likely upper GIB. The Ed started the pt on PPI drip and gave bolus of fluid (20 Aug 2019 13:55)      PATIENT ADM IN MAY and JUNE WITH MULTIPLE BILATERAL ACUTE CVA IN ANTERIOR AND POSTERIOR CIRCULATION  ALSO WITH SEVERE VERTEBROBASILAR DISEASE  PATIENT HAD 2 EVENTS WHILE ON PLAVIX AND ASA AND IN JUNE WAS CHANGED TO BRILINTA AND ASA      Vital Signs Last 24 Hrs  T(C): 37.1 (21 Aug 2019 05:15), Max: 37.5 (20 Aug 2019 22:54)  T(F): 98.8 (21 Aug 2019 05:15), Max: 99.5 (20 Aug 2019 22:54)  HR: 83 (21 Aug 2019 05:15) (78 - 88)  BP: 135/57 (21 Aug 2019 05:15) (100/43 - 135/57)  BP(mean): --  RR: 18 (21 Aug 2019 05:15) (16 - 20)  SpO2: 96% (20 Aug 2019 13:40) (96% - 96%)    Neurological Exam:   LETHARGIC, FOLLOWS SIMPLE COMMANDS DECREASED SPEECH OUTPUT  TRACE MOVEMENT OF ALL 4 EXTREMITIES  NO GROSS FOCALITY ON EXAM     Medications  acetaminophen    Suspension .. 650 milliGRAM(s) Oral every 6 hours PRN  acetaminophen   Tablet .. 650 milliGRAM(s) Oral every 6 hours PRN  lactated ringers. 1000 milliLiter(s) IV Continuous <Continuous>  oxybutynin 5 milliGRAM(s) Oral two times a day  pantoprazole Infusion 8 mG/Hr IV Continuous <Continuous>  pregabalin 150 milliGRAM(s) Oral every 8 hours  ticagrelor 90 milliGRAM(s) Oral two times a day      Lab  08-20    137  |  99  |  10  ----------------------------<  109<H>  4.2   |  24  |  <0.5<L>    Ca    8.5      20 Aug 2019 11:18  Mg     2.1     08-20    TPro  6.9  /  Alb  2.8<L>  /  TBili  0.4  /  DBili  x   /  AST  17  /  ALT  10  /  AlkPhos  114  08-20                          7.7    11.01 )-----------( 644      ( 21 Aug 2019 06:02 )             24.6     LIVER FUNCTIONS - ( 20 Aug 2019 11:18 )  Alb: 2.8 g/dL / Pro: 6.9 g/dL / ALK PHOS: 114 U/L / ALT: 10 U/L / AST: 17 U/L / GGT: x                   Radiology      Assessment:    Plan: Neurology Consultation note    Name  ASHVIN CUEVAS    HPI:  89 YO F with a PMH of CVA (May 2019 with residual B/L deficit including right facial droop), HLD, hypothyroidism, and depression who was sent in by her visiting home nurse after noticing a significant drop in Hgb (13 to 8). Associated with generalized fatigue and black stools for the past x 2 weeks. The family denies any symptoms of ABD pain, hematemesis, N/V/D, or other symptoms at this time.     In the ED, the ED staff performed a guiac which was positive and noted that her Hgb here was 8.4. GI consulted and they want to perform endoscopy in the AM. Would like for Neuro to be consulted for necessity of brillinta treatment in the setting of recent stroke and likely upper GIB. The Ed started the pt on PPI drip and gave bolus of fluid (20 Aug 2019 13:55)      PATIENT ADM IN MAY and JUNE WITH MULTIPLE BILATERAL ACUTE CVA IN ANTERIOR AND POSTERIOR CIRCULATION  ALSO WITH SEVERE VERTEBROBASILAR DISEASE  PATIENT HAD 2 EVENTS WHILE ON PLAVIX AND ASA AND IN JUNE WAS CHANGED TO BRILINTA AND ASA      Vital Signs Last 24 Hrs  T(C): 37.1 (21 Aug 2019 05:15), Max: 37.5 (20 Aug 2019 22:54)  T(F): 98.8 (21 Aug 2019 05:15), Max: 99.5 (20 Aug 2019 22:54)  HR: 83 (21 Aug 2019 05:15) (78 - 88)  BP: 135/57 (21 Aug 2019 05:15) (100/43 - 135/57)  BP(mean): --  RR: 18 (21 Aug 2019 05:15) (16 - 20)  SpO2: 96% (20 Aug 2019 13:40) (96% - 96%)    Neurological Exam:   LETHARGIC, FOLLOWS SIMPLE COMMANDS DECREASED SPEECH OUTPUT  TRACE MOVEMENT OF ALL 4 EXTREMITIES  NO GROSS FOCALITY ON EXAM     Medications  acetaminophen    Suspension .. 650 milliGRAM(s) Oral every 6 hours PRN  acetaminophen   Tablet .. 650 milliGRAM(s) Oral every 6 hours PRN  lactated ringers. 1000 milliLiter(s) IV Continuous <Continuous>  oxybutynin 5 milliGRAM(s) Oral two times a day  pantoprazole Infusion 8 mG/Hr IV Continuous <Continuous>  pregabalin 150 milliGRAM(s) Oral every 8 hours  ticagrelor 90 milliGRAM(s) Oral two times a day      Lab  08-20    137  |  99  |  10  ----------------------------<  109<H>  4.2   |  24  |  <0.5<L>    Ca    8.5      20 Aug 2019 11:18  Mg     2.1     08-20    TPro  6.9  /  Alb  2.8<L>  /  TBili  0.4  /  DBili  x   /  AST  17  /  ALT  10  /  AlkPhos  114  08-20                          7.7    11.01 )-----------( 644      ( 21 Aug 2019 06:02 )             24.6     LIVER FUNCTIONS - ( 20 Aug 2019 11:18 )  Alb: 2.8 g/dL / Pro: 6.9 g/dL / ALK PHOS: 114 U/L / ALT: 10 U/L / AST: 17 U/L / GGT: x                   Radiology      Assessment: 89 yo female with hx of recent cva in may and june which appeared cardio embolic but no etiology found.  patient also with severe VB disease.   now with GIB  was on brilinta and asa as she had failed asa and plavix twice    Plan:  cont to hold both  restart asa 325 mg if and when appropriate from GI standpoint  need to hold brilinta as risk > benefit at this time

## 2019-08-21 NOTE — CHART NOTE - NSCHARTNOTEFT_GEN_A_CORE
had discussion with family re: stopping brilinta  I explained to them the neurology and GI recommendations of holding brilinta  they understand the risks/benefits, but yet they still feel like she needs the brilinta  I told them that likely this would preclude pt from having EGD and they are ok with that  they would like pt to get brilinta tonight - ordered.  will monitor closely for further bleeding

## 2019-08-22 ENCOUNTER — LABORATORY RESULT (OUTPATIENT)
Age: 84
End: 2019-08-22

## 2019-08-22 ENCOUNTER — OUTPATIENT (OUTPATIENT)
Dept: OUTPATIENT SERVICES | Facility: HOSPITAL | Age: 84
LOS: 1 days | Discharge: HOME | End: 2019-08-22

## 2019-08-22 DIAGNOSIS — Z96.7 PRESENCE OF OTHER BONE AND TENDON IMPLANTS: Chronic | ICD-10-CM

## 2019-08-22 DIAGNOSIS — R79.89 OTHER SPECIFIED ABNORMAL FINDINGS OF BLOOD CHEMISTRY: ICD-10-CM

## 2019-08-22 DIAGNOSIS — Z02.9 ENCOUNTER FOR ADMINISTRATIVE EXAMINATIONS, UNSPECIFIED: ICD-10-CM

## 2019-08-22 LAB
ALBUMIN SERPL ELPH-MCNC: 2.6 G/DL — LOW (ref 3.5–5.2)
ALP SERPL-CCNC: 97 U/L — SIGNIFICANT CHANGE UP (ref 30–115)
ALT FLD-CCNC: 6 U/L — SIGNIFICANT CHANGE UP (ref 0–41)
ANION GAP SERPL CALC-SCNC: 13 MMOL/L — SIGNIFICANT CHANGE UP (ref 7–14)
APPEARANCE UR: ABNORMAL
AST SERPL-CCNC: 12 U/L — SIGNIFICANT CHANGE UP (ref 0–41)
BACTERIA # UR AUTO: ABNORMAL
BILIRUB SERPL-MCNC: 0.5 MG/DL — SIGNIFICANT CHANGE UP (ref 0.2–1.2)
BILIRUB UR-MCNC: NEGATIVE — SIGNIFICANT CHANGE UP
BUN SERPL-MCNC: 8 MG/DL — LOW (ref 10–20)
CALCIUM SERPL-MCNC: 8 MG/DL — LOW (ref 8.5–10.1)
CHLORIDE SERPL-SCNC: 100 MMOL/L — SIGNIFICANT CHANGE UP (ref 98–110)
CO2 SERPL-SCNC: 24 MMOL/L — SIGNIFICANT CHANGE UP (ref 17–32)
COLOR SPEC: YELLOW — SIGNIFICANT CHANGE UP
CREAT SERPL-MCNC: <0.5 MG/DL — LOW (ref 0.7–1.5)
DIFF PNL FLD: ABNORMAL
EPI CELLS # UR: ABNORMAL /HPF
GLUCOSE SERPL-MCNC: 98 MG/DL — SIGNIFICANT CHANGE UP (ref 70–99)
GLUCOSE UR QL: NEGATIVE MG/DL — SIGNIFICANT CHANGE UP
HCT VFR BLD CALC: 29.5 % — LOW (ref 37–47)
HGB BLD-MCNC: 9.5 G/DL — LOW (ref 12–16)
KETONES UR-MCNC: ABNORMAL
LEUKOCYTE ESTERASE UR-ACNC: ABNORMAL
MCHC RBC-ENTMCNC: 26.3 PG — LOW (ref 27–31)
MCHC RBC-ENTMCNC: 32.2 G/DL — SIGNIFICANT CHANGE UP (ref 32–37)
MCV RBC AUTO: 81.7 FL — SIGNIFICANT CHANGE UP (ref 81–99)
NITRITE UR-MCNC: POSITIVE
NRBC # BLD: 0 /100 WBCS — SIGNIFICANT CHANGE UP (ref 0–0)
PH UR: 6 — SIGNIFICANT CHANGE UP (ref 5–8)
PLATELET # BLD AUTO: 648 K/UL — HIGH (ref 130–400)
POTASSIUM SERPL-MCNC: 3.2 MMOL/L — LOW (ref 3.5–5)
POTASSIUM SERPL-SCNC: 3.2 MMOL/L — LOW (ref 3.5–5)
PROT SERPL-MCNC: 6.5 G/DL — SIGNIFICANT CHANGE UP (ref 6–8)
PROT UR-MCNC: 100 MG/DL
RBC # BLD: 3.61 M/UL — LOW (ref 4.2–5.4)
RBC # FLD: 15 % — HIGH (ref 11.5–14.5)
RBC CASTS # UR COMP ASSIST: ABNORMAL /HPF
SODIUM SERPL-SCNC: 137 MMOL/L — SIGNIFICANT CHANGE UP (ref 135–146)
SP GR SPEC: 1.02 — SIGNIFICANT CHANGE UP (ref 1.01–1.03)
UROBILINOGEN FLD QL: 1 MG/DL (ref 0.2–0.2)
WBC # BLD: 12.74 K/UL — HIGH (ref 4.8–10.8)
WBC # FLD AUTO: 12.74 K/UL — HIGH (ref 4.8–10.8)
WBC UR QL: ABNORMAL /HPF

## 2019-08-22 PROCEDURE — 99232 SBSQ HOSP IP/OBS MODERATE 35: CPT

## 2019-08-22 PROCEDURE — 99497 ADVNCD CARE PLAN 30 MIN: CPT | Mod: 25

## 2019-08-22 RX ORDER — PANTOPRAZOLE SODIUM 20 MG/1
40 TABLET, DELAYED RELEASE ORAL
Refills: 0 | Status: DISCONTINUED | OUTPATIENT
Start: 2019-08-22 | End: 2019-08-23

## 2019-08-22 RX ORDER — OXYCODONE AND ACETAMINOPHEN 5; 325 MG/1; MG/1
1 TABLET ORAL ONCE
Refills: 0 | Status: DISCONTINUED | OUTPATIENT
Start: 2019-08-22 | End: 2019-08-22

## 2019-08-22 RX ORDER — CIPROFLOXACIN LACTATE 400MG/40ML
250 VIAL (ML) INTRAVENOUS
Refills: 0 | Status: DISCONTINUED | OUTPATIENT
Start: 2019-08-22 | End: 2019-08-23

## 2019-08-22 RX ORDER — POTASSIUM CHLORIDE 20 MEQ
20 PACKET (EA) ORAL ONCE
Refills: 0 | Status: COMPLETED | OUTPATIENT
Start: 2019-08-22 | End: 2019-08-22

## 2019-08-22 RX ADMIN — Medication 150 MILLIGRAM(S): at 21:28

## 2019-08-22 RX ADMIN — SODIUM CHLORIDE 75 MILLILITER(S): 9 INJECTION, SOLUTION INTRAVENOUS at 13:47

## 2019-08-22 RX ADMIN — Medication 150 MILLIGRAM(S): at 13:47

## 2019-08-22 RX ADMIN — Medication 650 MILLIGRAM(S): at 16:55

## 2019-08-22 RX ADMIN — Medication 650 MILLIGRAM(S): at 16:58

## 2019-08-22 RX ADMIN — Medication 1 APPLICATION(S): at 11:22

## 2019-08-22 RX ADMIN — Medication 20 MILLIEQUIVALENT(S): at 11:24

## 2019-08-22 RX ADMIN — TICAGRELOR 90 MILLIGRAM(S): 90 TABLET ORAL at 17:00

## 2019-08-22 RX ADMIN — Medication 250 MILLIGRAM(S): at 21:28

## 2019-08-22 RX ADMIN — Medication 650 MILLIGRAM(S): at 06:11

## 2019-08-22 RX ADMIN — OXYCODONE AND ACETAMINOPHEN 1 TABLET(S): 5; 325 TABLET ORAL at 19:31

## 2019-08-22 RX ADMIN — OXYCODONE AND ACETAMINOPHEN 1 TABLET(S): 5; 325 TABLET ORAL at 20:00

## 2019-08-22 RX ADMIN — PANTOPRAZOLE SODIUM 10 MG/HR: 20 TABLET, DELAYED RELEASE ORAL at 05:30

## 2019-08-22 RX ADMIN — TICAGRELOR 90 MILLIGRAM(S): 90 TABLET ORAL at 05:28

## 2019-08-22 RX ADMIN — Medication 5 MILLIGRAM(S): at 05:27

## 2019-08-22 RX ADMIN — Medication 5 MILLIGRAM(S): at 16:59

## 2019-08-22 RX ADMIN — PANTOPRAZOLE SODIUM 40 MILLIGRAM(S): 20 TABLET, DELAYED RELEASE ORAL at 16:59

## 2019-08-22 RX ADMIN — Medication 150 MILLIGRAM(S): at 05:27

## 2019-08-22 RX ADMIN — SODIUM CHLORIDE 75 MILLILITER(S): 9 INJECTION, SOLUTION INTRAVENOUS at 05:30

## 2019-08-22 NOTE — DIETITIAN INITIAL EVALUATION ADULT. - OTHER INFO
89 year old female with hx of CVA ( may 2019) with residual B/L deficit with R facial droop, hypothyroid, depression, CHF, OA, neuropathy sent by [facility] for drop in Hgb with fatigue, guaic + in ED with hgb of 8. family does not want any GI work up to be done at this time. s/p 1 u PRBC. + UTI noted, started on cipro. 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 High protein high calorie food choices reviewed with family for optimal nutrition post d/c. SLP britney beneficial 2/2 recent CVA. pt is excepted to be discharged home in AM

## 2019-08-22 NOTE — DIETITIAN INITIAL EVALUATION ADULT. - PHYSICAL APPEARANCE
other (specify)/stage IV to sacrum loss of subcutaneous fat, mild muscle wasting noted to clavicle, temple region

## 2019-08-22 NOTE — PROGRESS NOTE ADULT - SUBJECTIVE AND OBJECTIVE BOX
TIANABERNIE KRAMERHELEN  89y, Female  Allergy: No Known Allergies    Hospital Day: 2d    Patient seen and examined earlier today.  Family at bedside, no overnight events.    PMH/PSH:  PAST MEDICAL & SURGICAL HISTORY:  Musa catheter in place  Diastolic heart failure  Neuropathy  Depression  OA (osteoarthritis)  Hypothyroid  HLD (hyperlipidemia)  S/P ORIF (open reduction internal fixation) fracture      VITALS:  T(F): 99.9 (08-22-19 @ 09:57), Max: 100.9 (08-22-19 @ 05:10)  HR: 84 (08-22-19 @ 05:10)  BP: 134/59 (08-22-19 @ 05:10) (127/62 - 175/75)  RR: 16 (08-22-19 @ 05:10)  SpO2: --    PHYSICAL EXAM:  GENERAL: NAD  HEENT: MMM  CVS:  RRR  CHEST/LUNG: Good air entry, no wheeze  ABD:  soft, NT/ND +bs  EXTREMITIES:  no edema  NEURO: awake, alert    TESTS & MEASUREMENTS:                        9.5    12.74 )-----------( 648      ( 22 Aug 2019 06:23 )             29.5     PT/INR - ( 21 Aug 2019 06:02 )   PT: 17.20 sec;   INR: 1.50 ratio         PTT - ( 21 Aug 2019 06:02 )  PTT:29.5 sec  08-22    137  |  100  |  8<L>  ----------------------------<  98  3.2<L>   |  24  |  <0.5<L>    Ca    8.0<L>      22 Aug 2019 06:23    TPro  6.5  /  Alb  2.6<L>  /  TBili  0.5  /  DBili  x   /  AST  12  /  ALT  6   /  AlkPhos  97  08-22    LIVER FUNCTIONS - ( 22 Aug 2019 06:23 )  Alb: 2.6 g/dL / Pro: 6.5 g/dL / ALK PHOS: 97 U/L / ALT: 6 U/L / AST: 12 U/L / GGT: x             RADIOLOGY & ADDITIONAL TESTS:  < from: Xray Chest 1 View-PORTABLE IMMEDIATE (08.20.19 @ 11:04) >    Support devices: None.    Cardiac/mediastinum/hilum: Stable    Lung parenchyma/Pleura: Prominent pulmonary vascular markings, in   appropriate clinical setting would represent pulmonary vascular   congestion. No evidence of pneumothorax or focal consolidation.    Skeleton/soft tissues: Unchanged    < end of copied text >      MEDICATIONS:  MEDICATIONS  (STANDING):  collagenase Ointment 1 Application(s) Topical daily  Dakins Solution - Full Strength 1 Application(s) Topical daily  lactated ringers. 1000 milliLiter(s) (75 mL/Hr) IV Continuous <Continuous>  oxybutynin 5 milliGRAM(s) Oral two times a day  pantoprazole Infusion 8 mG/Hr (10 mL/Hr) IV Continuous <Continuous>  pregabalin 150 milliGRAM(s) Oral every 8 hours  ticagrelor 90 milliGRAM(s) Oral two times a day    MEDICATIONS  (PRN):  acetaminophen    Suspension .. 650 milliGRAM(s) Oral every 6 hours PRN Temp greater or equal to 38C (100.4F)  acetaminophen   Tablet .. 650 milliGRAM(s) Oral every 6 hours PRN Temp greater or equal to 38C (100.4F)      HOME MEDICATIONS:  aspirin 81 mg oral tablet, chewable (08-20)  Lyrica 150 mg oral capsule (08-20)

## 2019-08-22 NOTE — PROGRESS NOTE ADULT - ASSESSMENT
89F with hx CVA on brilinta, bladder spasms, who presents with generalized fatigue, found to have anemia.    #possible UGIB  #acute blood loss anemia  pt had cva while on plavix and aspirin; recently changed to brilinta  per family discussion and request, they would like to keep her on brilinta and do not want to pursue EGD at this time  s/p 1U PRBC with improvement in Hb to 9.5  GI and neuro appreciated  allow to eat  change PPI to PO BID    #Hypoalbuminemia likely 2/2 poor oral intake  #Hx of CVA with residual deficits - continue brilinta,  PT  #bladder spasms   #chronic urinary retention w/ alicea  #stage IV sacral decub POA - continue local wound care, alicea for assistance in wound healing  #PPx - SCD    DNR/DNI see LORELEI    Progress Note Handoff  Pending:  dispo  Patient/Family discussion:  d/w daughters, agreeable to plan  Disposition: d/c home with 24hr home care tomorrow 89F with hx CVA on brilinta, bladder spasms, who presents with generalized fatigue, found to have anemia.    #possible UGIB  #acute blood loss anemia  pt had cva while on plavix and aspirin; recently changed to brilinta  per family discussion and request, they would like to keep her on brilinta and do not want to pursue EGD at this time  s/p 1U PRBC with improvement in Hb to 9.5  GI and neuro appreciated  allow to eat  change PPI to PO BID    #low grade T and mild leukocytosis - check UA/UCx	  #Hypoalbuminemia likely 2/2 poor oral intake  #Hx of CVA with residual deficits - continue brilinta,  PT  #bladder spasms   #chronic urinary retention w/ alicea  #stage IV sacral decub POA - continue local wound care, alicea for assistance in wound healing  #PPx - SCD    DNR/DNI see LORELEI    Progress Note Handoff  Pending:  dispo  Patient/Family discussion:  d/w daughters, agreeable to plan  Disposition: d/c home with 24hr home care tomorrow

## 2019-08-22 NOTE — DIETITIAN INITIAL EVALUATION ADULT. - 35 TO
[id number]
no nasal discharge/no hearing difficulty/no vertigo/no sinus symptoms/no nasal congestion/no ear pain

## 2019-08-22 NOTE — DIETITIAN INITIAL EVALUATION ADULT. - CONTINUE CURRENT NUTRITION CARE PLAN
maintain on present diet and supplements, continue 1;1 feed. monitor po intake, labs/meds, nutrition focused physical findings. RD to f/u in 1- 3 days/yes

## 2019-08-22 NOTE — CHART NOTE - NSCHARTNOTEFT_GEN_A_CORE
Upon Nutritional Assessment by the Registered Dietitian your patient was determined to meet criteria / has evidence of the following diagnosis/diagnoses:          [ ]  Mild Protein Calorie Malnutrition        [ ]  Moderate Protein Calorie Malnutrition        [X ] Severe Protein Calorie Malnutrition        [ ] Unspecified Protein Calorie Malnutrition        [X ] Underweight / BMI <19        [ ] Morbid Obesity / BMI > 40      Findings as based on:  •  Comprehensive nutrition assessment and consultation  BMI <18  <75% of estimated energy requirements for > 1 month  stage IV decubiti  loss of subcutaneous fat      Treatment:    The following diet has been recommended:  maintain on puree diet with ensure enlive 240 ml q 8hrs 1;1 feed    PROVIDER Section:     By signing this assessment you are acknowledging and agree with the diagnosis/diagnoses assigned by the Registered Dietitian    Comments:

## 2019-08-22 NOTE — GOALS OF CARE CONVERSATION - PERSONAL ADVANCE DIRECTIVE - CONVERSATION DETAILS
pt family carrying MOLST form from previous; reviewed.  continue DNR/DNI, comfort  no feeding tube  ok with IVF, IV abx

## 2019-08-23 ENCOUNTER — TRANSCRIPTION ENCOUNTER (OUTPATIENT)
Age: 84
End: 2019-08-23

## 2019-08-23 VITALS
HEART RATE: 84 BPM | RESPIRATION RATE: 16 BRPM | SYSTOLIC BLOOD PRESSURE: 147 MMHG | TEMPERATURE: 99 F | DIASTOLIC BLOOD PRESSURE: 66 MMHG

## 2019-08-23 LAB
ANION GAP SERPL CALC-SCNC: 14 MMOL/L — SIGNIFICANT CHANGE UP (ref 7–14)
BUN SERPL-MCNC: 9 MG/DL — LOW (ref 10–20)
CALCIUM SERPL-MCNC: 8.1 MG/DL — LOW (ref 8.5–10.1)
CHLORIDE SERPL-SCNC: 99 MMOL/L — SIGNIFICANT CHANGE UP (ref 98–110)
CO2 SERPL-SCNC: 24 MMOL/L — SIGNIFICANT CHANGE UP (ref 17–32)
CREAT SERPL-MCNC: <0.5 MG/DL — LOW (ref 0.7–1.5)
GLUCOSE SERPL-MCNC: 94 MG/DL — SIGNIFICANT CHANGE UP (ref 70–99)
HCT VFR BLD CALC: 30.9 % — LOW (ref 37–47)
HGB BLD-MCNC: 9.8 G/DL — LOW (ref 12–16)
MCHC RBC-ENTMCNC: 26.3 PG — LOW (ref 27–31)
MCHC RBC-ENTMCNC: 31.7 G/DL — LOW (ref 32–37)
MCV RBC AUTO: 82.8 FL — SIGNIFICANT CHANGE UP (ref 81–99)
NRBC # BLD: 0 /100 WBCS — SIGNIFICANT CHANGE UP (ref 0–0)
PLATELET # BLD AUTO: 596 K/UL — HIGH (ref 130–400)
POTASSIUM SERPL-MCNC: 3.5 MMOL/L — SIGNIFICANT CHANGE UP (ref 3.5–5)
POTASSIUM SERPL-SCNC: 3.5 MMOL/L — SIGNIFICANT CHANGE UP (ref 3.5–5)
RBC # BLD: 3.73 M/UL — LOW (ref 4.2–5.4)
RBC # FLD: 15.5 % — HIGH (ref 11.5–14.5)
SODIUM SERPL-SCNC: 137 MMOL/L — SIGNIFICANT CHANGE UP (ref 135–146)
WBC # BLD: 14.29 K/UL — HIGH (ref 4.8–10.8)
WBC # FLD AUTO: 14.29 K/UL — HIGH (ref 4.8–10.8)

## 2019-08-23 PROCEDURE — 99239 HOSP IP/OBS DSCHRG MGMT >30: CPT

## 2019-08-23 PROCEDURE — 70450 CT HEAD/BRAIN W/O DYE: CPT | Mod: 26

## 2019-08-23 RX ORDER — SODIUM HYPOCHLORITE 0.125 %
1 SOLUTION, NON-ORAL MISCELLANEOUS
Qty: 473 | Refills: 0
Start: 2019-08-23 | End: 2019-09-21

## 2019-08-23 RX ORDER — COLLAGENASE CLOSTRIDIUM HIST. 250 UNIT/G
1 OINTMENT (GRAM) TOPICAL
Qty: 90 | Refills: 0
Start: 2019-08-23 | End: 2019-09-21

## 2019-08-23 RX ORDER — PANTOPRAZOLE SODIUM 20 MG/1
1 TABLET, DELAYED RELEASE ORAL
Qty: 60 | Refills: 0
Start: 2019-08-23 | End: 2019-09-21

## 2019-08-23 RX ORDER — CIPROFLOXACIN LACTATE 400MG/40ML
1 VIAL (ML) INTRAVENOUS
Qty: 14 | Refills: 0
Start: 2019-08-23 | End: 2019-08-29

## 2019-08-23 RX ADMIN — Medication 5 MILLIGRAM(S): at 05:07

## 2019-08-23 RX ADMIN — Medication 1 APPLICATION(S): at 14:02

## 2019-08-23 RX ADMIN — Medication 1 APPLICATION(S): at 13:42

## 2019-08-23 RX ADMIN — TICAGRELOR 90 MILLIGRAM(S): 90 TABLET ORAL at 05:07

## 2019-08-23 RX ADMIN — PANTOPRAZOLE SODIUM 40 MILLIGRAM(S): 20 TABLET, DELAYED RELEASE ORAL at 05:07

## 2019-08-23 RX ADMIN — Medication 150 MILLIGRAM(S): at 14:31

## 2019-08-23 RX ADMIN — Medication 150 MILLIGRAM(S): at 05:07

## 2019-08-23 RX ADMIN — Medication 250 MILLIGRAM(S): at 05:07

## 2019-08-23 NOTE — DISCHARGE NOTE PROVIDER - NSDCHHNEEDSERVICE_GEN_ALL_CORE
Ostomy care and management/Catheter insertion/Rehabilitation services/Observation and assessment/Medication teaching and assessment

## 2019-08-23 NOTE — DISCHARGE NOTE NURSING/CASE MANAGEMENT/SOCIAL WORK - NSDCDPATPORTLINK_GEN_ALL_CORE
You can access the [facility] Patient Portal, offered by [facility], by registering with the following website: [email or web address]

## 2019-08-23 NOTE — DISCHARGE NOTE PROVIDER - CARE PROVIDER_API CALL
Yadira Sheldon)  Geriatric Medicine; Internal Medicine  [address] [place] [address]  Phone: [phone]  Fax: [phone]  Follow Up Time: 1 week

## 2019-08-23 NOTE — SWALLOW BEDSIDE ASSESSMENT ADULT - SWALLOW EVAL: DIAGNOSIS
high risk of aspiration due to dependency on eating, unable to provide any self care, patient with limited bed mobility as well. reviewed with her as she could not stay awake for assessment and staff report not dysphagia symptoms

## 2019-08-23 NOTE — DISCHARGE NOTE PROVIDER - HOSPITAL COURSE
89F with hx CVA on brilinta, bladder spasms, stage IV sacral ulcer, chronic indwelling alicea, who presents with generalized fatigue, found to have anemia.  There was concern for UGIB and GI was consulted for possible EGD, however, pt would need to be off brilinta for 5 days.  After discussion with family, they did not feel comfortable having pt off brilinta that long as pt has had multiple strokes while on aspirin and plavix.  They agreed for PRBC transfusion with improvement and increasing Hb.  No further bleeding and Hb has been stable.  PPI started.  During hospital stay, pt had low grade temp; UA done which was positive for UTI (cultures pending at time of discharge).  She was started on cipro pending culture results with improvement in fevers and her alicea was changed.  She has been fairly lethargic and family concerned about possibility of another stroke and requested CT head.  _______________.  She is DNR/DNI, see MOLST.  Pt has 24h home care and has visiting nurse and wound care appointments.          #possible UGIB    #acute blood loss anemia    #complicated UTI with chronic alicea    #Hypoalbuminemia likely 2/2 poor oral intake    #severe protein calorie malnutrition with low BMI 17    #Hx of CVA with residual deficits    #bladder spasms     #chronic urinary retention w/ alicea    #stage IV sacral decub POA - continue local wound care, alicea for assistance in wound healing        medically stable for discharge to home with home care    f/u PMD within 1 week            T(F): 98.6 (08-23-19 @ 06:00), Max: 99 (08-22-19 @ 22:17)    HR: 86 (08-23-19 @ 06:00)    BP: 151/77 (08-23-19 @ 06:00) (111/48 - 151/77)    RR: 16 (08-23-19 @ 06:00)    SpO2: --        GENERAL: NAD    HEENT: MMM    CHEST/LUNG: Good air entry, No wheezing    HEART: RRR, No murmurs    ABDOMEN: Soft, Bowel sounds present    EXTREMITIES:  no edema    NEURO: awake, alert though sleepy    skin:  stage IV sacral decub                                9.8      14.29 )-----------( 596      ( 23 Aug 2019 06:21 )               30.9         08-23        137  |  99  |  9<L>    ----------------------------<  94    3.5   |  24  |  <0.5<L>        Ca    8.1<L>      23 Aug 2019 06:21        TPro  6.5  /  Alb  2.6<L>  /  TBili  0.5  /  DBili  x   /  AST  12  /  ALT  6   /  AlkPhos  97  08-22                    This is only a brief summary of the pt's hospital course.  Further details outlined in the EMR.        Total time:  40 min 89F with hx CVA on brilinta, bladder spasms, stage IV sacral ulcer, chronic indwelling alicea, who presents with generalized fatigue, found to have anemia.  There was concern for UGIB and GI was consulted for possible EGD, however, pt would need to be off brilinta for 5 days.  After discussion with family, they did not feel comfortable having pt off brilinta that long as pt has had multiple strokes while on aspirin and plavix.  They agreed for PRBC transfusion with improvement and increasing Hb.  No further bleeding and Hb has been stable.  PPI started.  During hospital stay, pt had low grade temp; UA done which was positive for UTI (cultures pending at time of discharge).  She was started on cipro pending culture results with improvement in fevers and her alicea was changed.  She has been fairly lethargic and family concerned about possibility of another stroke and requested CT head, which was non-acute but showed old strokes.   She is DNR/DNI, see MOLST.  Pt has 24h home care and has visiting nurse and wound care appointments.  Overall prognosis is very poor.        #possible UGIB    #acute blood loss anemia    #complicated UTI with chronic alicea    #Hypoalbuminemia likely 2/2 poor oral intake    #severe protein calorie malnutrition with low BMI 17    #Hx of CVA with residual deficits    #bladder spasms     #chronic urinary retention w/ alicea    #stage IV sacral decub POA - continue local wound care, alicea for assistance in wound healing        medically stable for discharge to home with home care    f/u PMD within 1 week            T(F): 98.6 (08-23-19 @ 06:00), Max: 99 (08-22-19 @ 22:17)    HR: 86 (08-23-19 @ 06:00)    BP: 151/77 (08-23-19 @ 06:00) (111/48 - 151/77)    RR: 16 (08-23-19 @ 06:00)        GENERAL: NAD    HEENT: MMM    CHEST/LUNG: Good air entry, No wheezing    HEART: RRR, No murmurs    ABDOMEN: Soft, Bowel sounds present    EXTREMITIES:  no edema    NEURO: awake, alert though sleepy    skin:  stage IV sacral decub                                9.8      14.29 )-----------( 596      ( 23 Aug 2019 06:21 )               30.9         08-23        137  |  99  |  9<L>    ----------------------------<  94    3.5   |  24  |  <0.5<L>        Ca    8.1<L>      23 Aug 2019 06:21        TPro  6.5  /  Alb  2.6<L>  /  TBili  0.5  /  DBili  x   /  AST  12  /  ALT  6   /  AlkPhos  97  08-22                This is only a brief summary of the pt's hospital course.  Further details outlined in the EMR.        Total time:  40 min

## 2019-08-23 NOTE — DISCHARGE NOTE PROVIDER - NSDCCPCAREPLAN_GEN_ALL_CORE_FT
PRINCIPAL DISCHARGE DIAGNOSIS  Diagnosis: GI bleed  Assessment and Plan of Treatment: possible.  resolved.  monitor stools      SECONDARY DISCHARGE DIAGNOSES  Diagnosis: Weakness  Assessment and Plan of Treatment: likely due to UTI and overall condition    Diagnosis: Anemia  Assessment and Plan of Treatment: given 1 unit of blood, hemoglobin has been stable

## 2019-08-23 NOTE — SWALLOW BEDSIDE ASSESSMENT ADULT - ADDITIONAL RECOMMENDATIONS
NOT INDICATED FOR SYRINGE FEEDING! Discussed at length with patient's family. Reinforced positioning, small frequent meals, and oral hygiene. Provided information for one way valve straws and reinforced need to have support in community by an SLP or OT for adaptive feeding equipment. family

## 2019-08-24 LAB
CULTURE RESULTS: SIGNIFICANT CHANGE UP
SPECIMEN SOURCE: SIGNIFICANT CHANGE UP

## 2019-08-27 ENCOUNTER — APPOINTMENT (OUTPATIENT)
Dept: GERIATRICS | Facility: HOME HEALTH | Age: 84
End: 2019-08-27
Payer: MEDICARE

## 2019-08-27 DIAGNOSIS — L89.154 PRESSURE ULCER OF SACRAL REGION, STAGE 4: ICD-10-CM

## 2019-08-27 DIAGNOSIS — I10 ESSENTIAL (PRIMARY) HYPERTENSION: ICD-10-CM

## 2019-08-27 DIAGNOSIS — I63.9 CEREBRAL INFARCTION, UNSPECIFIED: ICD-10-CM

## 2019-08-27 DIAGNOSIS — K92.2 GASTROINTESTINAL HEMORRHAGE, UNSPECIFIED: ICD-10-CM

## 2019-08-27 PROCEDURE — 99496 TRANSJ CARE MGMT HIGH F2F 7D: CPT

## 2019-08-28 VITALS
RESPIRATION RATE: 18 BRPM | HEART RATE: 86 BPM | OXYGEN SATURATION: 94 % | SYSTOLIC BLOOD PRESSURE: 102 MMHG | DIASTOLIC BLOOD PRESSURE: 62 MMHG | TEMPERATURE: 98.4 F

## 2019-08-28 DIAGNOSIS — D62 ACUTE POSTHEMORRHAGIC ANEMIA: ICD-10-CM

## 2019-08-28 DIAGNOSIS — K92.2 GASTROINTESTINAL HEMORRHAGE, UNSPECIFIED: ICD-10-CM

## 2019-08-28 DIAGNOSIS — E88.09 OTHER DISORDERS OF PLASMA-PROTEIN METABOLISM, NOT ELSEWHERE CLASSIFIED: ICD-10-CM

## 2019-08-28 DIAGNOSIS — D72.829 ELEVATED WHITE BLOOD CELL COUNT, UNSPECIFIED: ICD-10-CM

## 2019-08-28 DIAGNOSIS — E78.5 HYPERLIPIDEMIA, UNSPECIFIED: ICD-10-CM

## 2019-08-28 DIAGNOSIS — G62.9 POLYNEUROPATHY, UNSPECIFIED: ICD-10-CM

## 2019-08-28 DIAGNOSIS — N32.89 OTHER SPECIFIED DISORDERS OF BLADDER: ICD-10-CM

## 2019-08-28 DIAGNOSIS — G45.0 VERTEBRO-BASILAR ARTERY SYNDROME: ICD-10-CM

## 2019-08-28 DIAGNOSIS — R33.9 RETENTION OF URINE, UNSPECIFIED: ICD-10-CM

## 2019-08-28 DIAGNOSIS — E43 UNSPECIFIED SEVERE PROTEIN-CALORIE MALNUTRITION: ICD-10-CM

## 2019-08-28 DIAGNOSIS — F32.9 MAJOR DEPRESSIVE DISORDER, SINGLE EPISODE, UNSPECIFIED: ICD-10-CM

## 2019-08-28 DIAGNOSIS — L89.154 PRESSURE ULCER OF SACRAL REGION, STAGE 4: ICD-10-CM

## 2019-08-28 DIAGNOSIS — M19.90 UNSPECIFIED OSTEOARTHRITIS, UNSPECIFIED SITE: ICD-10-CM

## 2019-08-28 DIAGNOSIS — Z66 DO NOT RESUSCITATE: ICD-10-CM

## 2019-08-28 DIAGNOSIS — R50.9 FEVER, UNSPECIFIED: ICD-10-CM

## 2019-08-28 DIAGNOSIS — Z86.73 PERSONAL HISTORY OF TRANSIENT ISCHEMIC ATTACK (TIA), AND CEREBRAL INFARCTION WITHOUT RESIDUAL DEFICITS: ICD-10-CM

## 2019-08-28 DIAGNOSIS — R33.8 OTHER RETENTION OF URINE: ICD-10-CM

## 2019-08-28 DIAGNOSIS — N39.0 URINARY TRACT INFECTION, SITE NOT SPECIFIED: ICD-10-CM

## 2019-08-28 DIAGNOSIS — R63.8 OTHER SYMPTOMS AND SIGNS CONCERNING FOOD AND FLUID INTAKE: ICD-10-CM

## 2019-08-28 DIAGNOSIS — Z96.0 PRESENCE OF UROGENITAL IMPLANTS: ICD-10-CM

## 2019-08-28 DIAGNOSIS — Z79.82 LONG TERM (CURRENT) USE OF ASPIRIN: ICD-10-CM

## 2019-08-28 DIAGNOSIS — I50.30 UNSPECIFIED DIASTOLIC (CONGESTIVE) HEART FAILURE: ICD-10-CM

## 2019-08-28 DIAGNOSIS — E03.9 HYPOTHYROIDISM, UNSPECIFIED: ICD-10-CM

## 2019-08-28 PROBLEM — I10 HTN (HYPERTENSION), BENIGN: Status: ACTIVE | Noted: 2019-05-01

## 2019-08-28 PROBLEM — I63.9 CVA (CEREBRAL VASCULAR ACCIDENT): Status: ACTIVE | Noted: 2019-08-28

## 2019-08-28 RX ORDER — NIFEDIPINE 30 MG/1
30 TABLET, EXTENDED RELEASE ORAL
Refills: 0 | Status: DISCONTINUED | COMMUNITY
End: 2019-08-28

## 2019-08-28 RX ORDER — MULTIVITAMIN
TABLET ORAL
Refills: 0 | Status: DISCONTINUED | COMMUNITY
End: 2019-08-28

## 2019-08-28 RX ORDER — OMEPRAZOLE 20 MG/1
20 CAPSULE, DELAYED RELEASE ORAL
Refills: 0 | Status: DISCONTINUED | COMMUNITY
End: 2019-08-28

## 2019-08-28 RX ORDER — LACTULOSE 10 G/15ML
10 SOLUTION ORAL
Refills: 0 | Status: DISCONTINUED | COMMUNITY
End: 2019-08-28

## 2019-08-28 RX ORDER — FUROSEMIDE 40 MG/1
40 TABLET ORAL
Refills: 0 | Status: DISCONTINUED | COMMUNITY
End: 2019-08-28

## 2019-08-28 RX ORDER — SIMVASTATIN 5 MG/1
5 TABLET, FILM COATED ORAL
Refills: 0 | Status: DISCONTINUED | COMMUNITY
End: 2019-08-28

## 2019-08-28 RX ORDER — FESOTERODINE FUMARATE 4 MG/1
4 TABLET, FILM COATED, EXTENDED RELEASE ORAL
Refills: 0 | Status: DISCONTINUED | COMMUNITY
End: 2019-08-28

## 2019-08-28 RX ORDER — FEXOFENADINE HYDROCHLORIDE 180 MG/1
180 TABLET, FILM COATED ORAL
Refills: 0 | Status: DISCONTINUED | COMMUNITY
End: 2019-08-28

## 2019-08-28 RX ORDER — LINACLOTIDE 72 UG/1
72 CAPSULE, GELATIN COATED ORAL
Refills: 0 | Status: DISCONTINUED | COMMUNITY
End: 2019-08-28

## 2019-08-28 RX ORDER — AZITHROMYCIN 250 MG/1
250 TABLET, FILM COATED ORAL
Qty: 6 | Refills: 0 | Status: DISCONTINUED | COMMUNITY
Start: 2019-05-07 | End: 2019-08-28

## 2019-08-28 RX ORDER — DULOXETINE HYDROCHLORIDE 20 MG/1
20 CAPSULE, DELAYED RELEASE ORAL
Refills: 0 | Status: DISCONTINUED | COMMUNITY
End: 2019-08-28

## 2019-08-28 RX ORDER — LISINOPRIL 5 MG/1
5 TABLET ORAL
Refills: 0 | Status: DISCONTINUED | COMMUNITY
End: 2019-08-28

## 2019-08-28 RX ORDER — DOCUSATE SODIUM 100 MG/1
100 CAPSULE ORAL
Refills: 0 | Status: DISCONTINUED | COMMUNITY
End: 2019-08-28

## 2019-08-28 RX ORDER — TICAGRELOR 90 MG/1
90 TABLET ORAL TWICE DAILY
Refills: 0 | Status: ACTIVE | COMMUNITY

## 2019-08-28 RX ORDER — FLUTICASONE PROPIONATE 50 MCG
50 SPRAY, SUSPENSION NASAL
Refills: 0 | Status: DISCONTINUED | COMMUNITY
End: 2019-08-28

## 2019-08-28 RX ORDER — ERGOCALCIFEROL (VITAMIN D2) 1250 MCG
50000 CAPSULE ORAL
Refills: 0 | Status: DISCONTINUED | COMMUNITY
End: 2019-08-28

## 2019-08-28 RX ORDER — PANTOPRAZOLE SODIUM 40 MG/1
40 TABLET, DELAYED RELEASE ORAL
Refills: 0 | Status: ACTIVE | COMMUNITY

## 2019-08-28 NOTE — HISTORY OF PRESENT ILLNESS
[FreeTextEntry1] : Patient seen and examined at home.  Patient is homebound.  HHA and Dtr present during visit.  patient s/p recent hospital admission, 8/20-8/23 2/2 decreased Hgb 2/2 possible UGIB.  Patient was evaluated by GI who wanted to do EGD but recommended holding Brilinta.  Neurology did not want to hold it 2/2 multiple recent CVA's.  Family discussion had with hospitalist and EGD was eventually refused.  patient started on PPI BID and received 1u PRBC's.  Hgb remained stable and patient discharged home.  Medications reviewed with hospital reconciliation and adjusted on medication list.  Finishing course of Cipro 2/2 UTI.  Levothyroxine not on d/c med list, other medications cut down and reconciled.  patient's medication list updated.  No acute complaints since discharge.  Currently following with wound care for large stage IV to sacrum.

## 2019-08-28 NOTE — ASSESSMENT
[FreeTextEntry1] : s/p recent hospitalization for probable UGIB and UTI\par - complete course of cipro as Rx'd on discharge\par - Monitor Hgb 9.8 on 8/23.  Will draw again 8/30 to confirm stability\par - c/w Protonix 40mg BID\par - family refusing EGD at this time\par - [facility], and will speak with them.  C/S placed through office\par \par Stage 4 Sacral Decubitus\par - c/w local wound care\par - wound care MD following\par - no s/s of infection\par \par Chronic UR\par - c/w Musa cath to gravity\par - change per current protocol\par \par Hypothyroid\par - patient not discharged on levothyroxine.  Review of hospital chart performed, it doesn't seem like that medication was listed as a home med.  Will resume and check TSH on next labs\par \par Multiple CVA's\par - c/w ASA and Brilinta\par - Hospice c/s as above

## 2019-08-28 NOTE — REASON FOR VISIT
[Post Hospitalization] : a post hospitalization visit [Formal Caregiver] : formal caregiver [Family Member] : family member

## 2019-08-28 NOTE — PHYSICAL EXAM
[General Appearance - In No Acute Distress] : in no acute distress [Sclera] : the sclera and conjunctiva were normal [Normal Oral Mucosa] : normal oral mucosa [No Oral Cyanosis] : no oral cyanosis [No Oral Pallor] : no oral pallor [Outer Ear] : the ears and nose were normal in appearance [Examination Of The Oral Cavity] : the lips and gums were normal [Neck Cervical Mass (___cm)] : no neck mass was observed [Jugular Venous Distention Increased] : there was no jugular-venous distention [] : no respiratory distress [Exaggerated Use Of Accessory Muscles For Inspiration] : no accessory muscle use [Respiration, Rhythm And Depth] : normal respiratory rhythm and effort [Heart Rate And Rhythm] : heart rate was normal and rhythm regular [Auscultation Breath Sounds / Voice Sounds] : lungs were clear to auscultation bilaterally [Heart Sounds] : normal S1 and S2 [Edema] : there was no peripheral edema [Abdomen Soft] : soft [Abdomen Tenderness] : non-tender [Nail Clubbing] : no clubbing  or cyanosis of the fingernails [Involuntary Movements] : no involuntary movements were seen [Affect] : the affect was normal [Mood] : the mood was normal [FreeTextEntry1] : lethargic

## 2019-09-03 ENCOUNTER — OUTPATIENT (OUTPATIENT)
Dept: OUTPATIENT SERVICES | Facility: HOSPITAL | Age: 84
LOS: 1 days | Discharge: HOME | End: 2019-09-03

## 2019-09-03 DIAGNOSIS — E03.9 HYPOTHYROIDISM, UNSPECIFIED: ICD-10-CM

## 2019-09-03 DIAGNOSIS — Z96.7 PRESENCE OF OTHER BONE AND TENDON IMPLANTS: Chronic | ICD-10-CM

## 2019-09-03 DIAGNOSIS — Z02.9 ENCOUNTER FOR ADMINISTRATIVE EXAMINATIONS, UNSPECIFIED: ICD-10-CM

## 2019-09-03 DIAGNOSIS — K92.2 GASTROINTESTINAL HEMORRHAGE, UNSPECIFIED: ICD-10-CM

## 2019-09-04 ENCOUNTER — RESULT REVIEW (OUTPATIENT)
Age: 84
End: 2019-09-04

## 2019-09-04 NOTE — BRIEF OPERATIVE NOTE - SPECIMENS
Quality 474: Zoster Vaccination Status: Shingrix Vaccination Administered or Previously Received
Quality 110: Preventive Care And Screening: Influenza Immunization: Influenza Immunization Administered during Influenza season
Quality 131: Pain Assessment And Follow-Up: Pain assessment NOT documented as being performed, documentation the patient is not eligible for a pain assessment using a standardized tool
Quality 226: Preventive Care And Screening: Tobacco Use: Screening And Cessation Intervention: Patient screened for tobacco use and is an ex/non-smoker
Quality 111:Pneumonia Vaccination Status For Older Adults: Pneumococcal Vaccination Previously Received
Detail Level: Detailed
none

## 2019-09-05 ENCOUNTER — LABORATORY RESULT (OUTPATIENT)
Age: 84
End: 2019-09-05

## 2019-09-05 ENCOUNTER — OUTPATIENT (OUTPATIENT)
Dept: OUTPATIENT SERVICES | Facility: HOSPITAL | Age: 84
LOS: 1 days | Discharge: HOME | End: 2019-09-05

## 2019-09-05 DIAGNOSIS — Z96.7 PRESENCE OF OTHER BONE AND TENDON IMPLANTS: Chronic | ICD-10-CM

## 2019-09-05 DIAGNOSIS — K92.2 GASTROINTESTINAL HEMORRHAGE, UNSPECIFIED: ICD-10-CM

## 2019-09-09 ENCOUNTER — RESULT REVIEW (OUTPATIENT)
Age: 84
End: 2019-09-09

## 2019-10-01 NOTE — DISCHARGE NOTE NURSING/CASE MANAGEMENT/SOCIAL WORK - NSDPLANG ASIS_GEN_ALL_CORE
EKG was performed.  :1962  AGE:57 year old  Ordering provider:   Diagnosis: I48.0 Paroxysmal atrial fibrillation (CMS/HCC)  (primary encounter diagnosis)  I47.1 Supraventricular tachycardia, s/p ablation  R00.2 Palpitations  Z79.899 High risk medication use  I10 Essential hypertension  E78.2 Mixed hyperlipidemia  Z79.899 Long term current use of antiarrhythmic drug    
Patient here for EKG form multaq. EKG shows SR HR 64bpm . Patient has no cardiac complaints at this time. Plan: Continue current cardiac management and follow up with Dr Romano as scheduled.      
No

## 2020-02-19 NOTE — PROGRESS NOTE ADULT - SUBJECTIVE AND OBJECTIVE BOX
TIANABERNIE KRAMERHELEN  89y, Female  Allergy: No Known Allergies    Hospital Day: 9d    Patient seen and examined at bedside this morning; sitting up in bed  -no events overnight  -d/c planning to STR   -frequent turning and skin care as per nursing     PMH/PSH:  PAST MEDICAL & SURGICAL HISTORY:  Diastolic heart failure  Neuropathy  Depression  OA (osteoarthritis)  Hypothyroid  HLD (hyperlipidemia)  S/P ORIF (open reduction internal fixation) fracture      VITALS:  T(F): 98.5 (06-01-19 @ 11:27), Max: 101.9 (05-31-19 @ 18:55)  HR: 80 (06-01-19 @ 05:17)  BP: 149/68 (06-01-19 @ 05:17) (139/66 - 149/68)  RR: 16 (06-01-19 @ 05:17)  SpO2: 96% (06-01-19 @ 06:46)    PHYSICAL EXAM:  GENERAL: NAD  HEENT: MMM  CVS:  RRR  CHEST/LUNG: Good air entry, no wheeze  ABD:  soft, NT/ND +bs  EXTREMITIES:  no edema  NEURO: AOx3, Rt hemiparesis    TESTS & MEASUREMENTS:                          10.7   10.87 )-----------( 407      ( 01 Jun 2019 06:28 )             33.8       06-01    138  |  102  |  11  ----------------------------<  115<H>  4.0   |  26  |  0.5<L>    Ca    8.9      01 Jun 2019 06:28  Mg     2.2     06-01    TPro  6.5  /  Alb  2.9<L>  /  TBili  0.3  /  DBili  x   /  AST  14  /  ALT  10  /  AlkPhos  94  06-01    LIVER FUNCTIONS - ( 01 Jun 2019 06:28 )  Alb: 2.9 g/dL / Pro: 6.5 g/dL / ALK PHOS: 94 U/L / ALT: 10 U/L / AST: 14 U/L / GGT: x             RADIOLOGY & ADDITIONAL TESTS:  < from: CT Head No Cont (05.26.19 @ 18:40) >  IMPRESSION:    In comparison with the prior noncontrast CT scan of the brain dated May   23, 2019:    Interval development of small foci of of diminished attenuation in the   left periventricular white matter and left basal ganglia, consistent with   new acute ischemic change.    Spoke with GINA RUFFIN on 5/27/2019 10:35 AM with readback.    < end of copied text >      MEDICATIONS  (STANDING):  aspirin  chewable 81 milliGRAM(s) Oral daily  chlorhexidine 4% Liquid 1 Application(s) Topical <User Schedule>  clopidogrel Tablet 75 milliGRAM(s) Oral daily  docusate sodium 100 milliGRAM(s) Oral three times a day  heparin  Injectable 5000 Unit(s) SubCutaneous every 12 hours  levothyroxine 25 MICROGram(s) Oral daily  multivitamin 1 Tablet(s) Oral daily  pantoprazole    Tablet 40 milliGRAM(s) Oral before breakfast  pregabalin 150 milliGRAM(s) Oral every 8 hours  senna 2 Tablet(s) Oral at bedtime  simvastatin 20 milliGRAM(s) Oral at bedtime    MEDICATIONS  (PRN):  acetaminophen   Tablet .. 650 milliGRAM(s) Oral every 8 hours PRN Mild Pain (1 - 3)  lactulose Syrup 20 Gram(s) Oral two times a day PRN constipation  oxyCODONE    5 mG/acetaminophen 325 mG 1 Tablet(s) Oral every 6 hours PRN Severe Pain (7 - 10)      HOME MEDICATIONS:  aspirin 81 mg oral tablet, chewable (05-23)  docusate sodium 100 mg oral capsule (05-23)  DULoxetine 20 mg oral delayed release capsule (05-23)  fesoterodine 4 mg oral tablet, extended release (05-23)  lactulose (05-23)  levothyroxine 25 mcg (0.025 mg) oral tablet (05-23)  Linzess 72 mcg oral capsule (05-23)  lisinopril 5 mg oral tablet (05-23)  Lyrica 150 mg oral capsule (05-23)  mirtazapine 15 mg oral tablet (05-23)  Multiple Vitamins oral tablet (05-23)  NIFEdipine 30 mg oral tablet, extended release (05-23)  omeprazole 20 mg oral delayed release capsule (05-23)  senna oral tablet (05-23)  simvastatin 5 mg oral tablet (05-23)  Toviaz (05-23)  Vitamin D3 50,000 intl units oral capsule (05-23) Ruddy Forbes)  Orthopaedic Surgery  [address], [address]  [place] [address]  Phone: [phone]  Fax: [phone]  Scheduled Appointment: 03/09/2020 11:00 AM

## 2020-02-27 NOTE — OCCUPATIONAL THERAPY INITIAL EVALUATION ADULT - VISUAL ASSESSMENT: VISUAL FIELD CUTS
Patient and spouse do not want home health services at this time upon discharge.   at this time/not observed

## 2020-05-12 NOTE — CONSULT NOTE ADULT - REASON FOR ADMISSION
INTERVAL HPI/OVERNIGHT EVENTS:  eating better  repeat c19 neg      MEDICATIONS  (STANDING):  collagenase Ointment 1 Application(s) Topical three times a day  metoprolol tartrate 50 milliGRAM(s) Oral two times a day  pantoprazole    Tablet 40 milliGRAM(s) Oral before breakfast  sertraline 100 milliGRAM(s) Oral daily  sodium chloride 0.65% Nasal 1 Spray(s) Both Nostrils two times a day  sucralfate suspension 1 Gram(s) Oral four times a day    MEDICATIONS  (PRN):      Allergies    No Known Allergies    Intolerances        Review of Systems:    unable to obtain     Vital Signs Last 24 Hrs  T(C): 36.6 (12 May 2020 07:44), Max: 36.7 (12 May 2020 04:17)  T(F): 97.8 (12 May 2020 07:44), Max: 98 (12 May 2020 04:17)  HR: 63 (12 May 2020 07:44) (63 - 70)  BP: 145/72 (12 May 2020 07:44) (106/64 - 152/76)  BP(mean): --  RR: 18 (12 May 2020 07:44) (18 - 19)  SpO2: 99% (12 May 2020 07:44) (97% - 99%)    PHYSICAL EXAM:    deferred f/u done remotely as covid +       LABS:                        9.3    8.42  )-----------( 242      ( 12 May 2020 07:01 )             29.7     05-12    138  |  105  |  25<H>  ----------------------------<  94  4.7   |  30  |  0.75    Ca    8.2<L>      12 May 2020 07:01    TPro  5.8<L>  /  Alb  2.1<L>  /  TBili  0.4  /  DBili  x   /  AST  31  /  ALT  49  /  AlkPhos  132<H>  05-12          RADIOLOGY & ADDITIONAL TESTS:
Stroke

## 2020-06-24 NOTE — DISCHARGE NOTE NURSING/CASE MANAGEMENT/SOCIAL WORK - NSDCPEPTSTROKESIGNS_GEN_ALL_CORE
Continue current management. Sudden numbness or weakness of the face, arm, or leg, especially on one side of the body. Confusion, trouble speaking or understanding. Trouble seeing in one or both eyes. Trouble walking, dizziness, loss of balance or coordination. Severe headache.

## 2020-09-03 NOTE — ED ADULT NURSE NOTE - NSFALLRSKASSESSDT_ED_ALL_ED
TRANSFER - OUT REPORT:    Verbal report given to Temi Naranjo RN on Melvina Gonzalez  being transferred to room 419 for routine progression of care       Report consisted of patients Situation, Background, Assessment and   Recommendations(SBAR). Information from the following report(s) OR Summary was reviewed with the receiving nurse. Lines:   Peripheral IV 09/03/20 Right Forearm (Active)   Site Assessment Clean, dry, & intact 09/03/20 1428   Phlebitis Assessment 0 09/03/20 1428   Infiltration Assessment 0 09/03/20 1428   Dressing Status Occlusive 09/03/20 1115   Dressing Type Transparent 09/03/20 1115   Hub Color/Line Status Pink 09/03/20 0705   Alcohol Cap Used Yes 09/03/20 0705        Opportunity for questions and clarification was provided.       Patient transported with:   O2 @ 2 liters 20-Aug-2019 10:58

## 2020-12-21 PROBLEM — J06.9 URI, ACUTE: Status: RESOLVED | Noted: 2019-05-07 | Resolved: 2020-12-21

## 2021-05-03 NOTE — PROGRESS NOTE ADULT - SUBJECTIVE AND OBJECTIVE BOX
Call placed to the patient per [facility] referral     After explanation of the program the patient is refusing at this time  Patient states he has had multiple back surgeries in the past  States he has done PT multiple times, seen Orthopedic surgery, Pain Management  , and neurosurgery  Patient was informed that this is what our program had to offer and patient stated he was not interested in starting all over again  Patient appreciative for the call  Referral Closed  ASHVIN CUEVAS  89y  Female    Patient is a 89y old  Female who presents with a chief complaint of Stroke (2019 10:37)      INTERVAL HPI/OVERNIGHT EVENTS:  No interval events.  No new complaints.      REVIEW OF SYSTEMS: UTO fully obtain due to AMS  CONSTITUTIONAL: No fever,+ fatigue  EYES: No visual disturbances  RESPIRATORY: No cough or shortness of breath  CARDIOVASCULAR: No chest pain  GASTROINTESTINAL: No abdominal pain. No nausea, vomiting.  NEUROLOGICAL: No headaches, +loss of strength      VITALS:  T(F): 96.2 (19 @ 11:11), Max: 98.4 (19 @ 14:53)  HR: 49 (19 @ 11:11) (42 - 62)  BP: 123/57 (19 @ 11:11) (105/47 - 149/58)  RR: 16 (19 @ 11:11) (15 - 20)  SpO2: 96% (19 @ 11:11) (96% - 100%)      CAPILLARY BLOOD GLUCOSE  POCT Blood Glucose.: 94 mg/dL (2019 14:01)      PHYSICAL EXAM:  GENERAL: NAD, well-developed  HEAD:  Atraumatic, Normocephalic  EYES: conjunctiva and sclera clear  ENMT: Moist mucous membranes  NECK: Supple, Normal thyroid  NERVOUS SYSTEM:  Alert & Oriented X 2, minimally verbal, left facial droop. Follows simple commands. LE 1/5, UE 3/5.  CHEST/LUNG: Clear to auscultation bilaterally.  HEART: Regular rate and rhythm; No murmurs, rubs, or gallops  ABDOMEN: Soft, Nontender, Nondistended; Bowel sounds present  EXTREMITIES:  2+ Peripheral Pulses, No clubbing, cyanosis, or edema  LYMPH: No lymphadenopathy noted  SKIN: Stage 3 sacral ulcer.    Consultant(s) Notes Reviewed:  [x ] YES  [ ] NO  Care Discussed with Consultants/Other Providers [ x] YES  [ ] NO    LABS:                        9.9    6.20  )-----------( 249      ( 2019 09:09 )             30.8           135  |  106  |  28<H>  ----------------------------<  91  4.0   |  21  |  0.6<L>    Ca    7.9<L>      2019 09:09  Phos  3.2       Mg     1.8         TPro  6.0  /  Alb  3.1<L>  /  TBili  <0.2  /  DBili  x   /  AST  17  /  ALT  13  /  AlkPhos  75        Urinalysis Basic - ( 2019 09:02 )    Color: Yellow / Appearance: Clear / S.010 / pH: x  Gluc: x / Ketone: Negative  / Bili: Negative / Urobili: 0.2 mg/dL   Blood: x / Protein: 30 mg/dL / Nitrite: Negative   Leuk Esterase: Trace / RBC: 26-50 /HPF / WBC 6-10 /HPF   Sq Epi: x / Non Sq Epi: x / Bacteria: x      CARDIAC MARKERS ( 2019 14:38 )  x     / <0.01 ng/mL / x     / x     / x          MICROBIOLOGY: pending.      RADIOLOGY & ADDITIONAL TESTS:  CT Head No Cont (19 @ 16:42)   In comparison with the prior noncontrast CT scan of the brain dated May   26, 2019:    New focus of diminished attenuation in the right side of the martin   consistent with ischemic change, age indeterminant.      CT Abdomen and Pelvis w/ IV Cont (19 @ 16:42)   Intermediate focal infiltrativefindings in the anterior abdominal   omentum, 1.8 x 3.5 cm previously 0.9 x 2.6 cm). Differential diagnosis   includes infectious/inflammatory etiology versus hematoma (is there is   any associated trauma, correlate with the clinical findings).      Imaging Personally Reviewed:  [x] YES  [ ] NO    MEDICATIONS  (STANDING):  aspirin  chewable 81 milliGRAM(s) Oral daily  chlorhexidine 4% Liquid 1 Application(s) Topical two times a day  clopidogrel Tablet 75 milliGRAM(s) Oral daily  heparin  Injectable 5000 Unit(s) SubCutaneous every 12 hours  pantoprazole    Tablet 40 milliGRAM(s) Oral before breakfast  simvastatin 20 milliGRAM(s) Oral at bedtime  sodium chloride 0.9%. 1000 milliLiter(s) (50 mL/Hr) IV Continuous <Continuous>      MEDICATIONS  (PRN): None        Home Medications:  aspirin 81 mg oral tablet, chewable: 1 tab(s) orally once a day (2019 20:41)  fluticasone 50 mcg/inh inhalation powder:  (2019 20:41)  Lasix 40 mg oral tablet: 1 tab(s) orally once a day (2019 20:41)  lisinopril 5 mg oral tablet: 1 tab(s) orally once a day (2019 20:41)  Lyrica 150 mg oral capsule: 1 cap(s) orally every 8 hours (2019 20:41)  Pepcid 40 mg oral tablet: 1 tab(s) orally once a day (at bedtime) (2019 20:41)  Toviaz 4 mg oral tablet, extended release: 1 tab(s) orally once a day (2019 20:41)      HEALTH ISSUES - PROBLEM Dx:  CVA  Musa catheter in place  Diastolic heart failure  Neuropathy  Depression  OA (osteoarthritis)  Hypothyroid  HLD (hyperlipidemia)  S/P ORIF (open reduction internal fixation) fracture

## 2021-06-18 NOTE — PATIENT PROFILE ADULT - NSASFALLWHENOCCURRED_GEN_A_NUR
This patient contacted office for the following prescriptions to be filled:    Last office visit: 3/24/2021  Follow up appointment: 9/29/2021  Medication requested :   Requested Prescriptions     Pending Prescriptions Disp Refills    fenofibrate (LOFIBRA) 160 mg tablet 90 Tablet 1     Sig: TAKE 1 TABLET BY MOUTH EVERY DAY     PCP: Contreras Alanis  Mail order or Local pharmacy name [address] [phone]
this admission

## 2022-05-02 NOTE — ED PROVIDER NOTE - INTERPRETATION
normal sinus rhythm Bleeding that does not stop/Pain not relieved by Medications/Fever greater than (need to indicate Fahrenheit or Celsius)/Wound/Surgical Site with redness, or foul smelling discharge or pus/Nausea and vomiting that does not stop/Inability to tolerate liquids or foods

## 2022-06-03 NOTE — PATIENT PROFILE ADULT - ANY SIGNIFICANT CHANGE IN ABILITY TO PERFORM THE FOLLOWING ADL SINCE THE ONSET OF PRESENT ILLNESS?
Physical Therapy  9[facility]  Patient not seen in therapy.     Unavailable due to request no therapy today.      PT session attempted. Writer personally attempted to coordinate with patient multiple times this week regarding mobility and times to attempt therapy. Pt continues to refuse all mobility. S/o brought in part of pt's prosthetics; does not have the \"cuffs\" to utilize the prosthetics. Pt had told writer on multiple occasions that she would contact her s/o to bring in the cuffs. Pt also told writer multiple days \"they are going to be here today\" or \"he's bringing them tomorrow\". This date; pt questions regarding the cuffs and if she had called her s/o to bring them in. Pt stating \"no, I haven't called him\".       Multiple attempts this date for session. Per RN request, attempt after pt received unit of blood and coordinate with pain medication. Wound care in room to change sternal dressing and assess RLE wound. During afternoon attempt, pt found prone, intermittently pushing self up onto elbows stating \"my chest hurts too much. I'm in too much pain\". Offered assist with repositioning to supine for pain relief, pt refused. Offered assist to use sling to transfer to recline; pt refused. Offered assist with changing pt's linens and rolling right <> left for mobility; pt refused.     Will discharge PT orders at this time as pt has had multiple refusals despite fervent attempts by writer to coordinate times that would suit patient participation.         OBJECTIVE                  Documented in the chart in the following areas: Assessment.      Therapy procedure time and total treatment time can be found documented on the Time Entry flowsheet   no

## 2023-03-06 NOTE — SWALLOW BEDSIDE ASSESSMENT ADULT - SLP REFERRING PHYSICIAN
PHYSICAL / OCCUPATIONAL THERAPY - DAILY TREATMENT NOTE (updated )    Patient Name: Bijan Toscano    Date: 3/6/2023    : 2005  Insurance: Payor: [employer] / Plan: [employer] / Product Type: *No Product type* /      Patient  verified Yes     Visit #   Current / Total 2 12   Time   In / Out 1:30 2:25   Pain   In / Out 0 0   Subjective Functional Status/Changes: Pt reports she is getting her dilators in today. She used the smallest dilator and then her boyfriend used his finger as a dilator. She has been using the dilators daily over the past week. Changes to:  Meds, Allergies, Med Hx, Sx Hx? If yes, update Summary List no       TREATMENT AREA =  Other symptoms and signs involving the genitourinary system [R39.89]    OBJECTIVE    Therapeutic Procedures: Tx Min Billable or 1:1 Min (if diff from Tx Min) Procedure, Rationale, Specifics   10  64602 Self Care/Home Management (timed):  improve patient knowledge and understanding of using dilators, rationale for strengthening, breathing techniques/core engagement with transfers  to improve patient's ability to progress to PLOF and address remaining functional goals. (see flow sheet as applicable)     Details if applicable:       15  01998 Therapeutic Exercise (timed):  increase ROM, strength, coordination, balance, and proprioception to improve patient's ability to progress to PLOF and address remaining functional goals. (see flow sheet as applicable)     Details if applicable:     30  99119 Neuromuscular Re-Education (timed):  improve balance, coordination, kinesthetic sense, posture, core stability and proprioception to improve patient's ability to develop conscious control of individual muscles and awareness of position of extremities in order to progress to PLOF and address remaining functional goals.  (see flow sheet as applicable)     Details if applicable:            Details if applicable:            Details if applicable:     54   BC Totals Reminder: bill using total billable min of TIMED therapeutic procedures (example: do not include dry needle or estim unattended, both untimed codes, in totals to left)  8-22 min = 1 unit; 23-37 min = 2 units; 38-52 min = 3 units; 53-67 min = 4 units; 68-82 min = 5 units   Total Total     TOTAL TREATMENT TIME:        55     [x]  Patient Education billed concurrently with other procedures   [x] Review HEP    [] Progressed/Changed HEP, detail:    [] Other detail:       Objective Information/Functional Measures/Assessment    SLS L 19s R 30 B arch collapse   Squat right valgus     R valgus U squat     Left U squat   R u HR 3+ Left 2 reps     ASLR 3-/5 bilaterally     Pt presents with altered neuromuscular control in standing, poor eccentric control of gluts and feet. She also presents with weak core. These all could be contributing to overactive pelvic floor for stability. She was given exercises to work on core and gluts. She demonstrated these well, but showed moderate weakness during her reps. Patient will continue to benefit from skilled PT / OT services to modify and progress therapeutic interventions, analyze and address functional mobility deficits, analyze and address ROM deficits, analyze and address strength deficits, analyze and address soft tissue restrictions, analyze and cue for proper movement patterns, analyze and modify for postural abnormalities, analyze and address imbalance/dizziness, and instruct in home and community integration to address functional deficits and attain remaining goals. Progress toward goals / Updated goals:  []  See Progress Note/Recertification    Short term goals: To be achieved in 6 weeks[de-identified]  Patient will demonstrate proper dietary/fluid habits and urge suppression strategies that promote bladder and bowel health to aid reducing strain on the pelvic floor.   Status at al: Patient consuming varying amount of water 32-64 ounces and a generally healthy diet     Patient will report ability to use a 1/2\" vaginal  with pain no more than 4/10 to improve her QOL  Status at eval:  Patient did not tolerate palpation beyond the superficial layer        Patient will demonstrate improvement of current complaints evidenced by reporting moderate difficulty with sexual activities  in [address], Pelvic functional disability index score  Status at Eval: Pelvic functional disability , index = 17, extreme difficulty     Long term goals: To be achieved in 12 weeks[de-identified]     Patient will report ability to use a 1\" vaginal  with pain no more than 2/10 to allow her to use a tampon with minimal pain  Status at eval:  Patient did not tolerate palpation beyond the superficial layer     Patient will demonstrate improvement of current complaints evidenced by reporting minimal difficulty with sexual activities  in [address], Pelvic functional disability index score  Status at Eval: Pelvic functional disability , index = 17, extreme difficulty     Patient will demonstrate independence with management tools and exercise program that are beneficial for current condition in order to feel comfortable with Pelvic floor PT D/C and not fear exacerbation of current condition or symptoms returning.    Status at eval : patient fearful of return to exercise and unaware of what activities to avoid to avoid exacerbation of current condition  Current: pt is receiving dilators today 03/06/2023 progressing    PLAN  Yes  Continue plan of care  []  Upgrade activities as tolerated  []  Discharge due to :  []  Other:    Essie Resendez PT    3/6/2023    10:00 AM    Future Appointments   Date Time Provider Samson Vicente   3/6/2023  1:30 PM Essie Resendez PT [facility]   3/16/2023  1:30 PM Essie Resendez PT [facility]   3/21/2023  1:30 PM Tatianna Smith PT [facility]   3/30/2023  1:30 PM Tatianna Smith PT [facility] [place]rielle

## 2023-06-12 NOTE — ED PROVIDER NOTE - CPE EDP SKIN NORM
Pharmacy faxed in a request for prior authorization on:    Medication: Albuterol  Dosage: 108 (90 Base)  Quantity requested:  1  Pharmacy for prescription has been selected.    Initiation of prior authorization needed.     normal...

## 2023-06-18 NOTE — REASON FOR VISIT
chest pain [Follow-Up] : a follow-up visit [Formal Caregiver] : formal caregiver [Family Member] : family member

## 2024-12-04 NOTE — H&P ADULT - DOES THIS PATIENT HAVE A HISTORY OF OR HAS BEEN DX WITH HEART FAILURE?
[facility]     Usha Last Patient Status:  Emergency    4/10/1967 MRN [id number]   Location [facility] EMERGENCY DEPARTMENT Attending Delfino Gonzales MD   Hosp Day # 0 PCP Braulio Hoang MD     Chief Complaint: abdominal pain    Subjective:    History of Present Illness:     Usha Last is a 57 year old female with history of type 2 diabetes, hypertension, GERD, hypothyroidism presents the emergency room with abdominal pain.  Abdominal pain is in the epigastric region with nausea vomiting. m this started around 1:00 this morning .patient does state that the pain radiates up towards the chest and the back.  Pain is rated 8 out of 10.  Patient denies any exacerbating remitting factors.  No fevers, chills, diarrhea or constipation.  No hematochezia, melena, hematuria.  No shortness of breath, palpitations.    History/Other:    Past Medical History:  Past Medical History:    Abdominal hernia    Hiatal hernia    Atypical mole    Removed; not cancer    Belching    Mild    Blood in the stool    Last instance was summer 2022    Blurred vision    Diagnosed with dry eyes    Body piercing    Ears    Change in hair    Toenail fungal infection; also ridges from plaque psoriasis    Constipation    Use Citrucel daily to help    Easy bruising    Bruised with physical therapy massages    Eye disease    Vitreous degeneration; high myopia    Factor V Leiden (HCC)    GERD (gastroesophageal reflux disease)    Heart palpitations    Have had palpitations; wore monitor in 2019 but no issues found    Heartburn    Hemorrhoids    Internal and external    History of depression    Itch of skin    Allergic reaction to Terbinafine medicine    Pain with bowel movements    Occasionally when have diarrhea    Presence of other cardiac implants and grafts    Occular lenses (cataracts removed)    Pulmonary embolism (HCC)    Rash    Allergic reaction to Terbinafine medicine    Skin  blushing/flushing    With exertion    Sleep apnea    Mild    Sleep disturbance    Occasionally have trouble staying asleep    Thyroid disease    Grave’s disease in remission    Wears glasses    For reading; had Lasik and cataract surgery     Past Surgical History:   Past Surgical History:   Procedure Laterality Date    Colonoscopy  Last in 2018    Have had 4    Hysterectomy      May 2006    Needle biopsy left  2010    dr hinojosa's office    Other surgical history  2022    Thyroid and Parathyroid Gland Removal    Thyroidectomy  2023    [facility] - right lobe removed due to cancer; also had a parathyroid gland removed    Total abdom hysterectomy  May 24, 2006    Supracervical      Family History:   Family History   Problem Relation Age of Onset    Hypertension Mother             Heart Attack Mother             Colon Polyps Father     Stroke Maternal Grandfather             Breast Cancer Maternal Grandmother             Diabetes Paternal Grandfather             Colon Polyps Brother      Social History:    reports that she has never smoked. She has never used smokeless tobacco. She reports that she does not currently use alcohol. She reports that she does not use drugs.     Allergies: Allergies[1]    Medications:  Medications Ordered Prior to Encounter[2]    Review of Systems:   A comprehensive review of systems was completed.    Pertinent positives and negatives noted in the HPI.    Objective:   Physical Exam:    /85   Pulse 66   Temp 97.4 °F (36.3 °C) (Oral)   Resp (!) 3   Ht 5' 2\" (1.575 m)   Wt 215 lb (97.5 kg)   SpO2 92%   BMI 39.32 kg/m²   General: No acute distress, Alert  Respiratory: No rhonchi, no wheezes  Cardiovascular: S1, S2. Regular rate and rhythm  Abdomen: Soft, Non-tender, non-distended, positive bowel sounds  Neuro: No new focal deficits  Extremities: No edema      Results:    Labs:      Labs Last 24 Hours:    Recent Labs   Lab  12/04/24 0424   RBC 5.06   HGB 14.6   HCT 42.4   MCV 83.8   MCH 28.9   MCHC 34.4   RDW 13.8   NEPRELIM 11.58*   WBC 13.8*   .0       Recent Labs   Lab 12/04/24 0424   *   BUN 30*   CREATSERUM 1.18*   EGFRCR 54*   CA 10.7*   ALB 4.7      K 3.3*      CO2 22.0   ALKPHO 33*   AST 21   ALT 16   BILT 0.4   TP 8.1       No results found for: \"PT\", \"INR\"    Recent Labs   Lab 12/04/24 0424   TROPHS <3       No results for input(s): \"TROP\", \"PBNP\" in the last 168 hours.    No results for input(s): \"PCT\" in the last 168 hours.    Imaging: Imaging data reviewed in Epic.    Assessment & Plan:      # Biliary colic  - Will continue IVF  - continue IV pain meds  - General surgery on consult    # Hypertension  - Will continue on amlodipine.    # GERD  - Will continue on PPI.    # Hypothyroidism  - Will continue on levothyroxine.    # Prediabetes  -Will place on hyperglycemia protocol with correction factor insulin.    Plan of care discussed with patient at bedside    Elijah Black, DO    Supplementary Documentation:     The 21st Century Cures Act makes medical notes like these available to patients in the interest of transparency. Please be advised this is a medical document. Medical documents are intended to carry relevant information, facts as evident, and the clinical opinion of the practitioner. The medical note is intended as peer to peer communication and may appear blunt or direct. It is written in medical language and may contain abbreviations or verbiage that are unfamiliar.                                     [1]   Allergies  Allergen Reactions    Cat Hair Extract SWELLING     Causes throat itching, coughing and sneezing    Terbinafine HIVES, RASH, ITCHING and SWELLING    Erythromycin PAIN and NAUSEA AND VOMITING     Stomach pain    Amoxicillin-Pot Clavulanate RASH    Nitrofurantoin RASH   [2]   No current facility-administered medications on file prior to encounter.     Current  Outpatient Medications on File Prior to Encounter   Medication Sig Dispense Refill    amLODIPine 10 MG Oral Tab Take 1 tablet (10 mg total) by mouth daily. 90 tablet 1    omeprazole 20 MG Oral Capsule Delayed Release Take 1 capsule (20 mg total) by mouth before breakfast. 90 capsule 1    Blood Glucose Monitoring Suppl (ONETOUCH VERIO) w/Device Does not apply Kit 1 each daily. 1 kit 0    Glucose Blood (ONETOUCH VERIO) In Vitro Strip Test 2x daily 200 strip 0    OneTouch Delica Lancets 33G Does not apply Misc Test 2 x daily 200 each 0    levothyroxine 50 MCG Oral Tab Take 1 tablet (50 mcg total) by mouth before breakfast. 90 tablet 3    EPINEPHrine 0.3 MG/0.3ML Injection Solution Auto-injector Inject 1 mL (3.3333 each total) into the muscle daily.      Glycerin, PF, (OASIS TEARS PLUS PF) 0.22 % Ophthalmic Solution Apply to eye in the morning, at noon, in the evening, and at bedtime.      predniSONE 20 MG Oral Tab Take 2 tablets by mouth daily x 5 days 10 tablet 0    Cholecalciferol (VITAMIN D3) 50 MCG (2000 UT) Oral Chew Tab  (Patient not taking: Reported on 12/4/2024)      Methylcellulose, Laxative, (CITRUCEL) 500 MG Oral Tab Take 2,000 mg by mouth at bedtime. (Patient not taking: Reported on 12/4/2024)        yes